# Patient Record
Sex: FEMALE | Race: WHITE | NOT HISPANIC OR LATINO | Employment: OTHER | URBAN - METROPOLITAN AREA
[De-identification: names, ages, dates, MRNs, and addresses within clinical notes are randomized per-mention and may not be internally consistent; named-entity substitution may affect disease eponyms.]

---

## 2023-06-06 ENCOUNTER — NURSING HOME VISIT (OUTPATIENT)
Dept: POST ACUTE CARE | Facility: EXTERNAL LOCATION | Age: 68
End: 2023-06-06
Payer: COMMERCIAL

## 2023-06-06 DIAGNOSIS — I10 HYPERTENSION, UNSPECIFIED TYPE: ICD-10-CM

## 2023-06-06 DIAGNOSIS — E78.5 HYPERLIPIDEMIA, UNSPECIFIED HYPERLIPIDEMIA TYPE: ICD-10-CM

## 2023-06-06 DIAGNOSIS — F10.29 ALCOHOL DEPENDENCE WITH UNSPECIFIED ALCOHOL-INDUCED DISORDER (MULTI): ICD-10-CM

## 2023-06-06 DIAGNOSIS — F32.A DEPRESSION, UNSPECIFIED DEPRESSION TYPE: ICD-10-CM

## 2023-06-06 DIAGNOSIS — J18.9 PNEUMONIA DUE TO INFECTIOUS ORGANISM, UNSPECIFIED LATERALITY, UNSPECIFIED PART OF LUNG: ICD-10-CM

## 2023-06-06 DIAGNOSIS — J44.9 CHRONIC OBSTRUCTIVE PULMONARY DISEASE, UNSPECIFIED COPD TYPE (MULTI): ICD-10-CM

## 2023-06-06 DIAGNOSIS — R53.81 PHYSICAL DECONDITIONING: Primary | ICD-10-CM

## 2023-06-06 PROCEDURE — 99309 SBSQ NF CARE MODERATE MDM 30: CPT | Performed by: NURSE PRACTITIONER

## 2023-06-06 NOTE — LETTER
Patient: Rose Marie Toussaint  : 1955    Encounter Date: 2023    Patient ID: Rose Marie Toussaint is a 67 y.o. female who is acute skilled care being seen and evaluated for multiple medical problems.  [id number]   1955    /84   Pulse 82   Temp 36.7 °C (98.1 °F)   Resp 18   Wt 57.6 kg (127 lb)   SpO2 97%     Assessment/Plan  Problem List Items Addressed This Visit          Respiratory    Chronic obstructive pulmonary disease (CMS/HCC)     Smoking cessation  Nicotine 21 mg patch transdermal change daily          Pneumonia due to infectious organism     Afebrile   Received IV Vancomycin and azithromycin while hospitalized  Robitussin DM 10 mg by mouth every 6 hrs as needed             Circulatory    Hypertension     Atenolol 25 mg by mouth daily   Chlorthalidone 25 mg by mouth daily             Other    Hyperlipidemia     Pravastatin 20 mg by mouth every HS          Alcohol dependence with unspecified alcohol-induced disorder (CMS/HCC)     MVI one tablet by mouth daily   Thiamine 100 mg by mouth daily   Folic acid 1 mg by mouth daily          Depression     Bupropion 100 mg by mouth BID    Sertraline 200 mg by mouth daily   Doxepin 50 mg by mouth daily          Physical deconditioning - Primary     PT              HPI: Client admitted at [facility] from 2023- 2023 with the final diagnosis of COPD, PN, and delirium tremor/ETOH. Client received IV Azithromycin and Vancomycin for PN, CT scan showed ground glass appearance. Client received Solumedrol and BIPAP, COPD. Precedex gtt, MVI, Thiamine, and Folate for ETOH withdrawal/ delirium tremor. Client denies HA, dizziness, or lightheadedness. Denies CP, SOB, or increased edema. C/O persistent cough. RA. Denies abdominal pain, N/V, diarrhea, or constipation. Denies dysuria. Denies change in appetite. Denies insomnia. C/O left ankle pain, chronic- declines left ankle x-ray.     Review of Systems ROS as described in in HPI     Physical  Exam  Constitutional:       Appearance: Normal appearance.      Comments: Sitting at bedside    HENT:      Head: Normocephalic.      Mouth/Throat:      Mouth: Mucous membranes are moist.   Cardiovascular:      Rate and Rhythm: Normal rate and regular rhythm.   Pulmonary:      Effort: Pulmonary effort is normal.      Breath sounds: Normal breath sounds.      Comments: RA   C/O persistent cough   Abdominal:      General: Bowel sounds are normal.   Genitourinary:     Comments: Denies dysuria   Musculoskeletal:      Cervical back: Neck supple.      Comments: PT  Left lateral and medial ankle edema- client states chronic issue since H/O fracture, declines Left ankle xray    Skin:     General: Skin is warm and dry.   Neurological:      Mental Status: She is alert. Mental status is at baseline.   Psychiatric:         Mood and Affect: Mood normal.      Comments: Conversational                    Electronically Signed By: SARAHI Nelson   6/8/23  8:12 AM

## 2023-06-08 VITALS
OXYGEN SATURATION: 97 % | RESPIRATION RATE: 18 BRPM | WEIGHT: 127 LBS | SYSTOLIC BLOOD PRESSURE: 116 MMHG | DIASTOLIC BLOOD PRESSURE: 84 MMHG | TEMPERATURE: 98.1 F | HEART RATE: 82 BPM

## 2023-06-08 PROBLEM — F32.A DEPRESSION: Status: ACTIVE | Noted: 2023-06-08

## 2023-06-08 PROBLEM — F10.29 ALCOHOL DEPENDENCE WITH UNSPECIFIED ALCOHOL-INDUCED DISORDER (MULTI): Status: ACTIVE | Noted: 2023-06-08

## 2023-06-08 PROBLEM — J44.9 CHRONIC OBSTRUCTIVE PULMONARY DISEASE (MULTI): Status: ACTIVE | Noted: 2023-06-08

## 2023-06-08 PROBLEM — R53.81 PHYSICAL DECONDITIONING: Status: ACTIVE | Noted: 2023-06-08

## 2023-06-08 PROBLEM — J18.9 PNEUMONIA DUE TO INFECTIOUS ORGANISM: Status: ACTIVE | Noted: 2023-06-08

## 2023-06-08 PROBLEM — E78.5 HYPERLIPIDEMIA: Status: ACTIVE | Noted: 2023-06-08

## 2023-06-08 PROBLEM — I10 HYPERTENSION: Status: ACTIVE | Noted: 2023-06-08

## 2023-06-08 NOTE — PROGRESS NOTES
Patient ID: Rose Marie Toussaint is a 67 y.o. female who is acute skilled care being seen and evaluated for multiple medical problems.  [id number]   1955    /84   Pulse 82   Temp 36.7 °C (98.1 °F)   Resp 18   Wt 57.6 kg (127 lb)   SpO2 97%     Assessment/Plan  Problem List Items Addressed This Visit          Respiratory    Chronic obstructive pulmonary disease (CMS/HCC)     Smoking cessation  Nicotine 21 mg patch transdermal change daily          Pneumonia due to infectious organism     Afebrile   Received IV Vancomycin and azithromycin while hospitalized  Robitussin DM 10 mg by mouth every 6 hrs as needed             Circulatory    Hypertension     Atenolol 25 mg by mouth daily   Chlorthalidone 25 mg by mouth daily             Other    Hyperlipidemia     Pravastatin 20 mg by mouth every HS          Alcohol dependence with unspecified alcohol-induced disorder (CMS/HCC)     MVI one tablet by mouth daily   Thiamine 100 mg by mouth daily   Folic acid 1 mg by mouth daily          Depression     Bupropion 100 mg by mouth BID    Sertraline 200 mg by mouth daily   Doxepin 50 mg by mouth daily          Physical deconditioning - Primary     PT              HPI: Client admitted at [facility] from 5/23/2023- 5/31/2023 with the final diagnosis of COPD, PN, and delirium tremor/ETOH. Client received IV Azithromycin and Vancomycin for PN, CT scan showed ground glass appearance. Client received Solumedrol and BIPAP, COPD. Precedex gtt, MVI, Thiamine, and Folate for ETOH withdrawal/ delirium tremor. Client denies HA, dizziness, or lightheadedness. Denies CP, SOB, or increased edema. C/O persistent cough. RA. Denies abdominal pain, N/V, diarrhea, or constipation. Denies dysuria. Denies change in appetite. Denies insomnia. C/O left ankle pain, chronic- declines left ankle x-ray.     Review of Systems ROS as described in in HPI     Physical Exam  Constitutional:       Appearance: Normal appearance.      Comments: Sitting at  bedside    HENT:      Head: Normocephalic.      Mouth/Throat:      Mouth: Mucous membranes are moist.   Cardiovascular:      Rate and Rhythm: Normal rate and regular rhythm.   Pulmonary:      Effort: Pulmonary effort is normal.      Breath sounds: Normal breath sounds.      Comments: RA   C/O persistent cough   Abdominal:      General: Bowel sounds are normal.   Genitourinary:     Comments: Denies dysuria   Musculoskeletal:      Cervical back: Neck supple.      Comments: PT  Left lateral and medial ankle edema- client states chronic issue since H/O fracture, declines Left ankle xray    Skin:     General: Skin is warm and dry.   Neurological:      Mental Status: She is alert. Mental status is at baseline.   Psychiatric:         Mood and Affect: Mood normal.      Comments: Conversational

## 2023-06-08 NOTE — ASSESSMENT & PLAN NOTE
Afebrile   Received IV Vancomycin and azithromycin while hospitalized  Robitussin DM 10 mg by mouth every 6 hrs as needed

## 2023-10-17 ENCOUNTER — HOSPITAL ENCOUNTER (OUTPATIENT)
Dept: RESPIRATORY THERAPY | Facility: HOSPITAL | Age: 68
End: 2023-10-17
Payer: COMMERCIAL

## 2023-10-18 ENCOUNTER — APPOINTMENT (OUTPATIENT)
Dept: RESPIRATORY THERAPY | Facility: HOSPITAL | Age: 68
End: 2023-10-18
Payer: COMMERCIAL

## 2023-10-18 ENCOUNTER — APPOINTMENT (OUTPATIENT)
Dept: PULMONOLOGY | Facility: CLINIC | Age: 68
End: 2023-10-18
Payer: COMMERCIAL

## 2023-10-18 PROBLEM — R06.09 DOE (DYSPNEA ON EXERTION): Status: ACTIVE | Noted: 2023-10-18

## 2023-10-18 RX ORDER — BUSPIRONE HYDROCHLORIDE 30 MG/1
30 TABLET ORAL 2 TIMES DAILY
COMMUNITY

## 2023-10-18 RX ORDER — PRAVASTATIN SODIUM 20 MG/1
1 TABLET ORAL DAILY
COMMUNITY

## 2023-10-18 RX ORDER — POTASSIUM CHLORIDE 750 MG/1
10 TABLET, FILM COATED, EXTENDED RELEASE ORAL DAILY
Status: ON HOLD | COMMUNITY
End: 2024-04-28

## 2023-10-18 RX ORDER — ATENOLOL 25 MG/1
1 TABLET ORAL DAILY
COMMUNITY

## 2023-10-18 RX ORDER — SERTRALINE HYDROCHLORIDE 100 MG/1
200 TABLET, FILM COATED ORAL DAILY
COMMUNITY

## 2023-10-18 RX ORDER — DILTIAZEM HYDROCHLORIDE 240 MG/1
240 CAPSULE, COATED, EXTENDED RELEASE ORAL
COMMUNITY

## 2023-10-18 RX ORDER — CHLORTHALIDONE 25 MG/1
25 TABLET ORAL
COMMUNITY

## 2023-10-18 RX ORDER — BUPROPION HYDROCHLORIDE 100 MG/1
100 TABLET ORAL 2 TIMES DAILY
COMMUNITY

## 2023-10-18 RX ORDER — DOXEPIN HYDROCHLORIDE 50 MG/1
50 CAPSULE ORAL NIGHTLY
COMMUNITY
End: 2024-04-29 | Stop reason: HOSPADM

## 2023-10-18 RX ORDER — ALBUTEROL SULFATE 90 UG/1
2 AEROSOL, METERED RESPIRATORY (INHALATION) EVERY 6 HOURS PRN
COMMUNITY

## 2023-10-19 ENCOUNTER — HOSPITAL ENCOUNTER (OUTPATIENT)
Dept: RESPIRATORY THERAPY | Facility: HOSPITAL | Age: 68
End: 2023-10-19
Payer: COMMERCIAL

## 2023-10-19 ENCOUNTER — APPOINTMENT (OUTPATIENT)
Dept: PULMONOLOGY | Facility: CLINIC | Age: 68
End: 2023-10-19
Payer: COMMERCIAL

## 2023-10-25 ENCOUNTER — APPOINTMENT (OUTPATIENT)
Dept: RESPIRATORY THERAPY | Facility: HOSPITAL | Age: 68
End: 2023-10-25
Payer: COMMERCIAL

## 2023-11-06 ENCOUNTER — APPOINTMENT (OUTPATIENT)
Dept: RESPIRATORY THERAPY | Facility: HOSPITAL | Age: 68
End: 2023-11-06
Payer: COMMERCIAL

## 2023-11-08 ENCOUNTER — APPOINTMENT (OUTPATIENT)
Dept: PULMONOLOGY | Facility: CLINIC | Age: 68
End: 2023-11-08
Payer: COMMERCIAL

## 2024-04-14 ENCOUNTER — APPOINTMENT (OUTPATIENT)
Dept: RADIOLOGY | Facility: HOSPITAL | Age: 69
DRG: 560 | End: 2024-04-14
Payer: COMMERCIAL

## 2024-04-14 ENCOUNTER — HOSPITAL ENCOUNTER (INPATIENT)
Facility: HOSPITAL | Age: 69
LOS: 2 days | Discharge: ANOTHER HOSPITAL | DRG: 560 | End: 2024-04-16
Attending: STUDENT IN AN ORGANIZED HEALTH CARE EDUCATION/TRAINING PROGRAM | Admitting: STUDENT IN AN ORGANIZED HEALTH CARE EDUCATION/TRAINING PROGRAM
Payer: COMMERCIAL

## 2024-04-14 DIAGNOSIS — E78.5 HYPERLIPIDEMIA, UNSPECIFIED HYPERLIPIDEMIA TYPE: ICD-10-CM

## 2024-04-14 DIAGNOSIS — F10.10 ALCOHOL ABUSE: ICD-10-CM

## 2024-04-14 DIAGNOSIS — K59.00 CONSTIPATION, UNSPECIFIED CONSTIPATION TYPE: ICD-10-CM

## 2024-04-14 DIAGNOSIS — I73.9 PERIPHERAL VASCULAR DISEASE, UNSPECIFIED (CMS-HCC): ICD-10-CM

## 2024-04-14 DIAGNOSIS — Z72.0 NICOTINE ABUSE: ICD-10-CM

## 2024-04-14 DIAGNOSIS — M00.9: Primary | ICD-10-CM

## 2024-04-14 DIAGNOSIS — I10 HYPERTENSION, UNSPECIFIED TYPE: ICD-10-CM

## 2024-04-14 DIAGNOSIS — G47.00 INSOMNIA, UNSPECIFIED TYPE: ICD-10-CM

## 2024-04-14 DIAGNOSIS — R11.0 NAUSEA: ICD-10-CM

## 2024-04-14 DIAGNOSIS — J44.9 CHRONIC OBSTRUCTIVE PULMONARY DISEASE, UNSPECIFIED COPD TYPE (MULTI): ICD-10-CM

## 2024-04-14 DIAGNOSIS — Z29.9 ENCOUNTER FOR DEEP VEIN THROMBOSIS (DVT) PROPHYLAXIS: ICD-10-CM

## 2024-04-14 DIAGNOSIS — F32.A DEPRESSION, UNSPECIFIED DEPRESSION TYPE: ICD-10-CM

## 2024-04-14 LAB
ALBUMIN SERPL BCP-MCNC: 3.2 G/DL (ref 3.4–5)
ANION GAP SERPL CALC-SCNC: 12 MMOL/L (ref 10–20)
BUN SERPL-MCNC: 22 MG/DL (ref 6–23)
CALCIUM SERPL-MCNC: 9.9 MG/DL (ref 8.6–10.3)
CHLORIDE SERPL-SCNC: 93 MMOL/L (ref 98–107)
CO2 SERPL-SCNC: 31 MMOL/L (ref 21–32)
CREAT SERPL-MCNC: 1.02 MG/DL (ref 0.5–1.05)
EGFRCR SERPLBLD CKD-EPI 2021: 61 ML/MIN/1.73M*2
ERYTHROCYTE [DISTWIDTH] IN BLOOD BY AUTOMATED COUNT: 12.8 % (ref 11.5–14.5)
GLUCOSE SERPL-MCNC: 88 MG/DL (ref 74–99)
HCT VFR BLD AUTO: 35.5 % (ref 36–46)
HGB BLD-MCNC: 11.9 G/DL (ref 12–16)
LACTATE SERPL-SCNC: 0.5 MMOL/L (ref 0.4–2)
MAGNESIUM SERPL-MCNC: 1.38 MG/DL (ref 1.6–2.4)
MCH RBC QN AUTO: 32.2 PG (ref 26–34)
MCHC RBC AUTO-ENTMCNC: 33.5 G/DL (ref 32–36)
MCV RBC AUTO: 96 FL (ref 80–100)
NRBC BLD-RTO: 0 /100 WBCS (ref 0–0)
PHOSPHATE SERPL-MCNC: 3.4 MG/DL (ref 2.5–4.9)
PLATELET # BLD AUTO: 410 X10*3/UL (ref 150–450)
POTASSIUM SERPL-SCNC: 4.4 MMOL/L (ref 3.5–5.3)
RBC # BLD AUTO: 3.69 X10*6/UL (ref 4–5.2)
SODIUM SERPL-SCNC: 132 MMOL/L (ref 136–145)
WBC # BLD AUTO: 14.6 X10*3/UL (ref 4.4–11.3)

## 2024-04-14 PROCEDURE — 80069 RENAL FUNCTION PANEL: CPT | Performed by: INTERNAL MEDICINE

## 2024-04-14 PROCEDURE — 1100000001 HC PRIVATE ROOM DAILY

## 2024-04-14 PROCEDURE — 96368 THER/DIAG CONCURRENT INF: CPT

## 2024-04-14 PROCEDURE — 96376 TX/PRO/DX INJ SAME DRUG ADON: CPT

## 2024-04-14 PROCEDURE — 2500000004 HC RX 250 GENERAL PHARMACY W/ HCPCS (ALT 636 FOR OP/ED)

## 2024-04-14 PROCEDURE — 36415 COLL VENOUS BLD VENIPUNCTURE: CPT | Performed by: INTERNAL MEDICINE

## 2024-04-14 PROCEDURE — 96367 TX/PROPH/DG ADDL SEQ IV INF: CPT

## 2024-04-14 PROCEDURE — 2500000002 HC RX 250 W HCPCS SELF ADMINISTERED DRUGS (ALT 637 FOR MEDICARE OP, ALT 636 FOR OP/ED): Mod: MUE | Performed by: INTERNAL MEDICINE

## 2024-04-14 PROCEDURE — 96365 THER/PROPH/DIAG IV INF INIT: CPT

## 2024-04-14 PROCEDURE — 2500000004 HC RX 250 GENERAL PHARMACY W/ HCPCS (ALT 636 FOR OP/ED): Performed by: STUDENT IN AN ORGANIZED HEALTH CARE EDUCATION/TRAINING PROGRAM

## 2024-04-14 PROCEDURE — 73610 X-RAY EXAM OF ANKLE: CPT | Mod: LEFT SIDE | Performed by: RADIOLOGY

## 2024-04-14 PROCEDURE — S4991 NICOTINE PATCH NONLEGEND: HCPCS

## 2024-04-14 PROCEDURE — 2500000004 HC RX 250 GENERAL PHARMACY W/ HCPCS (ALT 636 FOR OP/ED): Performed by: INTERNAL MEDICINE

## 2024-04-14 PROCEDURE — 83735 ASSAY OF MAGNESIUM: CPT | Performed by: INTERNAL MEDICINE

## 2024-04-14 PROCEDURE — G0378 HOSPITAL OBSERVATION PER HR: HCPCS

## 2024-04-14 PROCEDURE — 99223 1ST HOSP IP/OBS HIGH 75: CPT | Performed by: STUDENT IN AN ORGANIZED HEALTH CARE EDUCATION/TRAINING PROGRAM

## 2024-04-14 PROCEDURE — 2500000002 HC RX 250 W HCPCS SELF ADMINISTERED DRUGS (ALT 637 FOR MEDICARE OP, ALT 636 FOR OP/ED)

## 2024-04-14 PROCEDURE — 83605 ASSAY OF LACTIC ACID: CPT | Performed by: INTERNAL MEDICINE

## 2024-04-14 PROCEDURE — 73610 X-RAY EXAM OF ANKLE: CPT | Mod: LT

## 2024-04-14 PROCEDURE — 96375 TX/PRO/DX INJ NEW DRUG ADDON: CPT

## 2024-04-14 PROCEDURE — 2500000001 HC RX 250 WO HCPCS SELF ADMINISTERED DRUGS (ALT 637 FOR MEDICARE OP): Performed by: INTERNAL MEDICINE

## 2024-04-14 PROCEDURE — 84100 ASSAY OF PHOSPHORUS: CPT | Performed by: INTERNAL MEDICINE

## 2024-04-14 PROCEDURE — 2500000001 HC RX 250 WO HCPCS SELF ADMINISTERED DRUGS (ALT 637 FOR MEDICARE OP)

## 2024-04-14 PROCEDURE — 96366 THER/PROPH/DIAG IV INF ADDON: CPT

## 2024-04-14 PROCEDURE — 85027 COMPLETE CBC AUTOMATED: CPT | Performed by: INTERNAL MEDICINE

## 2024-04-14 RX ORDER — LORAZEPAM 2 MG/ML
1 INJECTION INTRAMUSCULAR EVERY 2 HOUR PRN
Status: DISCONTINUED | OUTPATIENT
Start: 2024-04-14 | End: 2024-04-16

## 2024-04-14 RX ORDER — ONDANSETRON HYDROCHLORIDE 2 MG/ML
4 INJECTION, SOLUTION INTRAVENOUS EVERY 8 HOURS PRN
Status: DISCONTINUED | OUTPATIENT
Start: 2024-04-14 | End: 2024-04-16 | Stop reason: HOSPADM

## 2024-04-14 RX ORDER — CHLORTHALIDONE 25 MG/1
25 TABLET ORAL DAILY
Status: DISCONTINUED | OUTPATIENT
Start: 2024-04-14 | End: 2024-04-16 | Stop reason: HOSPADM

## 2024-04-14 RX ORDER — ACETAMINOPHEN 500 MG
5 TABLET ORAL NIGHTLY PRN
Status: DISCONTINUED | OUTPATIENT
Start: 2024-04-14 | End: 2024-04-16 | Stop reason: HOSPADM

## 2024-04-14 RX ORDER — PRAVASTATIN SODIUM 40 MG/1
20 TABLET ORAL DAILY
Status: DISCONTINUED | OUTPATIENT
Start: 2024-04-14 | End: 2024-04-16 | Stop reason: HOSPADM

## 2024-04-14 RX ORDER — OXYCODONE HYDROCHLORIDE 10 MG/1
10 TABLET ORAL EVERY 6 HOURS PRN
Status: DISCONTINUED | OUTPATIENT
Start: 2024-04-14 | End: 2024-04-16 | Stop reason: HOSPADM

## 2024-04-14 RX ORDER — BUPROPION HYDROCHLORIDE 100 MG/1
100 TABLET ORAL 2 TIMES DAILY
Status: DISCONTINUED | OUTPATIENT
Start: 2024-04-14 | End: 2024-04-16 | Stop reason: HOSPADM

## 2024-04-14 RX ORDER — SERTRALINE HYDROCHLORIDE 50 MG/1
200 TABLET, FILM COATED ORAL DAILY
Status: DISCONTINUED | OUTPATIENT
Start: 2024-04-14 | End: 2024-04-16 | Stop reason: HOSPADM

## 2024-04-14 RX ORDER — OXYCODONE HYDROCHLORIDE 5 MG/1
5 TABLET ORAL EVERY 6 HOURS PRN
Status: DISCONTINUED | OUTPATIENT
Start: 2024-04-14 | End: 2024-04-16 | Stop reason: HOSPADM

## 2024-04-14 RX ORDER — LANOLIN ALCOHOL/MO/W.PET/CERES
100 CREAM (GRAM) TOPICAL DAILY
Status: DISCONTINUED | OUTPATIENT
Start: 2024-04-17 | End: 2024-04-16 | Stop reason: HOSPADM

## 2024-04-14 RX ORDER — ALBUTEROL SULFATE 90 UG/1
2 AEROSOL, METERED RESPIRATORY (INHALATION) EVERY 6 HOURS PRN
Status: DISCONTINUED | OUTPATIENT
Start: 2024-04-14 | End: 2024-04-16 | Stop reason: HOSPADM

## 2024-04-14 RX ORDER — FOLIC ACID 1 MG/1
1 TABLET ORAL DAILY
Status: DISCONTINUED | OUTPATIENT
Start: 2024-04-14 | End: 2024-04-16 | Stop reason: HOSPADM

## 2024-04-14 RX ORDER — POLYETHYLENE GLYCOL 3350 17 G/17G
17 POWDER, FOR SOLUTION ORAL DAILY PRN
Status: DISCONTINUED | OUTPATIENT
Start: 2024-04-14 | End: 2024-04-16 | Stop reason: HOSPADM

## 2024-04-14 RX ORDER — MULTIVIT-MIN/IRON FUM/FOLIC AC 7.5 MG-4
1 TABLET ORAL DAILY
Status: DISCONTINUED | OUTPATIENT
Start: 2024-04-14 | End: 2024-04-16 | Stop reason: HOSPADM

## 2024-04-14 RX ORDER — DILTIAZEM HYDROCHLORIDE 120 MG/1
240 CAPSULE, COATED, EXTENDED RELEASE ORAL DAILY
Status: DISCONTINUED | OUTPATIENT
Start: 2024-04-14 | End: 2024-04-16 | Stop reason: HOSPADM

## 2024-04-14 RX ORDER — IBUPROFEN 200 MG
1 TABLET ORAL DAILY
Status: DISCONTINUED | OUTPATIENT
Start: 2024-04-14 | End: 2024-04-16 | Stop reason: HOSPADM

## 2024-04-14 RX ORDER — BUSPIRONE HYDROCHLORIDE 15 MG/1
30 TABLET ORAL 2 TIMES DAILY
Status: DISCONTINUED | OUTPATIENT
Start: 2024-04-14 | End: 2024-04-16 | Stop reason: HOSPADM

## 2024-04-14 RX ORDER — ACETAMINOPHEN 325 MG/1
650 TABLET ORAL EVERY 4 HOURS PRN
Status: DISCONTINUED | OUTPATIENT
Start: 2024-04-14 | End: 2024-04-16 | Stop reason: HOSPADM

## 2024-04-14 RX ORDER — VANCOMYCIN HYDROCHLORIDE 1 G/20ML
INJECTION, POWDER, LYOPHILIZED, FOR SOLUTION INTRAVENOUS DAILY PRN
Status: DISCONTINUED | OUTPATIENT
Start: 2024-04-14 | End: 2024-04-16 | Stop reason: HOSPADM

## 2024-04-14 RX ORDER — MAGNESIUM SULFATE HEPTAHYDRATE 40 MG/ML
2 INJECTION, SOLUTION INTRAVENOUS ONCE
Status: COMPLETED | OUTPATIENT
Start: 2024-04-14 | End: 2024-04-14

## 2024-04-14 RX ORDER — ATENOLOL 25 MG/1
25 TABLET ORAL DAILY
Status: DISCONTINUED | OUTPATIENT
Start: 2024-04-14 | End: 2024-04-16 | Stop reason: HOSPADM

## 2024-04-14 RX ORDER — ENOXAPARIN SODIUM 100 MG/ML
40 INJECTION SUBCUTANEOUS EVERY 24 HOURS
Status: DISCONTINUED | OUTPATIENT
Start: 2024-04-14 | End: 2024-04-16 | Stop reason: HOSPADM

## 2024-04-14 RX ORDER — VANCOMYCIN HYDROCHLORIDE 1 G/200ML
1000 INJECTION, SOLUTION INTRAVENOUS EVERY 24 HOURS
Status: DISCONTINUED | OUTPATIENT
Start: 2024-04-14 | End: 2024-04-16

## 2024-04-14 RX ORDER — POLYETHYLENE GLYCOL 3350 17 G/17G
17 POWDER, FOR SOLUTION ORAL DAILY
Status: DISCONTINUED | OUTPATIENT
Start: 2024-04-14 | End: 2024-04-14

## 2024-04-14 RX ADMIN — BUSPIRONE HYDROCHLORIDE 30 MG: 15 TABLET ORAL at 08:16

## 2024-04-14 RX ADMIN — DILTIAZEM HYDROCHLORIDE 240 MG: 120 CAPSULE, COATED, EXTENDED RELEASE ORAL at 08:16

## 2024-04-14 RX ADMIN — CHLORTHALIDONE 25 MG: 25 TABLET ORAL at 09:24

## 2024-04-14 RX ADMIN — THIAMINE HYDROCHLORIDE 500 MG: 100 INJECTION, SOLUTION INTRAMUSCULAR; INTRAVENOUS at 08:19

## 2024-04-14 RX ADMIN — PRAVASTATIN SODIUM 20 MG: 40 TABLET ORAL at 08:15

## 2024-04-14 RX ADMIN — OXYCODONE HYDROCHLORIDE 5 MG: 5 TABLET ORAL at 15:51

## 2024-04-14 RX ADMIN — ENOXAPARIN SODIUM 40 MG: 40 INJECTION SUBCUTANEOUS at 09:23

## 2024-04-14 RX ADMIN — FOLIC ACID 1 MG: 1 TABLET ORAL at 08:15

## 2024-04-14 RX ADMIN — OXYCODONE HYDROCHLORIDE 10 MG: 10 TABLET ORAL at 09:23

## 2024-04-14 RX ADMIN — HYDROMORPHONE HYDROCHLORIDE 0.2 MG: 1 INJECTION, SOLUTION INTRAMUSCULAR; INTRAVENOUS; SUBCUTANEOUS at 11:42

## 2024-04-14 RX ADMIN — NICOTINE 1 PATCH: 14 PATCH, EXTENDED RELEASE TRANSDERMAL at 15:51

## 2024-04-14 RX ADMIN — VANCOMYCIN HYDROCHLORIDE 1000 MG: 1 INJECTION, SOLUTION INTRAVENOUS at 11:42

## 2024-04-14 RX ADMIN — Medication 1 TABLET: at 08:15

## 2024-04-14 RX ADMIN — HYDROMORPHONE HYDROCHLORIDE 0.2 MG: 1 INJECTION, SOLUTION INTRAMUSCULAR; INTRAVENOUS; SUBCUTANEOUS at 15:02

## 2024-04-14 RX ADMIN — BUPROPION HYDROCHLORIDE 100 MG: 100 TABLET, FILM COATED ORAL at 08:15

## 2024-04-14 RX ADMIN — LORAZEPAM 1 MG: 2 INJECTION INTRAMUSCULAR; INTRAVENOUS at 15:51

## 2024-04-14 RX ADMIN — THIAMINE HYDROCHLORIDE 500 MG: 100 INJECTION, SOLUTION INTRAMUSCULAR; INTRAVENOUS at 14:03

## 2024-04-14 RX ADMIN — BUSPIRONE HYDROCHLORIDE 30 MG: 15 TABLET ORAL at 20:08

## 2024-04-14 RX ADMIN — ATENOLOL 25 MG: 25 TABLET ORAL at 08:16

## 2024-04-14 RX ADMIN — THIAMINE HYDROCHLORIDE 500 MG: 100 INJECTION, SOLUTION INTRAMUSCULAR; INTRAVENOUS at 22:02

## 2024-04-14 RX ADMIN — SERTRALINE HYDROCHLORIDE 200 MG: 50 TABLET ORAL at 08:15

## 2024-04-14 RX ADMIN — MAGNESIUM SULFATE HEPTAHYDRATE 2 G: 2 INJECTION, SOLUTION INTRAVENOUS at 09:24

## 2024-04-14 RX ADMIN — BUPROPION HYDROCHLORIDE 100 MG: 100 TABLET, FILM COATED ORAL at 20:08

## 2024-04-14 SDOH — SOCIAL STABILITY: SOCIAL INSECURITY: ABUSE: ADULT

## 2024-04-14 SDOH — SOCIAL STABILITY: SOCIAL INSECURITY: WERE YOU ABLE TO COMPLETE ALL THE BEHAVIORAL HEALTH SCREENINGS?: YES

## 2024-04-14 SDOH — SOCIAL STABILITY: SOCIAL INSECURITY: DO YOU FEEL UNSAFE GOING BACK TO THE PLACE WHERE YOU ARE LIVING?: NO

## 2024-04-14 SDOH — SOCIAL STABILITY: SOCIAL INSECURITY: DOES ANYONE TRY TO KEEP YOU FROM HAVING/CONTACTING OTHER FRIENDS OR DOING THINGS OUTSIDE YOUR HOME?: NO

## 2024-04-14 SDOH — SOCIAL STABILITY: SOCIAL INSECURITY: DO YOU FEEL ANYONE HAS EXPLOITED OR TAKEN ADVANTAGE OF YOU FINANCIALLY OR OF YOUR PERSONAL PROPERTY?: NO

## 2024-04-14 SDOH — SOCIAL STABILITY: SOCIAL INSECURITY: HAS ANYONE EVER THREATENED TO HURT YOUR FAMILY OR YOUR PETS?: NO

## 2024-04-14 SDOH — SOCIAL STABILITY: SOCIAL INSECURITY: ARE THERE ANY APPARENT SIGNS OF INJURIES/BEHAVIORS THAT COULD BE RELATED TO ABUSE/NEGLECT?: NO

## 2024-04-14 SDOH — SOCIAL STABILITY: SOCIAL INSECURITY: ARE YOU OR HAVE YOU BEEN THREATENED OR ABUSED PHYSICALLY, EMOTIONALLY, OR SEXUALLY BY ANYONE?: NO

## 2024-04-14 SDOH — SOCIAL STABILITY: SOCIAL INSECURITY: HAVE YOU HAD THOUGHTS OF HARMING ANYONE ELSE?: NO

## 2024-04-14 ASSESSMENT — ACTIVITIES OF DAILY LIVING (ADL)
DRESSING YOURSELF: DEPENDENT
HEARING - LEFT EAR: DIFFICULTY WITH NOISE
ADEQUATE_TO_COMPLETE_ADL: NO
LACK_OF_TRANSPORTATION: NO
HEARING - RIGHT EAR: DIFFICULTY WITH NOISE
TOILETING: NEEDS ASSISTANCE
BATHING: DEPENDENT
PATIENT'S MEMORY ADEQUATE TO SAFELY COMPLETE DAILY ACTIVITIES?: YES
WALKS IN HOME: NEEDS ASSISTANCE
FEEDING YOURSELF: DEPENDENT
LACK_OF_TRANSPORTATION: NO
GROOMING: DEPENDENT
JUDGMENT_ADEQUATE_SAFELY_COMPLETE_DAILY_ACTIVITIES: YES
ASSISTIVE_DEVICE: WALKER

## 2024-04-14 ASSESSMENT — LIFESTYLE VARIABLES
TOTAL SCORE: 0
NAUSEA AND VOMITING: NO NAUSEA AND NO VOMITING
NAUSEA AND VOMITING: NO NAUSEA AND NO VOMITING
PULSE: 94
TOTAL SCORE: 0
VISUAL DISTURBANCES: NOT PRESENT
PAROXYSMAL SWEATS: NO SWEAT VISIBLE
AUDITORY DISTURBANCES: NOT PRESENT
VISUAL DISTURBANCES: NOT PRESENT
ORIENTATION AND CLOUDING OF SENSORIUM: ORIENTED AND CAN DO SERIAL ADDITIONS
ANXIETY: MODERATELY ANXIOUS, OR GUARDED, SO ANXIETY IS INFERRED
AGITATION: NORMAL ACTIVITY
VISUAL DISTURBANCES: NOT PRESENT
AUDITORY DISTURBANCES: NOT PRESENT
TREMOR: NO TREMOR
BLOOD PRESSURE: 105/60
TOTAL SCORE: 1
AGITATION: NORMAL ACTIVITY
TREMOR: NO TREMOR
BLOOD PRESSURE: 120/83
TOTAL SCORE: 0
SKIP TO QUESTIONS 9-10: 0
PULSE: 75
ORIENTATION AND CLOUDING OF SENSORIUM: ORIENTED AND CAN DO SERIAL ADDITIONS
TREMOR: NO TREMOR
HEADACHE, FULLNESS IN HEAD: NOT PRESENT
ANXIETY: MILDLY ANXIOUS
TOTAL SCORE: 0
VISUAL DISTURBANCES: NOT PRESENT
AGITATION: NORMAL ACTIVITY
AGITATION: NORMAL ACTIVITY
AGITATION: 3
PULSE: 75
ORIENTATION AND CLOUDING OF SENSORIUM: ORIENTED AND CAN DO SERIAL ADDITIONS
AGITATION: NORMAL ACTIVITY
TOTAL SCORE: 0
ANXIETY: NO ANXIETY, AT EASE
HEADACHE, FULLNESS IN HEAD: NOT PRESENT
TREMOR: NO TREMOR
HEADACHE, FULLNESS IN HEAD: NOT PRESENT
PULSE: 74
AUDITORY DISTURBANCES: NOT PRESENT
TREMOR: NO TREMOR
BLOOD PRESSURE: 117/71
HEADACHE, FULLNESS IN HEAD: NOT PRESENT
PAROXYSMAL SWEATS: NO SWEAT VISIBLE
AUDITORY DISTURBANCES: NOT PRESENT
PAROXYSMAL SWEATS: NO SWEAT VISIBLE
NAUSEA AND VOMITING: NO NAUSEA AND NO VOMITING
VISUAL DISTURBANCES: NOT PRESENT
PAROXYSMAL SWEATS: NO SWEAT VISIBLE
TOTAL SCORE: 0
AUDITORY DISTURBANCES: NOT PRESENT
HEADACHE, FULLNESS IN HEAD: NOT PRESENT
ANXIETY: NO ANXIETY, AT EASE
NAUSEA AND VOMITING: NO NAUSEA AND NO VOMITING
ANXIETY: NO ANXIETY, AT EASE
HEADACHE, FULLNESS IN HEAD: NOT PRESENT
NAUSEA AND VOMITING: NO NAUSEA AND NO VOMITING
VISUAL DISTURBANCES: NOT PRESENT
TOTAL SCORE: 0
HOW MANY STANDARD DRINKS CONTAINING ALCOHOL DO YOU HAVE ON A TYPICAL DAY: 1 OR 2
ORIENTATION AND CLOUDING OF SENSORIUM: ORIENTED AND CAN DO SERIAL ADDITIONS
ORIENTATION AND CLOUDING OF SENSORIUM: ORIENTED AND CAN DO SERIAL ADDITIONS
AGITATION: NORMAL ACTIVITY
ANXIETY: NO ANXIETY, AT EASE
BLOOD PRESSURE: 119/68
ORIENTATION AND CLOUDING OF SENSORIUM: ORIENTED AND CAN DO SERIAL ADDITIONS
ANXIETY: NO ANXIETY, AT EASE
AGITATION: NORMAL ACTIVITY
PAROXYSMAL SWEATS: NO SWEAT VISIBLE
ANXIETY: MILDLY ANXIOUS
TREMOR: NO TREMOR
TREMOR: NO TREMOR
HEADACHE, FULLNESS IN HEAD: NOT PRESENT
AUDITORY DISTURBANCES: NOT PRESENT
TREMOR: NO TREMOR
HEADACHE, FULLNESS IN HEAD: NOT PRESENT
AUDITORY DISTURBANCES: NOT PRESENT
PAROXYSMAL SWEATS: NO SWEAT VISIBLE
AUDITORY DISTURBANCES: NOT PRESENT
HEADACHE, FULLNESS IN HEAD: NOT PRESENT
AGITATION: NORMAL ACTIVITY
TREMOR: NO TREMOR
BLOOD PRESSURE: 165/80
TOTAL SCORE: 7
NAUSEA AND VOMITING: NO NAUSEA AND NO VOMITING
AUDITORY DISTURBANCES: NOT PRESENT
TREMOR: NO TREMOR
NAUSEA AND VOMITING: NO NAUSEA AND NO VOMITING
PAROXYSMAL SWEATS: NO SWEAT VISIBLE
HEADACHE, FULLNESS IN HEAD: NOT PRESENT
VISUAL DISTURBANCES: NOT PRESENT
AUDITORY DISTURBANCES: NOT PRESENT
NAUSEA AND VOMITING: NO NAUSEA AND NO VOMITING
PULSE: 75
VISUAL DISTURBANCES: NOT PRESENT
AGITATION: NORMAL ACTIVITY
ORIENTATION AND CLOUDING OF SENSORIUM: ORIENTED AND CAN DO SERIAL ADDITIONS
NAUSEA AND VOMITING: NO NAUSEA AND NO VOMITING
PAROXYSMAL SWEATS: NO SWEAT VISIBLE
ANXIETY: NO ANXIETY, AT EASE
VISUAL DISTURBANCES: NOT PRESENT
AUDIT-C TOTAL SCORE: 8
ORIENTATION AND CLOUDING OF SENSORIUM: ORIENTED AND CAN DO SERIAL ADDITIONS
ORIENTATION AND CLOUDING OF SENSORIUM: ORIENTED AND CAN DO SERIAL ADDITIONS
NAUSEA AND VOMITING: NO NAUSEA AND NO VOMITING
ORIENTATION AND CLOUDING OF SENSORIUM: ORIENTED AND CAN DO SERIAL ADDITIONS
AUDIT-C TOTAL SCORE: 8
HOW OFTEN DO YOU HAVE A DRINK CONTAINING ALCOHOL: 4 OR MORE TIMES A WEEK
HOW OFTEN DO YOU HAVE 6 OR MORE DRINKS ON ONE OCCASION: DAILY OR ALMOST DAILY
ANXIETY: NO ANXIETY, AT EASE
TOTAL SCORE: 1
VISUAL DISTURBANCES: NOT PRESENT

## 2024-04-14 ASSESSMENT — COGNITIVE AND FUNCTIONAL STATUS - GENERAL
MOVING TO AND FROM BED TO CHAIR: A LITTLE
WALKING IN HOSPITAL ROOM: A LITTLE
DAILY ACTIVITIY SCORE: 23
TOILETING: A LITTLE
WALKING IN HOSPITAL ROOM: A LITTLE
MOVING TO AND FROM BED TO CHAIR: A LITTLE
STANDING UP FROM CHAIR USING ARMS: A LITTLE
PATIENT BASELINE BEDBOUND: NO
CLIMB 3 TO 5 STEPS WITH RAILING: A LOT
DAILY ACTIVITIY SCORE: 23
STANDING UP FROM CHAIR USING ARMS: A LITTLE
MOBILITY SCORE: 19
CLIMB 3 TO 5 STEPS WITH RAILING: A LOT
MOBILITY SCORE: 19
TOILETING: A LITTLE

## 2024-04-14 ASSESSMENT — PATIENT HEALTH QUESTIONNAIRE - PHQ9
SUM OF ALL RESPONSES TO PHQ9 QUESTIONS 1 & 2: 0
1. LITTLE INTEREST OR PLEASURE IN DOING THINGS: NOT AT ALL
2. FEELING DOWN, DEPRESSED OR HOPELESS: NOT AT ALL

## 2024-04-14 ASSESSMENT — ENCOUNTER SYMPTOMS
ALLERGIC/IMMUNOLOGIC NEGATIVE: 1
JOINT SWELLING: 1
FEVER: 1
ENDOCRINE NEGATIVE: 1
RESPIRATORY NEGATIVE: 1
TREMORS: 1
HEMATOLOGIC/LYMPHATIC NEGATIVE: 1
EYES NEGATIVE: 1
GASTROINTESTINAL NEGATIVE: 1
NERVOUS/ANXIOUS: 1

## 2024-04-14 ASSESSMENT — PAIN SCALES - GENERAL
PAINLEVEL_OUTOF10: 4
PAINLEVEL_OUTOF10: 9
PAINLEVEL_OUTOF10: 6
PAINLEVEL_OUTOF10: 9
PAINLEVEL_OUTOF10: 0 - NO PAIN
PAINLEVEL_OUTOF10: 8

## 2024-04-14 ASSESSMENT — PAIN - FUNCTIONAL ASSESSMENT
PAIN_FUNCTIONAL_ASSESSMENT: 0-10
PAIN_FUNCTIONAL_ASSESSMENT: 0-10

## 2024-04-14 ASSESSMENT — COLUMBIA-SUICIDE SEVERITY RATING SCALE - C-SSRS
2. HAVE YOU ACTUALLY HAD ANY THOUGHTS OF KILLING YOURSELF?: NO
1. IN THE PAST MONTH, HAVE YOU WISHED YOU WERE DEAD OR WISHED YOU COULD GO TO SLEEP AND NOT WAKE UP?: NO
6. HAVE YOU EVER DONE ANYTHING, STARTED TO DO ANYTHING, OR PREPARED TO DO ANYTHING TO END YOUR LIFE?: NO

## 2024-04-14 ASSESSMENT — PAIN DESCRIPTION - ORIENTATION: ORIENTATION: RIGHT

## 2024-04-14 NOTE — CONSULTS
Consults  Referred by MARIANA Leal    Primary MD: Iain Márquez DO    Reason For Consult  Lt ankle infection    History Of Present Illness  Rose Marie Toussaint is a 68 y.o. female, hx of HTN, hx of COPD, hx of alcoholism, hx of Lt ankle fracture sp ORIF March 2022, hx of Lt ankle pain for 2 years, over the last week she has developed increased Lt ankle pain, swelling, redness, fever, chills, her WBC were up, the xra showed fractures / dislocated ankle     Past Medical History  She has no past medical history on file.    Surgical History  She has no past surgical history on file.     Social History     Occupational History    Not on file   Tobacco Use    Smoking status: Every Day     Current packs/day: 0.50     Average packs/day: 0.5 packs/day     Types: Cigarettes     Start date: 4/13/2024    Smokeless tobacco: Current   Substance and Sexual Activity    Alcohol use: Not on file    Drug use: Not on file    Sexual activity: Not on file     Travel History   Travel since 03/14/24    No documented travel since 03/14/24            Family History  Family History   Problem Relation Name Age of Onset    Hypertension Mother      Heart attack Father       Allergies  Penicillin     Immunization History   Administered Date(s) Administered    Flu vaccine (IIV4), preservative free *Check age/dose* 12/01/2021    [employer] SARS-CoV-2 Vaccination 03/12/2021    Pfizer Purple Cap SARS-CoV-2 12/01/2021   Pneumonia vaccine is planned  Hernandez fall risk 100, preventive protocol was implemented  Depression screen is negative    Medications  Home medications:  Medications Prior to Admission   Medication Sig Dispense Refill Last Dose    albuterol 90 mcg/actuation inhaler Inhale 2 puffs every 6 hours if needed for wheezing.   4/13/2024    atenolol (Tenormin) 25 mg tablet Take 1 tablet (25 mg) by mouth once daily.   4/13/2024    buPROPion (Wellbutrin) 100 mg tablet Take 1 tablet (100 mg) by mouth 2 times a day. Morning and 12pm   4/13/2024     busPIRone (Buspar) 30 mg tablet Take 1 tablet (30 mg) by mouth 2 times a day.   4/13/2024    chlorthalidone (Hygroton) 25 mg tablet Take 1 tablet (25 mg) by mouth.   4/13/2024    dilTIAZem CD (Cardizem CD) 240 mg 24 hr capsule Take 1 capsule (240 mg) by mouth.   4/13/2024    doxepin (SINEquan) 50 mg capsule Take 1 capsule (50 mg) by mouth once daily at bedtime.   4/13/2024    potassium chloride CR 10 mEq ER tablet Take 1 tablet (10 mEq) by mouth once daily. Do not crush, chew, or split.   4/13/2024    pravastatin (Pravachol) 20 mg tablet Take 1 tablet (20 mg) by mouth once daily.   4/13/2024    sertraline (Zoloft) 100 mg tablet Take 2 tablets (200 mg) by mouth once daily.   4/13/2024     Current medications:  Scheduled medications  atenolol, 25 mg, oral, Daily  buPROPion, 100 mg, oral, BID  busPIRone, 30 mg, oral, BID  chlorthalidone, 25 mg, oral, Daily  dilTIAZem CD, 240 mg, oral, Daily  enoxaparin, 40 mg, subcutaneous, q24h  folic acid, 1 mg, oral, Daily  magnesium sulfate, 2 g, intravenous, Once  multivitamin with minerals, 1 tablet, oral, Daily  polyethylene glycol, 17 g, oral, Daily  pravastatin, 20 mg, oral, Daily  sertraline, 200 mg, oral, Daily  [START ON 4/17/2024] thiamine, 100 mg, oral, Daily  thiamine, 500 mg, intravenous, q8h MANUEL  vancomycin, 1,000 mg, intravenous, q24h      Continuous medications     PRN medications  PRN medications: acetaminophen, albuterol, HYDROmorphone, LORazepam **OR** LORazepam, oxyCODONE, oxyCODONE, vancomycin    Review of Systems   All other systems reviewed and are negative.       Objective  Range of Vitals (last 24 hours)  Heart Rate:  [92-94]   Temp:  [36.7 °C (98.1 °F)]   Resp:  [22]   BP: (146-165)/(80-85)   Height:  [152.4 cm (5')]   Weight:  [56.8 kg (125 lb 4.8 oz)]   SpO2:  [95 %]   Daily Weight  04/14/24 : 56.8 kg (125 lb 4.8 oz)    Body mass index is 24.47 kg/m².     Physical Exam  Musculoskeletal:      Comments: Lt ankle swelling, redness, mostly over the lateral  "malleolus, the anlke and the foot are inverted, no necrosis, no crepitus          Relevant Results  Outside Hospital Results  reviewed  Labs  Results from last 72 hours   Lab Units 04/14/24  0644   WBC AUTO x10*3/uL 14.6*   HEMOGLOBIN g/dL 11.9*   HEMATOCRIT % 35.5*   PLATELETS AUTO x10*3/uL 410     Results from last 72 hours   Lab Units 04/14/24  0644   SODIUM mmol/L 132*   POTASSIUM mmol/L 4.4   CHLORIDE mmol/L 93*   CO2 mmol/L 31   BUN mg/dL 22   CREATININE mg/dL 1.02   GLUCOSE mg/dL 88   CALCIUM mg/dL 9.9   ANION GAP mmol/L 12   EGFR mL/min/1.73m*2 61   PHOSPHORUS mg/dL 3.4     Results from last 72 hours   Lab Units 04/14/24  0644   ALBUMIN g/dL 3.2*     Estimated Creatinine Clearance: 42.8 mL/min (by C-G formula based on SCr of 1.02 mg/dL).  CRP   Date Value Ref Range Status   05/24/2023 1.50 (A) mg/dL Final     Comment:     REF VALUE  < 1.00       Sedimentation Rate   Date Value Ref Range Status   05/24/2023 26 0 - 30 mm/h Final     Comment:     Please note new reference ranges as of 5/9/2022.     No results found for: \"HIV1X2\", \"HIVCONF\", \"TBMRLQ2FY\"  No results found for: \"HEPCABINIT\", \"HEPCAB\", \"HCVPCRQUANT\"  Microbiology  Reviewed  Imaging  Reviewed       Assessment/Plan     Left ankle cellulitis / likely hardware infection / failure    Recommendations :  Continue Vancomycin  Cultures  Keep the leg elevated  Will need surgery    I spent minutes in the professional and overall care of this patient.      Raina Sepulveda MD  "

## 2024-04-14 NOTE — CONSULTS
"Vancomycin Dosing by Pharmacy- INITIAL    Rsoe Marie Toussaint is a 68 y.o. year old female who Pharmacy has been consulted for vancomycin dosing for osteomyelitis/septic arthritis. Based on the patient's indication and renal status this patient will be dosed based on a goal AUC of 400-600.     Renal function is currently stable.    Visit Vitals  /80   Pulse 94   Temp 36.7 °C (98.1 °F) (Temporal)   Resp 22        Lab Results   Component Value Date    CREATININE 1.02 04/14/2024    CREATININE 1.36 (H) 05/31/2023    CREATININE 1.52 (H) 05/30/2023    CREATININE 1.36 (H) 05/29/2023    CREATININE 1.21 (H) 05/28/2023        Patient weight is No results found for: \"PTWEIGHT\"    No results found for: \"CULTURE\"     No intake/output data recorded.  [unfilled]    Lab Results   Component Value Date    PATIENTTEMP 37.0 05/26/2023    PATIENTTEMP 37.0 05/25/2023    PATIENTTEMP 37.0 05/23/2023          Assessment/Plan     Patient will not be given a loading dose.  Will initiate vancomycin maintenance,  1000 mg every 24 hours.    This dosing regimen is predicted by [employer] to result in the following pharmacokinetic parameters:  Loading dose: N/A  Regimen: 1000 mg IV every 24 hours.  Start time: 08:20 on 04/14/2024  Exposure target: AUC24 (range)400-600 mg/L.hr   AUC24,ss: 488 mg/L.hr  Probability of AUC24 > 400: 73 %  Ctrough,ss: 14.8 mg/L  Probability of Ctrough,ss > 20: 21 %  Probability of nephrotoxicity (Lodise TALIB 2009): 10 %      Follow-up level will be ordered on 4/16 at am labs unless clinically indicated sooner.  Will continue to monitor renal function daily while on vancomycin and order serum creatinine at least every 48 hours if not already ordered.  Follow for continued vancomycin needs, clinical response, and signs/symptoms of toxicity.       Rufino Horton, PharmD       "

## 2024-04-14 NOTE — PROGRESS NOTES
Patient: Rose Marie Toussaint  Room/bed: 133/133-A  Admitted on: 4/14/2024    Age: 68 y.o.   Gender: female  Code Status:  Full Code   Admitting Dx: Septic joint (Multi) [M00.9]    MRN: [id number]  PCP: Iain Márquez DO  Preferred Pharm: [unfilled]    Attending: [unfilled]  Resident: Ernie Rizo DO  TCC: No care team member to display      Overnight Events     No acute overnight events    Subjective   Patient was seen at bedside.  She is complaining of pain in her left ankle. Ankle is currently red and swollen.  Aspiration was taken at OSH in [place].  She states that the pain is currently well managed with oxycodone.  She denies fevers and chills chest pain and shortness of breath.  States that she was feeling feverish yesterday. No new complaints today.    Objective    Physical Exam   Constitutional: Appears stated age. Slightly disheveled In NAD.   HEENT: NC/AT, EOMI, clear sclera, moist mucous membranes  CV: RRR, No M/R/G  PULM: CTAB, no coughing or wheezing  ABDOMEN: Soft, NT/ND. No TTP. + BSx4.  SKIN: Normal Color, Warm, Dry, No Rashes   EXTREMITIES: Non-Tender, Full ROM, No Pedal Edema, Left ankle red and swollen with purulent drainage from both medial and lateral malleolus.  NEURO: A&O x 4, nonfocal neurological exam.  PSYCH: Normal Mood & Behavior      Temp:  [36.7 °C (98.1 °F)] 36.7 °C (98.1 °F)  Heart Rate:  [92-94] 94  Resp:  [22] 22  BP: (146-165)/(80-85) 165/80    No intake or output data in the 24 hours ending 04/14/24 1151    Vitals:    04/14/24 0415   Weight: 56.8 kg (125 lb 4.8 oz)             I/Os  No intake or output data in the 24 hours ending 04/14/24 1151    Labs:   Results from last 72 hours   Lab Units 04/14/24  0644   SODIUM mmol/L 132*   POTASSIUM mmol/L 4.4   CHLORIDE mmol/L 93*   CO2 mmol/L 31   BUN mg/dL 22   CREATININE mg/dL 1.02   GLUCOSE mg/dL 88   CALCIUM mg/dL 9.9   ANION GAP mmol/L 12   EGFR mL/min/1.73m*2 61   PHOSPHORUS mg/dL 3.4      Results from last 72 hours   Lab Units  "04/14/24  0644   WBC AUTO x10*3/uL 14.6*   HEMOGLOBIN g/dL 11.9*   HEMATOCRIT % 35.5*   PLATELETS AUTO x10*3/uL 410      Lab Results   Component Value Date    CALCIUM 9.9 04/14/2024    PHOS 3.4 04/14/2024      Lab Results   Component Value Date    CRP 1.50 (A) 05/24/2023      [unfilled]     Micro/ID:   No results found for the last 90 days.                   No lab exists for component: \"AGALPCRNB\"   .ID  Lab Results   Component Value Date    URINECULTURE NO GROWTH 05/24/2023    BLOODCULT  05/23/2023     No Growth at 1 days~No Growth at 2 days~No Growth at 3 days~NO GROWTH at 4 days - FINAL REPORT       Images:  XR ankle left 3+ views  Narrative: Interpreted By:  Collette Saldana,   STUDY:  XR ANKLE LEFT 3+ VIEWS; ;  4/14/2024 5:46 am      INDICATION:  Signs/Symptoms:Left ankle worsening infection and pain.      COMPARISON:  11/23/2022      ACCESSION NUMBER(S):  [id number]      ORDERING CLINICIAN:  ALEXIS VENEGAS      FINDINGS:  Three views of the left ankle show a plate and screw fixation of the  fibula with fracture of the hardware and fracture of the distal  fibula having apex lateral angulation. There is disruption of the  ankle mortise. The medial malleolus is fractured and displaced. There  is soft tissue gas adjacent to the medial malleolus.      Impression: Fracture and dislocation of the ankle with fractures through the  fixation hardware of the fibula. Displaced fractures of the distal  fibula and medial malleolus. Soft tissue gas in the medial ankle may  be due to infection with gas producing organism.          MACRO:  None      Signed by: Collette Saldana 4/14/2024 7:25 AM  Dictation workstation:   [id number]       Meds    Scheduled medications  atenolol, 25 mg, oral, Daily  buPROPion, 100 mg, oral, BID  busPIRone, 30 mg, oral, BID  chlorthalidone, 25 mg, oral, Daily  dilTIAZem CD, 240 mg, oral, Daily  enoxaparin, 40 mg, subcutaneous, q24h  folic acid, 1 mg, oral, Daily  multivitamin with minerals, 1 " tablet, oral, Daily  polyethylene glycol, 17 g, oral, Daily  pravastatin, 20 mg, oral, Daily  sertraline, 200 mg, oral, Daily  [START ON 4/17/2024] thiamine, 100 mg, oral, Daily  thiamine, 500 mg, intravenous, q8h MANUEL  vancomycin, 1,000 mg, intravenous, q24h      Continuous medications     PRN medications  PRN medications: acetaminophen, albuterol, HYDROmorphone, LORazepam **OR** LORazepam, oxyCODONE, oxyCODONE, vancomycin     Assessment and Plan    Rose Marie Toussaint is a 68 y.o. female with a PMH of COPD, HTN, Depression, hypokalemia  who presents to [place] ED presenting with worsening pain and swelling of her left ankle sp surgery in 2022.     Acute medical issues:     #Fracture dislocation of the distal tibia and fibula along with fibular plate fracture and medial malleolus screw fracture:  #Concern for septic arthritis:  - X-ray showing: Fracture dislocation of the distal tibia and fibula along with fibular plate fracture and medial malleolus screw fracture as described. Medial displacement of the medial malleolus screws.  - Wound culture from OSH pending  -Continue Vanocmycin per ID recs.  - Podiatry consulted; appreciate recs.        #Hyponatremia (resolved)  - Likely 2/2 decrease intake, patient was drinking water and alcohol   - Patient is asymptomatic  - Received 1 L NS in the ED, Na improved to  132.  - Continue to monitor.         #Hypokalemia(Resolved)   - Patient had hypokalemia at OSH, corrected after 80 meq of K; repeat 4.4        #ARELI on CKD (Resolved):  - Patient S.creat at OSH 1.41  - Likely pre-renal 2/2 intake.  - sp 1 L NS, S.Creat 1.02     #Alcohol use disorder:  - Patient using alcohol for pain managment.  -Last drink was 2 days before admission.  -Started on CIWA protocol(Ativan IV)  -High-dose thiamine 500 mg 3 times daily for 3 days then switch to 100 mg.     Chronic medical issues  #Tobacco Dependence -  - patient education for smoking/alcohol abstinence; nicotine patch       #COPD:  -Continue albuterol inhaler as needed.     #HLD  -Continue pravastatin 20 mg daily     #HTN:  -Continue atenolol 25 mg daily, Cardizem 240 mg daily, chlorthalidone 25 mg daily.     #Depression/anxiety:  -Continue Zoloft 200 mg, Wellbutrin 100 mg twice daily, buspirone 30 mg twice daily.        Fluids: PO intake   Electrolytes: Will replace as needed   Nutrition: Regular  GI Ppx: Not indicated   DVT Ppx: SCDs  Code Status: FULL  Abx: Vancomycin  Access: PIV   Emergency Contact: Cecil Toussaint (Spouse) [phone]      Disposition: Patient admitted for fracture ankle with fracture of metal and screw, possible septic joint. Treating with Vancomycin. Anticipate >2 midnight stay.      Ernie Rizo DO

## 2024-04-14 NOTE — PROGRESS NOTES
04/14/24 1202   Discharge Planning   Living Arrangements Spouse/significant other   Support Systems Spouse/significant other   Assistance Needed A&OX3; independent with ADLs with walker; drives; room air baseline and currently room air   Type of Residence Private residence   Number of Stairs to Enter Residence 0   Number of Stairs Within Residence 14   Do you have animals or pets at home? Yes   Type of Animals or Pets 1 cat   Who is requesting discharge planning? Provider   Patient expects to be discharged to: TBD   Does the patient need discharge transport arranged? Yes   RoundTrip coordination needed? Yes   Has discharge transport been arranged? No   Financial Resource Strain   How hard is it for you to pay for the very basics like food, housing, medical care, and heating? Not hard   Housing Stability   In the last 12 months, was there a time when you were not able to pay the mortgage or rent on time? N   In the last 12 months, how many places have you lived? 1   In the last 12 months, was there a time when you did not have a steady place to sleep or slept in a shelter (including now)? N   Transportation Needs   In the past 12 months, has lack of transportation kept you from medical appointments or from getting medications? no   In the past 12 months, has lack of transportation kept you from meetings, work, or from getting things needed for daily living? No     04/14/2024 1203pm  Spoke with patient bedside on [address]. At this time discharge plan is to be determined. Patient stated she is at home with her spouse and he recently came out of hospital for CHF. Patient stated she has been working on transitioning spouse to [facility]. Patient stated she may be able to stay with spouse at [facility] upon dc (but this hasn't been confirmed). SW should be requested by TCC on Monday to follow up with patient re the [facility].

## 2024-04-14 NOTE — H&P
History Of Present Illness  Rose Marie Toussaint is a 68 y.o. female  with a PMH of COPD, HTN, Depression, hypokalemia, ETOH use, NIcotine dependence, HLD, CKD  who presents to [place] ED presenting with worsening pain and swelling of her left ankle sp surgery in 2022.  Patient stated that she has been dealing with her ankle problem for 1 and half years now, after she had a surgery for a fall. She has infection and abscess, her pain has been present for a long time and hard to control. She was a poor historian as she was in distress and tired, wasn't able to provide a full story. She had her surgery with Dr. Francois in 2022. On the day she went to the ED, she was seen by her  nurse for [facility], and she told her she believes she is septic. She was scared and decide to go to the ED. She stated that her pain is 10/10, with worsening swelling, and pain. She is not able to bear weight on it. The patient drinks alcohol to alleviate the pain as she didn't have any pain meds at home except for Ibuprofen which wasn't very helpful. Her last drink was 2 days ago. She was having on and off fever. Denies any, n/v, new onset headaches, vision changes, chest pain, dyspnea, abdominal pain, changes in BM, or urinary changes.  12 Point ROS negative unless otherwise specified above.     ED Course:  -Vitals:   -Labs:  CBC:  WBCs 21,000, Hb/HCT 12.9/36.3, platelet 497  CMP:  , K2.9, CL 85, bicarb 35, , BUN/creat 29/1.41, alk phos 173, T. bili 0.9 AST/ALT 85/63  -Imaging:  X-RAY ankle:  Fracture dislocation of the distal tibia and fibula along with fibular  plate fracture and medial malleolus screw fracture as described.   -Meds &Fluids: Cefepime 1 gm x 2, vancomycin one , morphine 2 carl, I L NS, kcl 80 mg       Past Medical History: COPD, HTN, Depression, hypokalemia, ETOH use, NIcotine dependence, HLD, CKD    Allergies: Penicillin   Surgical History: Left ankle fracture with fixation with plate and screw   Family History:  Irrelevant   Medications: See med recs    Social History: Current Tobacco smoker 10-12/day , Current Alcohol drinker,No Illicit Drug Use, lives with her             Social History  She reports that she has been smoking cigarettes. She started smoking yesterday. She has been smoking an average of 0.5 packs per day. She uses smokeless tobacco. No history on file for alcohol use and drug use.    Family History  Family History   Problem Relation Name Age of Onset    Hypertension Mother      Heart attack Father          Allergies  Penicillin    Review of Systems   Constitutional:  Positive for fever (intermittent).   HENT: Negative.     Eyes: Negative.    Respiratory: Negative.     Cardiovascular:  Positive for leg swelling (At the site of the ankle).   Gastrointestinal: Negative.    Endocrine: Negative.    Genitourinary: Negative.    Musculoskeletal:  Positive for joint swelling (left ankle).   Skin: Negative.    Allergic/Immunologic: Negative.    Neurological:  Positive for tremors.   Hematological: Negative.    Psychiatric/Behavioral:  The patient is nervous/anxious.         Physical Exam  Constitutional:       General: She is in acute distress.      Appearance: She is normal weight. She is ill-appearing.   HENT:      Head: Normocephalic and atraumatic.      Nose: Nose normal.      Mouth/Throat:      Mouth: Mucous membranes are moist.      Pharynx: Oropharynx is clear.   Eyes:      Extraocular Movements: Extraocular movements intact.      Conjunctiva/sclera: Conjunctivae normal.      Pupils: Pupils are equal, round, and reactive to light.   Cardiovascular:      Rate and Rhythm: Normal rate and regular rhythm.      Pulses: Normal pulses.      Heart sounds: Normal heart sounds.   Pulmonary:      Effort: Pulmonary effort is normal.      Breath sounds: Normal breath sounds.   Abdominal:      General: Abdomen is flat. Bowel sounds are normal.      Palpations: Abdomen is soft.   Musculoskeletal:         General:  Swelling (left ankle), tenderness (left ankle) and deformity present.      Cervical back: Normal range of motion and neck supple.      Left lower leg: Edema present.   Skin:     General: Skin is warm and dry.   Neurological:      General: No focal deficit present.      Mental Status: She is alert and oriented to person, place, and time. Mental status is at baseline.   Psychiatric:      Comments: Anxious           Last Recorded Vitals  /85 (BP Location: Right arm, Patient Position: Lying)   Pulse 92   Temp 36.7 °C (98.1 °F) (Temporal)   Resp 22   Wt 56.8 kg (125 lb 4.8 oz)   SpO2 95%     Relevant Results  Scheduled medications  atenolol, 25 mg, oral, Daily  buPROPion, 100 mg, oral, BID  busPIRone, 30 mg, oral, BID  chlorthalidone, 25 mg, oral, Daily  dilTIAZem CD, 240 mg, oral, Daily  enoxaparin, 40 mg, subcutaneous, q24h  folic acid, 1 mg, oral, Daily  magnesium sulfate, 2 g, intravenous, Once  multivitamin with minerals, 1 tablet, oral, Daily  polyethylene glycol, 17 g, oral, Daily  pravastatin, 20 mg, oral, Daily  sertraline, 200 mg, oral, Daily  [START ON 4/17/2024] thiamine, 100 mg, oral, Daily  thiamine, 500 mg, intravenous, q8h MANUEL      Continuous medications     PRN medications  PRN medications: albuterol, LORazepam **OR** LORazepam, vancomycin    XR ankle left 3+ views    Result Date: 4/13/2024  * * *Final Report* * * DATE OF EXAM: Apr 13 2024  3:27PM   ASX   [id number]  -  XR ANKLE 3V AP/LAT/OBL LT  / ACCESSION #  [id number] PROCEDURE REASON: Osteomyelitis, ankle      * * * * Physician Interpretation * * * *  EXAMINATION / TECHNIQUE:  XR ANKLE 3V AP/LAT/OBL LT HISTORY:   osteomyelitis left ankle pain  Osteomyelitis, ankle. COMPARISON: None RESULT: There is fracture of the distal tibial metaphysis with lateral displacement of proximal tibia and fibula with respect to the distal fragments.  There is angulated fracture of the distal fibula as well as fracture of its associated internal fixation plate. 2  screws of the medial malleolus, with one of the screws demonstrating a fracture.  The medial malleolus adjacent to the screws is also fractured.  The threaded portion of the fractured screw is embedded within the displaced proximal tibial fracture fragment. Both screws are medially displaced into the soft tissues.  Marked soft tissue swelling at the ankle.    IMPRESSION: Fracture dislocation of the distal tibia and fibula along with fibular plate fracture and medial malleolus screw fracture as described. Medial displacement of the medial malleolus screws. : PSCB   Transcribe Date/Time: Apr 13 2024  4:29P Dictated by : TOMASA BENAVIDEZ MD This examination was interpreted and the report reviewed and electronically signed by: TOMASA BENAVIDEZ MD on Apr 13 2024  4:35PM  EST            Assessment/Plan   Principal Problem:    Septic joint (Multi)    Rose Marie Toussaint is a 68 y.o. female  with a PMH of COPD, HTN, Depression, hypokalemia  who presents to [place] ED presenting with worsening pain and swelling of her left ankle sp surgery in 2022.    Acute medical issues:    #Fracture dislocation of the distal tibia and fibula along with fibular plate fracture and medial malleolus screw fracture:  #Concern for septic arthritis:  -Patient has swelling, pain of the left ankle, she is not able to bear weight on it.  -Pain has been 10 out of 10 and progressively worsening.  -She has intermittent fever.  -X-ray showing: Fracture dislocation of the distal tibia and fibula along with fibular plate fracture and medial malleolus screw fracture as described. Medial displacement of the medial malleolus screws.  - WBCs: 21k, culture from the wound collected in OSH.  - Received cefepime and Vancomycin in the outside hospital.  -Continue Vanocmycin.  - ID and podiatry consulted; appreciate recs.      #Hyponatremia:  - Likely 2/2 decrease intake, patient was drinking water and alcohol   - Patient is asymptomatic  - Received 1 L NS  in the ED, Na improved to  132.  - Continue to monitor.       #Hypokalemia(Resolved)   - Patient had hypokalemia at OSH, corrected after 80 meq of K; repeat 4.4      #ARELI on CKD (Resolved):  - Patient S.creat at OSH 1.41  - Likely pre-renal 2/2 intake.  - sp 1 L NS, S.Creat 1.02    #Alcohol use disorder:  - Patient was in the alcohol  to control her symptoms.  -Last drink was 2 days before admission.  -Started on CIWA protocol(Ativan IV)  -High-dose thiamine 500 mg 3 times daily for 3 days then switch to 100 mg.    Chronic medical issues  #Tobacco Dependence -  - patient education for smoking/alcohol abstinence; nicotine patch     #COPD:  -Continue albuterol inhaler as needed.    #HLD  -Continue pravastatin 20 mg daily    #HTN:  -Continue atenolol 25 mg daily, Cardizem 240 mg daily, chlorthalidone 25 mg daily.    #Depression/anxiety:  -Continue Zoloft 200 mg, Wellbutrin 100 mg twice daily, buspirone 30 mg twice daily.      Fluids: PO intake   Electrolytes: Will replace as needed   Nutrition: Regular  GI Ppx: Not indicated   DVT Ppx: SCDs  Code Status: FULL  Abx: Vancomycin  Access: PIV   Emergency Contact: Cecil Toussaint (Spouse) [phone]      Disposition: Patient admitted for fracture ankle with fracture of metal and screw, possible septic joint , Anticipate >2 midnight stay.     Paul Lewis DO   Internal Medicine PGY-3  [facility]     Disclaimer: Documentation completed with the information available at the time of input. The times in the chart may not be reflective of actual patient care times, interventions, or procedures. Documentation occurs after the physical care of the patient.         Paul Lewis DO

## 2024-04-14 NOTE — HOSPITAL COURSE
Rose Marie Toussaint is a 68 y.o. female  with a PMH of COPD, HTN, Depression, hypokalemia, ETOH use, NIcotine dependence, HLD, CKD  who presents to [place] ED presenting with worsening pain and swelling of her left ankle sp surgery in 2022.  Patient stated that she has been dealing with her ankle problem for 1 and half years now, after she had a surgery for a fall. She has infection and abscess, her pain has been present for a long time and hard to control. She was a poor historian as she was in distress and tired, wasn't able to provide a full story. She had her surgery with Dr. Francois in 2022. On the day she went to the ED, she was seen by her  nurse for [facility], and she told her she believes she is septic. She was scared and decide to go to the ED. She stated that her pain is 10/10, with worsening swelling, and pain. She is not able to bear weight on it. The patient drinks alcohol to alleviate the pain as she didn't have any pain meds at home except for Ibuprofen which wasn't very helpful. Her last drink was 2 days ago. She was having on and off fever. Denies any, n/v, new onset headaches, vision changes, chest pain, dyspnea, abdominal pain, changes in BM, or urinary changes. Found to have fracutre dislocation of distal tibia and fibula along with fibular plate fracture and medial malleolus screw fracture with concerns for septic arthirits. Patient is on CIWA protocols for etoh use disorder.      Patient to have surgery at [facility] if can be reconstructed or possible BKA.  Patient will continue on Vancomycin.

## 2024-04-15 ENCOUNTER — APPOINTMENT (OUTPATIENT)
Dept: VASCULAR MEDICINE | Facility: HOSPITAL | Age: 69
DRG: 560 | End: 2024-04-15
Payer: COMMERCIAL

## 2024-04-15 LAB
ALBUMIN SERPL BCP-MCNC: 2.9 G/DL (ref 3.4–5)
ALBUMIN SERPL BCP-MCNC: 2.9 G/DL (ref 3.4–5)
ALP SERPL-CCNC: 146 U/L (ref 33–136)
ALT SERPL W P-5'-P-CCNC: 56 U/L (ref 7–45)
ANION GAP SERPL CALC-SCNC: 11 MMOL/L (ref 10–20)
ANION GAP SERPL CALC-SCNC: 11 MMOL/L (ref 10–20)
AST SERPL W P-5'-P-CCNC: 82 U/L (ref 9–39)
BILIRUB SERPL-MCNC: 0.2 MG/DL (ref 0–1.2)
BUN SERPL-MCNC: 19 MG/DL (ref 6–23)
BUN SERPL-MCNC: 19 MG/DL (ref 6–23)
CALCIUM SERPL-MCNC: 9.5 MG/DL (ref 8.6–10.3)
CALCIUM SERPL-MCNC: 9.7 MG/DL (ref 8.6–10.3)
CHLORIDE SERPL-SCNC: 90 MMOL/L (ref 98–107)
CHLORIDE SERPL-SCNC: 91 MMOL/L (ref 98–107)
CO2 SERPL-SCNC: 30 MMOL/L (ref 21–32)
CO2 SERPL-SCNC: 31 MMOL/L (ref 21–32)
CREAT SERPL-MCNC: 1.04 MG/DL (ref 0.5–1.05)
CREAT SERPL-MCNC: 1.07 MG/DL (ref 0.5–1.05)
CRP SERPL-MCNC: 23.19 MG/DL
EGFRCR SERPLBLD CKD-EPI 2021: 57 ML/MIN/1.73M*2
EGFRCR SERPLBLD CKD-EPI 2021: 59 ML/MIN/1.73M*2
ERYTHROCYTE [DISTWIDTH] IN BLOOD BY AUTOMATED COUNT: 12.9 % (ref 11.5–14.5)
ERYTHROCYTE [SEDIMENTATION RATE] IN BLOOD BY WESTERGREN METHOD: 94 MM/H (ref 0–30)
GLUCOSE SERPL-MCNC: 105 MG/DL (ref 74–99)
GLUCOSE SERPL-MCNC: 109 MG/DL (ref 74–99)
HCT VFR BLD AUTO: 32.5 % (ref 36–46)
HGB BLD-MCNC: 10.8 G/DL (ref 12–16)
MAGNESIUM SERPL-MCNC: 1.58 MG/DL (ref 1.6–2.4)
MCH RBC QN AUTO: 32.1 PG (ref 26–34)
MCHC RBC AUTO-ENTMCNC: 33.2 G/DL (ref 32–36)
MCV RBC AUTO: 97 FL (ref 80–100)
NRBC BLD-RTO: 0 /100 WBCS (ref 0–0)
PHOSPHATE SERPL-MCNC: 3.5 MG/DL (ref 2.5–4.9)
PLATELET # BLD AUTO: 420 X10*3/UL (ref 150–450)
POTASSIUM SERPL-SCNC: 3.4 MMOL/L (ref 3.5–5.3)
POTASSIUM SERPL-SCNC: 3.4 MMOL/L (ref 3.5–5.3)
PROT SERPL-MCNC: 6.1 G/DL (ref 6.4–8.2)
RBC # BLD AUTO: 3.36 X10*6/UL (ref 4–5.2)
SODIUM SERPL-SCNC: 128 MMOL/L (ref 136–145)
SODIUM SERPL-SCNC: 130 MMOL/L (ref 136–145)
WBC # BLD AUTO: 13.2 X10*3/UL (ref 4.4–11.3)

## 2024-04-15 PROCEDURE — S4991 NICOTINE PATCH NONLEGEND: HCPCS

## 2024-04-15 PROCEDURE — 85027 COMPLETE CBC AUTOMATED: CPT

## 2024-04-15 PROCEDURE — 85652 RBC SED RATE AUTOMATED: CPT

## 2024-04-15 PROCEDURE — 96367 TX/PROPH/DG ADDL SEQ IV INF: CPT

## 2024-04-15 PROCEDURE — 36415 COLL VENOUS BLD VENIPUNCTURE: CPT

## 2024-04-15 PROCEDURE — 93922 UPR/L XTREMITY ART 2 LEVELS: CPT

## 2024-04-15 PROCEDURE — 99232 SBSQ HOSP IP/OBS MODERATE 35: CPT | Performed by: STUDENT IN AN ORGANIZED HEALTH CARE EDUCATION/TRAINING PROGRAM

## 2024-04-15 PROCEDURE — 96365 THER/PROPH/DIAG IV INF INIT: CPT | Mod: 59

## 2024-04-15 PROCEDURE — 80053 COMPREHEN METABOLIC PANEL: CPT

## 2024-04-15 PROCEDURE — 80069 RENAL FUNCTION PANEL: CPT | Mod: CCI,91

## 2024-04-15 PROCEDURE — 2500000004 HC RX 250 GENERAL PHARMACY W/ HCPCS (ALT 636 FOR OP/ED)

## 2024-04-15 PROCEDURE — 96366 THER/PROPH/DIAG IV INF ADDON: CPT

## 2024-04-15 PROCEDURE — 2500000002 HC RX 250 W HCPCS SELF ADMINISTERED DRUGS (ALT 637 FOR MEDICARE OP, ALT 636 FOR OP/ED): Mod: MUE | Performed by: INTERNAL MEDICINE

## 2024-04-15 PROCEDURE — G0378 HOSPITAL OBSERVATION PER HR: HCPCS

## 2024-04-15 PROCEDURE — 1100000001 HC PRIVATE ROOM DAILY

## 2024-04-15 PROCEDURE — 2500000001 HC RX 250 WO HCPCS SELF ADMINISTERED DRUGS (ALT 637 FOR MEDICARE OP)

## 2024-04-15 PROCEDURE — 86140 C-REACTIVE PROTEIN: CPT

## 2024-04-15 PROCEDURE — 2500000001 HC RX 250 WO HCPCS SELF ADMINISTERED DRUGS (ALT 637 FOR MEDICARE OP): Performed by: INTERNAL MEDICINE

## 2024-04-15 PROCEDURE — 83735 ASSAY OF MAGNESIUM: CPT

## 2024-04-15 PROCEDURE — 2500000002 HC RX 250 W HCPCS SELF ADMINISTERED DRUGS (ALT 637 FOR MEDICARE OP, ALT 636 FOR OP/ED)

## 2024-04-15 PROCEDURE — 2500000004 HC RX 250 GENERAL PHARMACY W/ HCPCS (ALT 636 FOR OP/ED): Performed by: INTERNAL MEDICINE

## 2024-04-15 PROCEDURE — 2500000004 HC RX 250 GENERAL PHARMACY W/ HCPCS (ALT 636 FOR OP/ED): Performed by: STUDENT IN AN ORGANIZED HEALTH CARE EDUCATION/TRAINING PROGRAM

## 2024-04-15 PROCEDURE — 93922 UPR/L XTREMITY ART 2 LEVELS: CPT | Performed by: STUDENT IN AN ORGANIZED HEALTH CARE EDUCATION/TRAINING PROGRAM

## 2024-04-15 RX ORDER — POTASSIUM CHLORIDE 14.9 MG/ML
20 INJECTION INTRAVENOUS
Status: ACTIVE | OUTPATIENT
Start: 2024-04-15 | End: 2024-04-15

## 2024-04-15 RX ORDER — MAGNESIUM SULFATE HEPTAHYDRATE 40 MG/ML
2 INJECTION, SOLUTION INTRAVENOUS ONCE
Status: COMPLETED | OUTPATIENT
Start: 2024-04-15 | End: 2024-04-15

## 2024-04-15 RX ORDER — POTASSIUM CHLORIDE 14.9 MG/ML
20 INJECTION INTRAVENOUS
Status: COMPLETED | OUTPATIENT
Start: 2024-04-15 | End: 2024-04-15

## 2024-04-15 RX ADMIN — PRAVASTATIN SODIUM 20 MG: 40 TABLET ORAL at 09:14

## 2024-04-15 RX ADMIN — FOLIC ACID 1 MG: 1 TABLET ORAL at 09:14

## 2024-04-15 RX ADMIN — BUSPIRONE HYDROCHLORIDE 30 MG: 15 TABLET ORAL at 21:24

## 2024-04-15 RX ADMIN — THIAMINE HYDROCHLORIDE 500 MG: 100 INJECTION, SOLUTION INTRAMUSCULAR; INTRAVENOUS at 14:46

## 2024-04-15 RX ADMIN — ATENOLOL 25 MG: 25 TABLET ORAL at 09:14

## 2024-04-15 RX ADMIN — OXYCODONE HYDROCHLORIDE 10 MG: 10 TABLET ORAL at 04:02

## 2024-04-15 RX ADMIN — NICOTINE 1 PATCH: 14 PATCH, EXTENDED RELEASE TRANSDERMAL at 09:14

## 2024-04-15 RX ADMIN — MAGNESIUM SULFATE HEPTAHYDRATE 2 G: 2 INJECTION, SOLUTION INTRAVENOUS at 09:05

## 2024-04-15 RX ADMIN — VANCOMYCIN HYDROCHLORIDE 1000 MG: 1 INJECTION, SOLUTION INTRAVENOUS at 11:00

## 2024-04-15 RX ADMIN — BUPROPION HYDROCHLORIDE 100 MG: 100 TABLET, FILM COATED ORAL at 21:24

## 2024-04-15 RX ADMIN — DILTIAZEM HYDROCHLORIDE 240 MG: 120 CAPSULE, COATED, EXTENDED RELEASE ORAL at 09:15

## 2024-04-15 RX ADMIN — OXYCODONE HYDROCHLORIDE 10 MG: 10 TABLET ORAL at 09:18

## 2024-04-15 RX ADMIN — ENOXAPARIN SODIUM 40 MG: 40 INJECTION SUBCUTANEOUS at 06:08

## 2024-04-15 RX ADMIN — THIAMINE HYDROCHLORIDE 500 MG: 100 INJECTION, SOLUTION INTRAMUSCULAR; INTRAVENOUS at 21:24

## 2024-04-15 RX ADMIN — BUSPIRONE HYDROCHLORIDE 30 MG: 15 TABLET ORAL at 09:15

## 2024-04-15 RX ADMIN — POTASSIUM CHLORIDE 20 MEQ: 14.9 INJECTION, SOLUTION INTRAVENOUS at 14:47

## 2024-04-15 RX ADMIN — SERTRALINE HYDROCHLORIDE 200 MG: 50 TABLET ORAL at 09:14

## 2024-04-15 RX ADMIN — Medication 1 TABLET: at 09:14

## 2024-04-15 RX ADMIN — CHLORTHALIDONE 25 MG: 25 TABLET ORAL at 09:15

## 2024-04-15 RX ADMIN — OXYCODONE HYDROCHLORIDE 10 MG: 10 TABLET ORAL at 21:30

## 2024-04-15 RX ADMIN — BUPROPION HYDROCHLORIDE 100 MG: 100 TABLET, FILM COATED ORAL at 09:15

## 2024-04-15 RX ADMIN — POTASSIUM CHLORIDE 20 MEQ: 14.9 INJECTION, SOLUTION INTRAVENOUS at 11:53

## 2024-04-15 SDOH — HEALTH STABILITY: MENTAL HEALTH: SUBSTANCE USE: PT SLEEPING; UNABLE TO COMPLETE ASSESSMENT.

## 2024-04-15 ASSESSMENT — LIFESTYLE VARIABLES
NAUSEA AND VOMITING: NO NAUSEA AND NO VOMITING
ORIENTATION AND CLOUDING OF SENSORIUM: ORIENTED AND CAN DO SERIAL ADDITIONS
ORIENTATION AND CLOUDING OF SENSORIUM: ORIENTED AND CAN DO SERIAL ADDITIONS
TOTAL SCORE: 0
NAUSEA AND VOMITING: NO NAUSEA AND NO VOMITING
ANXIETY: NO ANXIETY, AT EASE
AGITATION: NORMAL ACTIVITY
HEADACHE, FULLNESS IN HEAD: NOT PRESENT
TOTAL SCORE: 0
BLOOD PRESSURE: 107/62
PULSE: 81
AGITATION: NORMAL ACTIVITY
NAUSEA AND VOMITING: NO NAUSEA AND NO VOMITING
VISUAL DISTURBANCES: NOT PRESENT
AUDITORY DISTURBANCES: NOT PRESENT
HEADACHE, FULLNESS IN HEAD: NOT PRESENT
ORIENTATION AND CLOUDING OF SENSORIUM: ORIENTED AND CAN DO SERIAL ADDITIONS
HEADACHE, FULLNESS IN HEAD: NOT PRESENT
HEADACHE, FULLNESS IN HEAD: NOT PRESENT
VISUAL DISTURBANCES: NOT PRESENT
TREMOR: NO TREMOR
ORIENTATION AND CLOUDING OF SENSORIUM: ORIENTED AND CAN DO SERIAL ADDITIONS
AGITATION: NORMAL ACTIVITY
ANXIETY: NO ANXIETY, AT EASE
TOTAL SCORE: 0
AGITATION: NORMAL ACTIVITY
PAROXYSMAL SWEATS: NO SWEAT VISIBLE
TOTAL SCORE: 0
ANXIETY: NO ANXIETY, AT EASE
VISUAL DISTURBANCES: NOT PRESENT
TREMOR: NO TREMOR
PAROXYSMAL SWEATS: NO SWEAT VISIBLE
ANXIETY: NO ANXIETY, AT EASE
TREMOR: NO TREMOR
VISUAL DISTURBANCES: NOT PRESENT
TOTAL SCORE: 0
TREMOR: NO TREMOR
PAROXYSMAL SWEATS: NO SWEAT VISIBLE
ANXIETY: NO ANXIETY, AT EASE
PAROXYSMAL SWEATS: NO SWEAT VISIBLE
AUDITORY DISTURBANCES: NOT PRESENT
TREMOR: NO TREMOR
AUDITORY DISTURBANCES: NOT PRESENT
AGITATION: NORMAL ACTIVITY
VISUAL DISTURBANCES: NOT PRESENT
NAUSEA AND VOMITING: NO NAUSEA AND NO VOMITING
HEADACHE, FULLNESS IN HEAD: NOT PRESENT
AUDITORY DISTURBANCES: NOT PRESENT
NAUSEA AND VOMITING: NO NAUSEA AND NO VOMITING
ORIENTATION AND CLOUDING OF SENSORIUM: ORIENTED AND CAN DO SERIAL ADDITIONS
AUDITORY DISTURBANCES: NOT PRESENT
PAROXYSMAL SWEATS: NO SWEAT VISIBLE

## 2024-04-15 ASSESSMENT — COGNITIVE AND FUNCTIONAL STATUS - GENERAL
STANDING UP FROM CHAIR USING ARMS: TOTAL
PERSONAL GROOMING: A LITTLE
WALKING IN HOSPITAL ROOM: TOTAL
CLIMB 3 TO 5 STEPS WITH RAILING: TOTAL
MOVING TO AND FROM BED TO CHAIR: TOTAL
WALKING IN HOSPITAL ROOM: TOTAL
CLIMB 3 TO 5 STEPS WITH RAILING: TOTAL
MOBILITY SCORE: 12
DAILY ACTIVITIY SCORE: 22
MOBILITY SCORE: 12
MOVING TO AND FROM BED TO CHAIR: TOTAL
TOILETING: A LITTLE
TOILETING: A LITTLE
STANDING UP FROM CHAIR USING ARMS: TOTAL
DAILY ACTIVITIY SCORE: 22
PERSONAL GROOMING: A LITTLE

## 2024-04-15 ASSESSMENT — PAIN DESCRIPTION - LOCATION
LOCATION: FOOT
LOCATION: FOOT

## 2024-04-15 ASSESSMENT — PAIN - FUNCTIONAL ASSESSMENT
PAIN_FUNCTIONAL_ASSESSMENT: 0-10
PAIN_FUNCTIONAL_ASSESSMENT: 0-10

## 2024-04-15 ASSESSMENT — PAIN SCALES - GENERAL
PAINLEVEL_OUTOF10: 8
PAINLEVEL_OUTOF10: 8
PAINLEVEL_OUTOF10: 0 - NO PAIN
PAINLEVEL_OUTOF10: 0 - NO PAIN
PAINLEVEL_OUTOF10: 8

## 2024-04-15 ASSESSMENT — PAIN DESCRIPTION - ORIENTATION
ORIENTATION: RIGHT
ORIENTATION: LEFT

## 2024-04-15 NOTE — CONSULTS
Consults    Reason For Consult  Left ankle non-union/hardware failure/osteomyelitis    History Of Present Illness  Rose Marie Toussaint is a 68 y.o. female presenting with a recurrent left ankle fracture/dislocation/infection.  Initial injury was 2 years ago and was fixated by Dr. Frederick.  Patient is unable to give a clear history on recovery and complications.  Currently, left ankle is grossly unstable with wounds to ankle.  Patient is a poor historian and unable to give much history..     Past Medical History  She has no past medical history on file.    Surgical History  She has no past surgical history on file.     Social History  She reports that she has been smoking cigarettes. She started smoking yesterday. She has been smoking an average of 0.5 packs per day. She uses smokeless tobacco. No history on file for alcohol use and drug use.    Family History  Family History   Problem Relation Name Age of Onset    Hypertension Mother      Heart attack Father          Allergies  Penicillin    Review of Systems  Unable to perform        Physical Exam    Constitutional:       General: She is in acute distress.      Appearance: She is normal weight. She is ill-appearing.   HENT:      Head: Normocephalic and atraumatic.      Nose: Nose normal.      Mouth/Throat:      Mouth: Mucous membranes are moist.      Pharynx: Oropharynx is clear.   Eyes:      Extraocular Movements: Extraocular movements intact.      Conjunctiva/sclera: Conjunctivae normal.      Pupils: Pupils are equal, round, and reactive to light.   Cardiovascular:      Rate and Rhythm: Normal rate and regular rhythm.      Pulses: Normal pulses.      Heart sounds: Normal heart sounds.   Pulmonary:      Effort: Pulmonary effort is normal.      Breath sounds: Normal breath sounds.   Abdominal:      General: Abdomen is flat. Bowel sounds are normal.      Palpations: Abdomen is soft.   Musculoskeletal:         General: Swelling (left ankle), tenderness (left ankle)  and deformity present. Left ankle is grossly unstable with deformity;  drainage from medial and lateral ankle,     Cervical back: Normal range of motion and neck supple.      Left lower leg: Edema present.   Skin:     General: Skin is warm and dry.   Neurological:      General: No focal deficit present.      Mental Status: She is alert and oriented to person, place, and time. Mental status is at baseline.   Psychiatric:      Comments: Anxious      Results:  XR ankle left 3+ views    Result Date: 4/14/2024  Interpreted By:  Collette Saldana, STUDY: XR ANKLE LEFT 3+ VIEWS; ;  4/14/2024 5:46 am   INDICATION: Signs/Symptoms:Left ankle worsening infection and pain.   COMPARISON: 11/23/2022   ACCESSION NUMBER(S): [id number]   ORDERING CLINICIAN: ALEXIS VENEGAS   FINDINGS: Three views of the left ankle show a plate and screw fixation of the fibula with fracture of the hardware and fracture of the distal fibula having apex lateral angulation. There is disruption of the ankle mortise. The medial malleolus is fractured and displaced. There is soft tissue gas adjacent to the medial malleolus.       Fracture and dislocation of the ankle with fractures through the fixation hardware of the fibula. Displaced fractures of the distal fibula and medial malleolus. Soft tissue gas in the medial ankle may be due to infection with gas producing organism.     MACRO: None   Signed by: Collette Saldana 4/14/2024 7:25 AM Dictation workstation:   [id number]    XR ankle left 3+ views    Result Date: 4/13/2024  * * *Final Report* * * DATE OF EXAM: Apr 13 2024  3:27PM   ASX   [id number]  -  XR ANKLE 3V AP/LAT/OBL LT  / ACCESSION #  [id number] PROCEDURE REASON: Osteomyelitis, ankle      * * * * Physician Interpretation * * * *  EXAMINATION / TECHNIQUE:  XR ANKLE 3V AP/LAT/OBL LT HISTORY:   osteomyelitis left ankle pain  Osteomyelitis, ankle. COMPARISON: None RESULT: There is fracture of the distal tibial metaphysis with lateral displacement of proximal  tibia and fibula with respect to the distal fragments.  There is angulated fracture of the distal fibula as well as fracture of its associated internal fixation plate. 2 screws of the medial malleolus, with one of the screws demonstrating a fracture.  The medial malleolus adjacent to the screws is also fractured.  The threaded portion of the fractured screw is embedded within the displaced proximal tibial fracture fragment. Both screws are medially displaced into the soft tissues.  Marked soft tissue swelling at the ankle.    IMPRESSION: Fracture dislocation of the distal tibia and fibula along with fibular plate fracture and medial malleolus screw fracture as described. Medial displacement of the medial malleolus screws. : PSCB   Transcribe Date/Time: Apr 13 2024  4:29P Dictated by : TOMASA BENAVIDEZ MD This examination was interpreted and the report reviewed and electronically signed by: TOMASA BENAVIDEZ MD on Apr 13 2024  4:35PM  EST   Results for orders placed or performed during the hospital encounter of 04/14/24 (from the past 24 hour(s))   CBC   Result Value Ref Range    WBC 14.6 (H) 4.4 - 11.3 x10*3/uL    nRBC 0.0 0.0 - 0.0 /100 WBCs    RBC 3.69 (L) 4.00 - 5.20 x10*6/uL    Hemoglobin 11.9 (L) 12.0 - 16.0 g/dL    Hematocrit 35.5 (L) 36.0 - 46.0 %    MCV 96 80 - 100 fL    MCH 32.2 26.0 - 34.0 pg    MCHC 33.5 32.0 - 36.0 g/dL    RDW 12.8 11.5 - 14.5 %    Platelets 410 150 - 450 x10*3/uL   Renal Function Panel   Result Value Ref Range    Glucose 88 74 - 99 mg/dL    Sodium 132 (L) 136 - 145 mmol/L    Potassium 4.4 3.5 - 5.3 mmol/L    Chloride 93 (L) 98 - 107 mmol/L    Bicarbonate 31 21 - 32 mmol/L    Anion Gap 12 10 - 20 mmol/L    Urea Nitrogen 22 6 - 23 mg/dL    Creatinine 1.02 0.50 - 1.05 mg/dL    eGFR 61 >60 mL/min/1.73m*2    Calcium 9.9 8.6 - 10.3 mg/dL    Phosphorus 3.4 2.5 - 4.9 mg/dL    Albumin 3.2 (L) 3.4 - 5.0 g/dL   Magnesium   Result Value Ref Range    Magnesium 1.38 (L) 1.60 - 2.40 mg/dL    Lactate   Result Value Ref Range    Lactate 0.5 0.4 - 2.0 mmol/L       Assessment/Plan      Left ankle fracture/dislocation - recurrent with failed hardware/infection/osteomyelitis - initially hardware at the very least needs to be removed; however, due to bone loss, patient may need a primary ankle arthrodesis, temporary external fixation, and/or amputation.  Concerned that patient does not seem to recall a new injury or recovery in general from first surgery.    - IV antibiotics per ID.   - will talk with patient again in AM to gain a clearer history.      I spent 40 minutes in the professional and overall care of this patient.

## 2024-04-15 NOTE — PROGRESS NOTES
Patient: Rose Marie Toussaint  Room/bed: 133/133-A  Admitted on: 4/14/2024    Age: 68 y.o.   Gender: female  Code Status:  Full Code   Admitting Dx: Septic joint (Multi) [M00.9]    MRN: [id number]  PCP: Iain Márquez DO  Preferred Pharm: [unfilled]    Attending: [unfilled]  Resident: Ernie Rizo DO  TCC: No care team member to display      Overnight Events     No acute overnight events    Subjective   Patient was seen at bedside.  She is complaining of pain in her left ankle. Ankle is currently red and swollen.  She states that the pain is currently well managed with oxycodone.  She denies fevers and chills chest pain and shortness of breath.  States that she was feeling feverish yesterday. No new complaints today.    Objective    Physical Exam   Constitutional: Appears stated age. Slightly disheveled In NAD.   HEENT: NC/AT, EOMI, clear sclera, moist mucous membranes  CV: RRR, No M/R/G  PULM: CTAB, no coughing or wheezing  ABDOMEN: Soft, NT/ND. No TTP. + BSx4.  SKIN: Normal Color, Warm, Dry, No Rashes   EXTREMITIES: Non-Tender, Full ROM, No Pedal Edema, Left ankle red and swollen with purulent drainage from both medial and lateral malleolus.  NEURO: A&O x 4, nonfocal neurological exam.  PSYCH: Normal Mood & Behavior      Temp:  [35.9 °C (96.6 °F)-36.7 °C (98.1 °F)] 36 °C (96.8 °F)  Heart Rate:  [74-85] 80  Resp:  [14-18] 18  BP: ()/(60-83) 98/70      Intake/Output Summary (Last 24 hours) at 4/15/2024 1215  Last data filed at 4/14/2024 1242  Gross per 24 hour   Intake 200 ml   Output --   Net 200 ml       Vitals:    04/14/24 0415   Weight: 56.8 kg (125 lb 4.8 oz)             I/Os    Intake/Output Summary (Last 24 hours) at 4/15/2024 1215  Last data filed at 4/14/2024 1242  Gross per 24 hour   Intake 200 ml   Output --   Net 200 ml       Labs:   Results from last 72 hours   Lab Units 04/15/24  0549 04/14/24  0644   SODIUM mmol/L 128*  130* 132*   POTASSIUM mmol/L 3.4*  3.4* 4.4   CHLORIDE mmol/L 90*  91*  "93*   CO2 mmol/L 30  31 31   BUN mg/dL 19  19 22   CREATININE mg/dL 1.04  1.07* 1.02   GLUCOSE mg/dL 105*  109* 88   CALCIUM mg/dL 9.7  9.5 9.9   ANION GAP mmol/L 11  11 12   EGFR mL/min/1.73m*2 59*  57* 61   PHOSPHORUS mg/dL 3.5 3.4      Results from last 72 hours   Lab Units 04/15/24  0549 04/14/24  0644   WBC AUTO x10*3/uL 13.2* 14.6*   HEMOGLOBIN g/dL 10.8* 11.9*   HEMATOCRIT % 32.5* 35.5*   PLATELETS AUTO x10*3/uL 420 410      Lab Results   Component Value Date    CALCIUM 9.5 04/15/2024    CALCIUM 9.7 04/15/2024    PHOS 3.5 04/15/2024      Lab Results   Component Value Date    CRP 23.19 (H) 04/15/2024      [unfilled]     Micro/ID:   No results found for the last 90 days.                   No lab exists for component: \"AGALPCRNB\"   .ID  Lab Results   Component Value Date    URINECULTURE NO GROWTH 05/24/2023    BLOODCULT  05/23/2023     No Growth at 1 days~No Growth at 2 days~No Growth at 3 days~NO GROWTH at 4 days - FINAL REPORT       Images:  XR ankle left 3+ views  Narrative: Interpreted By:  Collette Saldana,   STUDY:  XR ANKLE LEFT 3+ VIEWS; ;  4/14/2024 5:46 am      INDICATION:  Signs/Symptoms:Left ankle worsening infection and pain.      COMPARISON:  11/23/2022      ACCESSION NUMBER(S):  [id number]      ORDERING CLINICIAN:  ALEXIS VENEGAS      FINDINGS:  Three views of the left ankle show a plate and screw fixation of the  fibula with fracture of the hardware and fracture of the distal  fibula having apex lateral angulation. There is disruption of the  ankle mortise. The medial malleolus is fractured and displaced. There  is soft tissue gas adjacent to the medial malleolus.      Impression: Fracture and dislocation of the ankle with fractures through the  fixation hardware of the fibula. Displaced fractures of the distal  fibula and medial malleolus. Soft tissue gas in the medial ankle may  be due to infection with gas producing organism.          MACRO:  None      Signed by: Collette Saldana " 4/14/2024 7:25 AM  Dictation workstation:   [id number]       Meds    Scheduled medications  atenolol, 25 mg, oral, Daily  buPROPion, 100 mg, oral, BID  busPIRone, 30 mg, oral, BID  chlorthalidone, 25 mg, oral, Daily  dilTIAZem CD, 240 mg, oral, Daily  enoxaparin, 40 mg, subcutaneous, q24h  folic acid, 1 mg, oral, Daily  multivitamin with minerals, 1 tablet, oral, Daily  nicotine, 1 patch, transdermal, Daily  potassium chloride, 20 mEq, intravenous, q2h  pravastatin, 20 mg, oral, Daily  sertraline, 200 mg, oral, Daily  [START ON 4/17/2024] thiamine, 100 mg, oral, Daily  thiamine, 500 mg, intravenous, q8h MANUEL  vancomycin, 1,000 mg, intravenous, q24h      Continuous medications     PRN medications  PRN medications: acetaminophen, albuterol, HYDROmorphone, LORazepam **OR** LORazepam, melatonin, ondansetron, oxyCODONE, oxyCODONE, polyethylene glycol, vancomycin     Assessment and Plan    Rose Marie Toussaint is a 68 y.o. female with a PMH of COPD, HTN, Depression, hypokalemia  who presents to [place] ED presenting with worsening pain and swelling of her left ankle sp surgery in 2022. Anticipating surgery or transfer.     Acute medical issues:     #Fracture dislocation of the distal tibia and fibula along with fibular plate fracture and medial malleolus screw fracture:  #Concern for septic arthritis:  - X-ray showing: Fracture dislocation of the distal tibia and fibula along with fibular plate fracture and medial malleolus screw fracture as described. Medial displacement of the medial malleolus screws.  - Wound culture from OSH pending  -Continue Vanocmycin per ID recs.  - Podiatry consulted; appreciate recs.  - Will likely transfer to [facility] for repair or have patient undergo BKA here at [facility] if irreparable.         #Hyponatremia (resolved)  - Likely 2/2 decrease intake, patient was drinking water and alcohol   - Patient is asymptomatic  - Received 1 L NS in the ED, Na improved to  132.  - Continue to monitor.          #Hypokalemia(Resolved)   #Hypomagnesemia  - Patient had hypokalemia at OSH, corrected after 80 meq of K; repeat 4.4  -repleted        #ARELI on CKD (Resolved):  - Patient S.creat at OSH 1.41  - Likely pre-renal 2/2 intake.  - sp 1 L NS, S.Creat 1.02     #Alcohol use disorder:  - Patient using alcohol for pain managment.  -Last drink was 2 days before admission.  -Started on CIWA protocol(Ativan IV)  -High-dose thiamine 500 mg 3 times daily for 3 days then switch to 100 mg.     Chronic medical issues  #Tobacco Dependence -  - patient education for smoking/alcohol abstinence; nicotine patch      #COPD:  -Continue albuterol inhaler as needed.     #HLD  -Continue pravastatin 20 mg daily     #HTN:  -Continue atenolol 25 mg daily, Cardizem 240 mg daily, chlorthalidone 25 mg daily.     #Depression/anxiety:  -Continue Zoloft 200 mg, Wellbutrin 100 mg twice daily, buspirone 30 mg twice daily.        Fluids: PO intake   Electrolytes: Will replace as needed   Nutrition: Regular  GI Ppx: Not indicated   DVT Ppx: SCDs  Code Status: FULL  Abx: Vancomycin  Access: PIV   Emergency Contact: Cecil Toussaint (Spouse) [phone]      Disposition: Patient admitted for fracture ankle with fracture of metal and screw, possible septic joint. Treating with Vancomycin. Surgery at [facility] or here dependent on procedure. Anticipate >2 midnight stay.      Ernie Rizo DO

## 2024-04-15 NOTE — CARE PLAN
The patient's goals for the shift include      The clinical goals for the shift include pt will remain safe in bed    Over the shift, the patient did not make progress toward the following goals. Barriers to progression include . Recommendations to address these barriers include .  Problem: Pain  Goal: My pain/discomfort is manageable  Outcome: Progressing     Problem: Safety  Goal: I will remain free of falls  Outcome: Progressing

## 2024-04-15 NOTE — PROGRESS NOTES
04/15/24 1500   Referral Data   Referral Source Other (Comment)  (TCC)   Referral Reason Substance Abuse   County Information   County of Residence [place]   Patient Information   Primary Caregiver Self   Provides Primary Care For Significant Other   Support System Immediate family   Lives With spouse   Family Member Substance Use   Substance Use pt sleeping; unable to complete assessment.

## 2024-04-15 NOTE — PROGRESS NOTES
Rose Marie Toussaint is a 68 y.o. female on day 1 of admission presenting with Septic joint (Multi).    Subjective   Pain to left ankle controlled.  Friend at bedside.     Meds:  Scheduled medications  atenolol, 25 mg, oral, Daily  buPROPion, 100 mg, oral, BID  busPIRone, 30 mg, oral, BID  [Held by provider] chlorthalidone, 25 mg, oral, Daily  dilTIAZem CD, 240 mg, oral, Daily  enoxaparin, 40 mg, subcutaneous, q24h  folic acid, 1 mg, oral, Daily  multivitamin with minerals, 1 tablet, oral, Daily  nicotine, 1 patch, transdermal, Daily  pravastatin, 20 mg, oral, Daily  sertraline, 200 mg, oral, Daily  [START ON 4/17/2024] thiamine, 100 mg, oral, Daily  thiamine, 500 mg, intravenous, q8h MANUEL  vancomycin, 1,000 mg, intravenous, q24h      Continuous medications     PRN medications  PRN medications: acetaminophen, albuterol, HYDROmorphone, LORazepam **OR** LORazepam, melatonin, ondansetron, oxyCODONE, oxyCODONE, polyethylene glycol, vancomycin       Physical Exam   Constitutional:       General: She is in NAD     Appearance: She is normal weight.    HENT:      Head: Normocephalic and atraumatic.      Nose: Nose normal.      Mouth/Throat:      Mouth: Mucous membranes are moist.      Pharynx: Oropharynx is clear.   Eyes:      Extraocular Movements: Extraocular movements intact.      Conjunctiva/sclera: Conjunctivae normal.      Pupils: Pupils are equal, round, and reactive to light.   Cardiovascular:      Rate and Rhythm: Normal rate and regular rhythm.      Pulses: Normal pulses.      Heart sounds: Normal heart sounds.   Pulmonary:      Effort: Pulmonary effort is normal.      Breath sounds: Normal breath sounds.   Abdominal:      General: Abdomen is flat. Bowel sounds are normal.      Palpations: Abdomen is soft.   Musculoskeletal:         General: Swelling (left ankle), tenderness (left ankle) and deformity present. Left ankle is grossly unstable with deformity;  drainage from medial and lateral ankle,     Cervical back:  Normal range of motion and neck supple.      Left lower leg: Edema present.   Skin:     General: Skin is warm and dry.   Neurological:      General: No focal deficit present.      Mental Status: She is alert and oriented to person, place, and time. Mental status is at baseline.   Psychiatric:      Comments: Anxious      Last Recorded Vitals  Blood pressure 124/64, pulse 68, temperature 35.8 °C (96.4 °F), temperature source Temporal, resp. rate 16, height 1.524 m (5'), weight 56.8 kg (125 lb 4.8 oz), SpO2 95%.    Intake/Output last 3 Shifts:  I/O last 3 completed shifts:  In: 250 (4.4 mL/kg) [I.V.:50 (0.9 mL/kg); IV Piggyback:200]  Out: - (0 mL/kg)   Weight: 56.8 kg     Relevant Results   Vascular US ankle brachial index (LUIS) without exercise    Result Date: 4/15/2024  Preliminary Cardiology Report         [place] [address]  Tel [phone] and Fax [phone]            Preliminary Vascular Lab Report  [facility] US ANKLE BRACHIAL INDEX (LUIS) WITHOUT EXERCISE  Patient Name:      MANN Pond Physician:  [id number] Alicia Hernandez MD Study Date:        4/15/2024       Ordering Physician: [id number] LINDA GALINDO MRN/PID:           [id number]        Technologist:       Irasema Leung RVT Accession#:        [id number]    Technologist 2: Date of Birth/Age: 1955       Encounter#:         [id number] Gender:            F Admission Status:  Inpatient       Location Performed: [facility]  Diagnosis/ICD: Peripheral vascular disease, unspecified-I73.9 Procedure/CPT: 52255 Peripheral artery LUIS Only  PRELIMINARY CONCLUSIONS: Right Lower PVR: No evidence of arterial occlusive disease in the right lower extremity at rest. Decreased digital perfusion noted. Triphasic flow is noted in the right common femoral artery, right posterior tibial artery and  right dorsalis pedis artery. Left Lower PVR: Evidence of mild arterial occlusive disease in the left lower extremity at rest. Decreased digital perfusion noted. Biphasic flow is noted in the left common femoral artery, left posterior tibial artery and left dorsalis pedis artery.  Imaging & Doppler Findings:  RIGHT Lower PVR                Pressures Ratios Right Posterior Tibial (Ankle) 111 mmHg  0.93 Right Dorsalis Pedis (Ankle)   121 mmHg  1.02 Right Digit (Great Toe)        0 mmHg    0.00   LEFT Lower PVR                Pressures Ratios Left Posterior Tibial (Ankle) 93 mmHg   0.78 Left Dorsalis Pedis (Ankle)   97 mmHg   0.82 Left Digit (Great Toe)        0 mmHg    0.00                      Right     Left Brachial Pressure 118 mmHg 119 mmHg   VASCULAR PRELIMINARY REPORT completed by Irasema Leung RVT on 4/15/2024 at 2:53:33 PM  ** Final **     XR ankle left 3+ views    Result Date: 4/14/2024  Interpreted By:  Collette Saldana, STUDY: XR ANKLE LEFT 3+ VIEWS; ;  4/14/2024 5:46 am   INDICATION: Signs/Symptoms:Left ankle worsening infection and pain.   COMPARISON: 11/23/2022   ACCESSION NUMBER(S): [id number]   ORDERING CLINICIAN: ALEXIS VENEGAS   FINDINGS: Three views of the left ankle show a plate and screw fixation of the fibula with fracture of the hardware and fracture of the distal fibula having apex lateral angulation. There is disruption of the ankle mortise. The medial malleolus is fractured and displaced. There is soft tissue gas adjacent to the medial malleolus.       Fracture and dislocation of the ankle with fractures through the fixation hardware of the fibula. Displaced fractures of the distal fibula and medial malleolus. Soft tissue gas in the medial ankle may be due to infection with gas producing organism.     MACRO: None   Signed by: Collette Saldana 4/14/2024 7:25 AM Dictation workstation:   [id number]    XR ankle left 3+ views    Result Date: 4/13/2024  * * *Final Report* * * DATE OF EXAM: Apr 13 2024   3:27PM   ASX   5298  -  XR ANKLE 3V AP/LAT/OBL LT  / ACCESSION #  [id number] PROCEDURE REASON: Osteomyelitis, ankle      * * * * Physician Interpretation * * * *  EXAMINATION / TECHNIQUE:  XR ANKLE 3V AP/LAT/OBL LT HISTORY:   osteomyelitis left ankle pain  Osteomyelitis, ankle. COMPARISON: None RESULT: There is fracture of the distal tibial metaphysis with lateral displacement of proximal tibia and fibula with respect to the distal fragments.  There is angulated fracture of the distal fibula as well as fracture of its associated internal fixation plate. 2 screws of the medial malleolus, with one of the screws demonstrating a fracture.  The medial malleolus adjacent to the screws is also fractured.  The threaded portion of the fractured screw is embedded within the displaced proximal tibial fracture fragment. Both screws are medially displaced into the soft tissues.  Marked soft tissue swelling at the ankle.    IMPRESSION: Fracture dislocation of the distal tibia and fibula along with fibular plate fracture and medial malleolus screw fracture as described. Medial displacement of the medial malleolus screws. : [facility]   Transcribe Date/Time: Apr 13 2024  4:29P Dictated by : TOMASA BENAVIDEZ MD This examination was interpreted and the report reviewed and electronically signed by: TOMASA BENAVIDEZ MD on Apr 13 2024  4:35PM  EST   Results for orders placed or performed during the hospital encounter of 04/14/24 (from the past 24 hour(s))   CBC   Result Value Ref Range    WBC 13.2 (H) 4.4 - 11.3 x10*3/uL    nRBC 0.0 0.0 - 0.0 /100 WBCs    RBC 3.36 (L) 4.00 - 5.20 x10*6/uL    Hemoglobin 10.8 (L) 12.0 - 16.0 g/dL    Hematocrit 32.5 (L) 36.0 - 46.0 %    MCV 97 80 - 100 fL    MCH 32.1 26.0 - 34.0 pg    MCHC 33.2 32.0 - 36.0 g/dL    RDW 12.9 11.5 - 14.5 %    Platelets 420 150 - 450 x10*3/uL   Renal Function Panel   Result Value Ref Range    Glucose 109 (H) 74 - 99 mg/dL    Sodium 130 (L) 136 - 145 mmol/L    Potassium 3.4  (L) 3.5 - 5.3 mmol/L    Chloride 91 (L) 98 - 107 mmol/L    Bicarbonate 31 21 - 32 mmol/L    Anion Gap 11 10 - 20 mmol/L    Urea Nitrogen 19 6 - 23 mg/dL    Creatinine 1.07 (H) 0.50 - 1.05 mg/dL    eGFR 57 (L) >60 mL/min/1.73m*2    Calcium 9.5 8.6 - 10.3 mg/dL    Phosphorus 3.5 2.5 - 4.9 mg/dL    Albumin 2.9 (L) 3.4 - 5.0 g/dL   Magnesium   Result Value Ref Range    Magnesium 1.58 (L) 1.60 - 2.40 mg/dL   Sedimentation rate, automated   Result Value Ref Range    Sedimentation Rate 94 (H) 0 - 30 mm/h   C-reactive protein   Result Value Ref Range    C-Reactive Protein 23.19 (H) <1.00 mg/dL   Comprehensive metabolic panel   Result Value Ref Range    Glucose 105 (H) 74 - 99 mg/dL    Sodium 128 (L) 136 - 145 mmol/L    Potassium 3.4 (L) 3.5 - 5.3 mmol/L    Chloride 90 (L) 98 - 107 mmol/L    Bicarbonate 30 21 - 32 mmol/L    Anion Gap 11 10 - 20 mmol/L    Urea Nitrogen 19 6 - 23 mg/dL    Creatinine 1.04 0.50 - 1.05 mg/dL    eGFR 59 (L) >60 mL/min/1.73m*2    Calcium 9.7 8.6 - 10.3 mg/dL    Albumin 2.9 (L) 3.4 - 5.0 g/dL    Alkaline Phosphatase 146 (H) 33 - 136 U/L    Total Protein 6.1 (L) 6.4 - 8.2 g/dL    AST 82 (H) 9 - 39 U/L    Bilirubin, Total 0.2 0.0 - 1.2 mg/dL    ALT 56 (H) 7 - 45 U/L            Assessment/Plan   Principal Problem:    Septic joint (Multi)    Left ankle fracture/dislocation - recurrent with failed hardware/infection/osteomyelitis        Discussed case with ID (Dr. Menjivar) and foot and ankle orthopedic surgeons Dr. Jauregui and Dr. Guerra - recommended vascular studies, but agreed that patient will most likely either need a staged debridement and then arthrodesis or a BKA.  - IV antibiotics per ID.   Once vascular studies complete, will plan for next steps. NWB to left LE.       Carleen Obrien DPM

## 2024-04-15 NOTE — PROGRESS NOTES
04/15/24 1427   Discharge Planning   Living Arrangements Spouse/significant other   Support Systems Spouse/significant other   Assistance Needed A&Ox3, independent with ADLs with walker, drives, room air at baseline   Type of Residence Private residence   Number of Stairs to Enter Residence 0   Number of Stairs Within Residence 14   Do you have animals or pets at home? Yes   Type of Animals or Pets 1 cat   Home or Post Acute Services None   Patient expects to be discharged to: TBD pending medical plan

## 2024-04-15 NOTE — PROGRESS NOTES
Rose Marie Toussaint is a 68 y.o. female on day 1 of admission presenting with Septic joint (Multi).      Subjective   Patient is seen and examined, she denied any fever, no new complaints. Her ankle hurts at night with sudden movement or if she hits it.       Objective     Last Recorded Vitals  BP 98/70 (BP Location: Right arm, Patient Position: Lying)   Pulse 80   Temp 36 °C (96.8 °F) (Temporal)   Resp 18   Wt 56.8 kg (125 lb 4.8 oz)   SpO2 95%   Intake/Output last 3 Shifts:    Intake/Output Summary (Last 24 hours) at 4/15/2024 1102  Last data filed at 4/14/2024 1242  Gross per 24 hour   Intake 250 ml   Output --   Net 250 ml       Admission Weight  Weight: 56.8 kg (125 lb 4.8 oz) (04/14/24 0415)    Daily Weight  04/14/24 : 56.8 kg (125 lb 4.8 oz)    Image Results  XR ankle left 3+ views  Narrative: Interpreted By:  Collette Saldana,   STUDY:  XR ANKLE LEFT 3+ VIEWS; ;  4/14/2024 5:46 am      INDICATION:  Signs/Symptoms:Left ankle worsening infection and pain.      COMPARISON:  11/23/2022      ACCESSION NUMBER(S):  [id number]      ORDERING CLINICIAN:  ALEXIS VENEGAS      FINDINGS:  Three views of the left ankle show a plate and screw fixation of the  fibula with fracture of the hardware and fracture of the distal  fibula having apex lateral angulation. There is disruption of the  ankle mortise. The medial malleolus is fractured and displaced. There  is soft tissue gas adjacent to the medial malleolus.      Impression: Fracture and dislocation of the ankle with fractures through the  fixation hardware of the fibula. Displaced fractures of the distal  fibula and medial malleolus. Soft tissue gas in the medial ankle may  be due to infection with gas producing organism.          MACRO:  None      Signed by: Collette Saldana 4/14/2024 7:25 AM  Dictation workstation:   [id number]      Physical Exam  Constitutional:       Appearance: Normal appearance. She is normal weight.   HENT:      Head: Normocephalic and atraumatic.       Nose: Nose normal.   Eyes:      Extraocular Movements: Extraocular movements intact.      Conjunctiva/sclera: Conjunctivae normal.      Pupils: Pupils are equal, round, and reactive to light.   Cardiovascular:      Rate and Rhythm: Normal rate and regular rhythm.   Pulmonary:      Effort: Pulmonary effort is normal.      Breath sounds: Wheezing present.      Comments: Diminished air entry  Abdominal:      General: Abdomen is flat. Bowel sounds are normal.      Palpations: Abdomen is soft.   Musculoskeletal:         General: Swelling and deformity (Left ankle with dressing, swelling and deformity) present.      Cervical back: Normal range of motion and neck supple.      Left lower leg: Edema present.   Skin:     General: Skin is warm and dry.   Neurological:      General: No focal deficit present.      Mental Status: She is alert. Mental status is at baseline.   Psychiatric:         Mood and Affect: Mood normal.         Behavior: Behavior normal.         Relevant Results    Scheduled medications  atenolol, 25 mg, oral, Daily  buPROPion, 100 mg, oral, BID  busPIRone, 30 mg, oral, BID  chlorthalidone, 25 mg, oral, Daily  dilTIAZem CD, 240 mg, oral, Daily  enoxaparin, 40 mg, subcutaneous, q24h  folic acid, 1 mg, oral, Daily  multivitamin with minerals, 1 tablet, oral, Daily  nicotine, 1 patch, transdermal, Daily  potassium chloride, 20 mEq, intravenous, q2h  potassium chloride, 20 mEq, intravenous, q2h  pravastatin, 20 mg, oral, Daily  sertraline, 200 mg, oral, Daily  [START ON 4/17/2024] thiamine, 100 mg, oral, Daily  thiamine, 500 mg, intravenous, q8h MANUEL  vancomycin, 1,000 mg, intravenous, q24h      Continuous medications     PRN medications  PRN medications: acetaminophen, albuterol, HYDROmorphone, LORazepam **OR** LORazepam, melatonin, ondansetron, oxyCODONE, oxyCODONE, polyethylene glycol, vancomycin    Results for orders placed or performed during the hospital encounter of 04/14/24 (from the past 24 hour(s))    CBC   Result Value Ref Range    WBC 13.2 (H) 4.4 - 11.3 x10*3/uL    nRBC 0.0 0.0 - 0.0 /100 WBCs    RBC 3.36 (L) 4.00 - 5.20 x10*6/uL    Hemoglobin 10.8 (L) 12.0 - 16.0 g/dL    Hematocrit 32.5 (L) 36.0 - 46.0 %    MCV 97 80 - 100 fL    MCH 32.1 26.0 - 34.0 pg    MCHC 33.2 32.0 - 36.0 g/dL    RDW 12.9 11.5 - 14.5 %    Platelets 420 150 - 450 x10*3/uL   Renal Function Panel   Result Value Ref Range    Glucose 109 (H) 74 - 99 mg/dL    Sodium 130 (L) 136 - 145 mmol/L    Potassium 3.4 (L) 3.5 - 5.3 mmol/L    Chloride 91 (L) 98 - 107 mmol/L    Bicarbonate 31 21 - 32 mmol/L    Anion Gap 11 10 - 20 mmol/L    Urea Nitrogen 19 6 - 23 mg/dL    Creatinine 1.07 (H) 0.50 - 1.05 mg/dL    eGFR 57 (L) >60 mL/min/1.73m*2    Calcium 9.5 8.6 - 10.3 mg/dL    Phosphorus 3.5 2.5 - 4.9 mg/dL    Albumin 2.9 (L) 3.4 - 5.0 g/dL   Magnesium   Result Value Ref Range    Magnesium 1.58 (L) 1.60 - 2.40 mg/dL   Sedimentation rate, automated   Result Value Ref Range    Sedimentation Rate 94 (H) 0 - 30 mm/h   C-reactive protein   Result Value Ref Range    C-Reactive Protein 23.19 (H) <1.00 mg/dL   Comprehensive metabolic panel   Result Value Ref Range    Glucose 105 (H) 74 - 99 mg/dL    Sodium 128 (L) 136 - 145 mmol/L    Potassium 3.4 (L) 3.5 - 5.3 mmol/L    Chloride 90 (L) 98 - 107 mmol/L    Bicarbonate 30 21 - 32 mmol/L    Anion Gap 11 10 - 20 mmol/L    Urea Nitrogen 19 6 - 23 mg/dL    Creatinine 1.04 0.50 - 1.05 mg/dL    eGFR 59 (L) >60 mL/min/1.73m*2    Calcium 9.7 8.6 - 10.3 mg/dL    Albumin 2.9 (L) 3.4 - 5.0 g/dL    Alkaline Phosphatase 146 (H) 33 - 136 U/L    Total Protein 6.1 (L) 6.4 - 8.2 g/dL    AST 82 (H) 9 - 39 U/L    Bilirubin, Total 0.2 0.0 - 1.2 mg/dL    ALT 56 (H) 7 - 45 U/L     XR ankle left 3+ views    Result Date: 4/14/2024  Interpreted By:  Collette Saldana, STUDY: XR ANKLE LEFT 3+ VIEWS; ;  4/14/2024 5:46 am   INDICATION: Signs/Symptoms:Left ankle worsening infection and pain.   COMPARISON: 11/23/2022   ACCESSION NUMBER(S):  [id number]   ORDERING CLINICIAN: ALEXIS VENEGAS   FINDINGS: Three views of the left ankle show a plate and screw fixation of the fibula with fracture of the hardware and fracture of the distal fibula having apex lateral angulation. There is disruption of the ankle mortise. The medial malleolus is fractured and displaced. There is soft tissue gas adjacent to the medial malleolus.       Fracture and dislocation of the ankle with fractures through the fixation hardware of the fibula. Displaced fractures of the distal fibula and medial malleolus. Soft tissue gas in the medial ankle may be due to infection with gas producing organism.     MACRO: None   Signed by: Collette Saldana 4/14/2024 7:25 AM Dictation workstation:   [id number]    XR ankle left 3+ views    Result Date: 4/13/2024  * * *Final Report* * * DATE OF EXAM: Apr 13 2024  3:27PM   ASX   [id number]  -  XR ANKLE 3V AP/LAT/OBL LT  / ACCESSION #  [id number] PROCEDURE REASON: Osteomyelitis, ankle      * * * * Physician Interpretation * * * *  EXAMINATION / TECHNIQUE:  XR ANKLE 3V AP/LAT/OBL LT HISTORY:   osteomyelitis left ankle pain  Osteomyelitis, ankle. COMPARISON: None RESULT: There is fracture of the distal tibial metaphysis with lateral displacement of proximal tibia and fibula with respect to the distal fragments.  There is angulated fracture of the distal fibula as well as fracture of its associated internal fixation plate. 2 screws of the medial malleolus, with one of the screws demonstrating a fracture.  The medial malleolus adjacent to the screws is also fractured.  The threaded portion of the fractured screw is embedded within the displaced proximal tibial fracture fragment. Both screws are medially displaced into the soft tissues.  Marked soft tissue swelling at the ankle.    IMPRESSION: Fracture dislocation of the distal tibia and fibula along with fibular plate fracture and medial malleolus screw fracture as described. Medial displacement of the medial  malleolus screws. : ALFA   Transcribe Date/Time: Apr 13 2024  4:29P Dictated by : TOMASA BENAVIDEZ MD This examination was interpreted and the report reviewed and electronically signed by: TOMASA BENAVIDEZ MD on Apr 13 2024  4:35PM  EST         Assessment/Plan        Principal Problem:    Septic joint (Multi)    Left ankle cellulitis / likely hardware infection / failure sp I&D at [place]     Recommendations :  Continue Vancomycin  Cultures; pending.  Keep the leg elevated  Will need surgery                 Paul Lewis DO          Attending note : the patient was evaluated, the note was reviewed and up dated  Left ankle cellulitis / likely hardware infection / failure, forsurgery  Recommendations :  Continue Vancomycin  Discussed with the surgery team     I spent minutes in the professional and overall care of this patient.        Raina Sepulveda MD

## 2024-04-16 ENCOUNTER — APPOINTMENT (OUTPATIENT)
Dept: RADIOLOGY | Facility: HOSPITAL | Age: 69
DRG: 475 | End: 2024-04-16
Payer: COMMERCIAL

## 2024-04-16 ENCOUNTER — APPOINTMENT (OUTPATIENT)
Dept: CARDIOLOGY | Facility: HOSPITAL | Age: 69
DRG: 475 | End: 2024-04-16
Payer: COMMERCIAL

## 2024-04-16 ENCOUNTER — HOSPITAL ENCOUNTER (INPATIENT)
Facility: HOSPITAL | Age: 69
LOS: 13 days | Discharge: NURSING, REHAB OR LONG-TERM CARE | DRG: 475 | End: 2024-04-29
Attending: INTERNAL MEDICINE | Admitting: INTERNAL MEDICINE
Payer: COMMERCIAL

## 2024-04-16 VITALS
RESPIRATION RATE: 18 BRPM | SYSTOLIC BLOOD PRESSURE: 130 MMHG | TEMPERATURE: 97.5 F | HEART RATE: 70 BPM | BODY MASS INDEX: 24.6 KG/M2 | WEIGHT: 125.3 LBS | DIASTOLIC BLOOD PRESSURE: 73 MMHG | HEIGHT: 60 IN | OXYGEN SATURATION: 93 %

## 2024-04-16 DIAGNOSIS — I73.9 PERIPHERAL VASCULAR DISEASE, UNSPECIFIED (CMS-HCC): ICD-10-CM

## 2024-04-16 DIAGNOSIS — G47.00 INSOMNIA, UNSPECIFIED TYPE: ICD-10-CM

## 2024-04-16 DIAGNOSIS — I10 PRIMARY HYPERTENSION: ICD-10-CM

## 2024-04-16 DIAGNOSIS — I70.222 CRITICAL LIMB ISCHEMIA OF LEFT LOWER EXTREMITY (MULTI): ICD-10-CM

## 2024-04-16 DIAGNOSIS — Z72.0 NICOTINE ABUSE: ICD-10-CM

## 2024-04-16 DIAGNOSIS — F10.10 ALCOHOL ABUSE: ICD-10-CM

## 2024-04-16 DIAGNOSIS — G89.18 ACUTE POST-OPERATIVE PAIN: ICD-10-CM

## 2024-04-16 DIAGNOSIS — R09.89 OTHER SPECIFIED SYMPTOMS AND SIGNS INVOLVING THE CIRCULATORY AND RESPIRATORY SYSTEMS: ICD-10-CM

## 2024-04-16 DIAGNOSIS — E78.5 HYPERLIPIDEMIA, UNSPECIFIED HYPERLIPIDEMIA TYPE: ICD-10-CM

## 2024-04-16 DIAGNOSIS — I10 HYPERTENSION, UNSPECIFIED TYPE: ICD-10-CM

## 2024-04-16 DIAGNOSIS — K59.00 CONSTIPATION, UNSPECIFIED CONSTIPATION TYPE: ICD-10-CM

## 2024-04-16 DIAGNOSIS — J44.9 CHRONIC OBSTRUCTIVE PULMONARY DISEASE, UNSPECIFIED COPD TYPE (MULTI): ICD-10-CM

## 2024-04-16 DIAGNOSIS — Z29.9 ENCOUNTER FOR DEEP VEIN THROMBOSIS (DVT) PROPHYLAXIS: ICD-10-CM

## 2024-04-16 DIAGNOSIS — M86.672 CHRONIC OSTEOMYELITIS INVOLVING ANKLE AND FOOT, LEFT (MULTI): ICD-10-CM

## 2024-04-16 DIAGNOSIS — I70.262 ATHEROSCLEROSIS OF NATIVE ARTERIES OF EXTREMITIES WITH GANGRENE, LEFT LEG (MULTI): ICD-10-CM

## 2024-04-16 DIAGNOSIS — F32.A DEPRESSION, UNSPECIFIED DEPRESSION TYPE: ICD-10-CM

## 2024-04-16 DIAGNOSIS — I70.221 CRITICAL LIMB ISCHEMIA OF RIGHT LOWER EXTREMITY (MULTI): ICD-10-CM

## 2024-04-16 DIAGNOSIS — F10.931 ALCOHOL WITHDRAWAL DELIRIUM (MULTI): ICD-10-CM

## 2024-04-16 DIAGNOSIS — M00.9: Primary | ICD-10-CM

## 2024-04-16 LAB
ALBUMIN SERPL BCP-MCNC: 3 G/DL (ref 3.4–5)
ALP SERPL-CCNC: 137 U/L (ref 33–136)
ALT SERPL W P-5'-P-CCNC: 45 U/L (ref 7–45)
ANION GAP SERPL CALC-SCNC: 12 MMOL/L (ref 10–20)
AST SERPL W P-5'-P-CCNC: 38 U/L (ref 9–39)
BILIRUB SERPL-MCNC: 0.4 MG/DL (ref 0–1.2)
BUN SERPL-MCNC: 16 MG/DL (ref 6–23)
CALCIUM SERPL-MCNC: 9.7 MG/DL (ref 8.6–10.3)
CHLORIDE SERPL-SCNC: 91 MMOL/L (ref 98–107)
CO2 SERPL-SCNC: 32 MMOL/L (ref 21–32)
CREAT SERPL-MCNC: 1.08 MG/DL (ref 0.5–1.05)
EGFRCR SERPLBLD CKD-EPI 2021: 57 ML/MIN/1.73M*2
ERYTHROCYTE [DISTWIDTH] IN BLOOD BY AUTOMATED COUNT: 12.9 % (ref 11.5–14.5)
GLUCOSE SERPL-MCNC: 102 MG/DL (ref 74–99)
HCT VFR BLD AUTO: 34.6 % (ref 36–46)
HGB BLD-MCNC: 11.3 G/DL (ref 12–16)
MAGNESIUM SERPL-MCNC: 1.71 MG/DL (ref 1.6–2.4)
MCH RBC QN AUTO: 31.9 PG (ref 26–34)
MCHC RBC AUTO-ENTMCNC: 32.7 G/DL (ref 32–36)
MCV RBC AUTO: 98 FL (ref 80–100)
NRBC BLD-RTO: 0 /100 WBCS (ref 0–0)
PLATELET # BLD AUTO: 418 X10*3/UL (ref 150–450)
POTASSIUM SERPL-SCNC: 3.4 MMOL/L (ref 3.5–5.3)
PROT SERPL-MCNC: 6.1 G/DL (ref 6.4–8.2)
RBC # BLD AUTO: 3.54 X10*6/UL (ref 4–5.2)
SODIUM SERPL-SCNC: 132 MMOL/L (ref 136–145)
VANCOMYCIN SERPL-MCNC: 16.1 UG/ML (ref 5–20)
WBC # BLD AUTO: 9 X10*3/UL (ref 4.4–11.3)

## 2024-04-16 PROCEDURE — 93010 ELECTROCARDIOGRAM REPORT: CPT | Performed by: INTERNAL MEDICINE

## 2024-04-16 PROCEDURE — 80202 ASSAY OF VANCOMYCIN: CPT | Performed by: STUDENT IN AN ORGANIZED HEALTH CARE EDUCATION/TRAINING PROGRAM

## 2024-04-16 PROCEDURE — 1100000001 HC PRIVATE ROOM DAILY

## 2024-04-16 PROCEDURE — 2500000004 HC RX 250 GENERAL PHARMACY W/ HCPCS (ALT 636 FOR OP/ED): Mod: MUE | Performed by: INTERNAL MEDICINE

## 2024-04-16 PROCEDURE — 73700 CT LOWER EXTREMITY W/O DYE: CPT | Mod: LEFT SIDE | Performed by: STUDENT IN AN ORGANIZED HEALTH CARE EDUCATION/TRAINING PROGRAM

## 2024-04-16 PROCEDURE — 80053 COMPREHEN METABOLIC PANEL: CPT

## 2024-04-16 PROCEDURE — 2500000002 HC RX 250 W HCPCS SELF ADMINISTERED DRUGS (ALT 637 FOR MEDICARE OP, ALT 636 FOR OP/ED): Mod: MUE | Performed by: INTERNAL MEDICINE

## 2024-04-16 PROCEDURE — 83735 ASSAY OF MAGNESIUM: CPT

## 2024-04-16 PROCEDURE — 2500000004 HC RX 250 GENERAL PHARMACY W/ HCPCS (ALT 636 FOR OP/ED): Performed by: INTERNAL MEDICINE

## 2024-04-16 PROCEDURE — 71045 X-RAY EXAM CHEST 1 VIEW: CPT

## 2024-04-16 PROCEDURE — 2500000001 HC RX 250 WO HCPCS SELF ADMINISTERED DRUGS (ALT 637 FOR MEDICARE OP): Performed by: INTERNAL MEDICINE

## 2024-04-16 PROCEDURE — 93005 ELECTROCARDIOGRAM TRACING: CPT

## 2024-04-16 PROCEDURE — 85027 COMPLETE CBC AUTOMATED: CPT

## 2024-04-16 PROCEDURE — 73700 CT LOWER EXTREMITY W/O DYE: CPT | Mod: LT

## 2024-04-16 PROCEDURE — 2500000001 HC RX 250 WO HCPCS SELF ADMINISTERED DRUGS (ALT 637 FOR MEDICARE OP)

## 2024-04-16 PROCEDURE — 99222 1ST HOSP IP/OBS MODERATE 55: CPT | Performed by: STUDENT IN AN ORGANIZED HEALTH CARE EDUCATION/TRAINING PROGRAM

## 2024-04-16 PROCEDURE — 36415 COLL VENOUS BLD VENIPUNCTURE: CPT

## 2024-04-16 PROCEDURE — 2500000002 HC RX 250 W HCPCS SELF ADMINISTERED DRUGS (ALT 637 FOR MEDICARE OP, ALT 636 FOR OP/ED)

## 2024-04-16 PROCEDURE — 99223 1ST HOSP IP/OBS HIGH 75: CPT | Performed by: STUDENT IN AN ORGANIZED HEALTH CARE EDUCATION/TRAINING PROGRAM

## 2024-04-16 PROCEDURE — S4991 NICOTINE PATCH NONLEGEND: HCPCS

## 2024-04-16 PROCEDURE — 96375 TX/PRO/DX INJ NEW DRUG ADDON: CPT

## 2024-04-16 PROCEDURE — G0378 HOSPITAL OBSERVATION PER HR: HCPCS

## 2024-04-16 PROCEDURE — 96365 THER/PROPH/DIAG IV INF INIT: CPT | Mod: 59

## 2024-04-16 PROCEDURE — 99239 HOSP IP/OBS DSCHRG MGMT >30: CPT

## 2024-04-16 PROCEDURE — 2500000006 HC RX 250 W HCPCS SELF ADMINISTERED DRUGS (ALT 637 FOR ALL PAYERS)

## 2024-04-16 RX ORDER — VANCOMYCIN HYDROCHLORIDE 1 G/20ML
INJECTION, POWDER, LYOPHILIZED, FOR SOLUTION INTRAVENOUS DAILY PRN
Status: DISCONTINUED | OUTPATIENT
Start: 2024-04-16 | End: 2024-04-17

## 2024-04-16 RX ORDER — VANCOMYCIN HYDROCHLORIDE 750 MG/150ML
750 INJECTION, SOLUTION INTRAVENOUS EVERY 24 HOURS
Status: DISCONTINUED | OUTPATIENT
Start: 2024-04-16 | End: 2024-04-16 | Stop reason: HOSPADM

## 2024-04-16 RX ORDER — IBUPROFEN 200 MG
1 TABLET ORAL DAILY
Status: CANCELLED | OUTPATIENT
Start: 2024-04-17

## 2024-04-16 RX ORDER — IBUPROFEN 200 MG
1 TABLET ORAL DAILY
Status: DISCONTINUED | OUTPATIENT
Start: 2024-04-17 | End: 2024-04-29 | Stop reason: HOSPADM

## 2024-04-16 RX ORDER — ATENOLOL 25 MG/1
25 TABLET ORAL DAILY
Status: CANCELLED | OUTPATIENT
Start: 2024-04-17

## 2024-04-16 RX ORDER — BUPROPION HYDROCHLORIDE 100 MG/1
100 TABLET ORAL 2 TIMES DAILY
Status: DISCONTINUED | OUTPATIENT
Start: 2024-04-16 | End: 2024-04-21

## 2024-04-16 RX ORDER — BUSPIRONE HYDROCHLORIDE 10 MG/1
30 TABLET ORAL 2 TIMES DAILY
Status: DISCONTINUED | OUTPATIENT
Start: 2024-04-16 | End: 2024-04-17

## 2024-04-16 RX ORDER — ACETAMINOPHEN 500 MG
5 TABLET ORAL NIGHTLY PRN
Status: CANCELLED | OUTPATIENT
Start: 2024-04-16

## 2024-04-16 RX ORDER — OXYCODONE HYDROCHLORIDE 5 MG/1
5 TABLET ORAL EVERY 6 HOURS PRN
Status: CANCELLED | OUTPATIENT
Start: 2024-04-16

## 2024-04-16 RX ORDER — OXYCODONE HYDROCHLORIDE 5 MG/1
5 TABLET ORAL EVERY 6 HOURS PRN
Status: DISCONTINUED | OUTPATIENT
Start: 2024-04-16 | End: 2024-04-29 | Stop reason: HOSPADM

## 2024-04-16 RX ORDER — LANOLIN ALCOHOL/MO/W.PET/CERES
100 CREAM (GRAM) TOPICAL DAILY
Status: DISCONTINUED | OUTPATIENT
Start: 2024-04-17 | End: 2024-04-16

## 2024-04-16 RX ORDER — ALBUTEROL SULFATE 90 UG/1
2 AEROSOL, METERED RESPIRATORY (INHALATION) EVERY 6 HOURS PRN
Status: DISCONTINUED | OUTPATIENT
Start: 2024-04-16 | End: 2024-04-16

## 2024-04-16 RX ORDER — FOLIC ACID 1 MG/1
1 TABLET ORAL DAILY
Status: DISCONTINUED | OUTPATIENT
Start: 2024-04-16 | End: 2024-04-29 | Stop reason: HOSPADM

## 2024-04-16 RX ORDER — MULTIVIT-MIN/IRON FUM/FOLIC AC 7.5 MG-4
1 TABLET ORAL DAILY
Status: DISCONTINUED | OUTPATIENT
Start: 2024-04-17 | End: 2024-04-16

## 2024-04-16 RX ORDER — LORAZEPAM 1 MG/1
2 TABLET ORAL EVERY 2 HOUR PRN
Status: DISCONTINUED | OUTPATIENT
Start: 2024-04-16 | End: 2024-04-24

## 2024-04-16 RX ORDER — OXYCODONE HYDROCHLORIDE 10 MG/1
10 TABLET ORAL EVERY 6 HOURS PRN
Status: CANCELLED | OUTPATIENT
Start: 2024-04-16

## 2024-04-16 RX ORDER — ONDANSETRON HYDROCHLORIDE 2 MG/ML
4 INJECTION, SOLUTION INTRAVENOUS EVERY 8 HOURS PRN
Status: CANCELLED | OUTPATIENT
Start: 2024-04-16

## 2024-04-16 RX ORDER — SERTRALINE HYDROCHLORIDE 50 MG/1
200 TABLET, FILM COATED ORAL DAILY
Status: CANCELLED | OUTPATIENT
Start: 2024-04-17

## 2024-04-16 RX ORDER — PRAVASTATIN SODIUM 20 MG/1
20 TABLET ORAL DAILY
Status: DISCONTINUED | OUTPATIENT
Start: 2024-04-17 | End: 2024-04-29 | Stop reason: HOSPADM

## 2024-04-16 RX ORDER — CHLORTHALIDONE 25 MG/1
25 TABLET ORAL DAILY
Status: DISCONTINUED | OUTPATIENT
Start: 2024-04-17 | End: 2024-04-29 | Stop reason: HOSPADM

## 2024-04-16 RX ORDER — TALC
5 POWDER (GRAM) TOPICAL NIGHTLY PRN
Status: DISCONTINUED | OUTPATIENT
Start: 2024-04-16 | End: 2024-04-29 | Stop reason: HOSPADM

## 2024-04-16 RX ORDER — ACETAMINOPHEN 325 MG/1
650 TABLET ORAL EVERY 4 HOURS PRN
Status: DISCONTINUED | OUTPATIENT
Start: 2024-04-16 | End: 2024-04-29 | Stop reason: HOSPADM

## 2024-04-16 RX ORDER — LANOLIN ALCOHOL/MO/W.PET/CERES
100 CREAM (GRAM) TOPICAL DAILY
Status: CANCELLED | OUTPATIENT
Start: 2024-04-17

## 2024-04-16 RX ORDER — POLYETHYLENE GLYCOL 3350 17 G/17G
17 POWDER, FOR SOLUTION ORAL DAILY PRN
Status: CANCELLED | OUTPATIENT
Start: 2024-04-16

## 2024-04-16 RX ORDER — CHLORTHALIDONE 25 MG/1
25 TABLET ORAL DAILY
Status: CANCELLED | OUTPATIENT
Start: 2024-04-17

## 2024-04-16 RX ORDER — LORAZEPAM 0.5 MG/1
0.5 TABLET ORAL EVERY 2 HOUR PRN
Status: DISCONTINUED | OUTPATIENT
Start: 2024-04-16 | End: 2024-04-24

## 2024-04-16 RX ORDER — FOLIC ACID 1 MG/1
1 TABLET ORAL DAILY
Status: CANCELLED | OUTPATIENT
Start: 2024-04-17

## 2024-04-16 RX ORDER — ENOXAPARIN SODIUM 100 MG/ML
40 INJECTION SUBCUTANEOUS EVERY 24 HOURS
Status: DISCONTINUED | OUTPATIENT
Start: 2024-04-17 | End: 2024-04-29 | Stop reason: HOSPADM

## 2024-04-16 RX ORDER — MULTIVIT-MIN/IRON FUM/FOLIC AC 7.5 MG-4
1 TABLET ORAL DAILY
Status: DISCONTINUED | OUTPATIENT
Start: 2024-04-16 | End: 2024-04-29 | Stop reason: HOSPADM

## 2024-04-16 RX ORDER — ALBUTEROL SULFATE 0.83 MG/ML
2.5 SOLUTION RESPIRATORY (INHALATION) EVERY 6 HOURS PRN
Status: DISCONTINUED | OUTPATIENT
Start: 2024-04-16 | End: 2024-04-16

## 2024-04-16 RX ORDER — ALBUTEROL SULFATE 90 UG/1
2 AEROSOL, METERED RESPIRATORY (INHALATION) EVERY 6 HOURS PRN
Status: CANCELLED | OUTPATIENT
Start: 2024-04-16

## 2024-04-16 RX ORDER — LANOLIN ALCOHOL/MO/W.PET/CERES
100 CREAM (GRAM) TOPICAL DAILY
Status: DISCONTINUED | OUTPATIENT
Start: 2024-04-16 | End: 2024-04-16

## 2024-04-16 RX ORDER — ACETAMINOPHEN 325 MG/1
650 TABLET ORAL EVERY 4 HOURS PRN
Status: CANCELLED | OUTPATIENT
Start: 2024-04-16

## 2024-04-16 RX ORDER — POLYETHYLENE GLYCOL 3350 17 G/17G
17 POWDER, FOR SOLUTION ORAL DAILY PRN
Status: DISCONTINUED | OUTPATIENT
Start: 2024-04-16 | End: 2024-04-23

## 2024-04-16 RX ORDER — MULTIVIT-MIN/IRON FUM/FOLIC AC 7.5 MG-4
1 TABLET ORAL DAILY
Status: CANCELLED | OUTPATIENT
Start: 2024-04-17

## 2024-04-16 RX ORDER — POTASSIUM CHLORIDE 20 MEQ/1
40 TABLET, EXTENDED RELEASE ORAL ONCE
Status: COMPLETED | OUTPATIENT
Start: 2024-04-16 | End: 2024-04-16

## 2024-04-16 RX ORDER — ONDANSETRON HYDROCHLORIDE 2 MG/ML
4 INJECTION, SOLUTION INTRAVENOUS EVERY 8 HOURS PRN
Status: DISCONTINUED | OUTPATIENT
Start: 2024-04-16 | End: 2024-04-29 | Stop reason: HOSPADM

## 2024-04-16 RX ORDER — ENOXAPARIN SODIUM 100 MG/ML
40 INJECTION SUBCUTANEOUS EVERY 24 HOURS
Status: CANCELLED | OUTPATIENT
Start: 2024-04-17

## 2024-04-16 RX ORDER — VANCOMYCIN HYDROCHLORIDE 750 MG/150ML
750 INJECTION, SOLUTION INTRAVENOUS EVERY 24 HOURS
Status: DISCONTINUED | OUTPATIENT
Start: 2024-04-17 | End: 2024-04-17

## 2024-04-16 RX ORDER — VANCOMYCIN HYDROCHLORIDE 750 MG/150ML
750 INJECTION, SOLUTION INTRAVENOUS EVERY 24 HOURS
Status: CANCELLED | OUTPATIENT
Start: 2024-04-16

## 2024-04-16 RX ORDER — SERTRALINE HYDROCHLORIDE 50 MG/1
200 TABLET, FILM COATED ORAL DAILY
Status: DISCONTINUED | OUTPATIENT
Start: 2024-04-17 | End: 2024-04-17

## 2024-04-16 RX ORDER — VANCOMYCIN HYDROCHLORIDE 1 G/20ML
INJECTION, POWDER, LYOPHILIZED, FOR SOLUTION INTRAVENOUS DAILY PRN
Status: CANCELLED | OUTPATIENT
Start: 2024-04-16

## 2024-04-16 RX ORDER — OXYCODONE HYDROCHLORIDE 5 MG/1
10 TABLET ORAL EVERY 6 HOURS PRN
Status: DISCONTINUED | OUTPATIENT
Start: 2024-04-16 | End: 2024-04-29 | Stop reason: HOSPADM

## 2024-04-16 RX ORDER — ATENOLOL 25 MG/1
25 TABLET ORAL DAILY
Status: DISCONTINUED | OUTPATIENT
Start: 2024-04-17 | End: 2024-04-29 | Stop reason: HOSPADM

## 2024-04-16 RX ORDER — DILTIAZEM HYDROCHLORIDE 120 MG/1
240 CAPSULE, COATED, EXTENDED RELEASE ORAL DAILY
Status: DISCONTINUED | OUTPATIENT
Start: 2024-04-17 | End: 2024-04-29 | Stop reason: HOSPADM

## 2024-04-16 RX ORDER — BUPROPION HYDROCHLORIDE 100 MG/1
100 TABLET ORAL 2 TIMES DAILY
Status: CANCELLED | OUTPATIENT
Start: 2024-04-16

## 2024-04-16 RX ORDER — FOLIC ACID 1 MG/1
1 TABLET ORAL DAILY
Status: DISCONTINUED | OUTPATIENT
Start: 2024-04-17 | End: 2024-04-16

## 2024-04-16 RX ORDER — LORAZEPAM 1 MG/1
1 TABLET ORAL EVERY 2 HOUR PRN
Status: DISCONTINUED | OUTPATIENT
Start: 2024-04-16 | End: 2024-04-24

## 2024-04-16 RX ORDER — ALBUTEROL SULFATE 0.83 MG/ML
2.5 SOLUTION RESPIRATORY (INHALATION) EVERY 2 HOUR PRN
Status: DISCONTINUED | OUTPATIENT
Start: 2024-04-16 | End: 2024-04-29 | Stop reason: HOSPADM

## 2024-04-16 RX ORDER — DILTIAZEM HYDROCHLORIDE 120 MG/1
240 CAPSULE, COATED, EXTENDED RELEASE ORAL DAILY
Status: CANCELLED | OUTPATIENT
Start: 2024-04-17

## 2024-04-16 RX ORDER — BUSPIRONE HYDROCHLORIDE 15 MG/1
30 TABLET ORAL 2 TIMES DAILY
Status: CANCELLED | OUTPATIENT
Start: 2024-04-16

## 2024-04-16 RX ORDER — BUSPIRONE HYDROCHLORIDE 15 MG/1
7.5 TABLET ORAL ONCE
Status: COMPLETED | OUTPATIENT
Start: 2024-04-16 | End: 2024-04-16

## 2024-04-16 RX ORDER — PRAVASTATIN SODIUM 40 MG/1
20 TABLET ORAL DAILY
Status: CANCELLED | OUTPATIENT
Start: 2024-04-17

## 2024-04-16 RX ADMIN — OXYCODONE HYDROCHLORIDE 10 MG: 5 TABLET ORAL at 15:17

## 2024-04-16 RX ADMIN — ATENOLOL 25 MG: 25 TABLET ORAL at 08:17

## 2024-04-16 RX ADMIN — OXYCODONE HYDROCHLORIDE 10 MG: 10 TABLET ORAL at 11:27

## 2024-04-16 RX ADMIN — NICOTINE 1 PATCH: 14 PATCH, EXTENDED RELEASE TRANSDERMAL at 08:20

## 2024-04-16 RX ADMIN — THIAMINE HYDROCHLORIDE 500 MG: 100 INJECTION, SOLUTION INTRAMUSCULAR; INTRAVENOUS at 05:58

## 2024-04-16 RX ADMIN — BUPROPION HYDROCHLORIDE 100 MG: 100 TABLET, FILM COATED ORAL at 20:39

## 2024-04-16 RX ADMIN — THIAMINE HYDROCHLORIDE 500 MG: 100 INJECTION, SOLUTION INTRAMUSCULAR; INTRAVENOUS at 22:43

## 2024-04-16 RX ADMIN — Medication 1 TABLET: at 17:59

## 2024-04-16 RX ADMIN — BUPROPION HYDROCHLORIDE 100 MG: 100 TABLET, FILM COATED ORAL at 08:17

## 2024-04-16 RX ADMIN — ENOXAPARIN SODIUM 40 MG: 40 INJECTION SUBCUTANEOUS at 05:58

## 2024-04-16 RX ADMIN — FOLIC ACID 1 MG: 1 TABLET ORAL at 17:59

## 2024-04-16 RX ADMIN — Medication 1 TABLET: at 08:17

## 2024-04-16 RX ADMIN — BUSPIRONE HYDROCHLORIDE 30 MG: 10 TABLET ORAL at 20:39

## 2024-04-16 RX ADMIN — POTASSIUM CHLORIDE 40 MEQ: 1500 TABLET, EXTENDED RELEASE ORAL at 11:08

## 2024-04-16 RX ADMIN — SERTRALINE HYDROCHLORIDE 200 MG: 50 TABLET ORAL at 08:17

## 2024-04-16 RX ADMIN — FOLIC ACID 1 MG: 1 TABLET ORAL at 08:17

## 2024-04-16 RX ADMIN — BUSPIRONE HYDROCHLORIDE 30 MG: 15 TABLET ORAL at 08:17

## 2024-04-16 RX ADMIN — VANCOMYCIN HYDROCHLORIDE 750 MG: 750 INJECTION, SOLUTION INTRAVENOUS at 11:08

## 2024-04-16 RX ADMIN — PRAVASTATIN SODIUM 20 MG: 40 TABLET ORAL at 08:17

## 2024-04-16 RX ADMIN — LORAZEPAM 0.5 MG: 0.5 TABLET ORAL at 17:59

## 2024-04-16 RX ADMIN — LORAZEPAM 1 MG: 1 TABLET ORAL at 23:34

## 2024-04-16 RX ADMIN — THIAMINE HYDROCHLORIDE 500 MG: 100 INJECTION, SOLUTION INTRAMUSCULAR; INTRAVENOUS at 16:57

## 2024-04-16 RX ADMIN — DILTIAZEM HYDROCHLORIDE 240 MG: 120 CAPSULE, COATED, EXTENDED RELEASE ORAL at 08:17

## 2024-04-16 RX ADMIN — BUSPIRONE HYDROCHLORIDE 7.5 MG: 15 TABLET ORAL at 11:27

## 2024-04-16 SDOH — SOCIAL STABILITY: SOCIAL INSECURITY: DO YOU FEEL UNSAFE GOING BACK TO THE PLACE WHERE YOU ARE LIVING?: NO

## 2024-04-16 SDOH — SOCIAL STABILITY: SOCIAL INSECURITY: HAVE YOU HAD THOUGHTS OF HARMING ANYONE ELSE?: NO

## 2024-04-16 SDOH — SOCIAL STABILITY: SOCIAL INSECURITY: DOES ANYONE TRY TO KEEP YOU FROM HAVING/CONTACTING OTHER FRIENDS OR DOING THINGS OUTSIDE YOUR HOME?: NO

## 2024-04-16 SDOH — SOCIAL STABILITY: SOCIAL INSECURITY: ABUSE: ADULT

## 2024-04-16 SDOH — SOCIAL STABILITY: SOCIAL INSECURITY: HAS ANYONE EVER THREATENED TO HURT YOUR FAMILY OR YOUR PETS?: NO

## 2024-04-16 SDOH — SOCIAL STABILITY: SOCIAL INSECURITY: ARE YOU OR HAVE YOU BEEN THREATENED OR ABUSED PHYSICALLY, EMOTIONALLY, OR SEXUALLY BY ANYONE?: NO

## 2024-04-16 SDOH — SOCIAL STABILITY: SOCIAL INSECURITY: ARE THERE ANY APPARENT SIGNS OF INJURIES/BEHAVIORS THAT COULD BE RELATED TO ABUSE/NEGLECT?: NO

## 2024-04-16 SDOH — SOCIAL STABILITY: SOCIAL INSECURITY: HAVE YOU HAD ANY THOUGHTS OF HARMING ANYONE ELSE?: NO

## 2024-04-16 SDOH — SOCIAL STABILITY: SOCIAL INSECURITY: DO YOU FEEL ANYONE HAS EXPLOITED OR TAKEN ADVANTAGE OF YOU FINANCIALLY OR OF YOUR PERSONAL PROPERTY?: NO

## 2024-04-16 SDOH — SOCIAL STABILITY: SOCIAL INSECURITY: WERE YOU ABLE TO COMPLETE ALL THE BEHAVIORAL HEALTH SCREENINGS?: YES

## 2024-04-16 ASSESSMENT — LIFESTYLE VARIABLES
ANXIETY: NO ANXIETY, AT EASE
HOW OFTEN DURING THE LAST YEAR HAVE YOU HAD A FEELING OF GUILT OR REMORSE AFTER DRINKING: NEVER
TREMOR: NO TREMOR
AGITATION: NORMAL ACTIVITY
AUDITORY DISTURBANCES: NOT PRESENT
VISUAL DISTURBANCES: NOT PRESENT
TOTAL SCORE: 0
BLOOD PRESSURE: 108/62
PULSE: 74
AUDIT TOTAL SCORE: 8
PULSE: 83
AUDITORY DISTURBANCES: NOT PRESENT
PAROXYSMAL SWEATS: NO SWEAT VISIBLE
BLOOD PRESSURE: 109/62
ORIENTATION AND CLOUDING OF SENSORIUM: ORIENTED AND CAN DO SERIAL ADDITIONS
NAUSEA AND VOMITING: NO NAUSEA AND NO VOMITING
VISUAL DISTURBANCES: NOT PRESENT
HEADACHE, FULLNESS IN HEAD: NOT PRESENT
HOW OFTEN DO YOU HAVE 6 OR MORE DRINKS ON ONE OCCASION: DAILY OR ALMOST DAILY
NAUSEA AND VOMITING: NO NAUSEA AND NO VOMITING
HEADACHE, FULLNESS IN HEAD: NOT PRESENT
HOW MANY STANDARD DRINKS CONTAINING ALCOHOL DO YOU HAVE ON A TYPICAL DAY: 1 OR 2
TOTAL SCORE: 0
ANXIETY: NO ANXIETY, AT EASE
HEADACHE, FULLNESS IN HEAD: NOT PRESENT
AUDITORY DISTURBANCES: NOT PRESENT
PAROXYSMAL SWEATS: BEADS OF SWEAT OBVIOUS ON FOREHEAD
TREMOR: NO TREMOR
AUDIT-C TOTAL SCORE: 8
AUDIT-C TOTAL SCORE: 8
BLOOD PRESSURE: 128/64
ORIENTATION AND CLOUDING OF SENSORIUM: ORIENTED AND CAN DO SERIAL ADDITIONS
ANXIETY: 3
HAVE YOU OR SOMEONE ELSE BEEN INJURED AS A RESULT OF YOUR DRINKING: NO
HEADACHE, FULLNESS IN HEAD: NOT PRESENT
SKIP TO QUESTIONS 9-10: 0
HOW OFTEN DURING THE LAST YEAR HAVE YOU NEEDED AN ALCOHOLIC DRINK FIRST THING IN THE MORNING TO GET YOURSELF GOING AFTER A NIGHT OF HEAVY DRINKING: NEVER
NAUSEA AND VOMITING: NO NAUSEA AND NO VOMITING
AGITATION: NORMAL ACTIVITY
ORIENTATION AND CLOUDING OF SENSORIUM: ORIENTED AND CAN DO SERIAL ADDITIONS
HAS A RELATIVE, FRIEND, DOCTOR, OR ANOTHER HEALTH PROFESSIONAL EXPRESSED CONCERN ABOUT YOUR DRINKING OR SUGGESTED YOU CUT DOWN: NO
HOW OFTEN DO YOU HAVE A DRINK CONTAINING ALCOHOL: 4 OR MORE TIMES A WEEK
AGITATION: NORMAL ACTIVITY
TOTAL SCORE: 8
TOTAL SCORE: 0
VISUAL DISTURBANCES: NOT PRESENT
NAUSEA AND VOMITING: NO NAUSEA AND NO VOMITING
TREMOR: NO TREMOR
PAROXYSMAL SWEATS: NO SWEAT VISIBLE
TREMOR: NO TREMOR
HOW OFTEN DURING THE LAST YEAR HAVE YOU FOUND THAT YOU WERE NOT ABLE TO STOP DRINKING ONCE YOU HAD STARTED: NEVER
AGITATION: SOMEWHAT MORE THAN NORMAL ACTIVITY
AUDIT TOTAL SCORE: 0
VISUAL DISTURBANCES: NOT PRESENT
PULSE: 82
ANXIETY: NO ANXIETY, AT EASE
HOW OFTEN DURING THE LAST YEAR HAVE YOU FAILED TO DO WHAT WAS NORMALLY EXPECTED FROM YOU BECAUSE OF DRINKING: NEVER
AUDITORY DISTURBANCES: NOT PRESENT
ORIENTATION AND CLOUDING OF SENSORIUM: ORIENTED AND CAN DO SERIAL ADDITIONS
HOW OFTEN DURING THE LAST YEAR HAVE YOU BEEN UNABLE TO REMEMBER WHAT HAPPENED THE NIGHT BEFORE BECAUSE YOU HAD BEEN DRINKING: NEVER
PAROXYSMAL SWEATS: NO SWEAT VISIBLE

## 2024-04-16 ASSESSMENT — COGNITIVE AND FUNCTIONAL STATUS - GENERAL
CLIMB 3 TO 5 STEPS WITH RAILING: TOTAL
STANDING UP FROM CHAIR USING ARMS: TOTAL
TOILETING: A LITTLE
MOBILITY SCORE: 15
TOILETING: A LOT
MOVING TO AND FROM BED TO CHAIR: TOTAL
CLIMB 3 TO 5 STEPS WITH RAILING: TOTAL
MOBILITY SCORE: 12
MOVING TO AND FROM BED TO CHAIR: A LOT
DAILY ACTIVITIY SCORE: 22
WALKING IN HOSPITAL ROOM: TOTAL
PATIENT BASELINE BEDBOUND: NO
WALKING IN HOSPITAL ROOM: A LOT
DAILY ACTIVITIY SCORE: 21
PERSONAL GROOMING: A LITTLE
STANDING UP FROM CHAIR USING ARMS: A LOT
PERSONAL GROOMING: A LITTLE

## 2024-04-16 ASSESSMENT — ACTIVITIES OF DAILY LIVING (ADL)
JUDGMENT_ADEQUATE_SAFELY_COMPLETE_DAILY_ACTIVITIES: YES
TOILETING: NEEDS ASSISTANCE
PATIENT'S MEMORY ADEQUATE TO SAFELY COMPLETE DAILY ACTIVITIES?: YES
BATHING: NEEDS ASSISTANCE
HEARING - LEFT EAR: DIFFICULTY WITH NOISE
LACK_OF_TRANSPORTATION: NO
DRESSING YOURSELF: NEEDS ASSISTANCE
ASSISTIVE_DEVICE: WALKER
ADEQUATE_TO_COMPLETE_ADL: YES
FEEDING YOURSELF: NEEDS ASSISTANCE
WALKS IN HOME: NEEDS ASSISTANCE
LACK_OF_TRANSPORTATION: NO
GROOMING: NEEDS ASSISTANCE
HEARING - RIGHT EAR: DIFFICULTY WITH NOISE

## 2024-04-16 ASSESSMENT — PAIN DESCRIPTION - LOCATION: LOCATION: FOOT

## 2024-04-16 ASSESSMENT — PAIN SCALES - GENERAL
PAINLEVEL_OUTOF10: 7
PAINLEVEL_OUTOF10: 8

## 2024-04-16 ASSESSMENT — COLUMBIA-SUICIDE SEVERITY RATING SCALE - C-SSRS
1. IN THE PAST MONTH, HAVE YOU WISHED YOU WERE DEAD OR WISHED YOU COULD GO TO SLEEP AND NOT WAKE UP?: NO
2. HAVE YOU ACTUALLY HAD ANY THOUGHTS OF KILLING YOURSELF?: NO
6. HAVE YOU EVER DONE ANYTHING, STARTED TO DO ANYTHING, OR PREPARED TO DO ANYTHING TO END YOUR LIFE?: NO

## 2024-04-16 NOTE — PROGRESS NOTES
"Vancomycin Dosing by Pharmacy- FOLLOW UP    Rose Marie Toussaint is a 68 y.o. year old female who Pharmacy has been consulted for vancomycin dosing for osteomyelitis/septic arthritis. Based on the patient's indication and renal status this patient is being dosed based on a goal AUC of 400-600.     Renal function is currently stable. Continue to monitor for ARELI.     Current vancomycin dose: 1000 mg given every 24 hours    Estimated vancomycin AUC on current dose: 632 mg/L.hr     Visit Vitals  /73 (BP Location: Left arm, Patient Position: Lying)   Pulse 70   Temp 36.4 °C (97.5 °F) (Temporal)   Resp 18        Lab Results   Component Value Date    CREATININE 1.08 (H) 04/16/2024    CREATININE 1.07 (H) 04/15/2024    CREATININE 1.04 04/15/2024    CREATININE 1.02 04/14/2024        Patient weight is No results found for: \"PTWEIGHT\"    No results found for: \"CULTURE\"     I/O last 3 completed shifts:  In: 350 (6.2 mL/kg) [I.V.:50 (0.9 mL/kg); IV Piggyback:300]  Out: - (0 mL/kg)   Weight: 56.8 kg   [unfilled]    Lab Results   Component Value Date    PATIENTTEMP 37.0 05/26/2023    PATIENTTEMP 37.0 05/25/2023    PATIENTTEMP 37.0 05/23/2023        Assessment/Plan    Above goal AUC. Orders placed for new vancomcyin regimen of 750 mg every 24 hours to begin at 4/16 at 1100.    This dosing regimen is predicted by [employer] to result in the following pharmacokinetic parameters:    Regimen: 750 mg IV every 24 hours.  Start time: 11:00 on 04/16/2024  Exposure target: AUC24 (range)400-600 mg/L.hr   AUC24,ss: 482 mg/L.hr  Probability of AUC24 > 400: 90 %  Ctrough,ss: 14.6 mg/L  Probability of Ctrough,ss > 20: 7 %  Probability of nephrotoxicity (Lodise TALIB 2009): 10 %    The next level will be obtained on 0500 at 4/17. May be obtained sooner if clinically indicated.   Will continue to monitor renal function daily while on vancomycin and order serum creatinine at least every 48 hours if not already ordered.  Follow for continued " vancomycin needs, clinical response, and signs/symptoms of toxicity.       Carol Horner, PharmD

## 2024-04-16 NOTE — PROGRESS NOTES
04/16/24 0859   Discharge Planning   Living Arrangements Spouse/significant other   Support Systems Spouse/significant other   Assistance Needed A&OX3; independent with ADLs with walker; drives; room air baseline and currently room air   Type of Residence Private residence   Number of Stairs to Enter Residence 0   Number of Stairs Within Residence 14   Do you have animals or pets at home? Yes   Type of Animals or Pets 1 cat   Who is requesting discharge planning? Provider   Patient expects to be discharged to: transfer to [facility]   Does the patient need discharge transport arranged? Yes   RoundTrip coordination needed? Yes   Has discharge transport been arranged? No   Financial Resource Strain   How hard is it for you to pay for the very basics like food, housing, medical care, and heating? Not hard   Housing Stability   In the last 12 months, was there a time when you were not able to pay the mortgage or rent on time? N   In the last 12 months, how many places have you lived? 1   In the last 12 months, was there a time when you did not have a steady place to sleep or slept in a shelter (including now)? N   Transportation Needs   In the past 12 months, has lack of transportation kept you from medical appointments or from getting medications? no   In the past 12 months, has lack of transportation kept you from meetings, work, or from getting things needed for daily living? No     04/16/2024 0859am  Made aware patient will transfer to [facility] once accepted. Met with patient and she is aware of transfer and will make her family aware. When asked if she would like me to notify family she said she would call them. Transport confirmed for 11am this morning.

## 2024-04-16 NOTE — CARE PLAN
The patient's goals for the shift include pt will remain  comfortable throughout the shift.    The clinical goals for the shift include pt will remain safe and stable throughout the shift

## 2024-04-16 NOTE — PROGRESS NOTES
ORTHOPAEDIC SURGERY INPATIENT PROGRESS NOTE    Subjective   NAEON.  Patient patient seen on regular nursing floor for evaluation.  She appeared agitated and in constant motion.  She reports typically consuming 5 beers and 1 alcoholic drink daily and smokes typically 1/2 pack/day.    REVIEW OF SYSTEMS  Constitutional: no unplanned weight loss  Psychiatric: no suicidal ideation  ENT: no vision changes, no sinus problems  Pulmonary: no shortness of breath  Lymphatic: no enlarged lymph nodes  Cardiovascular: no chest pain or shortness of breath  Gastrointestinal: no stomach problems  Genitourinary: no dysuria   Skin: no rashes  Endocrine: no thyroid problems  Neurological: no headache, no numbness  Hematological: no easy bruising  Musculoskeletal: Left ankle pain    Objective   PHYSICAL EXAMINATION  Constitutional Exam: well developed, frail appearing, does not appear stated age  Psychiatric Exam: alert and oriented, inappropriate mood and behavior  Eye Exam: EOMI  Pulmonary Exam: breathing non-labored, no apparent distress  Lymphatic exam: no appreciable lymphadenopathy in the lower extremities  Cardiovascular exam: RRR to peripheral palpation, DP pulses 2+, PT 2+, toes are pink with good capillary refill, no pitting edema  Skin exam: no open lesions, rashes, abrasions or ulcerations  Neurological exam: sensation to light touch intact in both lower extremities in peripheral and dermatomal distributions (except for any abnormalities noted in musculoskeletal exam)    Musculoskeletal exam: Left lower extremity examination.  Patient has obvious relative varus deformity of the ankle with medial and lateral open wounds with drainage.  There is surrounding erythema and obvious ankle effusion.  Patient has sensation grossly diminished to light touch in the plantar surface of the foot and has intact but pain limited PF/DF.  She has palpable 2+ DP/PT pulses.    Last Recorded Vitals  Blood pressure 125/76, pulse 75, temperature  36.8 °C (98.2 °F), resp. rate 18, SpO2 96%.    Relevant Results  Results for orders placed or performed during the hospital encounter of 04/14/24 (from the past 24 hour(s))   Vancomycin   Result Value Ref Range    Vancomycin 16.1 5.0 - 20.0 ug/mL   CBC   Result Value Ref Range    WBC 9.0 4.4 - 11.3 x10*3/uL    nRBC 0.0 0.0 - 0.0 /100 WBCs    RBC 3.54 (L) 4.00 - 5.20 x10*6/uL    Hemoglobin 11.3 (L) 12.0 - 16.0 g/dL    Hematocrit 34.6 (L) 36.0 - 46.0 %    MCV 98 80 - 100 fL    MCH 31.9 26.0 - 34.0 pg    MCHC 32.7 32.0 - 36.0 g/dL    RDW 12.9 11.5 - 14.5 %    Platelets 418 150 - 450 x10*3/uL   Magnesium   Result Value Ref Range    Magnesium 1.71 1.60 - 2.40 mg/dL   Comprehensive metabolic panel   Result Value Ref Range    Glucose 102 (H) 74 - 99 mg/dL    Sodium 132 (L) 136 - 145 mmol/L    Potassium 3.4 (L) 3.5 - 5.3 mmol/L    Chloride 91 (L) 98 - 107 mmol/L    Bicarbonate 32 21 - 32 mmol/L    Anion Gap 12 10 - 20 mmol/L    Urea Nitrogen 16 6 - 23 mg/dL    Creatinine 1.08 (H) 0.50 - 1.05 mg/dL    eGFR 57 (L) >60 mL/min/1.73m*2    Calcium 9.7 8.6 - 10.3 mg/dL    Albumin 3.0 (L) 3.4 - 5.0 g/dL    Alkaline Phosphatase 137 (H) 33 - 136 U/L    Total Protein 6.1 (L) 6.4 - 8.2 g/dL    AST 38 9 - 39 U/L    Bilirubin, Total 0.4 0.0 - 1.2 mg/dL    ALT 45 7 - 45 U/L       Relevant Imaging  X-ray imaging left ankle reviewed from 04/14/2024 and independently evaluated by me on 04/16/2024 demonstrates hardware failure by comparison to intraoperative fluoroscopy from 11/20/2022 with relative varus malalignment and complete loss of the ankle mortise.    Assessment/Plan:  69 y/o F s/p L Ankle Bi malleolar ankle fracture ORIF with syndesmotic stabilization with Dr. Frederick from 11/20/2022 with hardware failure, draining wounds concerning for OM in the setting of presumed alcohol abuse with peripheral neuropathy and ongoing tobacco abuse.  Patient remains afebrile with stable vitals.  ESR/CRP from 04/15/2024 of 94/23, CBC from 9 from  "04/16/2024.      - NWB LLE  - NPO at midnight  - Admit to medicine, clearance pending for OR tomorrow  - STAT Left Ankle CT w/o contrast  - ID Consult  - Vascular Surgery Consult  - PO Pain control, IV for breakthrough  - Encourage IS, supplemental O2 as needed  - DVT PPX: SCDs, AMERICO hose, hold chemoppx in setting of upcoming surgery    Flako Guerra MD, ALEJA  Department of Orthopaedic Surgery  [facility]    Additional Updates:  I had a long conversation with the patient regarding her current clinical circumstances.  The patient was a relatively poor historian and had great difficulty with recall. I attempted to contact her treating surgeon as OSH records are not available in our system. I contacted the patient's emergency contact Cecil Toussaint at the contact number listed in the chart, the number was not in service.  I then contacted Janice Whitten at the number listed in the chart, the patient's sister.  Janice detailed a complex social circumstances for the patient and her .  Cecil is an alcoholic and likely will be moving to a assisted living environment shortly, by report he is \"incapacitated.\"  Rose Marie has struggled with her drinking and previously been treated for alcohol withdrawal.  Also by report, the patient has had difficulty with this ankle since approximately 6 weeks following index surgery.  She was reportedly relatively noncompliant and was scheduled to undergo a surgery in July 2023 but this was canceled due to problems with the preoperative workup and the patient subsequently canceled multiple follow-up appointments.      I reviewed with Janice as I did with the patient that the most definitive treatment in the setting would be left below-knee amputation.  At minimum patient would require a return to the operating room for debridement and hardware removal to begin to clear the infection and obtain sample to guide antibiotic management. This may also require " an antibiotic cement spacer and ankle spanning external fixator as an attempt for limb salvage with potential for staged conversion to arthrodesis down the road.  I reviewed that the patient would remain at increased risk of infection, wound healing complications and significant perioperative risk as she continues to consume alcohol and tobacco products.  Janice voiced understanding and was appreciative of the discussion.

## 2024-04-16 NOTE — PROGRESS NOTES
Mann Graham is a 68 y.o. female on day 2 of admission presenting with Septic joint (Multi).      Subjective   Patient is doing well today, no fever, no new complains, reported having occasional pain in the affected joint.       Objective     Last Recorded Vitals  /73 (BP Location: Left arm, Patient Position: Lying)   Pulse 70   Temp 36.4 °C (97.5 °F) (Temporal)   Resp 18   Wt 56.8 kg (125 lb 4.8 oz)   SpO2 93%   Intake/Output last 3 Shifts:    Intake/Output Summary (Last 24 hours) at 4/16/2024 1115  Last data filed at 4/16/2024 0205  Gross per 24 hour   Intake 350 ml   Output --   Net 350 ml       Admission Weight  Weight: 56.8 kg (125 lb 4.8 oz) (04/14/24 0415)    Daily Weight  04/14/24 : 56.8 kg (125 lb 4.8 oz)    Image Results  Vascular US ankle brachial index (LUIS) without exercise  Preliminary Cardiology Report            [place] [address]   Tel [phone] and Fax [phone]                 Preliminary Vascular Lab Report     [facility] US ANKLE BRACHIAL INDEX (LUIS) WITHOUT EXERCISE       Patient Name:      MANN GRAHAM Reading Physician:  [id number] Alicia Hernandez MD  Study Date:        4/15/2024       Ordering Physician: [id number] LINDA GALINDO  MRN/PID:           [id number]        Technologist:       Irasema Leung T  Accession#:        [id number]    Technologist 2:  Date of Birth/Age: 1955       Encounter#:         [id number]  Gender:            F  Admission Status:  Inpatient       Location Performed: [facility]       Diagnosis/ICD: Peripheral vascular disease, unspecified-I73.9  Procedure/CPT: 34543 Peripheral artery LUIS Only       PRELIMINARY CONCLUSIONS:  Right Lower PVR: No evidence of arterial occlusive disease in the right lower extremity at rest. Decreased digital perfusion noted. Triphasic flow is  noted in the right common femoral artery, right posterior tibial artery and right dorsalis pedis artery.  Left Lower PVR: Evidence of mild arterial occlusive disease in the left lower extremity at rest. Decreased digital perfusion noted. Biphasic flow is noted in the left common femoral artery, left posterior tibial artery and left dorsalis pedis artery.     Imaging & Doppler Findings:     RIGHT Lower PVR                Pressures Ratios  Right Posterior Tibial (Ankle) 111 mmHg  0.93  Right Dorsalis Pedis (Ankle)   121 mmHg  1.02  Right Digit (Great Toe)        0 mmHg    0.00          LEFT Lower PVR                Pressures Ratios  Left Posterior Tibial (Ankle) 93 mmHg   0.78  Left Dorsalis Pedis (Ankle)   97 mmHg   0.82  Left Digit (Great Toe)        0 mmHg    0.00                             Right     Left  Brachial Pressure 118 mmHg 119 mmHg            VASCULAR PRELIMINARY REPORT  completed by Irasema Leung RVT on 4/15/2024 at 2:53:33 PM       ** Final **      Physical Exam  Constitutional:       Appearance: Normal appearance.   HENT:      Head: Normocephalic and atraumatic.      Nose: Nose normal.      Mouth/Throat:      Mouth: Mucous membranes are moist.      Pharynx: Oropharynx is clear.   Eyes:      Extraocular Movements: Extraocular movements intact.      Conjunctiva/sclera: Conjunctivae normal.      Pupils: Pupils are equal, round, and reactive to light.   Cardiovascular:      Rate and Rhythm: Normal rate and regular rhythm.      Pulses: Normal pulses.      Heart sounds: Normal heart sounds.   Pulmonary:      Comments: Diminished air entry bilateral    Abdominal:      General: Abdomen is flat. Bowel sounds are normal.      Palpations: Abdomen is soft.   Musculoskeletal:         General: Swelling (Left ankle) and deformity present. Normal range of motion.      Cervical back: Normal range of motion and neck supple.   Skin:     General: Skin is warm and dry.   Neurological:      General: No focal deficit  present.      Mental Status: She is alert and oriented to person, place, and time. Mental status is at baseline.   Psychiatric:         Mood and Affect: Mood normal.         Behavior: Behavior normal.         Relevant Results    Scheduled medications  atenolol, 25 mg, oral, Daily  buPROPion, 100 mg, oral, BID  busPIRone, 30 mg, oral, BID  busPIRone, 7.5 mg, oral, Once  [Held by provider] chlorthalidone, 25 mg, oral, Daily  dilTIAZem CD, 240 mg, oral, Daily  enoxaparin, 40 mg, subcutaneous, q24h  folic acid, 1 mg, oral, Daily  multivitamin with minerals, 1 tablet, oral, Daily  nicotine, 1 patch, transdermal, Daily  pravastatin, 20 mg, oral, Daily  sertraline, 200 mg, oral, Daily  [START ON 4/17/2024] thiamine, 100 mg, oral, Daily  thiamine, 500 mg, intravenous, q8h MANUEL  vancomycin, 750 mg, intravenous, q24h      Continuous medications     PRN medications  PRN medications: acetaminophen, albuterol, HYDROmorphone, melatonin, ondansetron, oxyCODONE, oxyCODONE, polyethylene glycol, vancomycin    Results for orders placed or performed during the hospital encounter of 04/14/24 (from the past 24 hour(s))   Vancomycin   Result Value Ref Range    Vancomycin 16.1 5.0 - 20.0 ug/mL   CBC   Result Value Ref Range    WBC 9.0 4.4 - 11.3 x10*3/uL    nRBC 0.0 0.0 - 0.0 /100 WBCs    RBC 3.54 (L) 4.00 - 5.20 x10*6/uL    Hemoglobin 11.3 (L) 12.0 - 16.0 g/dL    Hematocrit 34.6 (L) 36.0 - 46.0 %    MCV 98 80 - 100 fL    MCH 31.9 26.0 - 34.0 pg    MCHC 32.7 32.0 - 36.0 g/dL    RDW 12.9 11.5 - 14.5 %    Platelets 418 150 - 450 x10*3/uL   Magnesium   Result Value Ref Range    Magnesium 1.71 1.60 - 2.40 mg/dL   Comprehensive metabolic panel   Result Value Ref Range    Glucose 102 (H) 74 - 99 mg/dL    Sodium 132 (L) 136 - 145 mmol/L    Potassium 3.4 (L) 3.5 - 5.3 mmol/L    Chloride 91 (L) 98 - 107 mmol/L    Bicarbonate 32 21 - 32 mmol/L    Anion Gap 12 10 - 20 mmol/L    Urea Nitrogen 16 6 - 23 mg/dL    Creatinine 1.08 (H) 0.50 - 1.05 mg/dL     eGFR 57 (L) >60 mL/min/1.73m*2    Calcium 9.7 8.6 - 10.3 mg/dL    Albumin 3.0 (L) 3.4 - 5.0 g/dL    Alkaline Phosphatase 137 (H) 33 - 136 U/L    Total Protein 6.1 (L) 6.4 - 8.2 g/dL    AST 38 9 - 39 U/L    Bilirubin, Total 0.4 0.0 - 1.2 mg/dL    ALT 45 7 - 45 U/L     Vascular US ankle brachial index (LUIS) without exercise    Result Date: 4/15/2024  Preliminary Cardiology Report         [facility] [id number] [place] [address]  Tel [phone] and Fax [phone]            Preliminary Vascular Lab Report  [facility] US ANKLE BRACHIAL INDEX (LUIS) WITHOUT EXERCISE  Patient Name:      MANN Pond Physician:  [id number] Alicia Hernandez MD Study Date:        4/15/2024       Ordering Physician: [id number] LINDA GALINDO MRN/PID:           [id number]        Technologist:       Irasema Leung RVT Accession#:        [id number]    Technologist 2: Date of Birth/Age: 1955       Encounter#:         [id number] Gender:            F Admission Status:  Inpatient       Location Performed: [facility]  Diagnosis/ICD: Peripheral vascular disease, unspecified-I73.9 Procedure/CPT: 26200 Peripheral artery LUIS Only  PRELIMINARY CONCLUSIONS: Right Lower PVR: No evidence of arterial occlusive disease in the right lower extremity at rest. Decreased digital perfusion noted. Triphasic flow is noted in the right common femoral artery, right posterior tibial artery and right dorsalis pedis artery. Left Lower PVR: Evidence of mild arterial occlusive disease in the left lower extremity at rest. Decreased digital perfusion noted. Biphasic flow is noted in the left common femoral artery, left posterior tibial artery and left dorsalis pedis artery.  Imaging & Doppler Findings:  RIGHT Lower PVR                Pressures Ratios Right Posterior Tibial (Ankle) 111 mmHg  0.93 Right Dorsalis Pedis  (Ankle)   121 mmHg  1.02 Right Digit (Great Toe)        0 mmHg    0.00   LEFT Lower PVR                Pressures Ratios Left Posterior Tibial (Ankle) 93 mmHg   0.78 Left Dorsalis Pedis (Ankle)   97 mmHg   0.82 Left Digit (Great Toe)        0 mmHg    0.00                      Right     Left Brachial Pressure 118 mmHg 119 mmHg   VASCULAR PRELIMINARY REPORT completed by Irasema Leung RVT on 4/15/2024 at 2:53:33 PM  ** Final **     XR ankle left 3+ views    Result Date: 4/14/2024  Interpreted By:  Collette Saldana, STUDY: XR ANKLE LEFT 3+ VIEWS; ;  4/14/2024 5:46 am   INDICATION: Signs/Symptoms:Left ankle worsening infection and pain.   COMPARISON: 11/23/2022   ACCESSION NUMBER(S): [id number]   ORDERING CLINICIAN: ALEXIS VENEGAS   FINDINGS: Three views of the left ankle show a plate and screw fixation of the fibula with fracture of the hardware and fracture of the distal fibula having apex lateral angulation. There is disruption of the ankle mortise. The medial malleolus is fractured and displaced. There is soft tissue gas adjacent to the medial malleolus.       Fracture and dislocation of the ankle with fractures through the fixation hardware of the fibula. Displaced fractures of the distal fibula and medial malleolus. Soft tissue gas in the medial ankle may be due to infection with gas producing organism.     MACRO: None   Signed by: Collette Saldana 4/14/2024 7:25 AM Dictation workstation:   [id number]    XR ankle left 3+ views    Result Date: 4/13/2024  * * *Final Report* * * DATE OF EXAM: Apr 13 2024  3:27PM   ASX   5298  -  XR ANKLE 3V AP/LAT/OBL LT  / ACCESSION #  [id number] PROCEDURE REASON: Osteomyelitis, ankle      * * * * Physician Interpretation * * * *  EXAMINATION / TECHNIQUE:  XR ANKLE 3V AP/LAT/OBL LT HISTORY:   osteomyelitis left ankle pain  Osteomyelitis, ankle. COMPARISON: None RESULT: There is fracture of the distal tibial metaphysis with lateral displacement of proximal tibia and fibula with respect to  the distal fragments.  There is angulated fracture of the distal fibula as well as fracture of its associated internal fixation plate. 2 screws of the medial malleolus, with one of the screws demonstrating a fracture.  The medial malleolus adjacent to the screws is also fractured.  The threaded portion of the fractured screw is embedded within the displaced proximal tibial fracture fragment. Both screws are medially displaced into the soft tissues.  Marked soft tissue swelling at the ankle.    IMPRESSION: Fracture dislocation of the distal tibia and fibula along with fibular plate fracture and medial malleolus screw fracture as described. Medial displacement of the medial malleolus screws. : PSCB   Transcribe Date/Time: Apr 13 2024  4:29P Dictated by : TOMASA BENAVIDEZ MD This examination was interpreted and the report reviewed and electronically signed by: TOMASA BENAVIDEZ MD on Apr 13 2024  4:35PM  EST     Assessment/Plan          Principal Problem:    Septic joint (Multi)    Left ankle cellulitis / likely hardware infection / failure sp I&D at [facility]     Recommendations :  Continue Vancomycin  Cultures; pending.  Keep the leg elevated  Will need surgery         Paul Lewis DO        Attending note : the patient was evaluated, the note was reviewed and up dated  Left ankle cellulitis / likely hardware infection / failure, forsurgery  Recommendations :  Continue Vancomycin  We need the results of the cultures from [facility], not in the EMR  Discussed with the surgery team     I spent minutes in the professional and overall care of this patient.        Raina Sepulveda MD

## 2024-04-16 NOTE — CARE PLAN
The clinical goals for the shift include pt will remain safe and stable throughout the shift

## 2024-04-16 NOTE — DISCHARGE SUMMARY
Discharge Diagnosis  Septic arthritis of left ankle  Left ankle hardware infection  Left ankle cellulitis  Electrolyte derangements (Hypokalemia, Hypomagnesemia, Hyponatremia)  Alcohol abuse history  Nicotine dependence    Issues Requiring Follow-Up  Septic Left ankle joint  Alcohol abuse  Nicotine dependence    Discharge Meds     Your medication list        ASK your doctor about these medications        Instructions Last Dose Given Next Dose Due   albuterol 90 mcg/actuation inhaler           atenolol 25 mg tablet  Commonly known as: Tenormin           buPROPion 100 mg tablet  Commonly known as: Wellbutrin           busPIRone 30 mg tablet  Commonly known as: Buspar           chlorthalidone 25 mg tablet  Commonly known as: [place]gro[place]           dilTIAZem  mg 24 hr capsule  Commonly known as: Cardizem CD           doxepin 50 mg capsule  Commonly known as: SINEquan           potassium chloride CR 10 mEq ER tablet  Commonly known as: Klor-Con           pravastatin 20 mg tablet  Commonly known as: Pravachol           sertraline 100 mg tablet  Commonly known as: Zoloft                    Test Results Pending At Discharge  Pending Labs       No current pending labs.            Hospital Course  Rose Marie Toussaint is a 68 y.o. female  with a PMH of COPD, HTN, Depression, hypokalemia, ETOH use, NIcotine dependence, HLD, CKD  who presents to [place] ED presenting with worsening pain and swelling of her left ankle sp surgery in 2022.  Patient stated that she has been dealing with her ankle problem for 1 and half years now, after she had a surgery for a fall. She has infection and abscess, her pain has been present for a long time and hard to control. She was a poor historian as she was in distress and tired, wasn't able to provide a full story. She had her surgery with Dr. Francois in 2022. On the day she went to the ED, she was seen by her  nurse for [facility], and she told her she believes she is septic. She was scared and  decide to go to the ED. She stated that her pain is 10/10, with worsening swelling, and pain. She is not able to bear weight on it. The patient drinks alcohol to alleviate the pain as she didn't have any pain meds at home except for Ibuprofen which wasn't very helpful. Her last drink was 2 days ago. She was having on and off fever. Denies any, n/v, new onset headaches, vision changes, chest pain, dyspnea, abdominal pain, changes in BM, or urinary changes. Found to have fracutre dislocation of distal tibia and fibula along with fibular plate fracture and medial malleolus screw fracture with concerns for septic arthirits. Patient is on CIWA protocols for etoh use disorder  however CIWAs have been predominantly 0 so can be taken off and monitored. No signs of withdrawal at time of discharge.     Podiatry saw patient and had discussion with patient as well as orthopaedic foot surgeon at [facility] (Dr. Guerra- apologize if spelling incorrect) who agreed with transfer to [facility] but admit to medicine with consult to them. Transfer center called and patient accepted to [facility] by Dr. SCARLET Jeong.     Notified patient bed available on 4/16/24. Patient made aware.   Patient discharged/transferred to [facility] for definitive management.    Pertinent Physical Exam At Time of Discharge  Physical Exam  Constitutional: Appears stated age. Slightly disheveled In NAD.   HEENT: NC/AT, EOMI, clear sclera, moist mucous membranes  CV: RRR, No M/R/G  PULM: CTAB, no coughing or wheezing  ABDOMEN: Soft, NT/ND. No TTP. + BSx4.  SKIN: Normal Color, Warm, Dry, No Rashes   EXTREMITIES: Non-Tender, Full ROM, No Pedal Edema, Left ankle red and swollen with purulent drainage from both medial and lateral malleolus.  NEURO: A&O x 4, nonfocal neurological exam.  PSYCH: Normal Mood & Behavior    Outpatient Follow-Up  No future appointments.      Ernie Rizo,

## 2024-04-16 NOTE — H&P
PRIMARY CARE PHYSICIAN:  Iain Márquez DO ADMITTING PHYSICIAN Octavio Jeong MD   MRN# [id number]   Admission Date: 4/16/2024     Subjective   CHIEF COMPLAINT: Infected hardwear    HISTORY OF PRESENT ILLNESS Rose Marie Toussaint is a 68 y.o. female with a history of left ankle fracture s/p ORIF in March 2022 presented to [facility] ED with worsening ankle pain for 2 weeks. She also had swelling redness fever with chills. She was started on IV vancomycin    Past Medical history     COPD  HTN  Depression  CKD  HLD     Past Surgical history  Ankle surgery.    Family History   Problem Relation Name Age of Onset    Hypertension Mother      Heart attack Father         Social History     Socioeconomic History    Marital status:      Spouse name: Not on file    Number of children: Not on file    Years of education: Not on file    Highest education level: Not on file   Occupational History    Not on file   Tobacco Use    Smoking status: Every Day     Current packs/day: 0.50     Average packs/day: 0.5 packs/day     Types: Cigarettes     Start date: 4/13/2024    Smokeless tobacco: Current   Substance and Sexual Activity    Alcohol use: Drinks daily.    Drug use: Not on file    Sexual activity: Not on file   Other Topics Concern    Not on file   Social History Narrative    Not on file     Social Determinants of Health     Financial Resource Strain: Low Risk  (4/16/2024)    Overall Financial Resource Strain (CARDIA)     Difficulty of Paying Living Expenses: Not very hard   Food Insecurity: Not on file   Transportation Needs: No Transportation Needs (4/16/2024)    PRAPARE - Transportation     Lack of Transportation (Medical): No     Lack of Transportation (Non-Medical): No   Physical Activity: Not on file   Stress: Not on file   Social Connections: Not on file   Intimate Partner Violence: Not on file   Housing Stability: Low Risk  (4/16/2024)    Housing Stability Vital Sign     Unable to Pay for Housing in the  Last Year: No     Number of Places Lived in the Last Year: 1     Unstable Housing in the Last Year: No   Recent Concern: Housing Stability - High Risk (4/14/2024)    Housing Stability Vital Sign     Unable to Pay for Housing in the Last Year: Yes     Number of Places Lived in the Last Year: 1     Unstable Housing in the Last Year: No       Medications  Medications Prior to Admission   Medication Sig Dispense Refill Last Dose    albuterol 90 mcg/actuation inhaler Inhale 2 puffs every 6 hours if needed for wheezing.       atenolol (Tenormin) 25 mg tablet Take 1 tablet (25 mg) by mouth once daily.       buPROPion (Wellbutrin) 100 mg tablet Take 1 tablet (100 mg) by mouth 2 times a day. Morning and 12pm       busPIRone (Buspar) 30 mg tablet Take 1 tablet (30 mg) by mouth 2 times a day.       chlorthalidone (Hygroton) 25 mg tablet Take 1 tablet (25 mg) by mouth.       dilTIAZem CD (Cardizem CD) 240 mg 24 hr capsule Take 1 capsule (240 mg) by mouth.       doxepin (SINEquan) 50 mg capsule Take 1 capsule (50 mg) by mouth once daily at bedtime.       potassium chloride CR 10 mEq ER tablet Take 1 tablet (10 mEq) by mouth once daily. Do not crush, chew, or split.       pravastatin (Pravachol) 20 mg tablet Take 1 tablet (20 mg) by mouth once daily.       sertraline (Zoloft) 100 mg tablet Take 2 tablets (200 mg) by mouth once daily.           Allergies   Allergen Reactions    Penicillin Unknown       Complete Review of symptoms  GENERAL: has fever & chills  HEENT: No, blurred vision, or hearing loss  CARDIAC: No chest pain, shortness of breath, palpitations or leg swelling  RESPIRATORY: No, cough, or hemoptysis  GI: No nausea, vomiting, diarrhea, or constipation  : No, dysuria, or frequency  SKIN: no rash, itching discoloration, jaundice or rash  ENDOCRINE: no or polydipsia  PSYCH: anxiety  NEURO: No, blurred vision, slurred speech, or facial droop    Objective     PHYSICAL EXAM:  Patient Vitals for the past 24 hrs:   BP Temp  Pulse Resp SpO2   04/16/24 1559 125/76 -- 75 18 96 %   04/16/24 1232 154/76 36.8 °C (98.2 °F) 64 18 96 %     Blood pressure 125/76, pulse 75, temperature 36.8 °C (98.2 °F), resp. rate 18, SpO2 96%.  Patient Vitals for the past 24 hrs:   BP Temp Pulse Resp SpO2   04/16/24 1559 125/76 -- 75 18 96 %   04/16/24 1232 154/76 36.8 °C (98.2 °F) 64 18 96 %     There is no height or weight on file to calculate BMI.  GENERAL: alert, cooperative, or no distress  SKIN: no rashes  OROPHARYNX:   NECK: supple, no thyromegaly, JVP within normal limits  LUNGS:  not in respiratory distress, respiratory rate normal, clear to auscultation  CARDIAC: rate regular and regular rhythm, normal S1 and S2, no murmur, rub, or gallop heard.  ABDOMEN: Soft, non-tender, normal bowel sounds; no bruits, organomegaly or masses.  EXTREMETIES: No edema.  Has left lower leg swollen.  NEURO: Alert and oriented x 3, strength and  sensation grossly normal,     DATA:   Diagnostic tests reviewed for today's visit:    Most recent  labs and imaging results  Results for orders placed or performed during the hospital encounter of 04/14/24 (from the past 96 hour(s))   CBC   Result Value Ref Range    WBC 14.6 (H) 4.4 - 11.3 x10*3/uL    nRBC 0.0 0.0 - 0.0 /100 WBCs    RBC 3.69 (L) 4.00 - 5.20 x10*6/uL    Hemoglobin 11.9 (L) 12.0 - 16.0 g/dL    Hematocrit 35.5 (L) 36.0 - 46.0 %    MCV 96 80 - 100 fL    MCH 32.2 26.0 - 34.0 pg    MCHC 33.5 32.0 - 36.0 g/dL    RDW 12.8 11.5 - 14.5 %    Platelets 410 150 - 450 x10*3/uL   Renal Function Panel   Result Value Ref Range    Glucose 88 74 - 99 mg/dL    Sodium 132 (L) 136 - 145 mmol/L    Potassium 4.4 3.5 - 5.3 mmol/L    Chloride 93 (L) 98 - 107 mmol/L    Bicarbonate 31 21 - 32 mmol/L    Anion Gap 12 10 - 20 mmol/L    Urea Nitrogen 22 6 - 23 mg/dL    Creatinine 1.02 0.50 - 1.05 mg/dL    eGFR 61 >60 mL/min/1.73m*2    Calcium 9.9 8.6 - 10.3 mg/dL    Phosphorus 3.4 2.5 - 4.9 mg/dL    Albumin 3.2 (L) 3.4 - 5.0 g/dL   Magnesium    Result Value Ref Range    Magnesium 1.38 (L) 1.60 - 2.40 mg/dL   Lactate   Result Value Ref Range    Lactate 0.5 0.4 - 2.0 mmol/L   CBC   Result Value Ref Range    WBC 13.2 (H) 4.4 - 11.3 x10*3/uL    nRBC 0.0 0.0 - 0.0 /100 WBCs    RBC 3.36 (L) 4.00 - 5.20 x10*6/uL    Hemoglobin 10.8 (L) 12.0 - 16.0 g/dL    Hematocrit 32.5 (L) 36.0 - 46.0 %    MCV 97 80 - 100 fL    MCH 32.1 26.0 - 34.0 pg    MCHC 33.2 32.0 - 36.0 g/dL    RDW 12.9 11.5 - 14.5 %    Platelets 420 150 - 450 x10*3/uL   Renal Function Panel   Result Value Ref Range    Glucose 109 (H) 74 - 99 mg/dL    Sodium 130 (L) 136 - 145 mmol/L    Potassium 3.4 (L) 3.5 - 5.3 mmol/L    Chloride 91 (L) 98 - 107 mmol/L    Bicarbonate 31 21 - 32 mmol/L    Anion Gap 11 10 - 20 mmol/L    Urea Nitrogen 19 6 - 23 mg/dL    Creatinine 1.07 (H) 0.50 - 1.05 mg/dL    eGFR 57 (L) >60 mL/min/1.73m*2    Calcium 9.5 8.6 - 10.3 mg/dL    Phosphorus 3.5 2.5 - 4.9 mg/dL    Albumin 2.9 (L) 3.4 - 5.0 g/dL   Magnesium   Result Value Ref Range    Magnesium 1.58 (L) 1.60 - 2.40 mg/dL   Sedimentation rate, automated   Result Value Ref Range    Sedimentation Rate 94 (H) 0 - 30 mm/h   C-reactive protein   Result Value Ref Range    C-Reactive Protein 23.19 (H) <1.00 mg/dL   Comprehensive metabolic panel   Result Value Ref Range    Glucose 105 (H) 74 - 99 mg/dL    Sodium 128 (L) 136 - 145 mmol/L    Potassium 3.4 (L) 3.5 - 5.3 mmol/L    Chloride 90 (L) 98 - 107 mmol/L    Bicarbonate 30 21 - 32 mmol/L    Anion Gap 11 10 - 20 mmol/L    Urea Nitrogen 19 6 - 23 mg/dL    Creatinine 1.04 0.50 - 1.05 mg/dL    eGFR 59 (L) >60 mL/min/1.73m*2    Calcium 9.7 8.6 - 10.3 mg/dL    Albumin 2.9 (L) 3.4 - 5.0 g/dL    Alkaline Phosphatase 146 (H) 33 - 136 U/L    Total Protein 6.1 (L) 6.4 - 8.2 g/dL    AST 82 (H) 9 - 39 U/L    Bilirubin, Total 0.2 0.0 - 1.2 mg/dL    ALT 56 (H) 7 - 45 U/L   Vancomycin   Result Value Ref Range    Vancomycin 16.1 5.0 - 20.0 ug/mL   CBC   Result Value Ref Range    WBC 9.0 4.4 - 11.3  x10*3/uL    nRBC 0.0 0.0 - 0.0 /100 WBCs    RBC 3.54 (L) 4.00 - 5.20 x10*6/uL    Hemoglobin 11.3 (L) 12.0 - 16.0 g/dL    Hematocrit 34.6 (L) 36.0 - 46.0 %    MCV 98 80 - 100 fL    MCH 31.9 26.0 - 34.0 pg    MCHC 32.7 32.0 - 36.0 g/dL    RDW 12.9 11.5 - 14.5 %    Platelets 418 150 - 450 x10*3/uL   Magnesium   Result Value Ref Range    Magnesium 1.71 1.60 - 2.40 mg/dL   Comprehensive metabolic panel   Result Value Ref Range    Glucose 102 (H) 74 - 99 mg/dL    Sodium 132 (L) 136 - 145 mmol/L    Potassium 3.4 (L) 3.5 - 5.3 mmol/L    Chloride 91 (L) 98 - 107 mmol/L    Bicarbonate 32 21 - 32 mmol/L    Anion Gap 12 10 - 20 mmol/L    Urea Nitrogen 16 6 - 23 mg/dL    Creatinine 1.08 (H) 0.50 - 1.05 mg/dL    eGFR 57 (L) >60 mL/min/1.73m*2    Calcium 9.7 8.6 - 10.3 mg/dL    Albumin 3.0 (L) 3.4 - 5.0 g/dL    Alkaline Phosphatase 137 (H) 33 - 136 U/L    Total Protein 6.1 (L) 6.4 - 8.2 g/dL    AST 38 9 - 39 U/L    Bilirubin, Total 0.4 0.0 - 1.2 mg/dL    ALT 45 7 - 45 U/L        Vascular US ankle brachial index (LUIS) without exercise    Result Date: 4/15/2024  Preliminary Cardiology Report         [place] [address]  Tel [phone] and Fax [phone]            Preliminary Vascular Lab Report  [facility] US ANKLE BRACHIAL INDEX (LUIS) WITHOUT EXERCISE  Patient Name:      MANN Pond Physician:  [id number] Alicia Hernandez MD Study Date:        4/15/2024       Ordering Physician: [id number] LINDA GALINDO MRN/PID:           [id number]        Technologist:       Irasema Leung RVT Accession#:        [id number]    Technologist 2: Date of Birth/Age: 1955       Encounter#:         [id number] Gender:            F Admission Status:  Inpatient       Location Performed: [facility]  Diagnosis/ICD: Peripheral vascular disease, unspecified-I73.9 Procedure/CPT:  82221 Peripheral artery LUIS Only  PRELIMINARY CONCLUSIONS: Right Lower PVR: No evidence of arterial occlusive disease in the right lower extremity at rest. Decreased digital perfusion noted. Triphasic flow is noted in the right common femoral artery, right posterior tibial artery and right dorsalis pedis artery. Left Lower PVR: Evidence of mild arterial occlusive disease in the left lower extremity at rest. Decreased digital perfusion noted. Biphasic flow is noted in the left common femoral artery, left posterior tibial artery and left dorsalis pedis artery.  Imaging & Doppler Findings:  RIGHT Lower PVR                Pressures Ratios Right Posterior Tibial (Ankle) 111 mmHg  0.93 Right Dorsalis Pedis (Ankle)   121 mmHg  1.02 Right Digit (Great Toe)        0 mmHg    0.00   LEFT Lower PVR                Pressures Ratios Left Posterior Tibial (Ankle) 93 mmHg   0.78 Left Dorsalis Pedis (Ankle)   97 mmHg   0.82 Left Digit (Great Toe)        0 mmHg    0.00                      Right     Left Brachial Pressure 118 mmHg 119 mmHg   VASCULAR PRELIMINARY REPORT completed by Irasema Leung RVT on 4/15/2024 at 2:53:33 PM  ** Final **      [unfilled]   Medication and Non-Pharmacologic VTE Prophylaxis/Anticoagulants   Last Anticoag Admin            enoxaparin (Lovenox) syringe 40 mg    Given 40 mg at 0558    Frequency: Every 24 hours         There are additional administrations since 04/13/24 1740 that are not shown.    Orders not given:    enoxaparin (Lovenox) syringe 40 mg            Assessment/Plan     Rose Marie Toussaint  has    Principal Problem:    Chronic osteomyelitis involving ankle and foot, left (Multi) ?Hard wear infection?   COPD   HTN   Alcohol abuse   Vancomycin. ID orthopedic consult        [facility]   Other Hospital problems        Abnormal findings not addressed during hospitalization, but require out patient follow up. None        I spent 75 minutes taking history and examining Rose Marie Toussaint, reviewing the labs & imaging  results, reviewing past hospital encounters,  speaking with & reviewing notes from emergency room physician.  SIGNATURE: Octavio Jeong MD PATIENT NAME: Rose Marie Toussaint   DATE: April 16, 2024 MRN: [id number]   TIME: 5:40 PM

## 2024-04-16 NOTE — CARE PLAN
The patient's goals for the shift include      The clinical goals for the shift include patient will remain safe throughout shift

## 2024-04-16 NOTE — CONSULTS
ORTHOPAEDIC CONSULT NOTE    Reason For Consult  Left ankle fracture and infection    History Of Present Illness  Rose Marie Toussaint is a 68 y.o. female with that suffered left bimalleolar fracture of left ankle and underwent ORIF with syndesmosis repair  in 2022 with Dr. Frederick from podiatry. I am unable to find any follow-up notes or ED visits relating to post-op recovery in the past year and a half.  She presented to OSH on 4/13 with left ankle pain and drainage. Per ED note patient has left lateral abscess on that ankle that began 2 months ago, and a wound on the medial aspect of her ankle that formed 2 weeks ago. XR was obtained and revealed a fracture dislocation of distal tibia and fibula along with fibular plate fracture and medial malleolus fracture. She was transferred to [facility] for Podiatry evaluation and possible intervention. Podiatry recommend an initial I&D and hardware removal along with IV abx. Given complexity of case patient was transferred to [facility] for orthopedic evaluation. Unable to see culture results of wound swab that was obtained at other hospital.     Patient is a poor historian and irritable, refusing to engage in assessment and showing signs of flights of ideas.     For my assessment patient reports infection has been present since original surgery but is unable to provide additional information of antibiotic course, possible PICC line, revision surgeries. She reports she ambulates with a walker at baseline, when asked if she's been able to put weight on it recently she answered that she's been taking care of her mother, cannot further elaborate. Per prior notes she's been unable to bear weight and has had 10/10 pain. Does not answer regarding recent injuries or when drainage started. Is not open to surgical discussion at this time.        Past Medical History  Patient has a PMH of COPD, HTN, depression, PAD, hypokalemia, ETOH abuse, Hx of delirium tremens,  nicotine  dependence, HLD, and CKD.    Surgical History  As above     Social History  Hx of nicotine dependence and ETOH abuse, last drink 4/12    Family History  Family History   Problem Relation Name Age of Onset    Hypertension Mother      Heart attack Father          Allergies  Penicillin    Review of Systems    No pertinent symptoms related to systems other than musculoskeletal were investigated      Physical Exam  Constitutional: irritable, sitting on edge of bed  Eyes: EOMI, clear sclera   Cardiovascular: Normal rate and regular rhythm  Respiratory/Thorax: NLWOB, on RA  Genitourinary: Voiding independently   Musculoskeletal: left ankle with open wound over lateral malleolus aprx 3-4 cm diameter with punctate wound in center, active blood drainage upon assessment, medial wound aprx 2.5 cm with more serosanguinous drainage and some skin abrasions. Patient refused participation in sensation/motor exam but upon assessment was firing TA/TP, toes well perfused.           Neurological: does not answer questions appropriately   Psychological: flight of ideas           Last Recorded Vitals  Vitals:    04/16/24 1232 04/16/24 1559   BP: 154/76 125/76   Pulse: 64 75   Resp: 18 18   Temp: 36.8 °C (98.2 °F)    SpO2: 96% 96%         Relevant Results    Imaging:     .=== 04/14/24 ===    XR ANKLE 3+ VIEWS LEFT    - Impression -  Fracture and dislocation of the ankle with fractures through the  fixation hardware of the fibula. Displaced fractures of the distal  fibula and medial malleolus. Soft tissue gas in the medial ankle may  be due to infection with gas producing organism.      MACRO:  None    Signed by: Collette Saldana 4/14/2024 7:25 AM  Dictation workstation:   [id number]   .=== 10/19/15 ===    CT SINUS W CONTRAST    - Impression -  1.  Partial opacification of right mastoid air cells.  Please  correlate with history of mastoiditis.  2.  Mild maxillary sinus mucosal thickening.  Please correlate with  chronic/allergic  sinusitis.      I personally reviewed the image(s)/study and resident physician's  interpretation. I agree with the findings as stated.         Lab Results:   Lab Results   Component Value Date    WBC 9.0 04/16/2024    HGB 11.3 (L) 04/16/2024    HCT 34.6 (L) 04/16/2024    MCV 98 04/16/2024     04/16/2024     Lab Results   Component Value Date    GLUCOSE 102 (H) 04/16/2024    CALCIUM 9.7 04/16/2024     (L) 04/16/2024    K 3.4 (L) 04/16/2024    CO2 32 04/16/2024    CL 91 (L) 04/16/2024    BUN 16 04/16/2024    CREATININE 1.08 (H) 04/16/2024     Results from last 72 hours   Lab Units 04/16/24  0658   ALK PHOS U/L 137*   BILIRUBIN TOTAL mg/dL 0.4   PROTEIN TOTAL g/dL 6.1*   ALT U/L 45   AST U/L 38   ALBUMIN g/dL 3.0*     Estimated Creatinine Clearance: 40.4 mL/min (A) (by C-G formula based on SCr of 1.08 mg/dL (H)).  C-Reactive Protein   Date/Time Value Ref Range Status   04/15/2024 05:49 AM 23.19 (H) <1.00 mg/dL Final             Assessment/Plan   Rose Marie Toussaint is a 68 y.o. female with left ankle fracture/dislocation with chronic osteomyelitis and hardware failure     - tentatively added on for I&D left ankle and hardware removal tomorrow with Dr. Guerra  - Further discussion about limb salvage procedure vs. amputation to be had between surgeon and patient once mentation improves  - recommend vascular consult for pre-op workup   - Please obtain preop labs (CBC/BMP/Coags/T+S/CXR/EKG)  -Please document any further need for medical optimization prior to OR  -NPO after midnight   -Rec DVT ppx with SCDs. DVT chemppx per trauma protocol. Currently on lovenox   -Pain control per primary  -WB status: NWB LLE  -Abx: currently on vancomycin  - repeat swab of wound       Dispo: Discussed with Dr. Guerra. Tentatively plan for OR tomorrow.     I spent 40 minutes in the professional and overall care of this patient.      Екатерина Purvis PA-C  Department of Surgical Services  [facility]  Center

## 2024-04-17 ENCOUNTER — APPOINTMENT (OUTPATIENT)
Dept: VASCULAR MEDICINE | Facility: HOSPITAL | Age: 69
DRG: 475 | End: 2024-04-17
Payer: COMMERCIAL

## 2024-04-17 ENCOUNTER — APPOINTMENT (OUTPATIENT)
Dept: RADIOLOGY | Facility: HOSPITAL | Age: 69
DRG: 475 | End: 2024-04-17
Payer: COMMERCIAL

## 2024-04-17 LAB
ABO GROUP (TYPE) IN BLOOD: NORMAL
ABO GROUP (TYPE) IN BLOOD: NORMAL
ALBUMIN SERPL BCP-MCNC: 3.1 G/DL (ref 3.4–5)
ALP SERPL-CCNC: 127 U/L (ref 33–136)
ALT SERPL W P-5'-P-CCNC: 39 U/L (ref 7–45)
ANION GAP SERPL CALC-SCNC: 13 MMOL/L (ref 10–20)
ANTIBODY SCREEN: NORMAL
AST SERPL W P-5'-P-CCNC: 30 U/L (ref 9–39)
ATRIAL RATE: 79 BPM
BILIRUB SERPL-MCNC: 0.3 MG/DL (ref 0–1.2)
BUN SERPL-MCNC: 15 MG/DL (ref 6–23)
CALCIUM SERPL-MCNC: 9.7 MG/DL (ref 8.6–10.3)
CHLORIDE SERPL-SCNC: 92 MMOL/L (ref 98–107)
CO2 SERPL-SCNC: 30 MMOL/L (ref 21–32)
CREAT SERPL-MCNC: 1.2 MG/DL (ref 0.5–1.05)
EGFRCR SERPLBLD CKD-EPI 2021: 50 ML/MIN/1.73M*2
ERYTHROCYTE [DISTWIDTH] IN BLOOD BY AUTOMATED COUNT: 12.7 % (ref 11.5–14.5)
GLUCOSE SERPL-MCNC: 104 MG/DL (ref 74–99)
HCT VFR BLD AUTO: 30 % (ref 36–46)
HGB BLD-MCNC: 10.5 G/DL (ref 12–16)
INR PPP: 1.3 (ref 0.9–1.1)
MAGNESIUM SERPL-MCNC: 1.5 MG/DL (ref 1.6–2.4)
MCH RBC QN AUTO: 31.7 PG (ref 26–34)
MCHC RBC AUTO-ENTMCNC: 35 G/DL (ref 32–36)
MCV RBC AUTO: 91 FL (ref 80–100)
NRBC BLD-RTO: 0 /100 WBCS (ref 0–0)
P AXIS: 85 DEGREES
P OFFSET: 179 MS
P ONSET: 145 MS
PHOSPHATE SERPL-MCNC: 3.8 MG/DL (ref 2.5–4.9)
PLATELET # BLD AUTO: 443 X10*3/UL (ref 150–450)
POTASSIUM SERPL-SCNC: 3.9 MMOL/L (ref 3.5–5.3)
PR INTERVAL: 148 MS
PROT SERPL-MCNC: 6.4 G/DL (ref 6.4–8.2)
PROTHROMBIN TIME: 14.3 SECONDS (ref 9.8–12.8)
Q ONSET: 219 MS
QRS COUNT: 13 BEATS
QRS DURATION: 64 MS
QT INTERVAL: 354 MS
QTC CALCULATION(BAZETT): 405 MS
QTC FREDERICIA: 388 MS
R AXIS: 68 DEGREES
RBC # BLD AUTO: 3.31 X10*6/UL (ref 4–5.2)
RH FACTOR (ANTIGEN D): NORMAL
RH FACTOR (ANTIGEN D): NORMAL
SODIUM SERPL-SCNC: 131 MMOL/L (ref 136–145)
T AXIS: 74 DEGREES
T OFFSET: 396 MS
VANCOMYCIN SERPL-MCNC: 10.7 UG/ML (ref 5–20)
VENTRICULAR RATE: 79 BPM
WBC # BLD AUTO: 12.3 X10*3/UL (ref 4.4–11.3)

## 2024-04-17 PROCEDURE — S4991 NICOTINE PATCH NONLEGEND: HCPCS

## 2024-04-17 PROCEDURE — 75635 CT ANGIO ABDOMINAL ARTERIES: CPT

## 2024-04-17 PROCEDURE — 2500000004 HC RX 250 GENERAL PHARMACY W/ HCPCS (ALT 636 FOR OP/ED): Mod: JZ | Performed by: INTERNAL MEDICINE

## 2024-04-17 PROCEDURE — 1100000001 HC PRIVATE ROOM DAILY

## 2024-04-17 PROCEDURE — 86901 BLOOD TYPING SEROLOGIC RH(D): CPT | Performed by: INTERNAL MEDICINE

## 2024-04-17 PROCEDURE — 99222 1ST HOSP IP/OBS MODERATE 55: CPT

## 2024-04-17 PROCEDURE — 80202 ASSAY OF VANCOMYCIN: CPT

## 2024-04-17 PROCEDURE — 36415 COLL VENOUS BLD VENIPUNCTURE: CPT | Performed by: INTERNAL MEDICINE

## 2024-04-17 PROCEDURE — 85027 COMPLETE CBC AUTOMATED: CPT

## 2024-04-17 PROCEDURE — 82607 VITAMIN B-12: CPT | Mod: AHULAB | Performed by: PSYCHIATRY & NEUROLOGY

## 2024-04-17 PROCEDURE — 70450 CT HEAD/BRAIN W/O DYE: CPT

## 2024-04-17 PROCEDURE — 2500000006 HC RX 250 W HCPCS SELF ADMINISTERED DRUGS (ALT 637 FOR ALL PAYERS): Performed by: INTERNAL MEDICINE

## 2024-04-17 PROCEDURE — 2500000002 HC RX 250 W HCPCS SELF ADMINISTERED DRUGS (ALT 637 FOR MEDICARE OP, ALT 636 FOR OP/ED)

## 2024-04-17 PROCEDURE — 2550000001 HC RX 255 CONTRASTS: Performed by: INTERNAL MEDICINE

## 2024-04-17 PROCEDURE — 2500000001 HC RX 250 WO HCPCS SELF ADMINISTERED DRUGS (ALT 637 FOR MEDICARE OP): Performed by: INTERNAL MEDICINE

## 2024-04-17 PROCEDURE — 36415 COLL VENOUS BLD VENIPUNCTURE: CPT

## 2024-04-17 PROCEDURE — 85610 PROTHROMBIN TIME: CPT

## 2024-04-17 PROCEDURE — 70450 CT HEAD/BRAIN W/O DYE: CPT | Performed by: RADIOLOGY

## 2024-04-17 PROCEDURE — 2500000001 HC RX 250 WO HCPCS SELF ADMINISTERED DRUGS (ALT 637 FOR MEDICARE OP)

## 2024-04-17 PROCEDURE — 83735 ASSAY OF MAGNESIUM: CPT

## 2024-04-17 PROCEDURE — 84100 ASSAY OF PHOSPHORUS: CPT | Performed by: INTERNAL MEDICINE

## 2024-04-17 PROCEDURE — 75635 CT ANGIO ABDOMINAL ARTERIES: CPT | Performed by: RADIOLOGY

## 2024-04-17 PROCEDURE — 2500000006 HC RX 250 W HCPCS SELF ADMINISTERED DRUGS (ALT 637 FOR ALL PAYERS): Mod: MUE | Performed by: INTERNAL MEDICINE

## 2024-04-17 PROCEDURE — 82746 ASSAY OF FOLIC ACID SERUM: CPT | Mod: AHULAB | Performed by: PSYCHIATRY & NEUROLOGY

## 2024-04-17 PROCEDURE — 86901 BLOOD TYPING SEROLOGIC RH(D): CPT

## 2024-04-17 PROCEDURE — 80053 COMPREHEN METABOLIC PANEL: CPT

## 2024-04-17 RX ORDER — CEFAZOLIN SODIUM 2 G/100ML
2 INJECTION, SOLUTION INTRAVENOUS EVERY 8 HOURS
Status: DISCONTINUED | OUTPATIENT
Start: 2024-04-17 | End: 2024-04-22

## 2024-04-17 RX ORDER — SERTRALINE HYDROCHLORIDE 50 MG/1
100 TABLET, FILM COATED ORAL DAILY
Status: DISCONTINUED | OUTPATIENT
Start: 2024-04-18 | End: 2024-04-21

## 2024-04-17 RX ADMIN — BUPROPION HYDROCHLORIDE 100 MG: 100 TABLET, FILM COATED ORAL at 08:40

## 2024-04-17 RX ADMIN — ATENOLOL 25 MG: 25 TABLET ORAL at 08:40

## 2024-04-17 RX ADMIN — LORAZEPAM 2 MG: 1 TABLET ORAL at 16:04

## 2024-04-17 RX ADMIN — BUSPIRONE HYDROCHLORIDE 30 MG: 10 TABLET ORAL at 08:40

## 2024-04-17 RX ADMIN — PRAVASTATIN SODIUM 20 MG: 20 TABLET ORAL at 08:40

## 2024-04-17 RX ADMIN — LORAZEPAM 2 MG: 1 TABLET ORAL at 12:55

## 2024-04-17 RX ADMIN — LORAZEPAM 2 MG: 1 TABLET ORAL at 20:18

## 2024-04-17 RX ADMIN — LORAZEPAM 2 MG: 1 TABLET ORAL at 01:55

## 2024-04-17 RX ADMIN — SERTRALINE HYDROCHLORIDE 200 MG: 50 TABLET ORAL at 08:40

## 2024-04-17 RX ADMIN — IOHEXOL 90 ML: 350 INJECTION, SOLUTION INTRAVENOUS at 14:52

## 2024-04-17 RX ADMIN — BUPROPION HYDROCHLORIDE 100 MG: 100 TABLET, FILM COATED ORAL at 20:18

## 2024-04-17 RX ADMIN — FOLIC ACID 1 MG: 1 TABLET ORAL at 08:39

## 2024-04-17 RX ADMIN — DILTIAZEM HYDROCHLORIDE 240 MG: 120 CAPSULE, EXTENDED RELEASE ORAL at 08:40

## 2024-04-17 RX ADMIN — NICOTINE 1 PATCH: 14 PATCH, EXTENDED RELEASE TRANSDERMAL at 08:39

## 2024-04-17 RX ADMIN — CEFAZOLIN SODIUM 2 G: 2 INJECTION, SOLUTION INTRAVENOUS at 16:04

## 2024-04-17 RX ADMIN — CEFAZOLIN SODIUM 2 G: 2 INJECTION, SOLUTION INTRAVENOUS at 23:56

## 2024-04-17 RX ADMIN — Medication 1 TABLET: at 08:40

## 2024-04-17 RX ADMIN — CEFAZOLIN SODIUM 2 G: 2 INJECTION, SOLUTION INTRAVENOUS at 08:53

## 2024-04-17 ASSESSMENT — LIFESTYLE VARIABLES
BLOOD PRESSURE: 140/75
TOTAL SCORE: 0
NAUSEA AND VOMITING: NO NAUSEA AND NO VOMITING
PULSE: 80
PAROXYSMAL SWEATS: NO SWEAT VISIBLE
AUDITORY DISTURBANCES: NOT PRESENT
TOTAL SCORE: 14
ANXIETY: NO ANXIETY, AT EASE
NAUSEA AND VOMITING: NO NAUSEA AND NO VOMITING
VISUAL DISTURBANCES: NOT PRESENT
TREMOR: 3
ANXIETY: NO ANXIETY, AT EASE
ANXIETY: 3
PULSE: 83
AUDITORY DISTURBANCES: NOT PRESENT
HEADACHE, FULLNESS IN HEAD: NOT PRESENT
ANXIETY: NO ANXIETY, AT EASE
PAROXYSMAL SWEATS: 2
HEADACHE, FULLNESS IN HEAD: NOT PRESENT
TREMOR: 5
ANXIETY: MODERATELY ANXIOUS, OR GUARDED, SO ANXIETY IS INFERRED
HEADACHE, FULLNESS IN HEAD: NOT PRESENT
ORIENTATION AND CLOUDING OF SENSORIUM: CANNOT DO SERIAL ADDITIONS OR IS UNCERTAIN ABOUT DATE
AUDITORY DISTURBANCES: NOT PRESENT
NAUSEA AND VOMITING: NO NAUSEA AND NO VOMITING
ANXIETY: NO ANXIETY, AT EASE
VISUAL DISTURBANCES: NOT PRESENT
HEADACHE, FULLNESS IN HEAD: NOT PRESENT
AGITATION: MODERATELY FIDGETY AND RESTLESS
VISUAL DISTURBANCES: NOT PRESENT
NAUSEA AND VOMITING: NO NAUSEA AND NO VOMITING
BLOOD PRESSURE: 138/70
TOTAL SCORE: 9
PAROXYSMAL SWEATS: NO SWEAT VISIBLE
AGITATION: 6
HEADACHE, FULLNESS IN HEAD: MILD
BLOOD PRESSURE: 123/71
AGITATION: 2
NAUSEA AND VOMITING: NO NAUSEA AND NO VOMITING
AUDITORY DISTURBANCES: NOT PRESENT
VISUAL DISTURBANCES: NOT PRESENT
VISUAL DISTURBANCES: NOT PRESENT
PAROXYSMAL SWEATS: BARELY PERCEPTIBLE SWEATING, PALMS MOIST
ORIENTATION AND CLOUDING OF SENSORIUM: ORIENTED AND CAN DO SERIAL ADDITIONS
AGITATION: NORMAL ACTIVITY
TREMOR: 2
AGITATION: NORMAL ACTIVITY
HEADACHE, FULLNESS IN HEAD: NOT PRESENT
TOTAL SCORE: 0
VISUAL DISTURBANCES: NOT PRESENT
AUDITORY DISTURBANCES: NOT PRESENT
AUDITORY DISTURBANCES: NOT PRESENT
ORIENTATION AND CLOUDING OF SENSORIUM: CANNOT DO SERIAL ADDITIONS OR IS UNCERTAIN ABOUT DATE
ANXIETY: MILDLY ANXIOUS
PAROXYSMAL SWEATS: NO SWEAT VISIBLE
ANXIETY: NO ANXIETY, AT EASE
TOTAL SCORE: 0
NAUSEA AND VOMITING: NO NAUSEA AND NO VOMITING
VISUAL DISTURBANCES: NOT PRESENT
TREMOR: 3
TOTAL SCORE: 0
TOTAL SCORE: 5
NAUSEA AND VOMITING: NO NAUSEA AND NO VOMITING
TOTAL SCORE: 5
AUDITORY DISTURBANCES: NOT PRESENT
AUDITORY DISTURBANCES: NOT PRESENT
HEADACHE, FULLNESS IN HEAD: NOT PRESENT
ANXIETY: MILDLY ANXIOUS
AGITATION: NORMAL ACTIVITY
VISUAL DISTURBANCES: NOT PRESENT
VISUAL DISTURBANCES: NOT PRESENT
HEADACHE, FULLNESS IN HEAD: NOT PRESENT
TOTAL SCORE: 10
PAROXYSMAL SWEATS: NO SWEAT VISIBLE
NAUSEA AND VOMITING: NO NAUSEA AND NO VOMITING
AGITATION: NORMAL ACTIVITY
PAROXYSMAL SWEATS: NO SWEAT VISIBLE
ANXIETY: MILDLY ANXIOUS
NAUSEA AND VOMITING: NO NAUSEA AND NO VOMITING
HEADACHE, FULLNESS IN HEAD: NOT PRESENT
AGITATION: MODERATELY FIDGETY AND RESTLESS
ORIENTATION AND CLOUDING OF SENSORIUM: ORIENTED AND CAN DO SERIAL ADDITIONS
VISUAL DISTURBANCES: NOT PRESENT
TREMOR: NO TREMOR
ORIENTATION AND CLOUDING OF SENSORIUM: ORIENTED AND CAN DO SERIAL ADDITIONS
PAROXYSMAL SWEATS: NO SWEAT VISIBLE
TREMOR: NO TREMOR
TREMOR: 2
VISUAL DISTURBANCES: NOT PRESENT
TREMOR: NO TREMOR
HEADACHE, FULLNESS IN HEAD: NOT PRESENT
TREMOR: MODERATE, WITH PATIENT'S ARMS EXTENDED
AGITATION: SOMEWHAT MORE THAN NORMAL ACTIVITY
AGITATION: SOMEWHAT MORE THAN NORMAL ACTIVITY
NAUSEA AND VOMITING: NO NAUSEA AND NO VOMITING
ORIENTATION AND CLOUDING OF SENSORIUM: ORIENTED AND CAN DO SERIAL ADDITIONS
ORIENTATION AND CLOUDING OF SENSORIUM: ORIENTED AND CAN DO SERIAL ADDITIONS
AUDITORY DISTURBANCES: NOT PRESENT
ORIENTATION AND CLOUDING OF SENSORIUM: ORIENTED AND CAN DO SERIAL ADDITIONS
TREMOR: NOT VISIBLE, BUT CAN BE FELT FINGERTIP TO FINGERTIP
HEADACHE, FULLNESS IN HEAD: NOT PRESENT
PAROXYSMAL SWEATS: NO SWEAT VISIBLE
HEADACHE, FULLNESS IN HEAD: NOT PRESENT
ANXIETY: MILDLY ANXIOUS
NAUSEA AND VOMITING: NO NAUSEA AND NO VOMITING
ORIENTATION AND CLOUDING OF SENSORIUM: ORIENTED AND CAN DO SERIAL ADDITIONS
ANXIETY: MILDLY ANXIOUS
TOTAL SCORE: 12
AUDITORY DISTURBANCES: NOT PRESENT
ORIENTATION AND CLOUDING OF SENSORIUM: ORIENTED AND CAN DO SERIAL ADDITIONS
PAROXYSMAL SWEATS: NO SWEAT VISIBLE
AGITATION: SOMEWHAT MORE THAN NORMAL ACTIVITY
VISUAL DISTURBANCES: NOT PRESENT
AUDITORY DISTURBANCES: NOT PRESENT
TOTAL SCORE: 5
AGITATION: NORMAL ACTIVITY
PAROXYSMAL SWEATS: NO SWEAT VISIBLE
TREMOR: NO TREMOR
TREMOR: 3
NAUSEA AND VOMITING: NO NAUSEA AND NO VOMITING
AUDITORY DISTURBANCES: NOT PRESENT
ORIENTATION AND CLOUDING OF SENSORIUM: ORIENTED AND CAN DO SERIAL ADDITIONS
PULSE: 65
TOTAL SCORE: 2
PAROXYSMAL SWEATS: NO SWEAT VISIBLE
ORIENTATION AND CLOUDING OF SENSORIUM: CANNOT DO SERIAL ADDITIONS OR IS UNCERTAIN ABOUT DATE

## 2024-04-17 ASSESSMENT — PAIN SCALES - GENERAL
PAINLEVEL_OUTOF10: 0 - NO PAIN

## 2024-04-17 ASSESSMENT — COGNITIVE AND FUNCTIONAL STATUS - GENERAL
MOVING TO AND FROM BED TO CHAIR: A LOT
STANDING UP FROM CHAIR USING ARMS: TOTAL
PERSONAL GROOMING: A LITTLE
MOBILITY SCORE: 11
MOVING FROM LYING ON BACK TO SITTING ON SIDE OF FLAT BED WITH BEDRAILS: A LITTLE
CLIMB 3 TO 5 STEPS WITH RAILING: TOTAL
STANDING UP FROM CHAIR USING ARMS: A LOT
CLIMB 3 TO 5 STEPS WITH RAILING: TOTAL
MOVING FROM LYING ON BACK TO SITTING ON SIDE OF FLAT BED WITH BEDRAILS: A LITTLE
WALKING IN HOSPITAL ROOM: TOTAL
STANDING UP FROM CHAIR USING ARMS: TOTAL
WALKING IN HOSPITAL ROOM: TOTAL
DRESSING REGULAR LOWER BODY CLOTHING: A LITTLE
TURNING FROM BACK TO SIDE WHILE IN FLAT BAD: A LITTLE
HELP NEEDED FOR BATHING: A LOT
DRESSING REGULAR UPPER BODY CLOTHING: A LITTLE
MOBILITY SCORE: 11
TOILETING: A LOT
EATING MEALS: A LITTLE
HELP NEEDED FOR BATHING: A LOT
PERSONAL GROOMING: A LITTLE
EATING MEALS: A LITTLE
MOBILITY SCORE: 15
DAILY ACTIVITIY SCORE: 17
DAILY ACTIVITIY SCORE: 21
DAILY ACTIVITIY SCORE: 17
PERSONAL GROOMING: A LITTLE
TURNING FROM BACK TO SIDE WHILE IN FLAT BAD: A LITTLE
WALKING IN HOSPITAL ROOM: A LOT
DRESSING REGULAR UPPER BODY CLOTHING: A LITTLE
TOILETING: A LITTLE
TOILETING: A LITTLE
CLIMB 3 TO 5 STEPS WITH RAILING: TOTAL
MOVING TO AND FROM BED TO CHAIR: A LOT
MOVING TO AND FROM BED TO CHAIR: A LOT
DRESSING REGULAR LOWER BODY CLOTHING: A LITTLE

## 2024-04-17 ASSESSMENT — PAIN SCALES - WONG BAKER
WONGBAKER_NUMERICALRESPONSE: NO HURT
WONGBAKER_NUMERICALRESPONSE: NO HURT

## 2024-04-17 ASSESSMENT — PAIN - FUNCTIONAL ASSESSMENT: PAIN_FUNCTIONAL_ASSESSMENT: 0-10

## 2024-04-17 NOTE — NURSING NOTE
Patient has been very restless and has made multiple attempts to get oob. Pt inc of large amount of urine and removed the external female catheter. Device currently off. Resting quietly at the time. Patient medicated for a CIWA score of 8.

## 2024-04-17 NOTE — PROGRESS NOTES
04/17/24 1500   Discharge Planning   Patient expects to be discharged to: SNF     Spoke to TCC from [facility], patient's  is currently at [place] with hx of dementia and can not make is own decisions, patients step daughter Daxa Montoya is currently lives in [place] her phone number is [phone] is trying to get him placed at [facility], Rose Marie is alert to herself and currently has a sitter at the bedside, she will most likely need placement to a SNF on discharge I am anticipating she may need to be discharged on IV abx, I will send a referral to [facility] as well, and will reach out to our SW to contact the patients Step daughter.

## 2024-04-17 NOTE — CARE PLAN
The patient's goals for the shift include      The clinical goals for the shift include pt will remain safe and stable throughout the shift      Problem: Pain  Goal: My pain/discomfort is manageable  Outcome: Progressing

## 2024-04-17 NOTE — PROGRESS NOTES
[facility]          Admitting Provider: Octavio Jeong MD Admission Date: 4/16/2024.   Attending Provider: Octavio Jeong MD MRN: [id number]       Subjective   Rose Marie Toussaint is a 68 y.o. female on day 1 of admission presenting with Chronic osteomyelitis involving ankle and foot, left (Multi).  Interval History no new complaints. CIWA score low this am.     Objective   Physical Exam  Last Recorded Vitals: Blood pressure 115/67, pulse 78, temperature 36.9 °C (98.4 °F), resp. rate 18, SpO2 93%.  Patient Vitals for the past 24 hrs:   BP Temp Pulse Resp SpO2   04/17/24 0755 115/67 36.9 °C (98.4 °F) 78 18 93 %   04/17/24 0151 140/75 -- 83 -- --   04/17/24 0000 138/70 -- 80 -- --   04/16/24 2331 128/64 -- 83 -- --   04/16/24 2200 109/62 -- 82 -- --   04/16/24 2000 108/62 36.9 °C (98.5 °F) 74 -- --   04/16/24 1900 109/70 36.8 °C (98.2 °F) 79 18 95 %   04/16/24 1559 125/76 -- 75 18 96 %   04/16/24 1232 154/76 36.8 °C (98.2 °F) 64 18 96 %     There is no height or weight on file to calculate BMI.  GENERAL: alert, cooperative, or no distress  SKIN: no rashes  NECK: supple, no thyromegaly, JVP within normal limits  LUNGS:  not in respiratory distress, respiratory rate normal, clear to auscultation  CARDIAC: regular rate and rhythm, normal S1 and S2, no murmur, rub, or gallop  ABDOMEN: Soft, non-tender, normal bowel sounds; no bruits, organomegaly or masses.  EXTREMITIES: No edema Left ankle wrapped  NEURO: Alert and oriented x 2, reflexes normal and symmetric, strength and  sensation grossly normal  Intake/Output last 3 Shifts:  I/O last 3 completed shifts:  In: 100 [P.O.:100]  Out: -   DATA:   Diagnostic tests reviewed for today's visit:    Most recent Labs  Results for orders placed or performed during the hospital encounter of 04/16/24 (from the past 24 hour(s))   ECG 12 Lead   Result Value Ref Range    Ventricular Rate 79 BPM    Atrial Rate 79 BPM    NH Interval 148 ms    QRS Duration 64  "ms    QT Interval 354 ms    QTC Calculation(Bazett) 405 ms    P Axis 85 degrees    R Axis 68 degrees    T Axis 74 degrees    QRS Count 13 beats    Q Onset 219 ms    P Onset 145 ms    P Offset 179 ms    T Offset 396 ms    QTC Fredericia 388 ms   CBC   Result Value Ref Range    WBC 12.3 (H) 4.4 - 11.3 x10*3/uL    nRBC 0.0 0.0 - 0.0 /100 WBCs    RBC 3.31 (L) 4.00 - 5.20 x10*6/uL    Hemoglobin 10.5 (L) 12.0 - 16.0 g/dL    Hematocrit 30.0 (L) 36.0 - 46.0 %    MCV 91 80 - 100 fL    MCH 31.7 26.0 - 34.0 pg    MCHC 35.0 32.0 - 36.0 g/dL    RDW 12.7 11.5 - 14.5 %    Platelets 443 150 - 450 x10*3/uL   Comprehensive metabolic panel   Result Value Ref Range    Glucose 104 (H) 74 - 99 mg/dL    Sodium 131 (L) 136 - 145 mmol/L    Potassium 3.9 3.5 - 5.3 mmol/L    Chloride 92 (L) 98 - 107 mmol/L    Bicarbonate 30 21 - 32 mmol/L    Anion Gap 13 10 - 20 mmol/L    Urea Nitrogen 15 6 - 23 mg/dL    Creatinine 1.20 (H) 0.50 - 1.05 mg/dL    eGFR 50 (L) >60 mL/min/1.73m*2    Calcium 9.7 8.6 - 10.3 mg/dL    Albumin 3.1 (L) 3.4 - 5.0 g/dL    Alkaline Phosphatase 127 33 - 136 U/L    Total Protein 6.4 6.4 - 8.2 g/dL    AST 30 9 - 39 U/L    Bilirubin, Total 0.3 0.0 - 1.2 mg/dL    ALT 39 7 - 45 U/L   Magnesium   Result Value Ref Range    Magnesium 1.50 (L) 1.60 - 2.40 mg/dL   Vancomycin   Result Value Ref Range    Vancomycin 10.7 5.0 - 20.0 ug/mL   Phosphorus   Result Value Ref Range    Phosphorus 3.8 2.5 - 4.9 mg/dL   Protime-INR   Result Value Ref Range    Protime 14.3 (H) 9.8 - 12.8 seconds    INR 1.3 (H) 0.9 - 1.1   Type and screen   Result Value Ref Range    ABO TYPE A     Rh TYPE POS     ANTIBODY SCREEN NEG      Urine Culture   Date Value Ref Range Status   05/24/2023 NO GROWTH  Final     Blood Culture   Date Value Ref Range Status   05/23/2023   Final    No Growth at 1 days~No Growth at 2 days~No Growth at 3 days~NO GROWTH at 4 days - FINAL REPORT    No results found for: \"RESPCULTSM\" No results found for: \"PERDIAFLDCUL\" No results found " "for: \"STERFLDCULSM\"   CT ankle left wo IV contrast    Result Date: 4/17/2024  Interpreted By:  Tony Nina, STUDY: CT ANKLE LEFT WO IV CONTRAST;  4/16/2024 11:38 pm   INDICATION: Signs/Symptoms:hardware failure.   COMPARISON: Radiographs 04/14/2024.   ACCESSION NUMBER(S): [id number]   ORDERING CLINICIAN: JOSE ROBERTO MCLAUGHLIN   TECHNIQUE: CT imaging of the left ankle was obtained without administration of intravenous contrast medium. Coronal and sagittal reformatted images were performed.   FINDINGS: OSSEOUS STRUCTURES: Patient is status post bimalleolar fracture ORIF with a lateral fibular plate and screw construct consisted of 3 proximal fibular screws, 4 distal syndesmotic screws and 4 distal fibular screws. Additional 2 medial to lateral transfixing screws through the medial malleolus. The lateral plate is fractured distally resulting in lateral apex angulation of the distal fibular fragment measuring approximately 54 degrees.   There is near complete retraction of the medial malleolus screws with displacement of the medial malleolus fragment superomedially from the distal tibia by approximately 4 cm. There is a dominant 3 cm fragment displaced posteriorly from the posterior distal tibia by approximately 1 cm (series 617, image 52).   There is disruption of the tibiotalar joint with near complete anterolateral subluxation of the tibia relative to the talus and associated marked hindfoot varus deformity. The posterior margin of the distal tibia is perched against the anterolateral margin of the talar dome (series 617, image 43). There is prominent subchondral cystic change/erosion of the lateral talar dome with loss of approximately 5% of the lateral articulating surface bone mass. There is a fractured screw in the anterior distal tibia. There is marked thickening of the tibiotalar joint capsule with above water attenuation content within the joint without simple fluid amenable for aspiration.   The subtalar, " mid and hindfoot joints are maintained. Diffuse osteopenia of the foot.   SOFT TISSUES: There is a subcutaneous fluid collection with internal gaseous foci in the medial ankle region surrounding the medial malleolus screws measuring approximately 2.9 x 2.2 cm in the greatest axial dimensions and 3.5 cm in the craniocaudal dimension.       Bimalleolar fracture ORIF with fracturing of lateral fibular plate, angulated distal fibular fracture, retracted medial malleolus screws, moderately displaced medial malleolus fragment, marked disruption of tibiotalar joint and hindfoot varus deformity.   Approximately 5% loss of the lateral talar dome articulating surface bone mass.   MACRO: None   Signed by: Tony Nina 4/17/2024 8:41 AM Dictation workstation:   [id number]    ECG 12 Lead    Result Date: 4/17/2024   Poor data quality, interpretation may be adversely affected Sinus rhythm with Premature atrial complexes Otherwise normal ECG When compared with ECG of 16-AUG-2023 10:32, Premature atrial complexes are now Present    XR chest 1 view    Result Date: 4/17/2024  Interpreted By:  Екатерина Mejia, STUDY: XR CHEST 1 VIEW;  4/16/2024 8:42 pm   INDICATION: Signs/Symptoms:Pre-Op.   COMPARISON: 05/26/2023   ACCESSION NUMBER(S): [id number]   ORDERING CLINICIAN: LEIA LEGGETT   FINDINGS: Faint linear opacity in the right apex similar to the prior exam. No new focal infiltrate, pleural effusion or pneumothorax identified. Lucency near the right heart border likely artifactual. Cardiac silhouette within normal limits for size. Step-off deformity at the left AC joint.       Streaky probable scarring or atelectasis in the right apex similar to 05/26/2023.   MACRO: None.   Signed by: Екатерина Mejia 4/17/2024 7:47 AM Dictation workstation:   [id number]    Vascular US ankle brachial index (LUSI) without exercise    Result Date: 4/15/2024  Preliminary Cardiology Report         [address]  [address]  Tel [phone] and Fax [phone]            Preliminary Vascular Lab Report  [facility] US ANKLE BRACHIAL INDEX (LUIS) WITHOUT EXERCISE  Patient Name:      MANN Pond Physician:  [id number] Alicia Hernandez MD Study Date:        4/15/2024       Ordering Physician: [id number] LINDA GALINDO MRN/PID:           [id number]        Technologist:       Irasema Leung RVT Accession#:        [id number]    Technologist 2: Date of Birth/Age: 1955       Encounter#:         [id number] Gender:            F Admission Status:  Inpatient       Location Performed: [facility]  Diagnosis/ICD: Peripheral vascular disease, unspecified-I73.9 Procedure/CPT: 43240 Peripheral artery LUIS Only  PRELIMINARY CONCLUSIONS: Right Lower PVR: No evidence of arterial occlusive disease in the right lower extremity at rest. Decreased digital perfusion noted. Triphasic flow is noted in the right common femoral artery, right posterior tibial artery and right dorsalis pedis artery. Left Lower PVR: Evidence of mild arterial occlusive disease in the left lower extremity at rest. Decreased digital perfusion noted. Biphasic flow is noted in the left common femoral artery, left posterior tibial artery and left dorsalis pedis artery.  Imaging & Doppler Findings:  RIGHT Lower PVR                Pressures Ratios Right Posterior Tibial (Ankle) 111 mmHg  0.93 Right Dorsalis Pedis (Ankle)   121 mmHg  1.02 Right Digit (Great Toe)        0 mmHg    0.00   LEFT Lower PVR                Pressures Ratios Left Posterior Tibial (Ankle) 93 mmHg   0.78 Left Dorsalis Pedis (Ankle)   97 mmHg   0.82 Left Digit (Great Toe)        0 mmHg    0.00                      Right     Left Brachial Pressure 118 mmHg 119 mmHg   VASCULAR PRELIMINARY REPORT completed by Irasema Leung RVT on 4/15/2024 at 2:53:33 PM  ** Final **     Current  Facility-Administered Medications   Medication Dose Route Frequency Provider Last Rate Last Admin    acetaminophen (Tylenol) tablet 650 mg  650 mg oral q4h PRN Octavio Jeong MD        albuterol 2.5 mg /3 mL (0.083 %) nebulizer solution 2.5 mg  2.5 mg nebulization q2h PRN Octavio Jeong MD        atenolol (Tenormin) tablet 25 mg  25 mg oral Daily Octavio Jeong MD   25 mg at 04/17/24 0840    buPROPion (Wellbutrin) tablet 100 mg  100 mg oral BID Octavio Jeong MD   100 mg at 04/17/24 0840    busPIRone (Buspar) tablet 30 mg  30 mg oral BID Ernie Rizo DO   30 mg at 04/17/24 0840    ceFAZolin in dextrose (iso-os) (Ancef) IVPB 2 g  2 g intravenous q8h Jaycob Krishna MD   2 g at 04/17/24 0853    [Held by provider] chlorthalidone (Hygroton) tablet 25 mg  25 mg oral Daily Ernie Rizo DO        dilTIAZem CD (Cardizem CD) 24 hr capsule 240 mg  240 mg oral Daily Octavio Jeong MD   240 mg at 04/17/24 0840    [Held by provider] enoxaparin (Lovenox) syringe 40 mg  40 mg subcutaneous q24h Octavio Jeong MD        folic acid (Folvite) tablet 1 mg  1 mg oral Daily Octavio Jeong MD   1 mg at 04/17/24 0839    HYDROmorphone PF (Dilaudid) injection 0.2 mg  0.2 mg intravenous q3h PRN Octavio Jeong MD        LORazepam (Ativan) tablet 0.5 mg  0.5 mg oral q2h PRN Octavio Jeong MD   0.5 mg at 04/16/24 1759    Or    LORazepam (Ativan) tablet 1 mg  1 mg oral q2h PRN Octavio Jeong MD   1 mg at 04/16/24 2334    Or    LORazepam (Ativan) tablet 2 mg  2 mg oral q2h PRN Octavio Jeong MD   2 mg at 04/17/24 0155    melatonin tablet 4.5 mg  4.5 mg oral Nightly PRN Octavio Jeong MD        multivitamin with minerals 1 tablet  1 tablet oral Daily Octavio Jeong MD   1 tablet at 04/17/24 0840    nicotine (Nicoderm CQ) 14 mg/24 hr patch 1 patch  1 patch transdermal Daily Ernie Rizo DO   1 patch at 04/17/24 0839    ondansetron (Zofran) injection 4 mg  4 mg intravenous q8h  PRN Octavio Jeong MD        oxyCODONE (Roxicodone) immediate release tablet 10 mg  10 mg oral q6h PRN Octavio Jeong MD   10 mg at 04/16/24 1517    oxyCODONE (Roxicodone) immediate release tablet 5 mg  5 mg oral q6h PRN Octavio Jeong MD        polyethylene glycol (Glycolax, Miralax) packet 17 g  17 g oral Daily PRN Octavio Jeong MD        pravastatin (Pravachol) tablet 20 mg  20 mg oral Daily Octavio Jeong MD   20 mg at 04/17/24 0840    sertraline (Zoloft) tablet 200 mg  200 mg oral Daily Octavio Jeong MD   200 mg at 04/17/24 0840     No current outpatient medications on file.   ..     Medication and Non-Pharmacologic VTE Prophylaxis/Anticoagulants      Last Anticoag Admin            enoxaparin (Lovenox) syringe 40 mg    Given 40 mg at 04/16/24 0558    Frequency: Every 24 hours         There are additional administrations since 04/14/24 0855 that are not shown.    Orders not given:    enoxaparin (Lovenox) syringe 40 mg          Assessment/Plan   Rose Marie Toussaint has  Principal Problem:    Chronic osteomyelitis involving ankle and foot, left (Multi)  COPD   HTN  CKD 3  Alcohol withdrawal On Vancomycin. Ortho consulted  Mild PAD LLE          On CIWA   Other Hospital problems        Abnormal findings not addressed during hospitalization, but require out patient follow up. None        I spent 35 minutes talking and examining Rose Marie Toussaint, reviewing the labs & medications, formulating plan of care & discussing with NP and nursing staff.  Octavio Jeong MD

## 2024-04-17 NOTE — CONSULTS
INFECTIOUS DISEASE INPATIENT INITIAL CONSULTATION    Referred By: Octavio Jeong    Reason For Consult: Septic joint     HPI:  This is a 68 y.o. female with PMH of alcohol dependence, left ankle fracture s/p ORIF 2022 who presented with left ankle pain and drainage.    Had developed an abscess on the medial left ankle 2 months ago. Transferred here from OSH for possible I/D and hardware removal given concern for infection. Was being seen by ID at [facility] and on IV Vancomycin. Ortho is seeing here.    I looked up records from [facility] and wound cultures x2 from 4/13 have grown MSSA.    Afebrile. Has leukocytosis, initially 14.6 and overall better at 12.3 yesterday. No culture data.     Allergies:  Penicillin     Vitals (Last 24 Hours):  Heart Rate:  [64-83]   Temp:  [36.4 °C (97.5 °F)-36.9 °C (98.5 °F)]   Resp:  [18]   BP: (108-162)/(62-98)   SpO2:  [93 %-96 %]      PHYSICAL EXAM:  Gen - lethargic, confused, not able to give any history  Heart - RRR  Lungs - clear bilaterally, no wheezing  Abd - soft, no ttp, BS present  LLE - in dressings  Skin - no rash    MEDS:    Current Facility-Administered Medications:     acetaminophen (Tylenol) tablet 650 mg, 650 mg, oral, q4h PRN, Octavio Jeong MD    albuterol 2.5 mg /3 mL (0.083 %) nebulizer solution 2.5 mg, 2.5 mg, nebulization, q2h PRN, Octavio Jeong MD    atenolol (Tenormin) tablet 25 mg, 25 mg, oral, Daily, Octavio Jeong MD    buPROPion (Wellbutrin) tablet 100 mg, 100 mg, oral, BID, Octavio Jeong MD, 100 mg at 04/16/24 2039    busPIRone (Buspar) tablet 30 mg, 30 mg, oral, BID, Ernie Rizo, DO, 30 mg at 04/16/24 2039    [Held by provider] chlorthalidone (Hygroton) tablet 25 mg, 25 mg, oral, Daily, Ernie Garical, DO    dilTIAZem CD (Cardizem CD) 24 hr capsule 240 mg, 240 mg, oral, Daily, Octavio Jeong MD    [Held by provider] enoxaparin (Lovenox) syringe 40 mg, 40 mg, subcutaneous, q24h, Octavio Jeong MD    folic acid (Folvite)  tablet 1 mg, 1 mg, oral, Daily, Octavio Jeong MD, 1 mg at 04/16/24 1759    HYDROmorphone PF (Dilaudid) injection 0.2 mg, 0.2 mg, intravenous, q3h PRN, Octavio Jeong MD    LORazepam (Ativan) tablet 0.5 mg, 0.5 mg, oral, q2h PRN, 0.5 mg at 04/16/24 1759 **OR** LORazepam (Ativan) tablet 1 mg, 1 mg, oral, q2h PRN, 1 mg at 04/16/24 2334 **OR** LORazepam (Ativan) tablet 2 mg, 2 mg, oral, q2h PRN, Octavio Jeong MD, 2 mg at 04/17/24 0155    melatonin tablet 4.5 mg, 4.5 mg, oral, Nightly PRN, Octavio Jeong MD    multivitamin with minerals 1 tablet, 1 tablet, oral, Daily, Octavio Jeong MD, 1 tablet at 04/16/24 1759    nicotine (Nicoderm CQ) 14 mg/24 hr patch 1 patch, 1 patch, transdermal, Daily, Ernie Rizo,     ondansetron (Zofran) injection 4 mg, 4 mg, intravenous, q8h PRN, Octavio Jeong MD    oxyCODONE (Roxicodone) immediate release tablet 10 mg, 10 mg, oral, q6h PRN, Octavio Jeong MD, 10 mg at 04/16/24 1517    oxyCODONE (Roxicodone) immediate release tablet 5 mg, 5 mg, oral, q6h PRN, Octavio Jeong MD    polyethylene glycol (Glycolax, Miralax) packet 17 g, 17 g, oral, Daily PRN, Octavio Jeong MD    pravastatin (Pravachol) tablet 20 mg, 20 mg, oral, Daily, Octavio Jeong MD    sertraline (Zoloft) tablet 200 mg, 200 mg, oral, Daily, Octavio Jeong MD    vancomycin (Vancocin) pharmacy to dose - pharmacy monitoring, , miscellaneous, Daily PRN, Octavio Jeong MD    vancomycin in dextrose 5 % (Vancocin) IVPB 750 mg, 750 mg, intravenous, q24h, Octavio Jeong MD     LABS:  Lab Results   Component Value Date    WBC 12.3 (H) 04/17/2024    HGB 10.5 (L) 04/17/2024    HCT 30.0 (L) 04/17/2024    MCV 91 04/17/2024     04/17/2024      Results from last 72 hours   Lab Units 04/17/24  0514   SODIUM mmol/L 131*   POTASSIUM mmol/L 3.9   CHLORIDE mmol/L 92*   CO2 mmol/L 30   BUN mg/dL 15   CREATININE mg/dL 1.20*   GLUCOSE mg/dL 104*   CALCIUM mg/dL 9.7    ANION GAP mmol/L 13   EGFR mL/min/1.73m*2 50*     Results from last 72 hours   Lab Units 04/17/24  0514   ALK PHOS U/L 127   BILIRUBIN TOTAL mg/dL 0.3   PROTEIN TOTAL g/dL 6.4   ALT U/L 39   AST U/L 30   ALBUMIN g/dL 3.1*     Estimated Creatinine Clearance: 36.4 mL/min (A) (by C-G formula based on SCr of 1.2 mg/dL (H)).    C-Reactive Protein   Date/Time Value Ref Range Status   04/15/2024 05:49 AM 23.19 (H) <1.00 mg/dL Final     CRP   Date/Time Value Ref Range Status   05/24/2023 06:45 AM 1.50 (A) mg/dL Final     Comment:     REF VALUE  < 1.00       Sedimentation Rate   Date/Time Value Ref Range Status   04/15/2024 05:49 AM 94 (H) 0 - 30 mm/h Final   05/24/2023 06:45 AM 26 0 - 30 mm/h Final     Comment:     Please note new reference ranges as of 5/9/2022.       IMAGING:  CXR 4/16  FINDINGS:  Faint linear opacity in the right apex similar to the prior exam. No  new focal infiltrate, pleural effusion or pneumothorax identified.  Lucency near the right heart border likely artifactual. Cardiac  silhouette within normal limits for size. Step-off deformity at the  left AC joint.    X-Ray Left Ankle 4/14  IMPRESSION:  Fracture and dislocation of the ankle with fractures through the  fixation hardware of the fibula. Displaced fractures of the distal  fibula and medial malleolus. Soft tissue gas in the medial ankle may  be due to infection with gas producing organism.    ASSESSMENT/PLAN:    Left Ankle Hardware Associated Infection with Suspected OM due to MSSA  CKD - CrCl 36, affects abx dosing  Penicillin Allergy - limits abx options    Surgical intervention pending with ortho. Stopped IV Vancomycin and placing on IV Cefazolin 2g Q8H. Has penicillin allergy but no reaction listed, suspect will be low risk for cross-reactivity. Monitoring for any rash/itching.    Will follow. Thanks!    Jaycob Krishna MD  [facility]  Office #[phone]

## 2024-04-17 NOTE — PROGRESS NOTES
04/17/24 1058   Discharge Planning   Patient expects to be discharged to: SNF     I met with this patient at her bedside to discuss discharge planning, she was alertx1, I called her  jorge at [phone] and left a message for him to call me back, I am anticipating that on discharge the patient may need a SNF for rehab and possibly IV abx I will reach out to the  as well to see if they can follow up with the  and choice the patient for facilities.

## 2024-04-17 NOTE — CARE PLAN
The patient's goals for the shift include      The clinical goals for the shift include pt will remain free of injury, agitation, falls this shift    Over the shift, the pt made progress towards the following goals      Problem: Pain  Goal: My pain/discomfort is manageable  Outcome: Progressing     Problem: Safety  Goal: Patient will be injury free during hospitalization  Outcome: Progressing  Goal: I will remain free of falls  Outcome: Progressing     Problem: Daily Care  Goal: Daily care needs are met  Outcome: Progressing     Problem: Psychosocial Needs  Goal: Demonstrates ability to cope with hospitalization/illness  Outcome: Progressing  Goal: Collaborate with me, my family, and caregiver to identify my specific goals  Outcome: Progressing     Problem: Discharge Barriers  Goal: My discharge needs are met  Outcome: Progressing     Problem: Skin  Goal: Participates in plan/prevention/treatment measures  Outcome: Progressing  Flowsheets (Taken 4/17/2024 0941)  Participates in plan/prevention/treatment measures: Elevate heels  Goal: Prevent/manage excess moisture  Outcome: Progressing  Flowsheets (Taken 4/17/2024 0941)  Prevent/manage excess moisture: Moisturize dry skin  Goal: Prevent/minimize sheer/friction injuries  Outcome: Progressing  Flowsheets (Taken 4/17/2024 0941)  Prevent/minimize sheer/friction injuries: Turn/reposition every 2 hours/use positioning/transfer devices  Goal: Promote/optimize nutrition  Outcome: Progressing  Flowsheets (Taken 4/17/2024 0941)  Promote/optimize nutrition:   Monitor/record intake including meals   Offer water/supplements/favorite foods

## 2024-04-17 NOTE — SIGNIFICANT EVENT
Service attempting to determine if pt has capacity to choose between limb salvage vs amputation - clear cognitive impairment noted.   Head CT without  (2015 mild diffuse volume loss noted)   MOCA  Reduce sertraline 100 mg daily  Dc buspirone  Continue Bupropion  mg twice daily  Full consult when results in.

## 2024-04-17 NOTE — CONSULTS
Reason For Consult  Foot infection, osteomyelitis    History Of Present Illness  Rose Marie Toussaint is a 68 y.o. female presenting with worsening left ankle pain. She has a history of left ankle fracture which has been fraught with complications. She has a history of heavy smoker and drinker. She reports some numbness in her left foot at times. Vascular surgery was consulted in order to help determine a plan for viability of the foot moving forward.     Past Medical History  She has no past medical history on file.    Surgical History  She has no past surgical history on file.     Social History  She reports that she has been smoking cigarettes. She started smoking 4 days ago. She has been smoking an average of 0.5 packs per day. She uses smokeless tobacco. No history on file for alcohol use and drug use.    Family History  Family History   Problem Relation Name Age of Onset    Hypertension Mother      Heart attack Father          Allergies  Penicillin    Review of Systems  A full 10 point ROS was completed. Nothing positive other than what was mentioned in the HPI.      Physical Exam  PE:  Constitutional: A&Ox3, calm and cooperative  Head/Neck: Neck supple, no JVD  Cardiovascular: RRR  Respiratory/Thorax: Non labored breathing on RA  Genitourinary: Voiding independently   Musculoskeletal: ROM intact, no joint swelling, normal strength. LLE: Reduced strength. In ace wrap. DP pulse palpable but faint. Able to wiggle toes. Sensation intact to light touch.  Extremities: [place], No peripheral edema  Neurological: No focal deficits   Psychological: Appropriate mood and behavior  Skin: Warm and dry.       Last Recorded Vitals  Blood pressure 115/67, pulse 78, temperature 36.9 °C (98.4 °F), temperature source Oral, resp. rate 18, height 1.524 m (5'), weight 56.7 kg (125 lb), SpO2 93%.    Relevant Results  Results for orders placed or performed during the hospital encounter of 04/16/24 (from the past 24 hour(s))   ECG 12 Lead    Result Value Ref Range    Ventricular Rate 79 BPM    Atrial Rate 79 BPM    SD Interval 148 ms    QRS Duration 64 ms    QT Interval 354 ms    QTC Calculation(Bazett) 405 ms    P Axis 85 degrees    R Axis 68 degrees    T Axis 74 degrees    QRS Count 13 beats    Q Onset 219 ms    P Onset 145 ms    P Offset 179 ms    T Offset 396 ms    QTC Fredericia 388 ms   CBC   Result Value Ref Range    WBC 12.3 (H) 4.4 - 11.3 x10*3/uL    nRBC 0.0 0.0 - 0.0 /100 WBCs    RBC 3.31 (L) 4.00 - 5.20 x10*6/uL    Hemoglobin 10.5 (L) 12.0 - 16.0 g/dL    Hematocrit 30.0 (L) 36.0 - 46.0 %    MCV 91 80 - 100 fL    MCH 31.7 26.0 - 34.0 pg    MCHC 35.0 32.0 - 36.0 g/dL    RDW 12.7 11.5 - 14.5 %    Platelets 443 150 - 450 x10*3/uL   Comprehensive metabolic panel   Result Value Ref Range    Glucose 104 (H) 74 - 99 mg/dL    Sodium 131 (L) 136 - 145 mmol/L    Potassium 3.9 3.5 - 5.3 mmol/L    Chloride 92 (L) 98 - 107 mmol/L    Bicarbonate 30 21 - 32 mmol/L    Anion Gap 13 10 - 20 mmol/L    Urea Nitrogen 15 6 - 23 mg/dL    Creatinine 1.20 (H) 0.50 - 1.05 mg/dL    eGFR 50 (L) >60 mL/min/1.73m*2    Calcium 9.7 8.6 - 10.3 mg/dL    Albumin 3.1 (L) 3.4 - 5.0 g/dL    Alkaline Phosphatase 127 33 - 136 U/L    Total Protein 6.4 6.4 - 8.2 g/dL    AST 30 9 - 39 U/L    Bilirubin, Total 0.3 0.0 - 1.2 mg/dL    ALT 39 7 - 45 U/L   Magnesium   Result Value Ref Range    Magnesium 1.50 (L) 1.60 - 2.40 mg/dL   Vancomycin   Result Value Ref Range    Vancomycin 10.7 5.0 - 20.0 ug/mL   Phosphorus   Result Value Ref Range    Phosphorus 3.8 2.5 - 4.9 mg/dL   Protime-INR   Result Value Ref Range    Protime 14.3 (H) 9.8 - 12.8 seconds    INR 1.3 (H) 0.9 - 1.1   Type and screen   Result Value Ref Range    ABO TYPE A     Rh TYPE POS     ANTIBODY SCREEN NEG      CT ankle left wo IV contrast   Final Result   Bimalleolar fracture ORIF with fracturing of lateral fibular plate,   angulated distal fibular fracture, retracted medial malleolus screws,   moderately displaced medial  "malleolus fragment, marked disruption of   tibiotalar joint and hindfoot varus deformity.        Approximately 5% loss of the lateral talar dome articulating surface   bone mass.        MACRO:   None        Signed by: Tony Maico 4/17/2024 8:41 AM   Dictation workstation:   [id number]      XR chest 1 view   Final Result   Streaky probable scarring or atelectasis in the right apex similar to   05/26/2023.        MACRO:   None.        Signed by: Екатерина Mejia 4/17/2024 7:47 AM   Dictation workstation:   [id number]      FL less than 1 hour    (Results Pending)   Vascular US PVR without exercise    (Results Pending)   CT angio aorta and bilateral iliofemoral runoff w and or wo IV contrast    (Results Pending)         Assessment/Plan     Plan:   Discussed with Dr. Barbosa and Dr. Christie.   Will obtain a PVR study and CT angio aorta with bilateral runoff.   Okay for surgery this afternoon with Dr. Guerra  If limb appears salvageable then will need and Angiogram of the LLE, which may be able to be done at [facility] either tomorrow or friday.   If limb is not salvageable then would recommend medicine to medicine transfer to [facility] for BKA.    I spent 60 minutes in the professional and overall care of this patient.      Phillip García PA-C    Discussed with Dr. Christie his read of Ct angio his read is  \"PVR with triphasic  PT and biphasic DP, and flat line at the toes level. CTA with possible moderate disease of the right ASHLI, the tibials appear to occlude distally with reconstitution via collaterals, plantar appears patent. Plan for angiogram + intervention if right foot/leg deemed salvageable per ortho and podiatry. ASA 81 mg and Statin. \"    Will attempt to get an Angio gram done tomorrow evening. If patient can tolerate procedure. Will need clearance for Psych to determine if she has capacity. Exam may require general anesthesia if she is unable to follow commands and stay still.  "

## 2024-04-17 NOTE — PROGRESS NOTES
Vancomycin Dosing by Pharmacy- Cessation of Therapy    Consult to pharmacy for vancomycin dosing has been discontinued by the prescriber, pharmacy will sign off at this time.    Please call pharmacy if there are further questions or re-enter a consult if vancomycin is resumed.     [place], PharmD

## 2024-04-18 ENCOUNTER — APPOINTMENT (OUTPATIENT)
Dept: CARDIOLOGY | Facility: HOSPITAL | Age: 69
DRG: 475 | End: 2024-04-18
Payer: COMMERCIAL

## 2024-04-18 PROBLEM — I70.221 CRITICAL LIMB ISCHEMIA OF RIGHT LOWER EXTREMITY (MULTI): Status: ACTIVE | Noted: 2024-04-16

## 2024-04-18 PROBLEM — F10.931 ALCOHOL WITHDRAWAL DELIRIUM (MULTI): Status: ACTIVE | Noted: 2024-04-18

## 2024-04-18 LAB
ALBUMIN SERPL BCP-MCNC: 3.2 G/DL (ref 3.4–5)
ALP SERPL-CCNC: 123 U/L (ref 33–136)
ALT SERPL W P-5'-P-CCNC: 20 U/L (ref 7–45)
ANION GAP SERPL CALC-SCNC: 16 MMOL/L (ref 10–20)
AST SERPL W P-5'-P-CCNC: 24 U/L (ref 9–39)
BILIRUB SERPL-MCNC: 0.2 MG/DL (ref 0–1.2)
BUN SERPL-MCNC: 13 MG/DL (ref 6–23)
CALCIUM SERPL-MCNC: 9.6 MG/DL (ref 8.6–10.3)
CHLORIDE SERPL-SCNC: 94 MMOL/L (ref 98–107)
CO2 SERPL-SCNC: 25 MMOL/L (ref 21–32)
CREAT SERPL-MCNC: 1.04 MG/DL (ref 0.5–1.05)
EGFRCR SERPLBLD CKD-EPI 2021: 59 ML/MIN/1.73M*2
ERYTHROCYTE [DISTWIDTH] IN BLOOD BY AUTOMATED COUNT: 13.2 % (ref 11.5–14.5)
FOLATE SERPL-MCNC: >24 NG/ML
GLUCOSE BLD MANUAL STRIP-MCNC: 128 MG/DL (ref 74–99)
GLUCOSE SERPL-MCNC: 84 MG/DL (ref 74–99)
HCT VFR BLD AUTO: 33.6 % (ref 36–46)
HGB BLD-MCNC: 11.1 G/DL (ref 12–16)
MAGNESIUM SERPL-MCNC: 1.5 MG/DL (ref 1.6–2.4)
MCH RBC QN AUTO: 31.5 PG (ref 26–34)
MCHC RBC AUTO-ENTMCNC: 33 G/DL (ref 32–36)
MCV RBC AUTO: 96 FL (ref 80–100)
NRBC BLD-RTO: 0 /100 WBCS (ref 0–0)
PLATELET # BLD AUTO: 440 X10*3/UL (ref 150–450)
POTASSIUM SERPL-SCNC: 3.8 MMOL/L (ref 3.5–5.3)
PROT SERPL-MCNC: 6.5 G/DL (ref 6.4–8.2)
RBC # BLD AUTO: 3.52 X10*6/UL (ref 4–5.2)
SODIUM SERPL-SCNC: 131 MMOL/L (ref 136–145)
VIT B12 SERPL-MCNC: 405 PG/ML (ref 211–911)
WBC # BLD AUTO: 10.8 X10*3/UL (ref 4.4–11.3)

## 2024-04-18 PROCEDURE — 2500000004 HC RX 250 GENERAL PHARMACY W/ HCPCS (ALT 636 FOR OP/ED): Performed by: INTERNAL MEDICINE

## 2024-04-18 PROCEDURE — 2500000004 HC RX 250 GENERAL PHARMACY W/ HCPCS (ALT 636 FOR OP/ED): Mod: JZ | Performed by: INTERNAL MEDICINE

## 2024-04-18 PROCEDURE — 85027 COMPLETE CBC AUTOMATED: CPT

## 2024-04-18 PROCEDURE — 93005 ELECTROCARDIOGRAM TRACING: CPT

## 2024-04-18 PROCEDURE — 2500000004 HC RX 250 GENERAL PHARMACY W/ HCPCS (ALT 636 FOR OP/ED): Performed by: PSYCHIATRY & NEUROLOGY

## 2024-04-18 PROCEDURE — B41G1ZZ FLUOROSCOPY OF LEFT LOWER EXTREMITY ARTERIES USING LOW OSMOLAR CONTRAST: ICD-10-PCS | Performed by: STUDENT IN AN ORGANIZED HEALTH CARE EDUCATION/TRAINING PROGRAM

## 2024-04-18 PROCEDURE — 2500000006 HC RX 250 W HCPCS SELF ADMINISTERED DRUGS (ALT 637 FOR ALL PAYERS): Mod: MUE | Performed by: PSYCHIATRY & NEUROLOGY

## 2024-04-18 PROCEDURE — 99223 1ST HOSP IP/OBS HIGH 75: CPT | Performed by: STUDENT IN AN ORGANIZED HEALTH CARE EDUCATION/TRAINING PROGRAM

## 2024-04-18 PROCEDURE — 83735 ASSAY OF MAGNESIUM: CPT

## 2024-04-18 PROCEDURE — 36415 COLL VENOUS BLD VENIPUNCTURE: CPT

## 2024-04-18 PROCEDURE — 2500000006 HC RX 250 W HCPCS SELF ADMINISTERED DRUGS (ALT 637 FOR ALL PAYERS): Performed by: PSYCHIATRY & NEUROLOGY

## 2024-04-18 PROCEDURE — 96125 COGNITIVE TEST BY HC PRO: CPT | Mod: GO

## 2024-04-18 PROCEDURE — 97166 OT EVAL MOD COMPLEX 45 MIN: CPT | Mod: GO

## 2024-04-18 PROCEDURE — 84075 ASSAY ALKALINE PHOSPHATASE: CPT

## 2024-04-18 PROCEDURE — 82947 ASSAY GLUCOSE BLOOD QUANT: CPT

## 2024-04-18 PROCEDURE — 2500000001 HC RX 250 WO HCPCS SELF ADMINISTERED DRUGS (ALT 637 FOR MEDICARE OP): Performed by: INTERNAL MEDICINE

## 2024-04-18 PROCEDURE — 1100000001 HC PRIVATE ROOM DAILY

## 2024-04-18 PROCEDURE — 99223 1ST HOSP IP/OBS HIGH 75: CPT | Performed by: PSYCHIATRY & NEUROLOGY

## 2024-04-18 RX ORDER — MAGNESIUM SULFATE HEPTAHYDRATE 40 MG/ML
2 INJECTION, SOLUTION INTRAVENOUS ONCE
Status: COMPLETED | OUTPATIENT
Start: 2024-04-18 | End: 2024-04-18

## 2024-04-18 RX ADMIN — THIAMINE HYDROCHLORIDE 500 MG: 100 INJECTION, SOLUTION INTRAMUSCULAR; INTRAVENOUS at 17:11

## 2024-04-18 RX ADMIN — LORAZEPAM 2 MG: 1 TABLET ORAL at 00:19

## 2024-04-18 RX ADMIN — MAGNESIUM SULFATE HEPTAHYDRATE 2 G: 40 INJECTION, SOLUTION INTRAVENOUS at 08:54

## 2024-04-18 RX ADMIN — LORAZEPAM 1 MG: 1 TABLET ORAL at 08:08

## 2024-04-18 RX ADMIN — CEFAZOLIN SODIUM 2 G: 2 INJECTION, SOLUTION INTRAVENOUS at 23:49

## 2024-04-18 RX ADMIN — Medication 1 TABLET: at 08:07

## 2024-04-18 RX ADMIN — CEFAZOLIN SODIUM 2 G: 2 INJECTION, SOLUTION INTRAVENOUS at 15:30

## 2024-04-18 RX ADMIN — PRAVASTATIN SODIUM 20 MG: 20 TABLET ORAL at 08:07

## 2024-04-18 RX ADMIN — FOLIC ACID 1 MG: 1 TABLET ORAL at 08:09

## 2024-04-18 RX ADMIN — SERTRALINE HYDROCHLORIDE 100 MG: 50 TABLET ORAL at 08:07

## 2024-04-18 RX ADMIN — CEFAZOLIN SODIUM 2 G: 2 INJECTION, SOLUTION INTRAVENOUS at 08:09

## 2024-04-18 RX ADMIN — LORAZEPAM 2 MG: 1 TABLET ORAL at 21:23

## 2024-04-18 RX ADMIN — ATENOLOL 25 MG: 25 TABLET ORAL at 08:08

## 2024-04-18 RX ADMIN — DILTIAZEM HYDROCHLORIDE 240 MG: 120 CAPSULE, EXTENDED RELEASE ORAL at 08:07

## 2024-04-18 RX ADMIN — BUPROPION HYDROCHLORIDE 100 MG: 100 TABLET, FILM COATED ORAL at 21:27

## 2024-04-18 RX ADMIN — LORAZEPAM 2 MG: 1 TABLET ORAL at 15:16

## 2024-04-18 RX ADMIN — BUPROPION HYDROCHLORIDE 100 MG: 100 TABLET, FILM COATED ORAL at 08:08

## 2024-04-18 RX ADMIN — THIAMINE HYDROCHLORIDE 500 MG: 100 INJECTION, SOLUTION INTRAMUSCULAR; INTRAVENOUS at 21:24

## 2024-04-18 ASSESSMENT — LIFESTYLE VARIABLES
AUDITORY DISTURBANCES: NOT PRESENT
PAROXYSMAL SWEATS: NO SWEAT VISIBLE
TREMOR: 2
ORIENTATION AND CLOUDING OF SENSORIUM: ORIENTED AND CAN DO SERIAL ADDITIONS
ORIENTATION AND CLOUDING OF SENSORIUM: ORIENTED AND CAN DO SERIAL ADDITIONS
PAROXYSMAL SWEATS: NO SWEAT VISIBLE
AGITATION: NORMAL ACTIVITY
AGITATION: NORMAL ACTIVITY
ANXIETY: MILDLY ANXIOUS
ANXIETY: NO ANXIETY, AT EASE
VISUAL DISTURBANCES: NOT PRESENT
PAROXYSMAL SWEATS: NO SWEAT VISIBLE
ORIENTATION AND CLOUDING OF SENSORIUM: ORIENTED AND CAN DO SERIAL ADDITIONS
HEADACHE, FULLNESS IN HEAD: NOT PRESENT
AUDITORY DISTURBANCES: NOT PRESENT
TOTAL SCORE: 9
ANXIETY: MILDLY ANXIOUS
TOTAL SCORE: 8
NAUSEA AND VOMITING: NO NAUSEA AND NO VOMITING
PAROXYSMAL SWEATS: NO SWEAT VISIBLE
VISUAL DISTURBANCES: NOT PRESENT
HEADACHE, FULLNESS IN HEAD: NOT PRESENT
HEADACHE, FULLNESS IN HEAD: NOT PRESENT
VISUAL DISTURBANCES: NOT PRESENT
PAROXYSMAL SWEATS: NO SWEAT VISIBLE
TREMOR: NO TREMOR
TREMOR: NOT VISIBLE, BUT CAN BE FELT FINGERTIP TO FINGERTIP
AUDITORY DISTURBANCES: NOT PRESENT
AUDITORY DISTURBANCES: NOT PRESENT
TACTILE DISTURBANCES: VERY MILD ITCHING, PINS AND NEEDLES, BURNING OR NUMBNESS
VISUAL DISTURBANCES: NOT PRESENT
TOTAL SCORE: 10
AGITATION: MODERATELY FIDGETY AND RESTLESS
TOTAL SCORE: 0
TREMOR: 3
ORIENTATION AND CLOUDING OF SENSORIUM: DISORIENTED FOR DATA BY NO MORE THAN 2 CALENDAR DAYS
ANXIETY: MILDLY ANXIOUS
NAUSEA AND VOMITING: NO NAUSEA AND NO VOMITING
AGITATION: SOMEWHAT MORE THAN NORMAL ACTIVITY
HEADACHE, FULLNESS IN HEAD: VERY MILD
ORIENTATION AND CLOUDING OF SENSORIUM: ORIENTED AND CAN DO SERIAL ADDITIONS
ORIENTATION AND CLOUDING OF SENSORIUM: ORIENTED AND CAN DO SERIAL ADDITIONS
AGITATION: 3
ANXIETY: MILDLY ANXIOUS
TREMOR: 2
TOTAL SCORE: 5
TREMOR: NO TREMOR
ANXIETY: MILDLY ANXIOUS
VISUAL DISTURBANCES: NOT PRESENT
NAUSEA AND VOMITING: NO NAUSEA AND NO VOMITING
ORIENTATION AND CLOUDING OF SENSORIUM: CANNOT DO SERIAL ADDITIONS OR IS UNCERTAIN ABOUT DATE
TREMOR: 2
AUDITORY DISTURBANCES: NOT PRESENT
PAROXYSMAL SWEATS: BARELY PERCEPTIBLE SWEATING, PALMS MOIST
TREMOR: 2
PAROXYSMAL SWEATS: NO SWEAT VISIBLE
TREMOR: 3
HEADACHE, FULLNESS IN HEAD: NOT PRESENT
AGITATION: NORMAL ACTIVITY
NAUSEA AND VOMITING: NO NAUSEA AND NO VOMITING
VISUAL DISTURBANCES: NOT PRESENT
ANXIETY: NO ANXIETY, AT EASE
TOTAL SCORE: 10
AGITATION: 3
AUDITORY DISTURBANCES: NOT PRESENT
AGITATION: 3
ANXIETY: NO ANXIETY, AT EASE
NAUSEA AND VOMITING: NO NAUSEA AND NO VOMITING
TREMOR: NO TREMOR
ORIENTATION AND CLOUDING OF SENSORIUM: DISORIENTED FOR DATA BY NO MORE THAN 2 CALENDAR DAYS
ANXIETY: NO ANXIETY, AT EASE
AUDITORY DISTURBANCES: NOT PRESENT
ANXIETY: NO ANXIETY, AT EASE
VISUAL DISTURBANCES: NOT PRESENT
VISUAL DISTURBANCES: NOT PRESENT
PAROXYSMAL SWEATS: NO SWEAT VISIBLE
ORIENTATION AND CLOUDING OF SENSORIUM: ORIENTED AND CAN DO SERIAL ADDITIONS
VISUAL DISTURBANCES: NOT PRESENT
TOTAL SCORE: 0
TOTAL SCORE: 9
NAUSEA AND VOMITING: NO NAUSEA AND NO VOMITING
VISUAL DISTURBANCES: NOT PRESENT
NAUSEA AND VOMITING: NO NAUSEA AND NO VOMITING
HEADACHE, FULLNESS IN HEAD: NOT PRESENT
NAUSEA AND VOMITING: NO NAUSEA AND NO VOMITING
AUDITORY DISTURBANCES: NOT PRESENT
ORIENTATION AND CLOUDING OF SENSORIUM: DISORIENTED FOR DATA BY NO MORE THAN 2 CALENDAR DAYS
TREMOR: MODERATE, WITH PATIENT'S ARMS EXTENDED
HEADACHE, FULLNESS IN HEAD: NOT PRESENT
AUDITORY DISTURBANCES: NOT PRESENT
PAROXYSMAL SWEATS: NO SWEAT VISIBLE
AUDITORY DISTURBANCES: NOT PRESENT
AGITATION: MODERATELY FIDGETY AND RESTLESS
NAUSEA AND VOMITING: NO NAUSEA AND NO VOMITING
TREMOR: 2
ANXIETY: NO ANXIETY, AT EASE
HEADACHE, FULLNESS IN HEAD: NOT PRESENT
HEADACHE, FULLNESS IN HEAD: NOT PRESENT
PAROXYSMAL SWEATS: NO SWEAT VISIBLE
VISUAL DISTURBANCES: NOT PRESENT
ORIENTATION AND CLOUDING OF SENSORIUM: ORIENTED AND CAN DO SERIAL ADDITIONS
ORIENTATION AND CLOUDING OF SENSORIUM: ORIENTED AND CAN DO SERIAL ADDITIONS
TOTAL SCORE: 8
HEADACHE, FULLNESS IN HEAD: NOT PRESENT
HEADACHE, FULLNESS IN HEAD: NOT PRESENT
AUDITORY DISTURBANCES: NOT PRESENT
NAUSEA AND VOMITING: NO NAUSEA AND NO VOMITING
PAROXYSMAL SWEATS: NO SWEAT VISIBLE
AGITATION: 2
NAUSEA AND VOMITING: NO NAUSEA AND NO VOMITING
TOTAL SCORE: 4
TREMOR: 2
PAROXYSMAL SWEATS: NO SWEAT VISIBLE
PAROXYSMAL SWEATS: NO SWEAT VISIBLE
ORIENTATION AND CLOUDING OF SENSORIUM: ORIENTED AND CAN DO SERIAL ADDITIONS
ANXIETY: NO ANXIETY, AT EASE
AGITATION: 3
AUDITORY DISTURBANCES: NOT PRESENT
HEADACHE, FULLNESS IN HEAD: NOT PRESENT
VISUAL DISTURBANCES: VERY MILD SENSITIVITY
NAUSEA AND VOMITING: NO NAUSEA AND NO VOMITING
TOTAL SCORE: 5
AGITATION: MODERATELY FIDGETY AND RESTLESS
HEADACHE, FULLNESS IN HEAD: NOT PRESENT
ORIENTATION AND CLOUDING OF SENSORIUM: CANNOT DO SERIAL ADDITIONS OR IS UNCERTAIN ABOUT DATE
ANXIETY: NO ANXIETY, AT EASE
TREMOR: MODERATE, WITH PATIENT'S ARMS EXTENDED
TOTAL SCORE: 0
TOTAL SCORE: 5
VISUAL DISTURBANCES: NOT PRESENT
VISUAL DISTURBANCES: MILD SENSITIVITY
NAUSEA AND VOMITING: NO NAUSEA AND NO VOMITING
ANXIETY: NO ANXIETY, AT EASE
AGITATION: 2
AUDITORY DISTURBANCES: NOT PRESENT
AUDITORY DISTURBANCES: NOT PRESENT
AGITATION: 2
HEADACHE, FULLNESS IN HEAD: NOT PRESENT
TOTAL SCORE: 4
PAROXYSMAL SWEATS: NO SWEAT VISIBLE
NAUSEA AND VOMITING: NO NAUSEA AND NO VOMITING

## 2024-04-18 ASSESSMENT — COGNITIVE AND FUNCTIONAL STATUS - GENERAL
DRESSING REGULAR UPPER BODY CLOTHING: A LITTLE
PERSONAL GROOMING: A LITTLE
MOVING FROM LYING ON BACK TO SITTING ON SIDE OF FLAT BED WITH BEDRAILS: A LITTLE
TOILETING: A LOT
WALKING IN HOSPITAL ROOM: TOTAL
TOILETING: TOTAL
DAILY ACTIVITIY SCORE: 15
DRESSING REGULAR UPPER BODY CLOTHING: A LOT
PERSONAL GROOMING: A LOT
DAILY ACTIVITIY SCORE: 9
CLIMB 3 TO 5 STEPS WITH RAILING: TOTAL
HELP NEEDED FOR BATHING: A LOT
EATING MEALS: A LITTLE
HELP NEEDED FOR BATHING: TOTAL
STANDING UP FROM CHAIR USING ARMS: TOTAL
MOVING TO AND FROM BED TO CHAIR: A LOT
EATING MEALS: A LOT
MOBILITY SCORE: 11
TURNING FROM BACK TO SIDE WHILE IN FLAT BAD: A LITTLE
DRESSING REGULAR LOWER BODY CLOTHING: TOTAL
DRESSING REGULAR LOWER BODY CLOTHING: A LOT

## 2024-04-18 ASSESSMENT — PAIN - FUNCTIONAL ASSESSMENT
PAIN_FUNCTIONAL_ASSESSMENT: 0-10

## 2024-04-18 ASSESSMENT — PAIN SCALES - GENERAL
PAINLEVEL_OUTOF10: 0 - NO PAIN

## 2024-04-18 ASSESSMENT — ACTIVITIES OF DAILY LIVING (ADL)
BATHING_ASSISTANCE: MAXIMAL
ADL_ASSISTANCE: INDEPENDENT

## 2024-04-18 NOTE — CARE PLAN
The patient's goals for the shift include      The clinical goals for the shift include pt will have angiogram and MOCA completed today    Over the shift, the patient made progress to the following goals     Barriers to progress include: mental status, cognitive impairments      Problem: Pain  Goal: My pain/discomfort is manageable  Outcome: Progressing     Problem: Safety  Goal: Patient will be injury free during hospitalization  Outcome: Progressing  Goal: I will remain free of falls  Outcome: Progressing     Problem: Daily Care  Goal: Daily care needs are met  Outcome: Progressing     Problem: Psychosocial Needs  Goal: Demonstrates ability to cope with hospitalization/illness  Outcome: Progressing  Goal: Collaborate with me, my family, and caregiver to identify my specific goals  Outcome: Progressing     Problem: Discharge Barriers  Goal: My discharge needs are met  Outcome: Progressing     Problem: Skin  Goal: Participates in plan/prevention/treatment measures  4/18/2024 1032 by Allison Bush RN  Flowsheets (Taken 4/18/2024 1032)  Participates in plan/prevention/treatment measures: Elevate heels  4/18/2024 1031 by Allison Bush RN  Outcome: Progressing  Goal: Prevent/manage excess moisture  4/18/2024 1032 by Allison Bush RN  Flowsheets (Taken 4/17/2024 0941)  Prevent/manage excess moisture: Moisturize dry skin  4/18/2024 1031 by Allison Bush RN  Outcome: Progressing  Goal: Prevent/minimize sheer/friction injuries  4/18/2024 1032 by Allison Bush RN  Flowsheets (Taken 4/17/2024 0941)  Prevent/minimize sheer/friction injuries: Turn/reposition every 2 hours/use positioning/transfer devices  4/18/2024 1031 by Allison Bush RN  Outcome: Progressing  Goal: Promote/optimize nutrition  4/18/2024 1032 by Allison Bush RN  Flowsheets (Taken 4/17/2024 0941)  Promote/optimize nutrition:   Monitor/record intake including meals    Offer water/supplements/favorite foods  4/18/2024 1031 by Allison Bush RN  Outcome: Progressing  Goal: Promote skin healing  4/18/2024 1032 by Allison Bush RN  Flowsheets (Taken 4/18/2024 1032)  Promote skin healing: Assess skin/pad under line(s)/device(s)  4/18/2024 1031 by Allison Bush RN  Outcome: Progressing

## 2024-04-18 NOTE — CARE PLAN
The patient's goals for the shift include      The clinical goals for the shift include pt will remain free of injury, agitation, falls this shift      Problem: Pain  Goal: My pain/discomfort is manageable  Outcome: Progressing     Problem: Safety  Goal: Patient will be injury free during hospitalization  Outcome: Progressing  Goal: I will remain free of falls  Outcome: Progressing     Problem: Daily Care  Goal: Daily care needs are met  Outcome: Progressing     Problem: Psychosocial Needs  Goal: Demonstrates ability to cope with hospitalization/illness  Outcome: Progressing  Goal: Collaborate with me, my family, and caregiver to identify my specific goals  Outcome: Progressing     Problem: Skin  Goal: Participates in plan/prevention/treatment measures  Outcome: Progressing  Goal: Prevent/manage excess moisture  Outcome: Progressing  Goal: Prevent/minimize sheer/friction injuries  Outcome: Progressing  Goal: Promote/optimize nutrition  Outcome: Progressing  Goal: Promote skin healing  Outcome: Progressing     Problem: Discharge Barriers  Goal: My discharge needs are met  Outcome: Progressing     Problem: Psychosocial Needs  Goal: Demonstrates ability to cope with hospitalization/illness  Outcome: Progressing  Goal: Collaborate with me, my family, and caregiver to identify my specific goals  Outcome: Progressing     Problem: Daily Care  Goal: Daily care needs are met  Outcome: Progressing     Problem: Safety  Goal: Patient will be injury free during hospitalization  Outcome: Progressing  Goal: I will remain free of falls  Outcome: Progressing

## 2024-04-18 NOTE — PROGRESS NOTES
Occupational Therapy    Evaluation    Patient Name: Rose Marie Toussaint  MRN: [id number]  Today's Date: 4/18/2024  Time Calculation  Start Time: 1005  Stop Time: 1055  Time Calculation (min): 50 min    Assessment  IP OT Assessment  OT Assessment: Pt seen this date for OT eval and MOCA. Pt demonstrates with decreased cognition, impulsive, assist with mobility and ADLS. Pt would benefit from MOd intensity therapy at d/c  Prognosis: Good  Barriers to Discharge: None  Evaluation/Treatment Tolerance: Other (Comment) (increased confusion)  Medical Staff Made Aware: Yes  End of Session Communication: Bedside nurse, Physician  End of Session Patient Position: Bed, 3 rail up, Alarm on  Plan:  Treatment Interventions: ADL retraining, Functional transfer training, Cognitive reorientation, Patient/family training, Continued evaluation  OT Frequency: 3 times per week  OT Discharge Recommendations: Moderate intensity level of continued care  Equipment Recommended upon Discharge: Other (comment) (TBD)  OT Recommended Transfer Status: Maximum assist, Assist of 2  OT - OK to Discharge: Yes    Subjective   Current Problem:  1. Septic joint (Multi)  acetaminophen (Tylenol) tablet 650 mg    HYDROmorphone PF (Dilaudid) injection 0.2 mg    ondansetron (Zofran) injection 4 mg    oxyCODONE (Roxicodone) immediate release tablet 10 mg    oxyCODONE (Roxicodone) immediate release tablet 5 mg    DISCONTINUED: vancomycin in dextrose 5 % (Vancocin) IVPB 750 mg    DISCONTINUED: vancomycin (Vancocin) pharmacy to dose - pharmacy monitoring      2. Hypertension, unspecified type  atenolol (Tenormin) tablet 25 mg    chlorthalidone (Hygroton) tablet 25 mg    dilTIAZem CD (Cardizem CD) 24 hr capsule 240 mg      3. Depression, unspecified depression type  buPROPion (Wellbutrin) tablet 100 mg    sertraline (Zoloft) tablet 100 mg    DISCONTINUED: busPIRone (Buspar) tablet 30 mg    DISCONTINUED: sertraline (Zoloft) tablet 200 mg      4. Encounter for deep vein  thrombosis (DVT) prophylaxis  enoxaparin (Lovenox) syringe 40 mg      5. Alcohol abuse  thiamine in dextrose 5% 100 mL  mg    DISCONTINUED: folic acid (Folvite) tablet 1 mg    DISCONTINUED: multivitamin with minerals 1 tablet    DISCONTINUED: thiamine (Vitamin B-1) tablet 100 mg      6. Nicotine abuse  nicotine (Nicoderm CQ) 14 mg/24 hr patch 1 patch      7. Hyperlipidemia, unspecified hyperlipidemia type  pravastatin (Pravachol) tablet 20 mg      8. Chronic obstructive pulmonary disease, unspecified COPD type (Multi)  DISCONTINUED: albuterol 90 mcg/actuation inhaler 2 puff      9. Insomnia, unspecified type  melatonin tablet 4.5 mg      10. Constipation, unspecified constipation type  polyethylene glycol (Glycolax, Miralax) packet 17 g      11. Chronic osteomyelitis involving ankle and foot, left (Multi)  Case Request Operating Room: Irrigation and Debridement Ankle, left ankle Hardware Removal    Case Request Operating Room: Irrigation and Debridement Ankle, left ankle Hardware Removal    FL less than 1 hour    FL less than 1 hour    Vascular US PVR without exercise    Vascular US PVR without exercise      12. Other specified symptoms and signs involving the circulatory and respiratory systems  Vascular US PVR without exercise    Vascular US PVR without exercise      13. Critical limb ischemia of right lower extremity (Multi)  Case Request Cath Lab: Lower Extremity Angiogram    Case Request Cath Lab: Lower Extremity Angiogram    Invasive vascular procedure    Invasive vascular procedure        General:  General  Reason for Referral: Chronic osetomylitis L ankle and foot  Referred By: JIMENEZ Kessler MD  Past Medical History Relevant to Rehab: No past medical history on file.    Family/Caregiver Present: Yes  Caregiver Feedback: Sisters present at start of session able to provide some baseline information  Prior to Session Communication: Bedside nurse  Patient Position Received: Bed, 3 rail up, Alarm on  Preferred  Learning Style: verbal, visual  General Comment: pt agreeable to eval. Pleasantly confused  Precautions:  LE Weight Bearing Status: Left Non-Weight Bearing  Medical Precautions: Fall precautions     Pain:  Pain Assessment  Pain Assessment: 0-10  Pain Score: 0 - No pain    Objective   Cognition:  Overall Cognitive Status: Impaired  Orientation Level: Disoriented to place, Disoriented to time, Disoriented to situation  Following Commands:  (follows one step commands 10% with redirection repetition, time and cuing)  Attention: Exceptions to WFL (easily distracted)  Memory: Exceptions to WFL  Long-Term Memory: Impaired  Short-Term Memory: Impaired  Insight: Severe  Impulsive: Severely           Home Living:  Type of Home: House  Lives With: Spouse  Home Layout: One level  Home Access: Stairs to enter without rails  Entrance Stairs-Number of Steps: 1  Bathroom Shower/Tub: Tub/shower unit  Bathroom Toilet: Standard   Prior Function:  Level of [place]: Independent with ADLs and functional transfers, Independent with homemaking with ambulation  Receives Help From: Family  ADL Assistance: Independent  Homemaking Assistance: Independent  Ambulatory Assistance: Independent  Hand Dominance: Right  IADL History:  Homemaking Responsibilities: Yes  Meal Prep Responsibility: Primary  Laundry Responsibility: Primary  Cleaning Responsibility: Primary  Bill Paying/Finance Responsibility: Primary  Shopping Responsibility: Primary  Homemaking Comments: Pt primary homemaker, primary caregiver for spoese. Per chart pt was in hospital and now going to SNF  Current License: Yes  Mode of Transportation: Car  ADL:  Eating Assistance: Moderate  Eating Deficit: Setup, Increased time to complete (pt currently NPO)  Grooming Assistance: Moderate  Grooming Deficit: Setup, Verbal cueing, Increased time to complete, Wash/dry hands, Wash/dry face, Brushing hair  Bathing Assistance: Maximal  Bathing Deficit: Setup, Verbal cueing, Increased time  to complete , Left arm, Abdomen, Chest, Right arm, Right upper leg, Left upper leg, Right lower leg including foot, Left lower leg including foot, Buttocks  UE Dressing Assistance: Moderate  UE Dressing Deficit: Thread RUE, Thread LUE, Pull over head, Pull around back, Pull down in back, Setup, Verbal cueing  LE Dressing Assistance: Total  LE Dressing Deficit: Don/doff R sock, Don/doff L sock, Thread RLE into underwear, Thread LLE into underwear  Toileting Assistance with Device: Total  Activity Tolerance:  Endurance: Tolerates 10 - 20 min exercise with multiple rests  Activity Tolerance Comments: fatigue with activity  Bed Mobility/Transfers: Bed Mobility  Bed Mobility: Yes  Bed Mobility 1  Bed Mobility 1: Supine to sitting, Sitting to supine  Level of Assistance 1: Moderate assistance, Maximum verbal cues, Maximum tactile cues  Bed Mobility 2  Bed Mobility  2: Scooting  Level of Assistance 2: Maximum verbal cues, Maximum tactile cues, Maximum assistance (x2)    Transfers  Transfer: Yes  Transfer 1  Technique 1: Sit to stand, Stand to sit  Transfer Device 1: Walker  Transfer Level of Assistance 1: Maximum assistance, Maximum verbal cues, Maximum tactile cues, +2 (increased cues to keep non weight bearing)    Sitting Balance:  Static Sitting Balance  Static Sitting-Balance Support: Feet supported  Static Sitting-Level of Assistance: Contact guard     IADL's:   Homemaking Responsibilities: Yes  Meal Prep Responsibility: Primary  Laundry Responsibility: Primary  Cleaning Responsibility: Primary  Bill Paying/Finance Responsibility: Primary  Shopping Responsibility: Primary  Homemaking Comments: Pt primary homemaker, primary caregiver for spoese. Per chart pt was in hospital and now going to SNF  Current License: Yes  Mode of Transportation: Car  Vision: Vision - Basic Assessment  Current Vision:  (per pt wears galsses could not tell therapist if  all the time ofr just for reading)  Sensation:  Light Touch: No apparent  deficits  Sharp/Dull: No apparent deficits  Sensation Comment: intact  Strength:  Strength Comments: B UE WFL     Coordination:  Movements are Fluid and Coordinated: Yes  Finger to Nose:  (able to complete with increased cues)  Coordination Comment: pt able to complete with increesed cues   Hand Function:  Hand Function  Gross Grasp: Functional  Coordination: Functional  Extremities: RUE   RUE : Within Functional Limits and LUE   LUE: Within Functional Limits    Outcome Measures: [facility] Daily Activity  Putting on and taking off regular lower body clothing: Total  Bathing (including washing, rinsing, drying): Total  Putting on and taking off regular upper body clothing: A lot  Toileting, which includes using toilet, bedpan or urinal: Total  Taking care of personal grooming such as brushing teeth: A lot  Eating Meals: A lot  Daily Activity - Total Score: 9      Education Documentation  Handouts, taught by Reyna Guy OT at 4/18/2024 11:24 AM.  Learner: Patient  Readiness: Acceptance  Method: Explanation, Demonstration  Response: Verbalizes Understanding, No Evidence of Learning, Needs Reinforcement    Precautions, taught by Reyna Guy OT at 4/18/2024 11:24 AM.  Learner: Patient  Readiness: Acceptance  Method: Explanation, Demonstration  Response: Verbalizes Understanding, No Evidence of Learning, Needs Reinforcement    ADL Training, taught by Reyna Guy OT at 4/18/2024 11:24 AM.  Learner: Patient  Readiness: Acceptance  Method: Explanation, Demonstration  Response: Verbalizes Understanding, No Evidence of Learning, Needs Reinforcement    Education Comments  No comments found.      Goals:   Encounter Problems       Encounter Problems (Active)       ADLs       Patient will perform UB bathing 50% with minimal assist  level of assistance and adaptive equipment prn . (Progressing)       Start:  04/18/24    Expected End:  05/02/24            Patient with complete upper body dressing with minimal  assist  level of assistance donning and doffing all UE clothes with PRN adaptive equipment while supported sitting (Progressing)       Start:  04/18/24    Expected End:  05/02/24            Patient will complete daily grooming tasks brushing teeth and washing face/hair with modified independent level of assistance and PRN adaptive equipment while supported sitting. (Progressing)       Start:  04/18/24    Expected End:  05/02/24               COGNITION/SAFETY       Pt will follow Simple 25% time during OT tx session with max cues . (Progressing)       Start:  04/18/24    Expected End:  05/02/24            Patient will demonstrated orientation x 3 with verbal cues, visual cues, and auditory cues. (Progressing)       Start:  04/18/24    Expected End:  05/02/24       ORIENTATION            TRANSFERS       Patient will perform bed mobility minimal assist  level of assistance and bed rails and draw sheet in order to improve safety and independence with mobility (Progressing)       Start:  04/18/24    Expected End:  05/02/24

## 2024-04-18 NOTE — PROGRESS NOTES
INFECTIOUS DISEASE DAILY PROGRESS NOTE    SUBJECTIVE:    High CIWA scores yesterday, on protocol for alcohol withdrawal. Sleepy this morning and did not wake to give any interval history. Possible surgery soon.    OBJECTIVE:  VITALS (Last 24 Hours)  /58 (BP Location: Right arm, Patient Position: Lying)   Pulse 79   Temp 36.5 °C (97.7 °F) (Axillary)   Resp 18   Ht 1.524 m (5')   Wt 56.7 kg (125 lb)   SpO2 94%   BMI 24.41 kg/m²     PHYSICAL EXAM:  Gen - lethargic, confused, not able to give any history  Heart - RRR  Lungs - clear bilaterally, no wheezing  Abd - soft, no ttp, BS present  LLE - in dressings  Skin - no rash    ABX: IV Cefazolin    LABS:  Lab Results   Component Value Date    WBC 10.8 04/18/2024    HGB 11.1 (L) 04/18/2024    HCT 33.6 (L) 04/18/2024    MCV 96 04/18/2024     04/18/2024     Lab Results   Component Value Date    GLUCOSE 84 04/18/2024    CALCIUM 9.6 04/18/2024     (L) 04/18/2024    K 3.8 04/18/2024    CO2 25 04/18/2024    CL 94 (L) 04/18/2024    BUN 13 04/18/2024    CREATININE 1.04 04/18/2024     Results from last 72 hours   Lab Units 04/18/24  0541   ALK PHOS U/L 123   BILIRUBIN TOTAL mg/dL 0.2   PROTEIN TOTAL g/dL 6.5   ALT U/L 20   AST U/L 24   ALBUMIN g/dL 3.2*     Estimated Creatinine Clearance: 42 mL/min (by C-G formula based on SCr of 1.04 mg/dL).    ASSESSMENT/PLAN:     Left Ankle Hardware Associated Infection with Suspected OM due to MSSA  CKD - CrCl 42, affects abx dosing  Penicillin Allergy - limits abx options  Alcohol Dependence - withdrawal, on CIWA protocol     Surgical intervention pending with ortho.    IV Cefazolin 2g Q8H. No apparent reaction thus far.    Monitoring for adverse effects of abx such as rash/itching/diarrhea.     Will follow. Thanks! D/w RN Allison Krishna MD  [facility]  Office #[phone]

## 2024-04-18 NOTE — CARE PLAN
Problem: Skin  Goal: Participates in plan/prevention/treatment measures  4/17/2024 2133 by Saida Rudd RN  Flowsheets (Taken 4/17/2024 2133)  Participates in plan/prevention/treatment measures: Elevate heels  4/17/2024 2129 by Saida Rudd RN  Outcome: Progressing   The patient's goals for the shift include      The clinical goals for the shift include pt will remain free of injury, agitation, falls this shift

## 2024-04-18 NOTE — SIGNIFICANT EVENT

## 2024-04-18 NOTE — CONSULTS
ETHICS CONSULTATION NOTE    CONSULT REQUESTER: Physician : Dr. Flako Guerra    ETHICS QUESTION: Who is the appropriate surrogate for a patient who lacks capacity?    BACKGROUND:    Process Steps: Information about Ms. Toussaint's case was gathered through discussion with Dr. Guerra, Екатерина ROCK, Allison Bush RN, Ms. Toussaint's sisters Ghada and Janice, and through chart review. After initial consultation with Dr. Guerra, a family meeting was held with all the aforementioned participants at Ms. Toussaint's bedside and with me by phone.    Ethically Relevant Medical Information: Ms. Toussaint is admitted for chronic osteomyelitis involving her left foot and ankle. Her sisters reported that Ms. Toussaint first broke a bone in her ankle in November 2022 for which she had surgery. Chart review shows that Ms. Toussaint was scheduled for surgery in July 2023, a procedure that would include removal and hardware. I was informed that the surgery was cancelled and she did not appear for subsequent follow-up appointments.    I was told that Ms. Toussaint's foot and ankle are chronically infected and that hardware is exposed. She is in need of either limb-sparing surgery or below-knee amputation. Limb-sparing surgery would require extensive follow-up and other subsequent procedures. Ghada and Janice expressed concerns that Ms. Toussaint would be unlikely to follow-up and complete the regimen. Dr. Guerra outlined the likely harms that would ensue if limb-sparing surgery were initiated and the needed follow-up did not occur.    Code Status: Full code    Capacity/Decision-Making Considerations: I was informed that Ms. Toussaint lacks capacity. She did not participate in the discussion at her bedside.    Advance Directives/Family/Support System: Ms. Toussaint is . Her  is currently hospitalized at a different facility. I was informed that he lacks the capacity to serve as Ms. Toussaint's surrogate. Mr. Toussaint has a daughter who, I am told, serves as his surrogate. She is Ms.  Breana's step-daughter. She lives out of state and is not available for surrogacy.     Ms. Toussaint's siblings, Ghada, Janice, and Donis, have been involved with the medical team. It was reported to me that Ghada and Janice have been uncertain about whether they can act as surrogates. Following the [place] hierarchy of surrogates, Ms. Villanueva siblings are priority surrogates if her  is unable to act in that role and if there are no adult children or parents of the patient available.    Other Consult-Specific Information: The family meeting was held at 13:15, 4/18 with all participants named above at patient's bedside and myself via phone. We discussed Ms. Toussaint's current condition. Ms. Toussaint's sisters expressed understanding of the treatment options available to her: limb-sparing surgery or below-knee amputation, and the risks and benefits of each. Ghada and Janice expressed sadness at the thought of amputation, but they acknowledged that Ms. Toussaint is unlikely to adhere to the regimen involved with limb-sparing surgery.    Ghada and Janice asked who could serve as surrogate. I confirmed that Ms. Toussaint's  does not have capacity and that Ms. Toussaint's step-daughter was not adopted and is unavailable. I explained that, by the [place] hierarchy, they, along with their brother Donis are the appropriate surrogates. Ghada and Janice confirmed that they are willing and available to serve in this role and that Donis is also willing and able to be a surrogate on Ms. Toussaint's behalf.    ETHICS DISCUSSION & ANALYSIS:    Rec 1: When a patient lacks capacity, we look to a surrogate who can represent the patient's values, perspectives, and goals. Often, it is family members who are best positioned to serve this function. The [place] Revised Code section 2133.08 provides a hierarchy of people involved in a patient's life who are priority surrogates. By this hierarchy, a majority of a patient's adult siblings would be identified as appropriate surrogates if  there is no available candidate among a guardian, spouse, adult children, or parents. This is the case in Ms. Toussaint's situation, making Ghada, Janice, and Donis appropriate surrogates to participate in decision-making about her care.      ETHICS RECOMMENDATIONS:    There is ethical support to engaging Ms. Toussaint's siblings as her surrogates. They are the appropriate people to approach, according to the [place] hierarchy. They are also willing and available to take on this role and represent Ms. Toussaint in complex medical planning.      FOLLOW UP:   The Ethics Consultation Service remains available.  Please call with any questions or additional concerns..  The Ethics Consultation Service (ECS) can be reached by paging [phone]..     Nic Hammer DBE    Fellow  Clinical Ethics

## 2024-04-18 NOTE — PROGRESS NOTES
[facility]          Admitting Provider: Octavio Jeong MD Admission Date: 4/16/2024.   Attending Provider: Octavio Jeong MD MRN: [id number]       Subjective   Rose Marie Toussaint is a 68 y.o. female on day 2 of admission presenting with Chronic osteomyelitis involving ankle and foot, left (Multi).  Interval History confused. Tremulous. Had high CIWA score.     Objective   Physical Exam  Last Recorded Vitals: Blood pressure 139/58, pulse 79, temperature 36.5 °C (97.7 °F), temperature source Axillary, resp. rate 18, height 1.524 m (5'), weight 56.7 kg (125 lb), SpO2 94%.  Patient Vitals for the past 24 hrs:   BP Temp Temp src Pulse Resp SpO2 Height Weight   04/18/24 0729 139/58 36.5 °C (97.7 °F) Axillary 79 18 94 % -- --   04/18/24 0259 120/69 36.4 °C (97.5 °F) -- 71 -- 96 % -- --   04/18/24 0000 121/69 36.6 °C (97.9 °F) Oral 76 20 94 % -- --   04/17/24 2300 -- 36.6 °C (97.9 °F) -- -- -- -- -- --   04/17/24 2225 122/72 36.6 °C (97.8 °F) -- 74 -- 95 % -- --   04/17/24 2000 123/71 -- -- 65 -- -- -- --   04/17/24 1556 98/60 -- -- 76 18 94 % -- --   04/17/24 1045 118/70 -- -- 71 18 93 % -- --   04/17/24 0859 -- 36.9 °C (98.4 °F) Oral -- -- -- -- --   04/17/24 0858 -- -- -- -- -- -- 1.524 m (5') 56.7 kg (125 lb)     Body mass index is 24.41 kg/m².  GENERAL: alert, cooperative, or no distress  SKIN: no rashes  NECK: supple, no thyromegaly, JVP within normal limits  LUNGS:  not in respiratory distress, respiratory rate normal, clear to auscultation  CARDIAC: regular rate and rhythm, normal S1 and S2, no murmur, rub, or gallop  ABDOMEN: Soft, non-tender, normal bowel sounds; no bruits, organomegaly or masses.  EXTREMITIES: No edema  NEURO: Alert and oriented x 1, reflexes normal and symmetric, strength and  sensation grossly normal  Intake/Output last 3 Shifts:  I/O last 3 completed shifts:  In: 200 (3.5 mL/kg) [P.O.:100; IV Piggyback:100]  Out: - (0 mL/kg)   Weight: 56.7 kg   DATA:   Diagnostic  "tests reviewed for today's visit:    Most recent Labs  Results for orders placed or performed during the hospital encounter of 04/16/24 (from the past 24 hour(s))   ABO/Rh   Result Value Ref Range    ABO TYPE A     Rh TYPE POS    CBC   Result Value Ref Range    WBC 10.8 4.4 - 11.3 x10*3/uL    nRBC 0.0 0.0 - 0.0 /100 WBCs    RBC 3.52 (L) 4.00 - 5.20 x10*6/uL    Hemoglobin 11.1 (L) 12.0 - 16.0 g/dL    Hematocrit 33.6 (L) 36.0 - 46.0 %    MCV 96 80 - 100 fL    MCH 31.5 26.0 - 34.0 pg    MCHC 33.0 32.0 - 36.0 g/dL    RDW 13.2 11.5 - 14.5 %    Platelets 440 150 - 450 x10*3/uL   Comprehensive metabolic panel   Result Value Ref Range    Glucose 84 74 - 99 mg/dL    Sodium 131 (L) 136 - 145 mmol/L    Potassium 3.8 3.5 - 5.3 mmol/L    Chloride 94 (L) 98 - 107 mmol/L    Bicarbonate 25 21 - 32 mmol/L    Anion Gap 16 10 - 20 mmol/L    Urea Nitrogen 13 6 - 23 mg/dL    Creatinine 1.04 0.50 - 1.05 mg/dL    eGFR 59 (L) >60 mL/min/1.73m*2    Calcium 9.6 8.6 - 10.3 mg/dL    Albumin 3.2 (L) 3.4 - 5.0 g/dL    Alkaline Phosphatase 123 33 - 136 U/L    Total Protein 6.5 6.4 - 8.2 g/dL    AST 24 9 - 39 U/L    Bilirubin, Total 0.2 0.0 - 1.2 mg/dL    ALT 20 7 - 45 U/L   Magnesium   Result Value Ref Range    Magnesium 1.50 (L) 1.60 - 2.40 mg/dL     Urine Culture   Date Value Ref Range Status   05/24/2023 NO GROWTH  Final     Blood Culture   Date Value Ref Range Status   05/23/2023   Final    No Growth at 1 days~No Growth at 2 days~No Growth at 3 days~NO GROWTH at 4 days - FINAL REPORT    No results found for: \"RESPCULTSM\" No results found for: \"PERDIAFLDCUL\" No results found for: \"STERFLDCULSM\"   CT angio aorta and bilateral iliofemoral runoff w and or wo IV contrast    Result Date: 4/17/2024  Interpreted By:  Mukesh Rivera, STUDY: CT ANGIO AORTA AND BILATERAL ILIOFEMORAL RUNOFF W AND OR WO IV CONTRAST;  4/17/2024 3:22 pm   INDICATION: Signs/Symptoms:Assess vasulature prior to surgery for left foot.   COMPARISON: None.   ACCESSION NUMBER(S): " [id number]   ORDERING CLINICIAN: JEROME FARNSWORTH   TECHNIQUE: CTA of the abdomen and pelvis, with runoff was performed.  Contiguous axial images were obtained at 3 mm slice thickness through the abdomen and pelvis, with runoff. 3D Coronal and sagittal reconstructions at 3 mm slice thickness were performed. 90 ml of contrast material  Omnipaque 350 were administered intravenously without immediate complication. FINDINGS:   Examination is limited by motion artifact.   CTA ABDOMEN VESSELS   Celiac axis: No significant stenosis.   SMA: No significant stenosis.   FAMILIA: No significant stenosis.   Right renal vessels: Calcific disease at its origin without significant stenosis.   Left renal vessels: Calcific disease at its origin without significant stenosis.   Infrarenal aorta: Diffuse partially calcified atherosclerotic disease without significant stenosis or aneurysmal dilation.   CTA  PELVIC VESSELS R. Common iliac artery: Contains partially calcified disease resulting in focal mild-to-moderate stenosis at its mid segment   R. External iliac artery: No significant stenosis.   R. Internal iliac artery: Contains partially calcified disease resulting in mild stenosis.   L. Common iliac artery: Contains partially calcified atherosclerotic disease without significant stenosis.   L. External iliac artery: Contains predominantly noncalcified disease resulting in high-grade stenosis.   L. Internal iliac artery: Contains partially calcified atherosclerotic disease resulting in mild stenosis.   CTA RIGHT LOWER EXTREMITY R. Common femoral artery: Contains partially calcified atherosclerotic disease without significant stenosis.   R. Profunda femoris artery: No significant stenosis.   R. SFA: Contains partially calcified disease of its distal segment resulting in mild stenosis across Shola's canal. The remainder of this vessel is patent.   R. Popliteal artery: Contains partially calcified atherosclerotic disease resulting in focal  mild stenosis of the above the knee segment. Remainder of this vessel is patent.   Evaluation of the tibial vessels is markedly limited due to motion artifact. The trifurcation appears to be patent and there appears to be a three-vessel runoff. The right plantar artery is patent, the dorsalis pedis is not visualized, this could be due to motion artifact.   CTA LEFT LOWER EXTREMITY L. Common femoral artery: Contains partially calcified atherosclerotic disease resulting in mild stenosis.   L. Profunda femoris artery: No significant stenosis.   L. SFA: Contains minor partially calcified atherosclerotic disease of the distal most segment across Shola's canal, resulting in moderate stenosis, the remainder of this vessel is patent.   L. Popliteal artery: No significant stenosis.   Evaluation of the tibial vessels is markedly limited due to both motion artifact and significant venous contamination. Despite this, the trifurcation appears to be patent, evaluation beyond the proximal most segments is limited.   NON-VASCULAR FINDINGS   Visualized portions of the heart: Unremarkable.   Lungs: Included lung bases are clear.   Hepatobiliary: Unremarkable liver without biliary dilation evident   Pancreas: Unremarkable   Spleen: Unremarkable   Adrenal Glands: Nodule in the right adrenal gland measuring up to 1.1 cm and nodule in the left adrenal gland measuring up to 1.7 cm. Both of which are indeterminate.   Kidneys, ureters, and bladder: No calculi or hydroureteronephrosis. The distended urinary bladder is within normal limits.   GI tract: Large fecal ball within the rectum measuring 8 cm. There is very mild rectal wall thickening and perirectal fat stranding. There is no evidence of obstruction. There is colonic diverticulosis without evidence of diverticulitis. The appendix is within normal limits.   Peritoneum and retroperitoneum: No free fluid or free air is noted.   Lymph Nodes: No abdominal or pelvic lymphadenopathy is  evident   Reproductive Organs: Unremarkable   Visualized musculoskeletal structures: Degenerative changes of the spine. Postsurgical changes related to prior fracture at the left ankle and foot with metallic hardware in place. Further evaluation is limited by motion artifact. There is fatty atrophy of the left leg.       1. Diffuse partially calcified atherosclerotic disease with high-grade stenosis across the left external iliac artery and moderate stenosis of the right common iliac artery. Additional sites of disease is seen in the right popliteal artery. Evaluation of the tibial vessels is limited due to significant motion artifact and venous contamination in the left leg. Consider catheter angiography for further evaluation.   2. Nodules in both adrenal glands which are indeterminate by this examination.   3. Large fecal ball within the rectum measuring up to 8 cm with very mild inflammatory changes suggestive of early stercoral colitis. There is also colonic diverticulosis without diverticulitis.   4. Additional chronic findings, as above.   Signed by: Mukesh Rivera 4/17/2024 5:12 PM Dictation workstation:   [id number]    CT head wo IV contrast    Result Date: 4/17/2024  Interpreted By:  Shahida Bhakta, STUDY: CT HEAD WO IV CONTRAST;  4/17/2024 3:22 pm   INDICATION: Signs/Symptoms:cognitive impairment.   COMPARISON: None.   ACCESSION NUMBER(S): [id number]   ORDERING CLINICIAN: LEONA JUAREZ   TECHNIQUE: Noncontrast axial CT scan of head was performed. Angled reformats in brain and bone windows were generated. The images were reviewed in bone, brain, blood and soft tissue windows.   FINDINGS: The ventricles, cisterns and sulci are prominent, consistent with mild diffuse volume loss. There are areas of nonspecific white matter hypodensity, which are probably age-related or microvascular in nature.   Gray-white differentiation is intact and there is no evidence of acute cortical infarct. No mass, mass effect or  midline shift is seen. There is no evidence of hemorrhage.   The visualized paranasal sinuses are clear. Nasal septal deviation to the right.         No evidence of acute cortical infarct or intracranial hemorrhage.   No evidence of intracranial hemorrhage or displaced skull fracture.   MACRO: None   Signed by: Shahida Bhakta 4/17/2024 3:31 PM Dictation workstation:   [id number]    ECG 12 Lead    Result Date: 4/17/2024  Sinus rhythm with Premature atrial complexes Otherwise normal ECG When compared with ECG of 16-AUG-2023 10:32, Premature atrial complexes are now Present Confirmed by Matias Caceres ([id number]) on 4/17/2024 11:11:56 AM    CT ankle left wo IV contrast    Result Date: 4/17/2024  Interpreted By:  Tony Nina, STUDY: CT ANKLE LEFT WO IV CONTRAST;  4/16/2024 11:38 pm   INDICATION: Signs/Symptoms:hardware failure.   COMPARISON: Radiographs 04/14/2024.   ACCESSION NUMBER(S): [id number]   ORDERING CLINICIAN: JOSE ROBERTO MCLAUGHLIN   TECHNIQUE: CT imaging of the left ankle was obtained without administration of intravenous contrast medium. Coronal and sagittal reformatted images were performed.   FINDINGS: OSSEOUS STRUCTURES: Patient is status post bimalleolar fracture ORIF with a lateral fibular plate and screw construct consisted of 3 proximal fibular screws, 4 distal syndesmotic screws and 4 distal fibular screws. Additional 2 medial to lateral transfixing screws through the medial malleolus. The lateral plate is fractured distally resulting in lateral apex angulation of the distal fibular fragment measuring approximately 54 degrees.   There is near complete retraction of the medial malleolus screws with displacement of the medial malleolus fragment superomedially from the distal tibia by approximately 4 cm. There is a dominant 3 cm fragment displaced posteriorly from the posterior distal tibia by approximately 1 cm (series 617, image 52).   There is disruption of the tibiotalar joint with near complete  anterolateral subluxation of the tibia relative to the talus and associated marked hindfoot varus deformity. The posterior margin of the distal tibia is perched against the anterolateral margin of the talar dome (series 617, image 43). There is prominent subchondral cystic change/erosion of the lateral talar dome with loss of approximately 5% of the lateral articulating surface bone mass. There is a fractured screw in the anterior distal tibia. There is marked thickening of the tibiotalar joint capsule with above water attenuation content within the joint without simple fluid amenable for aspiration.   The subtalar, mid and hindfoot joints are maintained. Diffuse osteopenia of the foot.   SOFT TISSUES: There is a subcutaneous fluid collection with internal gaseous foci in the medial ankle region surrounding the medial malleolus screws measuring approximately 2.9 x 2.2 cm in the greatest axial dimensions and 3.5 cm in the craniocaudal dimension.       Bimalleolar fracture ORIF with fracturing of lateral fibular plate, angulated distal fibular fracture, retracted medial malleolus screws, moderately displaced medial malleolus fragment, marked disruption of tibiotalar joint and hindfoot varus deformity.   Approximately 5% loss of the lateral talar dome articulating surface bone mass.   MACRO: None   Signed by: Tony Nina 4/17/2024 8:41 AM Dictation workstation:   [id number]    XR chest 1 view    Result Date: 4/17/2024  Interpreted By:  Екатерина Mejia, STUDY: XR CHEST 1 VIEW;  4/16/2024 8:42 pm   INDICATION: Signs/Symptoms:Pre-Op.   COMPARISON: 05/26/2023   ACCESSION NUMBER(S): [id number]   ORDERING CLINICIAN: LEIA LEGGETT   FINDINGS: Faint linear opacity in the right apex similar to the prior exam. No new focal infiltrate, pleural effusion or pneumothorax identified. Lucency near the right heart border likely artifactual. Cardiac silhouette within normal limits for size. Step-off deformity at the left AC joint.        Streaky probable scarring or atelectasis in the right apex similar to 05/26/2023.   MACRO: None.   Signed by: Екатерина Mejia 4/17/2024 7:47 AM Dictation workstation:   [id number]    Current Facility-Administered Medications   Medication Dose Route Frequency Provider Last Rate Last Admin    acetaminophen (Tylenol) tablet 650 mg  650 mg oral q4h PRN Octavio Jeong MD        albuterol 2.5 mg /3 mL (0.083 %) nebulizer solution 2.5 mg  2.5 mg nebulization q2h PRN Octavio Jeong MD        atenolol (Tenormin) tablet 25 mg  25 mg oral Daily Octavio Jeong MD   25 mg at 04/17/24 0840    buPROPion (Wellbutrin) tablet 100 mg  100 mg oral BID Octavio Jeong MD   100 mg at 04/17/24 2018    ceFAZolin in dextrose (iso-os) (Ancef) IVPB 2 g  2 g intravenous q8h Jaycob Krishna MD   Stopped at 04/18/24 0026    [Held by provider] chlorthalidone (Hygroton) tablet 25 mg  25 mg oral Daily Ernie Rizo DO        dilTIAZem CD (Cardizem CD) 24 hr capsule 240 mg  240 mg oral Daily Octavio Jeong MD   240 mg at 04/17/24 0840    [Held by provider] enoxaparin (Lovenox) syringe 40 mg  40 mg subcutaneous q24h Octavio Jeong MD        folic acid (Folvite) tablet 1 mg  1 mg oral Daily Octavio Jeong MD   1 mg at 04/17/24 0839    HYDROmorphone PF (Dilaudid) injection 0.2 mg  0.2 mg intravenous q3h PRN Octavio Jeong MD        LORazepam (Ativan) tablet 0.5 mg  0.5 mg oral q2h PRN Octavio Jeong MD   0.5 mg at 04/16/24 1759    Or    LORazepam (Ativan) tablet 1 mg  1 mg oral q2h PRN Octavio Jeong MD   1 mg at 04/16/24 2334    Or    LORazepam (Ativan) tablet 2 mg  2 mg oral q2h PRN Octavio Jeong MD   2 mg at 04/18/24 0019    melatonin tablet 4.5 mg  4.5 mg oral Nightly PRN Octavio Jeong MD        multivitamin with minerals 1 tablet  1 tablet oral Daily Octavio Jeong MD   1 tablet at 04/17/24 0840    nicotine (Nicoderm CQ) 14 mg/24 hr patch 1 patch  1 patch transdermal Daily Ernie  D Fuell, DO   1 patch at 04/17/24 0839    ondansetron (Zofran) injection 4 mg  4 mg intravenous q8h PRN Octavio Jeong MD        oxyCODONE (Roxicodone) immediate release tablet 10 mg  10 mg oral q6h PRN Octavio Jeong MD   10 mg at 04/16/24 1517    oxyCODONE (Roxicodone) immediate release tablet 5 mg  5 mg oral q6h PRN Octavio Jeong MD        polyethylene glycol (Glycolax, Miralax) packet 17 g  17 g oral Daily PRN Octavio Jeong MD        pravastatin (Pravachol) tablet 20 mg  20 mg oral Daily Octavio Jeong MD   20 mg at 04/17/24 0840    sertraline (Zoloft) tablet 100 mg  100 mg oral Daily Manda Kessler MD         No current outpatient medications on file.   ..     Medication and Non-Pharmacologic VTE Prophylaxis/Anticoagulants      Last Anticoag Admin            enoxaparin (Lovenox) syringe 40 mg    Given 40 mg at 04/16/24 0558    Frequency: Every 24 hours         Orders not given:    enoxaparin (Lovenox) syringe 40 mg          Assessment/Plan   Rose Marie Toussaint has  Principal Problem:    Chronic osteomyelitis involving ankle and foot, left (Multi)  Active Problems:    Septic joint (Multi)    Alcohol withdrawal delirium (Multi)    Hyponatremia   On Vancomycin  Angiogram planned for today.      On CIWA scale.   Other Hospital problems        Abnormal findings not addressed during hospitalization, but require out patient follow up. None        I spent 35 minutes talking and examining Rose Marie Toussaint, reviewing the labs & medications, formulating plan of care & discussing with NP and nursing staff.    Octavio Jeong MD

## 2024-04-18 NOTE — CARE PLAN
Pt does not  have capacity.      CASSIDY  called  sister  Janice Whitten [phone] (VM left); Ghada [phone] (no  answer or option  for  VM) and step-daughter  Daxa  [phone]  (  left)  to  ask them  to call this  writer and give  further  contact  info if  possible - [facility]  reports that there  are  biological daughters  but that there is not  contact info  on  them currently.     Of note tho, CIWA is  currently  high. Pt was reportedly A+Ox3  while  at  [facility].     CASSIDY will continue to follow for  contact info  for  proxy.   Currently, sisters  would  be  proxy  as they are  the  closest  available  relative. Majority  decision making  is  needed for  all decisions.     DANN Manning, KAYE        4-18-24 [id number]  CASSIDY spoke with  Daxa. She is a  NP in [place]. She says that there are no bio dtrs and that as far as  she knows there  is no HPOA. She was informed  that Janice is currently  healthcare proxy - Daxa  says she speaks with her  frequently. Daxa confirmed that   the phone no is correct.     Daxa  confirmed her  dad will be  be dc to home from  [facility] but the  caregiver   cannot  care  for  Rose Marie  too.   Daxa thought  [place]  plan was  appropriate.     TCC updated.     DANN Manning, KAYE            4-18-24    [id number]  Ethics  saw  pt  and family  Healthcare proxies  are  three siblings with majority  rule  CASSIDY spoke with Janice and Ghada who agreed to  [place]  CASSIDY updated  them  that  pt's   will not be there but plan is  to  get him  there  per  Daxa  They agreed  DANN Manning, KAYE

## 2024-04-18 NOTE — PROGRESS NOTES
04/18/24 1258   Discharge Planning   Patient expects to be discharged to: SNF     Patient has not been med clear, for discharge on IV abx, possible need for sx referrals have been sent to [facility], I will continue to monitor for discharge planning.

## 2024-04-18 NOTE — PROGRESS NOTES
ORTHOPAEDIC SURGERY INPATIENT PROGRESS NOTE    Subjective   NAEON.  Patient was seen by psychiatry and deemed not to have capacity.  She continues to be afebrile with stable vitals.  She continues on IV antibiotics per ID.    REVIEW OF SYSTEMS  Constitutional: no unplanned weight loss  Psychiatric: no suicidal ideation  ENT: no vision changes, no sinus problems  Pulmonary: no shortness of breath  Lymphatic: no enlarged lymph nodes  Cardiovascular: no chest pain or shortness of breath  Gastrointestinal: no stomach problems  Genitourinary: no dysuria   Skin: no rashes  Endocrine: no thyroid problems  Neurological: no headache, no numbness  Hematological: no easy bruising  Musculoskeletal: Left ankle pain    Objective   PHYSICAL EXAMINATION  Constitutional Exam: frail appearing, does not appear stated age  Psychiatric Exam: alert and not oriented, inappropriate mood and behavior  Eye Exam: EOMI  Pulmonary Exam: breathing non-labored, no apparent distress  Lymphatic exam: no appreciable lymphadenopathy in the lower extremities  Cardiovascular exam: RRR to peripheral palpation, DP pulses 2+, PT 2+, toes are pink with good capillary refill, no pitting edema  Skin exam: no open lesions, rashes, abrasions or ulcerations  Neurological exam: sensation to light touch intact in both lower extremities in peripheral and dermatomal distributions (except for any abnormalities noted in musculoskeletal exam)    Musculoskeletal exam: Left lower extremity examination.  Patient has obvious relative varus deformity of the ankle with soft dressing overlying ankle. Patient has sensation grossly diminished to light touch in the plantar surface of the foot and has intact but pain limited PF/DF.  She has palpable 2+ DP/PT pulses.    Last Recorded Vitals  Blood pressure 139/58, pulse 79, temperature 36.5 °C (97.7 °F), temperature source Axillary, resp. rate 18, height 1.524 m (5'), weight 56.7 kg (125 lb), SpO2 94%.    Relevant  Results  Results for orders placed or performed during the hospital encounter of 04/16/24 (from the past 24 hour(s))   CBC   Result Value Ref Range    WBC 10.8 4.4 - 11.3 x10*3/uL    nRBC 0.0 0.0 - 0.0 /100 WBCs    RBC 3.52 (L) 4.00 - 5.20 x10*6/uL    Hemoglobin 11.1 (L) 12.0 - 16.0 g/dL    Hematocrit 33.6 (L) 36.0 - 46.0 %    MCV 96 80 - 100 fL    MCH 31.5 26.0 - 34.0 pg    MCHC 33.0 32.0 - 36.0 g/dL    RDW 13.2 11.5 - 14.5 %    Platelets 440 150 - 450 x10*3/uL   Comprehensive metabolic panel   Result Value Ref Range    Glucose 84 74 - 99 mg/dL    Sodium 131 (L) 136 - 145 mmol/L    Potassium 3.8 3.5 - 5.3 mmol/L    Chloride 94 (L) 98 - 107 mmol/L    Bicarbonate 25 21 - 32 mmol/L    Anion Gap 16 10 - 20 mmol/L    Urea Nitrogen 13 6 - 23 mg/dL    Creatinine 1.04 0.50 - 1.05 mg/dL    eGFR 59 (L) >60 mL/min/1.73m*2    Calcium 9.6 8.6 - 10.3 mg/dL    Albumin 3.2 (L) 3.4 - 5.0 g/dL    Alkaline Phosphatase 123 33 - 136 U/L    Total Protein 6.5 6.4 - 8.2 g/dL    AST 24 9 - 39 U/L    Bilirubin, Total 0.2 0.0 - 1.2 mg/dL    ALT 20 7 - 45 U/L   Magnesium   Result Value Ref Range    Magnesium 1.50 (L) 1.60 - 2.40 mg/dL   POCT GLUCOSE   Result Value Ref Range    POCT Glucose 128 (H) 74 - 99 mg/dL       Relevant Imaging  No new imaging.    Assessment/Plan:  69 y/o F s/p L Ankle Bi-malleolar ankle fracture ORIF with syndesmotic stabilization with Dr. Frederick from 11/20/2022 with hardware failure, draining wounds concerning for OM in the setting of presumed alcohol abuse with peripheral neuropathy and ongoing tobacco abuse.  Patient remains afebrile with stable vitals.  ESR/CRP from 04/15/2024 of 94/23, CBC from 9 from 04/16/2024.      - NWB LLE  - Regular diet  - ID Consult  - Vascular Surgery Consulted, recommendations appreciated  - PO Pain control, IV for breakthrough  - Please see narrative note for further details  - Encourage IS, supplemental O2 as needed  - DVT PPX: SCDs, AMERICO hose, hold chemoppx in setting of upcoming  surgery    Flako Guerra MD, ALJEA  Department of Orthopaedic Surgery  [facility]    Additional Updates:  The patient was deemed to lack capacity to consent to surgery and an ethics consult was placed. The vascular surgery team had recommended a formal angiogram but this was delayed due to the results of the capacity evaluation and awaiting the ethics consult. The patient had been scheduled for formal I&D, YADIRA, likely abx spacer and ankle spanning external fixator for the first stage of limb salvage with me. As these events unfolded, I had a long discussion with the patient's sisters Janice Whitten and Dwain Ulloa at the bedside and revisited the patient's current condition and treatment options including limb salvage and definitive below knee amputation. I had attempted to also contact the patient's step-daughter Daxa at the request of the patient's sisters without success. I reviewed that she will remain at increased risk of infection and wound healing complications, particularly in light of her alcohol and tobacco use and would require close follow-up with atleast one additional surgical procedure to formally fuse the hindfoot. Based on the patient's complex social circumstances including providing care for her  who cannot care for himself and her prior history of non-compliance with treatment recommendations as well as poor follow-up, I did recommend the patient undergo a definitive L below knee amputation. This procedure would appear to offer a more durable result and would more likely minimize complications in an already complex circumstance.    I coordinated with Dr. Hammer of ethics, please see his separate documentation and a family meeting was held where it was determined that the patient's sisters could provide consent for surgery. There was agreement that the surgical procedure most likely to benefit the patient would be for a below knee amputation. The  patient's sisters were not yet ready to consent to this procedure and were going to take some time to discuss with their family members. I did advise that if the patient were to destabilize clinically that she would require left below knee amputation on an emergent basis.     The diagnosis and treatment plan were reviewed with the patient. All questions were answered. The patient verbalized understanding of the treatment plan. There were no barriers to understanding identified.    Note dictated with XYDO software.  Completed without full type editing and sent to avoid delay.

## 2024-04-18 NOTE — CONSULTS
"Reason For Consult  capacity    History Of Present Illness  Rose Marie Toussaint is a 68 y.o. female, known ETOH abuse, last drink 4/12, admitted [facility] on 4/14/24 for recurrent fracture and septic joint in previously surgerized ankle. S/p ORIF Left ankle 2022. Transferred [facility] for surgery on 4/16. Pt noted to be unable to give much history, noted to have marked cognitive deficits.     H.o. ETOH withdrawal delirium in 5/23 - reported ETOH intake previously one pint shruti and many beers daily  Psychiatry consulted for capacity to choose between limb salvage vs amputation.  apparently recently hospitalized and has memory impairment.    Head CT mild diffuse volume loss, chronic microvascular changes  Folate high (causes anxiety)  B12 marginal 400  MOCA 8/30    Currently pt sitting up in bed, sitter in room.   Pt oriented to situation \"I can't walk on my foot so I'm stuck in bed\".   Not sure where she is.   C.o. pain, taking ibuprofen  \"Trying to keep a sense of humor\"  Doctors came in and talked to her - \"I made my doom and gloom\". Pauses for a moment and cannot continue sentence even with prompt. \"I dont know what I was saying\".     Stimulus bound, unable to put IPAD down and stop playing with it. Requires multiple redirections.     Born in [facility], lived here all my life, currently in [place].   Nice day today - \"It was maximo\" (overcast and grey).   Unable to look outside and explain the weather.     Unable to describe what kind of treatment she's getting.     Overall pt is rambling, tangential, stimulus bound, confused, oriented only x 1 to self.   Has no capacity for any kind of decision making  Surgeon notified.     Past Medical History  COPD, HTN, Depression, hypokalemia, ETOH use, NIcotine dependence, HLD, CKD  who presents to [place] ED presenting with worsening pain and swelling of her left ankle sp surgery in 2022.   She has no past medical history on file.    Surgical History  She has no past surgical " history on file.     Social History  H.o. ETOH withdrawal delirium in 5/23 - reported ETOH intake previously one pint shruti and many beers daily  She reports that she has been smoking cigarettes. She started smoking 5 days ago. She has been smoking an average of 0.5 packs per day. She uses smokeless tobacco. No history on file for alcohol use and drug use.    Family History  Family History   Problem Relation Name Age of Onset    Hypertension Mother      Heart attack Father          Allergies  Penicillin    Physical Exam  Physical Exam  Psychiatric:         Attention and Perception: She is inattentive. She does not perceive auditory or visual hallucinations.         Mood and Affect: Mood is anxious. Affect is inappropriate.         Speech: Speech is tangential.         Behavior: Behavior is hyperactive.         Thought Content: Thought content is not paranoid. Thought content does not include homicidal or suicidal ideation.         Cognition and Memory: Cognition is impaired. Memory is impaired. She exhibits impaired recent memory.         Judgment: Judgment is impulsive.      Comments: Awake, alert female in NAD sitting up in bed, seen by zoom, distractible, poor concentration, stimulus bound to IPAD, rambling, sometimes nonsensical, at times confused. Oriented to self and something wrong with my foot. No hallucinations or paranoia. Not suicidal. Impulsive.        Last Recorded Vitals  Blood pressure 139/58, pulse 79, temperature 36.5 °C (97.7 °F), temperature source Axillary, resp. rate 18, height 1.524 m (5'), weight 56.7 kg (125 lb), SpO2 94%.    Relevant Results  MOCA 8/30  Cognition:  Overall Cognitive Status: Impaired  Orientation Level: Disoriented to place, Disoriented to time, Disoriented to situation  Following Commands:  (follows one step commands 10% with redirection repetition, time and cuing)  Attention: Exceptions to WFL (easily distracted)  Memory: Exceptions to WFL  Long-Term Memory:  Impaired  Short-Term Memory: Impaired  Insight: Severe  Impulsive: Severely    Scheduled medications  atenolol, 25 mg, oral, Daily  buPROPion, 100 mg, oral, BID  ceFAZolin, 2 g, intravenous, q8h  [Held by provider] chlorthalidone, 25 mg, oral, Daily  dilTIAZem CD, 240 mg, oral, Daily  [Held by provider] enoxaparin, 40 mg, subcutaneous, q24h  folic acid, 1 mg, oral, Daily  multivitamin with minerals, 1 tablet, oral, Daily  nicotine, 1 patch, transdermal, Daily  pravastatin, 20 mg, oral, Daily  sertraline, 100 mg, oral, Daily  thiamine, 500 mg, intravenous, TID        PRN medications: acetaminophen, albuterol, HYDROmorphone, LORazepam **OR** LORazepam **OR** LORazepam, melatonin, ondansetron, oxyCODONE, oxyCODONE, polyethylene glycol    Results for orders placed or performed during the hospital encounter of 04/16/24 (from the past 96 hour(s))   ECG 12 Lead   Result Value Ref Range    Ventricular Rate 79 BPM    Atrial Rate 79 BPM    DC Interval 148 ms    QRS Duration 64 ms    QT Interval 354 ms    QTC Calculation(Bazett) 405 ms    P Axis 85 degrees    R Axis 68 degrees    T Axis 74 degrees    QRS Count 13 beats    Q Onset 219 ms    P Onset 145 ms    P Offset 179 ms    T Offset 396 ms    QTC Fredericia 388 ms   CBC   Result Value Ref Range    WBC 12.3 (H) 4.4 - 11.3 x10*3/uL    nRBC 0.0 0.0 - 0.0 /100 WBCs    RBC 3.31 (L) 4.00 - 5.20 x10*6/uL    Hemoglobin 10.5 (L) 12.0 - 16.0 g/dL    Hematocrit 30.0 (L) 36.0 - 46.0 %    MCV 91 80 - 100 fL    MCH 31.7 26.0 - 34.0 pg    MCHC 35.0 32.0 - 36.0 g/dL    RDW 12.7 11.5 - 14.5 %    Platelets 443 150 - 450 x10*3/uL   Comprehensive metabolic panel   Result Value Ref Range    Glucose 104 (H) 74 - 99 mg/dL    Sodium 131 (L) 136 - 145 mmol/L    Potassium 3.9 3.5 - 5.3 mmol/L    Chloride 92 (L) 98 - 107 mmol/L    Bicarbonate 30 21 - 32 mmol/L    Anion Gap 13 10 - 20 mmol/L    Urea Nitrogen 15 6 - 23 mg/dL    Creatinine 1.20 (H) 0.50 - 1.05 mg/dL    eGFR 50 (L) >60 mL/min/1.73m*2     Calcium 9.7 8.6 - 10.3 mg/dL    Albumin 3.1 (L) 3.4 - 5.0 g/dL    Alkaline Phosphatase 127 33 - 136 U/L    Total Protein 6.4 6.4 - 8.2 g/dL    AST 30 9 - 39 U/L    Bilirubin, Total 0.3 0.0 - 1.2 mg/dL    ALT 39 7 - 45 U/L   Magnesium   Result Value Ref Range    Magnesium 1.50 (L) 1.60 - 2.40 mg/dL   Vancomycin   Result Value Ref Range    Vancomycin 10.7 5.0 - 20.0 ug/mL   Phosphorus   Result Value Ref Range    Phosphorus 3.8 2.5 - 4.9 mg/dL   Protime-INR   Result Value Ref Range    Protime 14.3 (H) 9.8 - 12.8 seconds    INR 1.3 (H) 0.9 - 1.1   Type and screen   Result Value Ref Range    ABO TYPE A     Rh TYPE POS     ANTIBODY SCREEN NEG    Vitamin B12   Result Value Ref Range    Vitamin B12 405 211 - 911 pg/mL   Folate   Result Value Ref Range    Folate, Serum >24.0 >5.0 ng/mL   ABO/Rh   Result Value Ref Range    ABO TYPE A     Rh TYPE POS    CBC   Result Value Ref Range    WBC 10.8 4.4 - 11.3 x10*3/uL    nRBC 0.0 0.0 - 0.0 /100 WBCs    RBC 3.52 (L) 4.00 - 5.20 x10*6/uL    Hemoglobin 11.1 (L) 12.0 - 16.0 g/dL    Hematocrit 33.6 (L) 36.0 - 46.0 %    MCV 96 80 - 100 fL    MCH 31.5 26.0 - 34.0 pg    MCHC 33.0 32.0 - 36.0 g/dL    RDW 13.2 11.5 - 14.5 %    Platelets 440 150 - 450 x10*3/uL   Comprehensive metabolic panel   Result Value Ref Range    Glucose 84 74 - 99 mg/dL    Sodium 131 (L) 136 - 145 mmol/L    Potassium 3.8 3.5 - 5.3 mmol/L    Chloride 94 (L) 98 - 107 mmol/L    Bicarbonate 25 21 - 32 mmol/L    Anion Gap 16 10 - 20 mmol/L    Urea Nitrogen 13 6 - 23 mg/dL    Creatinine 1.04 0.50 - 1.05 mg/dL    eGFR 59 (L) >60 mL/min/1.73m*2    Calcium 9.6 8.6 - 10.3 mg/dL    Albumin 3.2 (L) 3.4 - 5.0 g/dL    Alkaline Phosphatase 123 33 - 136 U/L    Total Protein 6.5 6.4 - 8.2 g/dL    AST 24 9 - 39 U/L    Bilirubin, Total 0.2 0.0 - 1.2 mg/dL    ALT 20 7 - 45 U/L   Magnesium   Result Value Ref Range    Magnesium 1.50 (L) 1.60 - 2.40 mg/dL   POCT GLUCOSE   Result Value Ref Range    POCT Glucose 128 (H) 74 - 99 mg/dL          Assessment/Plan     IMP:  Severe cognitive impairment - likely Dementia but could also have a component of Wernicke's from ETOH withdrawal  ETOH abuse/dependence  Pt lacks capacity for medical decision making  No apparent HCPOA - spouse has dementia apparently.    PLAN:  High dose thiamine for possible Wernicke's encephalopathy  If pt does not have POA, then next of kin can be proxy. In the event there is no one able or willing to be decision maker, please have two physicians (MD or DO only) document medical necessity for any surgical procedure, and then proceed.   A same day ethics consultation should be considered in the event of family disagreement, otherwise is optional.       I spent 60 minutes in the professional and overall care of this patient.      Manda Kessler MD

## 2024-04-19 ENCOUNTER — APPOINTMENT (OUTPATIENT)
Dept: VASCULAR MEDICINE | Facility: HOSPITAL | Age: 69
DRG: 475 | End: 2024-04-19
Payer: COMMERCIAL

## 2024-04-19 LAB
ALBUMIN SERPL BCP-MCNC: 3.1 G/DL (ref 3.4–5)
ALP SERPL-CCNC: 114 U/L (ref 33–136)
ALT SERPL W P-5'-P-CCNC: 10 U/L (ref 7–45)
ANION GAP SERPL CALC-SCNC: 16 MMOL/L (ref 10–20)
AST SERPL W P-5'-P-CCNC: 21 U/L (ref 9–39)
BASOPHILS # BLD AUTO: 0.1 X10*3/UL (ref 0–0.1)
BASOPHILS NFR BLD AUTO: 1.1 %
BILIRUB SERPL-MCNC: 0.2 MG/DL (ref 0–1.2)
BUN SERPL-MCNC: 14 MG/DL (ref 6–23)
CALCIUM SERPL-MCNC: 9 MG/DL (ref 8.6–10.3)
CHLORIDE SERPL-SCNC: 93 MMOL/L (ref 98–107)
CO2 SERPL-SCNC: 26 MMOL/L (ref 21–32)
CREAT SERPL-MCNC: 1.09 MG/DL (ref 0.5–1.05)
EGFRCR SERPLBLD CKD-EPI 2021: 56 ML/MIN/1.73M*2
EOSINOPHIL # BLD AUTO: 0.34 X10*3/UL (ref 0–0.7)
EOSINOPHIL NFR BLD AUTO: 3.6 %
ERYTHROCYTE [DISTWIDTH] IN BLOOD BY AUTOMATED COUNT: 12.9 % (ref 11.5–14.5)
GLUCOSE SERPL-MCNC: 82 MG/DL (ref 74–99)
HCT VFR BLD AUTO: 33.8 % (ref 36–46)
HGB BLD-MCNC: 11.5 G/DL (ref 12–16)
IMM GRANULOCYTES # BLD AUTO: 0.31 X10*3/UL (ref 0–0.7)
IMM GRANULOCYTES NFR BLD AUTO: 3.3 % (ref 0–0.9)
LYMPHOCYTES # BLD AUTO: 1.47 X10*3/UL (ref 1.2–4.8)
LYMPHOCYTES NFR BLD AUTO: 15.5 %
MAGNESIUM SERPL-MCNC: 1.7 MG/DL (ref 1.6–2.4)
MCH RBC QN AUTO: 31.7 PG (ref 26–34)
MCHC RBC AUTO-ENTMCNC: 34 G/DL (ref 32–36)
MCV RBC AUTO: 93 FL (ref 80–100)
MONOCYTES # BLD AUTO: 0.77 X10*3/UL (ref 0.1–1)
MONOCYTES NFR BLD AUTO: 8.1 %
NEUTROPHILS # BLD AUTO: 6.51 X10*3/UL (ref 1.2–7.7)
NEUTROPHILS NFR BLD AUTO: 68.4 %
NRBC BLD-RTO: 0 /100 WBCS (ref 0–0)
PLATELET # BLD AUTO: 493 X10*3/UL (ref 150–450)
POTASSIUM SERPL-SCNC: 3.3 MMOL/L (ref 3.5–5.3)
PROT SERPL-MCNC: 6.3 G/DL (ref 6.4–8.2)
RBC # BLD AUTO: 3.63 X10*6/UL (ref 4–5.2)
SODIUM SERPL-SCNC: 132 MMOL/L (ref 136–145)
WBC # BLD AUTO: 9.5 X10*3/UL (ref 4.4–11.3)

## 2024-04-19 PROCEDURE — 2500000001 HC RX 250 WO HCPCS SELF ADMINISTERED DRUGS (ALT 637 FOR MEDICARE OP): Performed by: INTERNAL MEDICINE

## 2024-04-19 PROCEDURE — 36415 COLL VENOUS BLD VENIPUNCTURE: CPT | Performed by: INTERNAL MEDICINE

## 2024-04-19 PROCEDURE — 2500000002 HC RX 250 W HCPCS SELF ADMINISTERED DRUGS (ALT 637 FOR MEDICARE OP, ALT 636 FOR OP/ED)

## 2024-04-19 PROCEDURE — 80053 COMPREHEN METABOLIC PANEL: CPT

## 2024-04-19 PROCEDURE — 2500000006 HC RX 250 W HCPCS SELF ADMINISTERED DRUGS (ALT 637 FOR ALL PAYERS): Performed by: PSYCHIATRY & NEUROLOGY

## 2024-04-19 PROCEDURE — 2500000004 HC RX 250 GENERAL PHARMACY W/ HCPCS (ALT 636 FOR OP/ED): Performed by: PSYCHIATRY & NEUROLOGY

## 2024-04-19 PROCEDURE — 1100000001 HC PRIVATE ROOM DAILY

## 2024-04-19 PROCEDURE — 85025 COMPLETE CBC W/AUTO DIFF WBC: CPT | Performed by: INTERNAL MEDICINE

## 2024-04-19 PROCEDURE — 2500000004 HC RX 250 GENERAL PHARMACY W/ HCPCS (ALT 636 FOR OP/ED): Mod: MUE | Performed by: PSYCHIATRY & NEUROLOGY

## 2024-04-19 PROCEDURE — S4991 NICOTINE PATCH NONLEGEND: HCPCS

## 2024-04-19 PROCEDURE — 2500000006 HC RX 250 W HCPCS SELF ADMINISTERED DRUGS (ALT 637 FOR ALL PAYERS): Mod: MUE | Performed by: PSYCHIATRY & NEUROLOGY

## 2024-04-19 PROCEDURE — 83735 ASSAY OF MAGNESIUM: CPT

## 2024-04-19 PROCEDURE — 2500000004 HC RX 250 GENERAL PHARMACY W/ HCPCS (ALT 636 FOR OP/ED): Mod: JZ | Performed by: INTERNAL MEDICINE

## 2024-04-19 RX ORDER — LACTULOSE 10 G/15ML
20 SOLUTION ORAL DAILY
Status: DISCONTINUED | OUTPATIENT
Start: 2024-04-19 | End: 2024-04-29 | Stop reason: HOSPADM

## 2024-04-19 RX ADMIN — ATENOLOL 25 MG: 25 TABLET ORAL at 08:52

## 2024-04-19 RX ADMIN — LORAZEPAM 0.5 MG: 0.5 TABLET ORAL at 18:56

## 2024-04-19 RX ADMIN — THIAMINE HYDROCHLORIDE 500 MG: 100 INJECTION, SOLUTION INTRAMUSCULAR; INTRAVENOUS at 18:27

## 2024-04-19 RX ADMIN — FOLIC ACID 1 MG: 1 TABLET ORAL at 08:52

## 2024-04-19 RX ADMIN — LORAZEPAM 0.5 MG: 0.5 TABLET ORAL at 22:21

## 2024-04-19 RX ADMIN — LORAZEPAM 1 MG: 1 TABLET ORAL at 03:57

## 2024-04-19 RX ADMIN — CEFAZOLIN SODIUM 2 G: 2 INJECTION, SOLUTION INTRAVENOUS at 17:03

## 2024-04-19 RX ADMIN — NICOTINE 1 PATCH: 14 PATCH, EXTENDED RELEASE TRANSDERMAL at 08:54

## 2024-04-19 RX ADMIN — BUPROPION HYDROCHLORIDE 100 MG: 100 TABLET, FILM COATED ORAL at 08:51

## 2024-04-19 RX ADMIN — LORAZEPAM 0.5 MG: 0.5 TABLET ORAL at 09:31

## 2024-04-19 RX ADMIN — PRAVASTATIN SODIUM 20 MG: 20 TABLET ORAL at 09:00

## 2024-04-19 RX ADMIN — THIAMINE HYDROCHLORIDE 500 MG: 100 INJECTION, SOLUTION INTRAMUSCULAR; INTRAVENOUS at 22:12

## 2024-04-19 RX ADMIN — OXYCODONE HYDROCHLORIDE 10 MG: 5 TABLET ORAL at 17:24

## 2024-04-19 RX ADMIN — LACTULOSE 20 G: 20 SOLUTION ORAL at 08:53

## 2024-04-19 RX ADMIN — SERTRALINE HYDROCHLORIDE 100 MG: 50 TABLET ORAL at 08:52

## 2024-04-19 RX ADMIN — CEFAZOLIN SODIUM 2 G: 2 INJECTION, SOLUTION INTRAVENOUS at 08:54

## 2024-04-19 RX ADMIN — THIAMINE HYDROCHLORIDE 500 MG: 100 INJECTION, SOLUTION INTRAMUSCULAR; INTRAVENOUS at 10:28

## 2024-04-19 RX ADMIN — Medication 1 TABLET: at 08:50

## 2024-04-19 RX ADMIN — BUPROPION HYDROCHLORIDE 100 MG: 100 TABLET, FILM COATED ORAL at 22:12

## 2024-04-19 RX ADMIN — DILTIAZEM HYDROCHLORIDE 240 MG: 120 CAPSULE, EXTENDED RELEASE ORAL at 08:51

## 2024-04-19 ASSESSMENT — PAIN SCALES - GENERAL
PAINLEVEL_OUTOF10: 9
PAINLEVEL_OUTOF10: 0 - NO PAIN

## 2024-04-19 ASSESSMENT — LIFESTYLE VARIABLES
ANXIETY: MILDLY ANXIOUS
VISUAL DISTURBANCES: NOT PRESENT
AUDITORY DISTURBANCES: NOT PRESENT
AGITATION: NORMAL ACTIVITY
AGITATION: NORMAL ACTIVITY
PAROXYSMAL SWEATS: NO SWEAT VISIBLE
AUDITORY DISTURBANCES: NOT PRESENT
ANXIETY: NO ANXIETY, AT EASE
ORIENTATION AND CLOUDING OF SENSORIUM: ORIENTED AND CAN DO SERIAL ADDITIONS
ANXIETY: MILDLY ANXIOUS
TOTAL SCORE: 8
AUDITORY DISTURBANCES: NOT PRESENT
TOTAL SCORE: 3
VISUAL DISTURBANCES: MILD SENSITIVITY
TREMOR: 2
NAUSEA AND VOMITING: NO NAUSEA AND NO VOMITING
VISUAL DISTURBANCES: MODERATE SENSITIVITY
TREMOR: 2
AGITATION: SOMEWHAT MORE THAN NORMAL ACTIVITY
TREMOR: 2
NAUSEA AND VOMITING: NO NAUSEA AND NO VOMITING
BLOOD PRESSURE: 117/65
HEADACHE, FULLNESS IN HEAD: NOT PRESENT
TOTAL SCORE: 5
ORIENTATION AND CLOUDING OF SENSORIUM: CANNOT DO SERIAL ADDITIONS OR IS UNCERTAIN ABOUT DATE
TOTAL SCORE: 5
ORIENTATION AND CLOUDING OF SENSORIUM: ORIENTED AND CAN DO SERIAL ADDITIONS
TOTAL SCORE: 2
ORIENTATION AND CLOUDING OF SENSORIUM: ORIENTED AND CAN DO SERIAL ADDITIONS
AUDITORY DISTURBANCES: NOT PRESENT
HEADACHE, FULLNESS IN HEAD: NOT PRESENT
NAUSEA AND VOMITING: NO NAUSEA AND NO VOMITING
HEADACHE, FULLNESS IN HEAD: NOT PRESENT
NAUSEA AND VOMITING: NO NAUSEA AND NO VOMITING
HEADACHE, FULLNESS IN HEAD: NOT PRESENT
TREMOR: 2
VISUAL DISTURBANCES: NOT PRESENT
VISUAL DISTURBANCES: NOT PRESENT
PULSE: 72
ANXIETY: NO ANXIETY, AT EASE
AUDITORY DISTURBANCES: NOT PRESENT
AUDITORY DISTURBANCES: NOT PRESENT
ANXIETY: MILDLY ANXIOUS
TOTAL SCORE: 2
AUDITORY DISTURBANCES: NOT PRESENT
AGITATION: NORMAL ACTIVITY
TACTILE DISTURBANCES: VERY MILD ITCHING, PINS AND NEEDLES, BURNING OR NUMBNESS
AUDITORY DISTURBANCES: NOT PRESENT
HEADACHE, FULLNESS IN HEAD: NOT PRESENT
ORIENTATION AND CLOUDING OF SENSORIUM: ORIENTED AND CAN DO SERIAL ADDITIONS
AGITATION: NORMAL ACTIVITY
ANXIETY: MILDLY ANXIOUS
TREMOR: 2
VISUAL DISTURBANCES: MILD SENSITIVITY
HEADACHE, FULLNESS IN HEAD: NOT PRESENT
TREMOR: 2
HEADACHE, FULLNESS IN HEAD: NOT PRESENT
AUDITORY DISTURBANCES: NOT PRESENT
TREMOR: 2
TREMOR: 2
AUDITORY DISTURBANCES: NOT PRESENT
ANXIETY: NO ANXIETY, AT EASE
ORIENTATION AND CLOUDING OF SENSORIUM: ORIENTED AND CAN DO SERIAL ADDITIONS
HEADACHE, FULLNESS IN HEAD: NOT PRESENT
TOTAL SCORE: 4
HEADACHE, FULLNESS IN HEAD: NOT PRESENT
TOTAL SCORE: 6
TREMOR: 2
NAUSEA AND VOMITING: NO NAUSEA AND NO VOMITING
AUDITORY DISTURBANCES: NOT PRESENT
AGITATION: NORMAL ACTIVITY
TOTAL SCORE: 5
ANXIETY: NO ANXIETY, AT EASE
AGITATION: 2
PULSE: 69
PAROXYSMAL SWEATS: NO SWEAT VISIBLE
AUDITORY DISTURBANCES: NOT PRESENT
AGITATION: NORMAL ACTIVITY
VISUAL DISTURBANCES: MILD SENSITIVITY
AGITATION: NORMAL ACTIVITY
TREMOR: 2
NAUSEA AND VOMITING: NO NAUSEA AND NO VOMITING
TREMOR: 2
VISUAL DISTURBANCES: NOT PRESENT
TREMOR: NOT VISIBLE, BUT CAN BE FELT FINGERTIP TO FINGERTIP
PAROXYSMAL SWEATS: NO SWEAT VISIBLE
NAUSEA AND VOMITING: NO NAUSEA AND NO VOMITING
ORIENTATION AND CLOUDING OF SENSORIUM: ORIENTED AND CAN DO SERIAL ADDITIONS
VISUAL DISTURBANCES: MILD SENSITIVITY
PAROXYSMAL SWEATS: NO SWEAT VISIBLE
ANXIETY: MILDLY ANXIOUS
VISUAL DISTURBANCES: NOT PRESENT
NAUSEA AND VOMITING: NO NAUSEA AND NO VOMITING
AUDITORY DISTURBANCES: NOT PRESENT
BLOOD PRESSURE: 122/69
ORIENTATION AND CLOUDING OF SENSORIUM: ORIENTED AND CAN DO SERIAL ADDITIONS
PAROXYSMAL SWEATS: NO SWEAT VISIBLE
TOTAL SCORE: 2
ANXIETY: 2
AGITATION: NORMAL ACTIVITY
AGITATION: NORMAL ACTIVITY
PAROXYSMAL SWEATS: NO SWEAT VISIBLE
TOTAL SCORE: 5
VISUAL DISTURBANCES: NOT PRESENT
TREMOR: 2
TOTAL SCORE: 6
ORIENTATION AND CLOUDING OF SENSORIUM: ORIENTED AND CAN DO SERIAL ADDITIONS
ORIENTATION AND CLOUDING OF SENSORIUM: ORIENTED AND CAN DO SERIAL ADDITIONS
PAROXYSMAL SWEATS: NO SWEAT VISIBLE
PAROXYSMAL SWEATS: NO SWEAT VISIBLE
NAUSEA AND VOMITING: NO NAUSEA AND NO VOMITING
ORIENTATION AND CLOUDING OF SENSORIUM: ORIENTED AND CAN DO SERIAL ADDITIONS
TOTAL SCORE: 2
PAROXYSMAL SWEATS: NO SWEAT VISIBLE
ANXIETY: MILDLY ANXIOUS
VISUAL DISTURBANCES: NOT PRESENT
AGITATION: NORMAL ACTIVITY
PAROXYSMAL SWEATS: NO SWEAT VISIBLE
NAUSEA AND VOMITING: NO NAUSEA AND NO VOMITING
PAROXYSMAL SWEATS: NO SWEAT VISIBLE
ANXIETY: 2
HEADACHE, FULLNESS IN HEAD: NOT PRESENT
ANXIETY: 2
HEADACHE, FULLNESS IN HEAD: NOT PRESENT
NAUSEA AND VOMITING: NO NAUSEA AND NO VOMITING
ORIENTATION AND CLOUDING OF SENSORIUM: ORIENTED AND CAN DO SERIAL ADDITIONS
BLOOD PRESSURE: 137/80
PULSE: 71
PAROXYSMAL SWEATS: NO SWEAT VISIBLE
PAROXYSMAL SWEATS: NO SWEAT VISIBLE
HEADACHE, FULLNESS IN HEAD: NOT PRESENT
NAUSEA AND VOMITING: NO NAUSEA AND NO VOMITING
VISUAL DISTURBANCES: NOT PRESENT
ORIENTATION AND CLOUDING OF SENSORIUM: ORIENTED AND CAN DO SERIAL ADDITIONS
AGITATION: 2
NAUSEA AND VOMITING: NO NAUSEA AND NO VOMITING
HEADACHE, FULLNESS IN HEAD: NOT PRESENT

## 2024-04-19 ASSESSMENT — COGNITIVE AND FUNCTIONAL STATUS - GENERAL
DRESSING REGULAR LOWER BODY CLOTHING: TOTAL
TURNING FROM BACK TO SIDE WHILE IN FLAT BAD: A LITTLE
HELP NEEDED FOR BATHING: TOTAL
DRESSING REGULAR LOWER BODY CLOTHING: TOTAL
DRESSING REGULAR UPPER BODY CLOTHING: A LOT
MOBILITY SCORE: 11
PERSONAL GROOMING: A LOT
WALKING IN HOSPITAL ROOM: TOTAL
MOVING FROM LYING ON BACK TO SITTING ON SIDE OF FLAT BED WITH BEDRAILS: A LITTLE
PERSONAL GROOMING: A LOT
MOVING TO AND FROM BED TO CHAIR: A LOT
WALKING IN HOSPITAL ROOM: TOTAL
DAILY ACTIVITIY SCORE: 9
MOVING TO AND FROM BED TO CHAIR: A LOT
TURNING FROM BACK TO SIDE WHILE IN FLAT BAD: A LITTLE
CLIMB 3 TO 5 STEPS WITH RAILING: TOTAL
TOILETING: TOTAL
MOBILITY SCORE: 11
STANDING UP FROM CHAIR USING ARMS: TOTAL
STANDING UP FROM CHAIR USING ARMS: TOTAL
HELP NEEDED FOR BATHING: TOTAL
DRESSING REGULAR UPPER BODY CLOTHING: A LOT
TOILETING: TOTAL
DAILY ACTIVITIY SCORE: 9
EATING MEALS: A LOT
EATING MEALS: A LOT
MOVING FROM LYING ON BACK TO SITTING ON SIDE OF FLAT BED WITH BEDRAILS: A LITTLE
CLIMB 3 TO 5 STEPS WITH RAILING: TOTAL

## 2024-04-19 ASSESSMENT — PAIN DESCRIPTION - LOCATION: LOCATION: FOOT

## 2024-04-19 ASSESSMENT — PAIN - FUNCTIONAL ASSESSMENT: PAIN_FUNCTIONAL_ASSESSMENT: 0-10

## 2024-04-19 ASSESSMENT — PAIN DESCRIPTION - ORIENTATION: ORIENTATION: LEFT

## 2024-04-19 NOTE — SIGNIFICANT EVENT
Patient without family at bedside, patient pleasant, cooperative, confused.     Left Lower Extremity:   -dressing to ankle intact  -Tenderness with painful ROM.  -Diminished sensation to to plantar surface  -limited DF/PF  -Foot warm, well perfused  -Palpable DP pulse, brisk cap refill  -Compartments soft and compressible    Plan:  We are awaiting family decision for BKA.

## 2024-04-19 NOTE — CARE PLAN
The patient's goals for the shift include      The clinical goals for the shift include pt to be free from injury this shift      Problem: Pain  Goal: My pain/discomfort is manageable  Outcome: Progressing     Problem: Safety  Goal: Patient will be injury free during hospitalization  Outcome: Progressing  Goal: I will remain free of falls  Outcome: Progressing     Problem: Daily Care  Goal: Daily care needs are met  Outcome: Progressing     Problem: Psychosocial Needs  Goal: Demonstrates ability to cope with hospitalization/illness  Outcome: Progressing  Goal: Collaborate with me, my family, and caregiver to identify my specific goals  Outcome: Progressing     Problem: Discharge Barriers  Goal: My discharge needs are met  Outcome: Progressing     Problem: Skin  Goal: Participates in plan/prevention/treatment measures  Outcome: Progressing  Goal: Prevent/manage excess moisture  Outcome: Progressing  Goal: Prevent/minimize sheer/friction injuries  Outcome: Progressing  Goal: Promote/optimize nutrition  Outcome: Progressing  Goal: Promote skin healing  Outcome: Progressing

## 2024-04-19 NOTE — PROGRESS NOTES
INFECTIOUS DISEASE DAILY PROGRESS NOTE    SUBJECTIVE:    No overnight events. No new complaints. Afebrile. No rash/itching/diarrhea. Seems that a BKA is needed but ongoing discussions for consent from family, etc are pending.    OBJECTIVE:  VITALS (Last 24 Hours)  /72 (BP Location: Left arm, Patient Position: Lying)   Pulse 69   Temp 36.5 °C (97.7 °F) (Oral)   Resp 18   Ht 1.524 m (5')   Wt 56.7 kg (125 lb)   SpO2 98%   BMI 24.41 kg/m²     PHYSICAL EXAM:  Gen - more awake today, seems a bit confused still  Abd - soft, no ttp, BS present  LLE - in dressings  Skin - no rash    ABX: IV Cefazolin    LABS:  Lab Results   Component Value Date    WBC 9.5 04/19/2024    HGB 11.5 (L) 04/19/2024    HCT 33.8 (L) 04/19/2024    MCV 93 04/19/2024     (H) 04/19/2024     Lab Results   Component Value Date    GLUCOSE 82 04/19/2024    CALCIUM 9.0 04/19/2024     (L) 04/19/2024    K 3.3 (L) 04/19/2024    CO2 26 04/19/2024    CL 93 (L) 04/19/2024    BUN 14 04/19/2024    CREATININE 1.09 (H) 04/19/2024     Results from last 72 hours   Lab Units 04/19/24  0521   ALK PHOS U/L 114   BILIRUBIN TOTAL mg/dL 0.2   PROTEIN TOTAL g/dL 6.3*   ALT U/L 10   AST U/L 21   ALBUMIN g/dL 3.1*     Estimated Creatinine Clearance: 40.1 mL/min (A) (by C-G formula based on SCr of 1.09 mg/dL (H)).    ASSESSMENT/PLAN:     Left Ankle Hardware Associated Infection with Suspected OM due to MSSA  CKD - CrCl 42, affects abx dosing  Penicillin Allergy - limits abx options  Alcohol Dependence - withdrawal, on CIWA protocol     Surgical intervention pending with ortho - timing TBD, ongoing talks with family.    IV Cefazolin 2g Q8H.    Monitoring for adverse effects of abx such as rash/itching/diarrhea - none.     Will follow peripherally over the weekend. Please call Dr. Gonzalez with questions. Thanks!    Jaycob Krishna MD  [facility]  Office #[phone]

## 2024-04-19 NOTE — PROGRESS NOTES
[facility]          Admitting Provider: Octavio Jeong MD Admission Date: 4/16/2024.   Attending Provider: Octavio Jeong MD MRN: [id number]       Subjective   Rose Marie Toussaint is a 68 y.o. female on day 3 of admission presenting with Chronic osteomyelitis involving ankle and foot, left (Multi).  Interval History remains confused with high CIWA scores.     Objective   Physical Exam  Last Recorded Vitals: Blood pressure 136/72, pulse 69, temperature 36.5 °C (97.7 °F), temperature source Oral, resp. rate 18, height 1.524 m (5'), weight 56.7 kg (125 lb), SpO2 98%.  Patient Vitals for the past 24 hrs:   BP Temp Temp src Pulse Resp SpO2   04/18/24 2344 136/72 36.5 °C (97.7 °F) Oral 69 18 98 %   04/18/24 2044 138/62 36.6 °C (97.9 °F) Oral 72 17 99 %   04/18/24 1519 106/62 36.6 °C (97.8 °F) Oral 72 16 94 %     Body mass index is 24.41 kg/m².  GENERAL: alert, cooperative, or no distress  SKIN: no rashes  NECK: supple, no thyromegaly, JVP within normal limits  LUNGS:  not in respiratory distress, respiratory rate normal, clear to auscultation  CARDIAC: regular rate and rhythm, normal S1 and S2, no murmur, rub, or gallop  ABDOMEN: Soft, non-tender, normal bowel sounds; no bruits, organomegaly or masses.  EXTREMITIES: No edema  NEURO: Alert and oriented x 1, reflexes normal and symmetric, strength and  sensation grossly normal  Intake/Output last 3 Shifts:  I/O last 3 completed shifts:  In: 690 (12.2 mL/kg) [P.O.:240; I.V.:50 (0.9 mL/kg); IV Piggyback:400]  Out: - (0 mL/kg)   Weight: 56.7 kg   DATA:   Diagnostic tests reviewed for today's visit:    Most recent Labs  Results for orders placed or performed during the hospital encounter of 04/16/24 (from the past 24 hour(s))   POCT GLUCOSE   Result Value Ref Range    POCT Glucose 128 (H) 74 - 99 mg/dL   Comprehensive metabolic panel   Result Value Ref Range    Glucose 82 74 - 99 mg/dL    Sodium 132 (L) 136 - 145 mmol/L    Potassium 3.3 (L) 3.5 -  "5.3 mmol/L    Chloride 93 (L) 98 - 107 mmol/L    Bicarbonate 26 21 - 32 mmol/L    Anion Gap 16 10 - 20 mmol/L    Urea Nitrogen 14 6 - 23 mg/dL    Creatinine 1.09 (H) 0.50 - 1.05 mg/dL    eGFR 56 (L) >60 mL/min/1.73m*2    Calcium 9.0 8.6 - 10.3 mg/dL    Albumin 3.1 (L) 3.4 - 5.0 g/dL    Alkaline Phosphatase 114 33 - 136 U/L    Total Protein 6.3 (L) 6.4 - 8.2 g/dL    AST 21 9 - 39 U/L    Bilirubin, Total 0.2 0.0 - 1.2 mg/dL    ALT 10 7 - 45 U/L   Magnesium   Result Value Ref Range    Magnesium 1.70 1.60 - 2.40 mg/dL   CBC and Auto Differential   Result Value Ref Range    WBC 9.5 4.4 - 11.3 x10*3/uL    nRBC 0.0 0.0 - 0.0 /100 WBCs    RBC 3.63 (L) 4.00 - 5.20 x10*6/uL    Hemoglobin 11.5 (L) 12.0 - 16.0 g/dL    Hematocrit 33.8 (L) 36.0 - 46.0 %    MCV 93 80 - 100 fL    MCH 31.7 26.0 - 34.0 pg    MCHC 34.0 32.0 - 36.0 g/dL    RDW 12.9 11.5 - 14.5 %    Platelets 493 (H) 150 - 450 x10*3/uL    Neutrophils % 68.4 40.0 - 80.0 %    Immature Granulocytes %, Automated 3.3 (H) 0.0 - 0.9 %    Lymphocytes % 15.5 13.0 - 44.0 %    Monocytes % 8.1 2.0 - 10.0 %    Eosinophils % 3.6 0.0 - 6.0 %    Basophils % 1.1 0.0 - 2.0 %    Neutrophils Absolute 6.51 1.20 - 7.70 x10*3/uL    Immature Granulocytes Absolute, Automated 0.31 0.00 - 0.70 x10*3/uL    Lymphocytes Absolute 1.47 1.20 - 4.80 x10*3/uL    Monocytes Absolute 0.77 0.10 - 1.00 x10*3/uL    Eosinophils Absolute 0.34 0.00 - 0.70 x10*3/uL    Basophils Absolute 0.10 0.00 - 0.10 x10*3/uL     Urine Culture   Date Value Ref Range Status   05/24/2023 NO GROWTH  Final     Blood Culture   Date Value Ref Range Status   05/23/2023   Final    No Growth at 1 days~No Growth at 2 days~No Growth at 3 days~NO GROWTH at 4 days - FINAL REPORT    No results found for: \"RESPCULTSM\" No results found for: \"PERDIAFLDCUL\" No results found for: \"STERFLDCULSM\"   CT angio aorta and bilateral iliofemoral runoff w and or wo IV contrast    Result Date: 4/17/2024  Interpreted By:  Mukesh Rivera, STUDY: CT ANGIO " AORTA AND BILATERAL ILIOFEMORAL RUNOFF W AND OR WO IV CONTRAST;  4/17/2024 3:22 pm   INDICATION: Signs/Symptoms:Assess vasulature prior to surgery for left foot.   COMPARISON: None.   ACCESSION NUMBER(S): [id number]   ORDERING CLINICIAN: JEROME FARNSWORTH   TECHNIQUE: CTA of the abdomen and pelvis, with runoff was performed.  Contiguous axial images were obtained at 3 mm slice thickness through the abdomen and pelvis, with runoff. 3D Coronal and sagittal reconstructions at 3 mm slice thickness were performed. 90 ml of contrast material  Omnipaque 350 were administered intravenously without immediate complication. FINDINGS:   Examination is limited by motion artifact.   CTA ABDOMEN VESSELS   Celiac axis: No significant stenosis.   SMA: No significant stenosis.   FAMILIA: No significant stenosis.   Right renal vessels: Calcific disease at its origin without significant stenosis.   Left renal vessels: Calcific disease at its origin without significant stenosis.   Infrarenal aorta: Diffuse partially calcified atherosclerotic disease without significant stenosis or aneurysmal dilation.   CTA  PELVIC VESSELS R. Common iliac artery: Contains partially calcified disease resulting in focal mild-to-moderate stenosis at its mid segment   R. External iliac artery: No significant stenosis.   R. Internal iliac artery: Contains partially calcified disease resulting in mild stenosis.   L. Common iliac artery: Contains partially calcified atherosclerotic disease without significant stenosis.   L. External iliac artery: Contains predominantly noncalcified disease resulting in high-grade stenosis.   L. Internal iliac artery: Contains partially calcified atherosclerotic disease resulting in mild stenosis.   CTA RIGHT LOWER EXTREMITY R. Common femoral artery: Contains partially calcified atherosclerotic disease without significant stenosis.   R. Profunda femoris artery: No significant stenosis.   R. SFA: Contains partially calcified disease  of its distal segment resulting in mild stenosis across Shola's canal. The remainder of this vessel is patent.   R. Popliteal artery: Contains partially calcified atherosclerotic disease resulting in focal mild stenosis of the above the knee segment. Remainder of this vessel is patent.   Evaluation of the tibial vessels is markedly limited due to motion artifact. The trifurcation appears to be patent and there appears to be a three-vessel runoff. The right plantar artery is patent, the dorsalis pedis is not visualized, this could be due to motion artifact.   CTA LEFT LOWER EXTREMITY L. Common femoral artery: Contains partially calcified atherosclerotic disease resulting in mild stenosis.   L. Profunda femoris artery: No significant stenosis.   L. SFA: Contains minor partially calcified atherosclerotic disease of the distal most segment across Shola's canal, resulting in moderate stenosis, the remainder of this vessel is patent.   L. Popliteal artery: No significant stenosis.   Evaluation of the tibial vessels is markedly limited due to both motion artifact and significant venous contamination. Despite this, the trifurcation appears to be patent, evaluation beyond the proximal most segments is limited.   NON-VASCULAR FINDINGS   Visualized portions of the heart: Unremarkable.   Lungs: Included lung bases are clear.   Hepatobiliary: Unremarkable liver without biliary dilation evident   Pancreas: Unremarkable   Spleen: Unremarkable   Adrenal Glands: Nodule in the right adrenal gland measuring up to 1.1 cm and nodule in the left adrenal gland measuring up to 1.7 cm. Both of which are indeterminate.   Kidneys, ureters, and bladder: No calculi or hydroureteronephrosis. The distended urinary bladder is within normal limits.   GI tract: Large fecal ball within the rectum measuring 8 cm. There is very mild rectal wall thickening and perirectal fat stranding. There is no evidence of obstruction. There is colonic  diverticulosis without evidence of diverticulitis. The appendix is within normal limits.   Peritoneum and retroperitoneum: No free fluid or free air is noted.   Lymph Nodes: No abdominal or pelvic lymphadenopathy is evident   Reproductive Organs: Unremarkable   Visualized musculoskeletal structures: Degenerative changes of the spine. Postsurgical changes related to prior fracture at the left ankle and foot with metallic hardware in place. Further evaluation is limited by motion artifact. There is fatty atrophy of the left leg.       1. Diffuse partially calcified atherosclerotic disease with high-grade stenosis across the left external iliac artery and moderate stenosis of the right common iliac artery. Additional sites of disease is seen in the right popliteal artery. Evaluation of the tibial vessels is limited due to significant motion artifact and venous contamination in the left leg. Consider catheter angiography for further evaluation.   2. Nodules in both adrenal glands which are indeterminate by this examination.   3. Large fecal ball within the rectum measuring up to 8 cm with very mild inflammatory changes suggestive of early stercoral colitis. There is also colonic diverticulosis without diverticulitis.   4. Additional chronic findings, as above.   Signed by: Mukesh Rivera 4/17/2024 5:12 PM Dictation workstation:   [id number]    CT head wo IV contrast    Result Date: 4/17/2024  Interpreted By:  Shahida Bhakta, STUDY: CT HEAD WO IV CONTRAST;  4/17/2024 3:22 pm   INDICATION: Signs/Symptoms:cognitive impairment.   COMPARISON: None.   ACCESSION NUMBER(S): [id number]   ORDERING CLINICIAN: LEONA JUAREZ   TECHNIQUE: Noncontrast axial CT scan of head was performed. Angled reformats in brain and bone windows were generated. The images were reviewed in bone, brain, blood and soft tissue windows.   FINDINGS: The ventricles, cisterns and sulci are prominent, consistent with mild diffuse volume loss. There are areas  of nonspecific white matter hypodensity, which are probably age-related or microvascular in nature.   Gray-white differentiation is intact and there is no evidence of acute cortical infarct. No mass, mass effect or midline shift is seen. There is no evidence of hemorrhage.   The visualized paranasal sinuses are clear. Nasal septal deviation to the right.         No evidence of acute cortical infarct or intracranial hemorrhage.   No evidence of intracranial hemorrhage or displaced skull fracture.   MACRO: None   Signed by: Shahida Bhakta 4/17/2024 3:31 PM Dictation workstation:   [id number]    Current Facility-Administered Medications   Medication Dose Route Frequency Provider Last Rate Last Admin    acetaminophen (Tylenol) tablet 650 mg  650 mg oral q4h PRN Octavio Jeong MD        albuterol 2.5 mg /3 mL (0.083 %) nebulizer solution 2.5 mg  2.5 mg nebulization q2h PRN Octavio Jeong MD        atenolol (Tenormin) tablet 25 mg  25 mg oral Daily Octavio Jeong MD   25 mg at 04/18/24 0808    buPROPion (Wellbutrin) tablet 100 mg  100 mg oral BID Octavio Jeong MD   100 mg at 04/18/24 2127    ceFAZolin in dextrose (iso-os) (Ancef) IVPB 2 g  2 g intravenous q8h Jaycob Krishna MD   Stopped at 04/19/24 0019    [Held by provider] chlorthalidone (Hygroton) tablet 25 mg  25 mg oral Daily Ernie Rizo DO        dilTIAZem CD (Cardizem CD) 24 hr capsule 240 mg  240 mg oral Daily Octavio Jeong MD   240 mg at 04/18/24 0807    [Held by provider] enoxaparin (Lovenox) syringe 40 mg  40 mg subcutaneous q24h Octavio Jeong MD        folic acid (Folvite) tablet 1 mg  1 mg oral Daily Octavio Jeong MD   1 mg at 04/18/24 0809    HYDROmorphone PF (Dilaudid) injection 0.2 mg  0.2 mg intravenous q3h PRN Octavio Jeong MD        LORazepam (Ativan) tablet 0.5 mg  0.5 mg oral q2h PRN Octavio Jeong MD   0.5 mg at 04/16/24 9640    Or    LORazepam (Ativan) tablet 1 mg  1 mg oral q2h PRN Octavio  MD Jean-Paul   1 mg at 04/19/24 0357    Or    LORazepam (Ativan) tablet 2 mg  2 mg oral q2h PRN Octavio Jeong MD   2 mg at 04/18/24 2123    melatonin tablet 4.5 mg  4.5 mg oral Nightly PRN Octavio Jeong MD        multivitamin with minerals 1 tablet  1 tablet oral Daily Octavio Jeong MD   1 tablet at 04/18/24 0807    nicotine (Nicoderm CQ) 14 mg/24 hr patch 1 patch  1 patch transdermal Daily Ernie Rizo DO   1 patch at 04/17/24 0839    ondansetron (Zofran) injection 4 mg  4 mg intravenous q8h PRN Octavio Jeong MD        oxyCODONE (Roxicodone) immediate release tablet 10 mg  10 mg oral q6h PRN Octavio Jeong MD   10 mg at 04/16/24 1517    oxyCODONE (Roxicodone) immediate release tablet 5 mg  5 mg oral q6h PRN Octavio Jeong MD        polyethylene glycol (Glycolax, Miralax) packet 17 g  17 g oral Daily PRN Octavio Jeong MD        pravastatin (Pravachol) tablet 20 mg  20 mg oral Daily Octavio Jeong MD   20 mg at 04/18/24 0807    sertraline (Zoloft) tablet 100 mg  100 mg oral Daily Manda Kessler MD   100 mg at 04/18/24 0807    thiamine in dextrose 5% 100 mL  mg  500 mg intravenous TID Manda Kessler MD   500 mg at 04/18/24 2124     No current outpatient medications on file.   ..     Medication and Non-Pharmacologic VTE Prophylaxis/Anticoagulants      Last Anticoag Admin            Orders not given:    enoxaparin (Lovenox) syringe 40 mg          Assessment/Plan   Rose Marie Toussaint has  Principal Problem:    Chronic osteomyelitis involving ankle and foot, left (Multi)  Active Problems:    Septic joint (Multi)    Alcohol withdrawal delirium (Multi)    Critical limb ischemia of right lower extremity (Multi)     On Vancomycin  Plan for BKA. Awaiting sister to make decision        On CIWA scale.   Other Hospital problems        Abnormal findings not addressed during hospitalization, but require out patient follow up. None        I spent 35   minutes talking and examining  Rose Marie Toussaint, reviewing the labs & medications, formulating plan of care & discussing with NP and nursing staff.      Octavio Jeong MD

## 2024-04-19 NOTE — PROGRESS NOTES
04/19/24 1013   Discharge Planning   Patient expects to be discharged to: [facility]     Patient has not been med clear for discharge, per notes ID on consult, Vascular on consult, receiving IB abx, plan for left BKA, on discharge [facility] is currently looking to see if they can accept, we do not have any other FOC at this time, patient will need auth for placements, ADOD possibly next week sometime, I will continue to monitor for discharge planning.

## 2024-04-20 LAB
ALBUMIN SERPL BCP-MCNC: 3.3 G/DL (ref 3.4–5)
ALP SERPL-CCNC: 109 U/L (ref 33–136)
ALT SERPL W P-5'-P-CCNC: 5 U/L (ref 7–45)
ANION GAP SERPL CALC-SCNC: 14 MMOL/L (ref 10–20)
AST SERPL W P-5'-P-CCNC: 19 U/L (ref 9–39)
BILIRUB SERPL-MCNC: 0.2 MG/DL (ref 0–1.2)
BUN SERPL-MCNC: 15 MG/DL (ref 6–23)
CALCIUM SERPL-MCNC: 9.1 MG/DL (ref 8.6–10.3)
CHLORIDE SERPL-SCNC: 91 MMOL/L (ref 98–107)
CO2 SERPL-SCNC: 31 MMOL/L (ref 21–32)
CREAT SERPL-MCNC: 1.37 MG/DL (ref 0.5–1.05)
EGFRCR SERPLBLD CKD-EPI 2021: 43 ML/MIN/1.73M*2
ERYTHROCYTE [DISTWIDTH] IN BLOOD BY AUTOMATED COUNT: 12.9 % (ref 11.5–14.5)
GLUCOSE SERPL-MCNC: 80 MG/DL (ref 74–99)
HCT VFR BLD AUTO: 36.1 % (ref 36–46)
HGB BLD-MCNC: 12.1 G/DL (ref 12–16)
MAGNESIUM SERPL-MCNC: 1.6 MG/DL (ref 1.6–2.4)
MCH RBC QN AUTO: 31.8 PG (ref 26–34)
MCHC RBC AUTO-ENTMCNC: 33.5 G/DL (ref 32–36)
MCV RBC AUTO: 95 FL (ref 80–100)
NRBC BLD-RTO: 0 /100 WBCS (ref 0–0)
PLATELET # BLD AUTO: 549 X10*3/UL (ref 150–450)
POTASSIUM SERPL-SCNC: 3.8 MMOL/L (ref 3.5–5.3)
PROT SERPL-MCNC: 5.8 G/DL (ref 6.4–8.2)
RBC # BLD AUTO: 3.81 X10*6/UL (ref 4–5.2)
SODIUM SERPL-SCNC: 132 MMOL/L (ref 136–145)
WBC # BLD AUTO: 10.5 X10*3/UL (ref 4.4–11.3)

## 2024-04-20 PROCEDURE — 2500000002 HC RX 250 W HCPCS SELF ADMINISTERED DRUGS (ALT 637 FOR MEDICARE OP, ALT 636 FOR OP/ED)

## 2024-04-20 PROCEDURE — 2500000004 HC RX 250 GENERAL PHARMACY W/ HCPCS (ALT 636 FOR OP/ED): Performed by: PSYCHIATRY & NEUROLOGY

## 2024-04-20 PROCEDURE — 2500000004 HC RX 250 GENERAL PHARMACY W/ HCPCS (ALT 636 FOR OP/ED): Mod: JZ | Performed by: INTERNAL MEDICINE

## 2024-04-20 PROCEDURE — 84075 ASSAY ALKALINE PHOSPHATASE: CPT

## 2024-04-20 PROCEDURE — 2500000001 HC RX 250 WO HCPCS SELF ADMINISTERED DRUGS (ALT 637 FOR MEDICARE OP): Performed by: INTERNAL MEDICINE

## 2024-04-20 PROCEDURE — 2500000004 HC RX 250 GENERAL PHARMACY W/ HCPCS (ALT 636 FOR OP/ED): Mod: MUE | Performed by: PSYCHIATRY & NEUROLOGY

## 2024-04-20 PROCEDURE — 85027 COMPLETE CBC AUTOMATED: CPT

## 2024-04-20 PROCEDURE — 2500000004 HC RX 250 GENERAL PHARMACY W/ HCPCS (ALT 636 FOR OP/ED): Performed by: INTERNAL MEDICINE

## 2024-04-20 PROCEDURE — 2500000006 HC RX 250 W HCPCS SELF ADMINISTERED DRUGS (ALT 637 FOR ALL PAYERS): Mod: MUE | Performed by: PSYCHIATRY & NEUROLOGY

## 2024-04-20 PROCEDURE — 2500000006 HC RX 250 W HCPCS SELF ADMINISTERED DRUGS (ALT 637 FOR ALL PAYERS): Performed by: PSYCHIATRY & NEUROLOGY

## 2024-04-20 PROCEDURE — 1100000001 HC PRIVATE ROOM DAILY

## 2024-04-20 PROCEDURE — 83735 ASSAY OF MAGNESIUM: CPT

## 2024-04-20 PROCEDURE — 36415 COLL VENOUS BLD VENIPUNCTURE: CPT

## 2024-04-20 PROCEDURE — S4991 NICOTINE PATCH NONLEGEND: HCPCS

## 2024-04-20 RX ADMIN — DILTIAZEM HYDROCHLORIDE 240 MG: 120 CAPSULE, EXTENDED RELEASE ORAL at 10:13

## 2024-04-20 RX ADMIN — SERTRALINE HYDROCHLORIDE 100 MG: 50 TABLET ORAL at 10:12

## 2024-04-20 RX ADMIN — THIAMINE HYDROCHLORIDE 500 MG: 100 INJECTION, SOLUTION INTRAMUSCULAR; INTRAVENOUS at 10:52

## 2024-04-20 RX ADMIN — THIAMINE HYDROCHLORIDE 500 MG: 100 INJECTION, SOLUTION INTRAMUSCULAR; INTRAVENOUS at 20:43

## 2024-04-20 RX ADMIN — CEFAZOLIN SODIUM 2 G: 2 INJECTION, SOLUTION INTRAVENOUS at 10:11

## 2024-04-20 RX ADMIN — BUPROPION HYDROCHLORIDE 100 MG: 100 TABLET, FILM COATED ORAL at 20:43

## 2024-04-20 RX ADMIN — CEFAZOLIN SODIUM 2 G: 2 INJECTION, SOLUTION INTRAVENOUS at 00:52

## 2024-04-20 RX ADMIN — LACTULOSE 20 G: 20 SOLUTION ORAL at 10:13

## 2024-04-20 RX ADMIN — CEFAZOLIN SODIUM 2 G: 2 INJECTION, SOLUTION INTRAVENOUS at 18:25

## 2024-04-20 RX ADMIN — PRAVASTATIN SODIUM 20 MG: 20 TABLET ORAL at 10:12

## 2024-04-20 RX ADMIN — FOLIC ACID 1 MG: 1 TABLET ORAL at 10:12

## 2024-04-20 RX ADMIN — ENOXAPARIN SODIUM 40 MG: 40 INJECTION SUBCUTANEOUS at 05:18

## 2024-04-20 RX ADMIN — ATENOLOL 25 MG: 25 TABLET ORAL at 10:12

## 2024-04-20 RX ADMIN — THIAMINE HYDROCHLORIDE 500 MG: 100 INJECTION, SOLUTION INTRAMUSCULAR; INTRAVENOUS at 17:30

## 2024-04-20 RX ADMIN — OXYCODONE HYDROCHLORIDE 10 MG: 5 TABLET ORAL at 14:39

## 2024-04-20 RX ADMIN — BUPROPION HYDROCHLORIDE 100 MG: 100 TABLET, FILM COATED ORAL at 10:12

## 2024-04-20 RX ADMIN — NICOTINE 1 PATCH: 14 PATCH, EXTENDED RELEASE TRANSDERMAL at 10:12

## 2024-04-20 RX ADMIN — LORAZEPAM 0.5 MG: 0.5 TABLET ORAL at 13:07

## 2024-04-20 RX ADMIN — Medication 1 TABLET: at 10:12

## 2024-04-20 ASSESSMENT — LIFESTYLE VARIABLES
AUDITORY DISTURBANCES: NOT PRESENT
ORIENTATION AND CLOUDING OF SENSORIUM: ORIENTED AND CAN DO SERIAL ADDITIONS
AGITATION: NORMAL ACTIVITY
VISUAL DISTURBANCES: MILD SENSITIVITY
AUDITORY DISTURBANCES: NOT PRESENT
PULSE: 74
NAUSEA AND VOMITING: MILD NAUSEA WITH NO VOMITING
BLOOD PRESSURE: 146/63
TREMOR: NOT VISIBLE, BUT CAN BE FELT FINGERTIP TO FINGERTIP
TOTAL SCORE: 2
AGITATION: NORMAL ACTIVITY
TOTAL SCORE: 4
AGITATION: SOMEWHAT MORE THAN NORMAL ACTIVITY
PAROXYSMAL SWEATS: NO SWEAT VISIBLE
AUDITORY DISTURBANCES: NOT PRESENT
NAUSEA AND VOMITING: NO NAUSEA AND NO VOMITING
TOTAL SCORE: 6
ORIENTATION AND CLOUDING OF SENSORIUM: ORIENTED AND CAN DO SERIAL ADDITIONS
ANXIETY: MILDLY ANXIOUS
HEADACHE, FULLNESS IN HEAD: NOT PRESENT
NAUSEA AND VOMITING: NO NAUSEA AND NO VOMITING
AUDITORY DISTURBANCES: NOT PRESENT
PAROXYSMAL SWEATS: NO SWEAT VISIBLE
TREMOR: NOT VISIBLE, BUT CAN BE FELT FINGERTIP TO FINGERTIP
VISUAL DISTURBANCES: NOT PRESENT
NAUSEA AND VOMITING: NO NAUSEA AND NO VOMITING
HEADACHE, FULLNESS IN HEAD: NOT PRESENT
BLOOD PRESSURE: 119/72
ANXIETY: MILDLY ANXIOUS
NAUSEA AND VOMITING: NO NAUSEA AND NO VOMITING
ORIENTATION AND CLOUDING OF SENSORIUM: ORIENTED AND CAN DO SERIAL ADDITIONS
ANXIETY: MILDLY ANXIOUS
ANXIETY: MILDLY ANXIOUS
HEADACHE, FULLNESS IN HEAD: NOT PRESENT
ANXIETY: NO ANXIETY, AT EASE
AUDITORY DISTURBANCES: NOT PRESENT
HEADACHE, FULLNESS IN HEAD: NOT PRESENT
TOTAL SCORE: 3
AGITATION: NORMAL ACTIVITY
VISUAL DISTURBANCES: MILD SENSITIVITY
VISUAL DISTURBANCES: NOT PRESENT
TOTAL SCORE: 4
HEADACHE, FULLNESS IN HEAD: NOT PRESENT
ORIENTATION AND CLOUDING OF SENSORIUM: ORIENTED AND CAN DO SERIAL ADDITIONS
PAROXYSMAL SWEATS: NO SWEAT VISIBLE
AGITATION: NORMAL ACTIVITY
TREMOR: 2
HEADACHE, FULLNESS IN HEAD: NOT PRESENT
TREMOR: NOT VISIBLE, BUT CAN BE FELT FINGERTIP TO FINGERTIP
VISUAL DISTURBANCES: NOT PRESENT
TREMOR: 2
ANXIETY: MILDLY ANXIOUS
PULSE: 76
AGITATION: NORMAL ACTIVITY
ORIENTATION AND CLOUDING OF SENSORIUM: ORIENTED AND CAN DO SERIAL ADDITIONS
AUDITORY DISTURBANCES: NOT PRESENT
ANXIETY: 2
AUDITORY DISTURBANCES: NOT PRESENT
VISUAL DISTURBANCES: MILD SENSITIVITY
PAROXYSMAL SWEATS: NO SWEAT VISIBLE
TOTAL SCORE: 4
PAROXYSMAL SWEATS: NO SWEAT VISIBLE
VISUAL DISTURBANCES: NOT PRESENT
HEADACHE, FULLNESS IN HEAD: NOT PRESENT
ORIENTATION AND CLOUDING OF SENSORIUM: ORIENTED AND CAN DO SERIAL ADDITIONS
AGITATION: 2
TOTAL SCORE: 4
NAUSEA AND VOMITING: NO NAUSEA AND NO VOMITING
NAUSEA AND VOMITING: NO NAUSEA AND NO VOMITING
TREMOR: 2
ORIENTATION AND CLOUDING OF SENSORIUM: ORIENTED AND CAN DO SERIAL ADDITIONS
TREMOR: NOT VISIBLE, BUT CAN BE FELT FINGERTIP TO FINGERTIP
PAROXYSMAL SWEATS: NO SWEAT VISIBLE
PAROXYSMAL SWEATS: NO SWEAT VISIBLE

## 2024-04-20 ASSESSMENT — COGNITIVE AND FUNCTIONAL STATUS - GENERAL
WALKING IN HOSPITAL ROOM: TOTAL
TOILETING: TOTAL
CLIMB 3 TO 5 STEPS WITH RAILING: TOTAL
DAILY ACTIVITIY SCORE: 9
EATING MEALS: A LOT
MOVING FROM LYING ON BACK TO SITTING ON SIDE OF FLAT BED WITH BEDRAILS: A LITTLE
MOVING TO AND FROM BED TO CHAIR: A LOT
DRESSING REGULAR UPPER BODY CLOTHING: A LOT
TURNING FROM BACK TO SIDE WHILE IN FLAT BAD: A LITTLE
STANDING UP FROM CHAIR USING ARMS: TOTAL
PERSONAL GROOMING: A LOT
DRESSING REGULAR LOWER BODY CLOTHING: TOTAL
HELP NEEDED FOR BATHING: TOTAL
MOBILITY SCORE: 11

## 2024-04-20 ASSESSMENT — PAIN DESCRIPTION - ORIENTATION: ORIENTATION: LEFT

## 2024-04-20 ASSESSMENT — PAIN DESCRIPTION - LOCATION: LOCATION: FOOT

## 2024-04-20 ASSESSMENT — PAIN SCALES - GENERAL
PAINLEVEL_OUTOF10: 0 - NO PAIN
PAINLEVEL_OUTOF10: 0 - NO PAIN

## 2024-04-20 ASSESSMENT — PAIN - FUNCTIONAL ASSESSMENT: PAIN_FUNCTIONAL_ASSESSMENT: 0-10

## 2024-04-20 NOTE — PROGRESS NOTES
[facility]          Admitting Provider: Octavio Jeong MD Admission Date: 4/16/2024.   Attending Provider: Octavio Jeong MD MRN: [id number]       Subjective   Rose Marie Toussaint is a 68 y.o. female on day 4 of admission presenting with Chronic osteomyelitis involving ankle and foot, left (Multi).  Interval History no complaints, less confused. Has some pain in the left ankle.     Objective   Physical Exam  Last Recorded Vitals: Blood pressure 105/68, pulse 74, temperature 36.4 °C (97.6 °F), temperature source Oral, resp. rate 18, height 1.524 m (5'), weight 56.7 kg (125 lb), SpO2 94%.  Patient Vitals for the past 24 hrs:   BP Temp Temp src Pulse Resp SpO2   04/20/24 0814 105/68 36.4 °C (97.6 °F) Oral 74 18 94 %   04/20/24 0638 139/77 36.3 °C (97.3 °F) -- 67 -- 98 %   04/19/24 2300 132/62 36.1 °C (97 °F) -- 73 18 97 %   04/19/24 1900 112/66 36.3 °C (97.4 °F) -- 68 18 96 %   04/19/24 1849 117/65 -- -- 69 -- --   04/19/24 1732 122/69 -- -- 71 -- --   04/19/24 1340 126/60 -- -- 75 -- --   04/19/24 1140 132/67 36.6 °C (97.9 °F) Axillary 70 18 96 %   04/19/24 0938 137/80 -- -- 72 -- --     Body mass index is 24.41 kg/m².  GENERAL: alert, cooperative, or no distress  SKIN: no rashes  NECK: supple, no thyromegaly, JVP within normal limits  LUNGS:  not in respiratory distress, respiratory rate normal, clear to auscultation  CARDIAC: regular rate and rhythm, normal S1 and S2, no murmur, rub, or gallop  ABDOMEN: Soft, non-tender, normal bowel sounds; no bruits, organomegaly or masses.  EXTREMITIES: No edema left leg wrapped.  NEURO: Alert and oriented x 3, reflexes normal and symmetric, strength and  sensation grossly normal  Intake/Output last 3 Shifts:  I/O last 3 completed shifts:  In: 300 (5.3 mL/kg) [P.O.:300]  Out: - (0 mL/kg)   Weight: 56.7 kg   DATA:   Diagnostic tests reviewed for today's visit:    Most recent Labs  Results for orders placed or performed during the hospital encounter of  "04/16/24 (from the past 24 hour(s))   CBC   Result Value Ref Range    WBC 10.5 4.4 - 11.3 x10*3/uL    nRBC 0.0 0.0 - 0.0 /100 WBCs    RBC 3.81 (L) 4.00 - 5.20 x10*6/uL    Hemoglobin 12.1 12.0 - 16.0 g/dL    Hematocrit 36.1 36.0 - 46.0 %    MCV 95 80 - 100 fL    MCH 31.8 26.0 - 34.0 pg    MCHC 33.5 32.0 - 36.0 g/dL    RDW 12.9 11.5 - 14.5 %    Platelets 549 (H) 150 - 450 x10*3/uL   Comprehensive metabolic panel   Result Value Ref Range    Glucose 80 74 - 99 mg/dL    Sodium 132 (L) 136 - 145 mmol/L    Potassium 3.8 3.5 - 5.3 mmol/L    Chloride 91 (L) 98 - 107 mmol/L    Bicarbonate 31 21 - 32 mmol/L    Anion Gap 14 10 - 20 mmol/L    Urea Nitrogen 15 6 - 23 mg/dL    Creatinine 1.37 (H) 0.50 - 1.05 mg/dL    eGFR 43 (L) >60 mL/min/1.73m*2    Calcium 9.1 8.6 - 10.3 mg/dL    Albumin 3.3 (L) 3.4 - 5.0 g/dL    Alkaline Phosphatase 109 33 - 136 U/L    Total Protein 5.8 (L) 6.4 - 8.2 g/dL    AST 19 9 - 39 U/L    Bilirubin, Total 0.2 0.0 - 1.2 mg/dL    ALT 5 (L) 7 - 45 U/L   Magnesium   Result Value Ref Range    Magnesium 1.60 1.60 - 2.40 mg/dL     Urine Culture   Date Value Ref Range Status   05/24/2023 NO GROWTH  Final     Blood Culture   Date Value Ref Range Status   05/23/2023   Final    No Growth at 1 days~No Growth at 2 days~No Growth at 3 days~NO GROWTH at 4 days - FINAL REPORT    No results found for: \"RESPCULTSM\" No results found for: \"PERDIAFLDCUL\" No results found for: \"STERFLDCULSM\"   No results found.  Current Facility-Administered Medications   Medication Dose Route Frequency Provider Last Rate Last Admin    acetaminophen (Tylenol) tablet 650 mg  650 mg oral q4h PRN Octavio Jeong MD        albuterol 2.5 mg /3 mL (0.083 %) nebulizer solution 2.5 mg  2.5 mg nebulization q2h PRN Octavio Jeong MD        atenolol (Tenormin) tablet 25 mg  25 mg oral Daily Octavio Jeong MD   25 mg at 04/19/24 8718    buPROPion (Wellbutrin) tablet 100 mg  100 mg oral BID Octavio Jeong MD   100 mg at 04/19/24 2079    " ceFAZolin in dextrose (iso-os) (Ancef) IVPB 2 g  2 g intravenous q8h Jaycob Krishna MD   Stopped at 04/20/24 0122    [Held by provider] chlorthalidone (Hygroton) tablet 25 mg  25 mg oral Daily Ernie Rizo DO        dilTIAZem CD (Cardizem CD) 24 hr capsule 240 mg  240 mg oral Daily Octavio Jeong MD   240 mg at 04/19/24 0851    enoxaparin (Lovenox) syringe 40 mg  40 mg subcutaneous q24h Octavio Jeong MD   40 mg at 04/20/24 0518    folic acid (Folvite) tablet 1 mg  1 mg oral Daily Octavio Jeong MD   1 mg at 04/19/24 0852    HYDROmorphone PF (Dilaudid) injection 0.2 mg  0.2 mg intravenous q3h PRN Octavio Jeong MD        lactulose 20 gram/30 mL oral solution 20 g  20 g oral Daily Octavio Jeong MD   20 g at 04/19/24 0853    LORazepam (Ativan) tablet 0.5 mg  0.5 mg oral q2h PRN Octavio Jeong MD   0.5 mg at 04/19/24 2221    Or    LORazepam (Ativan) tablet 1 mg  1 mg oral q2h PRN Octavio Jeong MD   1 mg at 04/19/24 0357    Or    LORazepam (Ativan) tablet 2 mg  2 mg oral q2h PRN Octavio Jeong MD   2 mg at 04/18/24 2123    melatonin tablet 4.5 mg  4.5 mg oral Nightly PRN Octavio Jeong MD        multivitamin with minerals 1 tablet  1 tablet oral Daily Octavio Jeong MD   1 tablet at 04/19/24 0850    nicotine (Nicoderm CQ) 14 mg/24 hr patch 1 patch  1 patch transdermal Daily Ernie Rizo DO   1 patch at 04/19/24 0854    ondansetron (Zofran) injection 4 mg  4 mg intravenous q8h PRN Octavio Jeong MD        oxyCODONE (Roxicodone) immediate release tablet 10 mg  10 mg oral q6h PRN Octavio Jeong MD   10 mg at 04/19/24 1724    oxyCODONE (Roxicodone) immediate release tablet 5 mg  5 mg oral q6h PRN Octavio Jeong MD        polyethylene glycol (Glycolax, Miralax) packet 17 g  17 g oral Daily PRN Octavio Jeong MD        pravastatin (Pravachol) tablet 20 mg  20 mg oral Daily Octavio Jeong MD   20 mg at 04/19/24 0900    sertraline (Zoloft) tablet  100 mg  100 mg oral Daily Manda Kessler MD   100 mg at 04/19/24 0852    thiamine in dextrose 5% 100 mL  mg  500 mg intravenous TID Manda Kessler MD   500 mg at 04/19/24 2212     No current outpatient medications on file.   ..     Medication and Non-Pharmacologic VTE Prophylaxis/Anticoagulants      Last Anticoag Admin            enoxaparin (Lovenox) syringe 40 mg    Given 40 mg at 0518    Frequency: Every 24 hours         No unadministered anticoagulant orders found.          Assessment/Plan   Rose Marie Toussaint has  Principal Problem:    Chronic osteomyelitis involving ankle and foot, left (Multi)  Active Problems:    Septic joint (Multi)    Alcohol withdrawal delirium (Multi)    Critical limb ischemia of right lower extremity (Multi)     On Cefazolin (MSSA)  Plan for BKA. Awaiting sister to make decision        On CIWA scale.   Other Hospital problems        Abnormal findings not addressed during hospitalization, but require out patient follow up. None      I spent 35 minutes talking and examining Rose Marie Toussaint, reviewing the labs & medications, formulating plan of care & discussing with NP and nursing staff.    Octavio Jeong MD

## 2024-04-21 LAB
ALBUMIN SERPL BCP-MCNC: 3.2 G/DL (ref 3.4–5)
ALP SERPL-CCNC: 111 U/L (ref 33–136)
ALT SERPL W P-5'-P-CCNC: <3 U/L (ref 7–45)
ANION GAP SERPL CALC-SCNC: 15 MMOL/L (ref 10–20)
AST SERPL W P-5'-P-CCNC: 16 U/L (ref 9–39)
BILIRUB SERPL-MCNC: 0.2 MG/DL (ref 0–1.2)
BUN SERPL-MCNC: 14 MG/DL (ref 6–23)
CALCIUM SERPL-MCNC: 9.1 MG/DL (ref 8.6–10.3)
CHLORIDE SERPL-SCNC: 93 MMOL/L (ref 98–107)
CO2 SERPL-SCNC: 30 MMOL/L (ref 21–32)
CREAT SERPL-MCNC: 1.34 MG/DL (ref 0.5–1.05)
EGFRCR SERPLBLD CKD-EPI 2021: 44 ML/MIN/1.73M*2
ERYTHROCYTE [DISTWIDTH] IN BLOOD BY AUTOMATED COUNT: 13.1 % (ref 11.5–14.5)
GLUCOSE SERPL-MCNC: 92 MG/DL (ref 74–99)
HCT VFR BLD AUTO: 35.5 % (ref 36–46)
HGB BLD-MCNC: 11.6 G/DL (ref 12–16)
MAGNESIUM SERPL-MCNC: 1.6 MG/DL (ref 1.6–2.4)
MCH RBC QN AUTO: 30.9 PG (ref 26–34)
MCHC RBC AUTO-ENTMCNC: 32.7 G/DL (ref 32–36)
MCV RBC AUTO: 95 FL (ref 80–100)
NRBC BLD-RTO: 0 /100 WBCS (ref 0–0)
PLATELET # BLD AUTO: 583 X10*3/UL (ref 150–450)
POTASSIUM SERPL-SCNC: 3.8 MMOL/L (ref 3.5–5.3)
PROT SERPL-MCNC: 5.9 G/DL (ref 6.4–8.2)
RBC # BLD AUTO: 3.75 X10*6/UL (ref 4–5.2)
SODIUM SERPL-SCNC: 134 MMOL/L (ref 136–145)
WBC # BLD AUTO: 11 X10*3/UL (ref 4.4–11.3)

## 2024-04-21 PROCEDURE — 99233 SBSQ HOSP IP/OBS HIGH 50: CPT | Performed by: STUDENT IN AN ORGANIZED HEALTH CARE EDUCATION/TRAINING PROGRAM

## 2024-04-21 PROCEDURE — 2500000006 HC RX 250 W HCPCS SELF ADMINISTERED DRUGS (ALT 637 FOR ALL PAYERS): Performed by: PSYCHIATRY & NEUROLOGY

## 2024-04-21 PROCEDURE — 84075 ASSAY ALKALINE PHOSPHATASE: CPT

## 2024-04-21 PROCEDURE — 2500000006 HC RX 250 W HCPCS SELF ADMINISTERED DRUGS (ALT 637 FOR ALL PAYERS): Mod: MUE | Performed by: PSYCHIATRY & NEUROLOGY

## 2024-04-21 PROCEDURE — 2500000004 HC RX 250 GENERAL PHARMACY W/ HCPCS (ALT 636 FOR OP/ED): Performed by: PSYCHIATRY & NEUROLOGY

## 2024-04-21 PROCEDURE — 85027 COMPLETE CBC AUTOMATED: CPT

## 2024-04-21 PROCEDURE — 2500000004 HC RX 250 GENERAL PHARMACY W/ HCPCS (ALT 636 FOR OP/ED): Performed by: INTERNAL MEDICINE

## 2024-04-21 PROCEDURE — 2500000001 HC RX 250 WO HCPCS SELF ADMINISTERED DRUGS (ALT 637 FOR MEDICARE OP): Performed by: INTERNAL MEDICINE

## 2024-04-21 PROCEDURE — 2500000004 HC RX 250 GENERAL PHARMACY W/ HCPCS (ALT 636 FOR OP/ED): Mod: JZ | Performed by: INTERNAL MEDICINE

## 2024-04-21 PROCEDURE — 2500000002 HC RX 250 W HCPCS SELF ADMINISTERED DRUGS (ALT 637 FOR MEDICARE OP, ALT 636 FOR OP/ED)

## 2024-04-21 PROCEDURE — 83735 ASSAY OF MAGNESIUM: CPT

## 2024-04-21 PROCEDURE — S4991 NICOTINE PATCH NONLEGEND: HCPCS

## 2024-04-21 PROCEDURE — 36415 COLL VENOUS BLD VENIPUNCTURE: CPT

## 2024-04-21 PROCEDURE — 1100000001 HC PRIVATE ROOM DAILY

## 2024-04-21 RX ORDER — SERTRALINE HYDROCHLORIDE 50 MG/1
50 TABLET, FILM COATED ORAL DAILY
Status: DISCONTINUED | OUTPATIENT
Start: 2024-04-22 | End: 2024-04-29 | Stop reason: HOSPADM

## 2024-04-21 RX ORDER — QUETIAPINE FUMARATE 25 MG/1
25 TABLET, FILM COATED ORAL NIGHTLY PRN
Status: DISCONTINUED | OUTPATIENT
Start: 2024-04-21 | End: 2024-04-29 | Stop reason: HOSPADM

## 2024-04-21 RX ORDER — BUPROPION HYDROCHLORIDE 100 MG/1
100 TABLET ORAL
Status: DISCONTINUED | OUTPATIENT
Start: 2024-04-22 | End: 2024-04-29 | Stop reason: HOSPADM

## 2024-04-21 RX ADMIN — DILTIAZEM HYDROCHLORIDE 240 MG: 120 CAPSULE, EXTENDED RELEASE ORAL at 09:39

## 2024-04-21 RX ADMIN — NICOTINE 1 PATCH: 14 PATCH, EXTENDED RELEASE TRANSDERMAL at 09:39

## 2024-04-21 RX ADMIN — CEFAZOLIN SODIUM 2 G: 2 INJECTION, SOLUTION INTRAVENOUS at 16:17

## 2024-04-21 RX ADMIN — FOLIC ACID 1 MG: 1 TABLET ORAL at 09:39

## 2024-04-21 RX ADMIN — LACTULOSE 20 G: 20 SOLUTION ORAL at 09:39

## 2024-04-21 RX ADMIN — CEFAZOLIN SODIUM 2 G: 2 INJECTION, SOLUTION INTRAVENOUS at 00:41

## 2024-04-21 RX ADMIN — PRAVASTATIN SODIUM 20 MG: 20 TABLET ORAL at 09:39

## 2024-04-21 RX ADMIN — Medication 1 TABLET: at 09:39

## 2024-04-21 RX ADMIN — ENOXAPARIN SODIUM 40 MG: 40 INJECTION SUBCUTANEOUS at 05:35

## 2024-04-21 RX ADMIN — BUPROPION HYDROCHLORIDE 100 MG: 100 TABLET, FILM COATED ORAL at 20:48

## 2024-04-21 RX ADMIN — SERTRALINE HYDROCHLORIDE 100 MG: 50 TABLET ORAL at 09:39

## 2024-04-21 RX ADMIN — THIAMINE HYDROCHLORIDE 500 MG: 100 INJECTION, SOLUTION INTRAMUSCULAR; INTRAVENOUS at 10:36

## 2024-04-21 RX ADMIN — ATENOLOL 25 MG: 25 TABLET ORAL at 09:39

## 2024-04-21 RX ADMIN — BUPROPION HYDROCHLORIDE 100 MG: 100 TABLET, FILM COATED ORAL at 09:49

## 2024-04-21 RX ADMIN — Medication 4.5 MG: at 20:48

## 2024-04-21 RX ADMIN — OXYCODONE HYDROCHLORIDE 10 MG: 5 TABLET ORAL at 20:48

## 2024-04-21 RX ADMIN — CEFAZOLIN SODIUM 2 G: 2 INJECTION, SOLUTION INTRAVENOUS at 09:39

## 2024-04-21 ASSESSMENT — COGNITIVE AND FUNCTIONAL STATUS - GENERAL
TURNING FROM BACK TO SIDE WHILE IN FLAT BAD: A LITTLE
DRESSING REGULAR LOWER BODY CLOTHING: A LOT
MOVING TO AND FROM BED TO CHAIR: A LOT
PERSONAL GROOMING: A LOT
DAILY ACTIVITIY SCORE: 11
STANDING UP FROM CHAIR USING ARMS: TOTAL
TOILETING: TOTAL
EATING MEALS: A LITTLE
WALKING IN HOSPITAL ROOM: TOTAL
DRESSING REGULAR UPPER BODY CLOTHING: A LOT
HELP NEEDED FOR BATHING: TOTAL
CLIMB 3 TO 5 STEPS WITH RAILING: TOTAL
MOBILITY SCORE: 12

## 2024-04-21 ASSESSMENT — LIFESTYLE VARIABLES
ORIENTATION AND CLOUDING OF SENSORIUM: ORIENTED AND CAN DO SERIAL ADDITIONS
TREMOR: NOT VISIBLE, BUT CAN BE FELT FINGERTIP TO FINGERTIP
AUDITORY DISTURBANCES: NOT PRESENT
NAUSEA AND VOMITING: NO NAUSEA AND NO VOMITING
AUDITORY DISTURBANCES: NOT PRESENT
VISUAL DISTURBANCES: NOT PRESENT
AUDITORY DISTURBANCES: NOT PRESENT
ORIENTATION AND CLOUDING OF SENSORIUM: ORIENTED AND CAN DO SERIAL ADDITIONS
NAUSEA AND VOMITING: NO NAUSEA AND NO VOMITING
AUDITORY DISTURBANCES: NOT PRESENT
ANXIETY: 2
PAROXYSMAL SWEATS: NO SWEAT VISIBLE
TOTAL SCORE: 2
AGITATION: NORMAL ACTIVITY
ANXIETY: 2
TOTAL SCORE: 2
HEADACHE, FULLNESS IN HEAD: NOT PRESENT
TOTAL SCORE: 4
ORIENTATION AND CLOUDING OF SENSORIUM: ORIENTED AND CAN DO SERIAL ADDITIONS
TOTAL SCORE: 3
AUDITORY DISTURBANCES: NOT PRESENT
VISUAL DISTURBANCES: NOT PRESENT
AGITATION: NORMAL ACTIVITY
TOTAL SCORE: 4
AGITATION: NORMAL ACTIVITY
VISUAL DISTURBANCES: NOT PRESENT
ANXIETY: 3
NAUSEA AND VOMITING: NO NAUSEA AND NO VOMITING
VISUAL DISTURBANCES: NOT PRESENT
PAROXYSMAL SWEATS: NO SWEAT VISIBLE
NAUSEA AND VOMITING: NO NAUSEA AND NO VOMITING
TREMOR: NOT VISIBLE, BUT CAN BE FELT FINGERTIP TO FINGERTIP
TREMOR: NOT VISIBLE, BUT CAN BE FELT FINGERTIP TO FINGERTIP
ORIENTATION AND CLOUDING OF SENSORIUM: ORIENTED AND CAN DO SERIAL ADDITIONS
PAROXYSMAL SWEATS: NO SWEAT VISIBLE
BLOOD PRESSURE: 112/97
AGITATION: NORMAL ACTIVITY
AGITATION: NORMAL ACTIVITY
PULSE: 81
ANXIETY: MILDLY ANXIOUS
VISUAL DISTURBANCES: NOT PRESENT
HEADACHE, FULLNESS IN HEAD: NOT PRESENT
ORIENTATION AND CLOUDING OF SENSORIUM: CANNOT DO SERIAL ADDITIONS OR IS UNCERTAIN ABOUT DATE
HEADACHE, FULLNESS IN HEAD: NOT PRESENT
ANXIETY: MILDLY ANXIOUS
TREMOR: NOT VISIBLE, BUT CAN BE FELT FINGERTIP TO FINGERTIP
PAROXYSMAL SWEATS: NO SWEAT VISIBLE
TREMOR: NOT VISIBLE, BUT CAN BE FELT FINGERTIP TO FINGERTIP
PAROXYSMAL SWEATS: NO SWEAT VISIBLE
HEADACHE, FULLNESS IN HEAD: NOT PRESENT
HEADACHE, FULLNESS IN HEAD: NOT PRESENT
NAUSEA AND VOMITING: NO NAUSEA AND NO VOMITING

## 2024-04-21 ASSESSMENT — PAIN SCALES - GENERAL: PAINLEVEL_OUTOF10: 7

## 2024-04-21 NOTE — PROGRESS NOTES
ORTHOPAEDIC SURGERY INPATIENT PROGRESS NOTE    Subjective   NAEON.  Patient with apparently improving cognition.  Alert and oriented to person and place.  Patient does not recall any previous conversation regarding surgical treatment of her LLE.  Patient reports that her ankle has been deformed since the day of surgery.  She does not recall when her ankle wounds developed.    REVIEW OF SYSTEMS  Constitutional: no unplanned weight loss  Psychiatric: no suicidal ideation  ENT: no vision changes, no sinus problems  Pulmonary: no shortness of breath  Lymphatic: no enlarged lymph nodes  Cardiovascular: no chest pain or shortness of breath  Gastrointestinal: no stomach problems  Genitourinary: no dysuria   Skin: no rashes  Endocrine: no thyroid problems  Neurological: no headache, no numbness  Hematological: no easy bruising  Musculoskeletal: Left ankle pain    Objective   PHYSICAL EXAMINATION  Constitutional Exam: frail appearing, does not appear stated age  Psychiatric Exam: alert and not oriented, appropriate mood and behavior  Eye Exam: EOMI  Pulmonary Exam: breathing non-labored, no apparent distress  Lymphatic exam: no appreciable lymphadenopathy in the lower extremities  Cardiovascular exam: RRR to peripheral palpation, DP pulses 2+, PT 2+, toes are pink with good capillary refill, no pitting edema  Skin exam: no open lesions, rashes, abrasions or ulcerations  Neurological exam: sensation to light touch intact in both lower extremities in peripheral and dermatomal distributions (except for any abnormalities noted in musculoskeletal exam)    Musculoskeletal exam: Left lower extremity examination.  Patient with continued obvious varus deformity of the ankle with soft dressing overlying ankle.  Patient has diminished sensation to light touch grossly the plantar surface of the foot and about the dorsum of the foot.  Intact but pain limited PF/DF.  She is 1+ DP pulse palpated.    Last Recorded Vitals  Blood pressure  133/77, pulse 83, temperature 36.2 °C (97.2 °F), temperature source Axillary, resp. rate 18, height 1.524 m (5'), weight 56.7 kg (125 lb), SpO2 97%.    Relevant Results  Results for orders placed or performed during the hospital encounter of 04/16/24 (from the past 24 hour(s))   CBC   Result Value Ref Range    WBC 11.0 4.4 - 11.3 x10*3/uL    nRBC 0.0 0.0 - 0.0 /100 WBCs    RBC 3.75 (L) 4.00 - 5.20 x10*6/uL    Hemoglobin 11.6 (L) 12.0 - 16.0 g/dL    Hematocrit 35.5 (L) 36.0 - 46.0 %    MCV 95 80 - 100 fL    MCH 30.9 26.0 - 34.0 pg    MCHC 32.7 32.0 - 36.0 g/dL    RDW 13.1 11.5 - 14.5 %    Platelets 583 (H) 150 - 450 x10*3/uL   Comprehensive metabolic panel   Result Value Ref Range    Glucose 92 74 - 99 mg/dL    Sodium 134 (L) 136 - 145 mmol/L    Potassium 3.8 3.5 - 5.3 mmol/L    Chloride 93 (L) 98 - 107 mmol/L    Bicarbonate 30 21 - 32 mmol/L    Anion Gap 15 10 - 20 mmol/L    Urea Nitrogen 14 6 - 23 mg/dL    Creatinine 1.34 (H) 0.50 - 1.05 mg/dL    eGFR 44 (L) >60 mL/min/1.73m*2    Calcium 9.1 8.6 - 10.3 mg/dL    Albumin 3.2 (L) 3.4 - 5.0 g/dL    Alkaline Phosphatase 111 33 - 136 U/L    Total Protein 5.9 (L) 6.4 - 8.2 g/dL    AST 16 9 - 39 U/L    Bilirubin, Total 0.2 0.0 - 1.2 mg/dL    ALT <3 (L) 7 - 45 U/L   Magnesium   Result Value Ref Range    Magnesium 1.60 1.60 - 2.40 mg/dL       Relevant Imaging  No new imaging.    Assessment/Plan:  69 y/o F s/p L Ankle Bi-malleolar ankle fracture ORIF with syndesmotic stabilization with Dr. Frederick from 11/20/2022 with hardware failure, draining wounds concerning for OM in the setting of presumed alcohol abuse with peripheral neuropathy and ongoing tobacco abuse.  Patient remains afebrile with stable vitals.  ESR/CRP from 04/15/2024 of 94/23, CBC from 9 from 04/16/2024.      - EDOUARD CHO @ [facility]  - ID Consulted  - Vascular Surgery Consulted, recommendations appreciated  - PO Pain control, IV for breakthrough  - Please see narrative note for further details  - Encourage IS,  supplemental O2 as needed  - DVT PPX: SCDs, AMERICO hose, hold chemoppx in setting of upcoming surgery    Flako Guerra MD, ALEJA  Department of Orthopaedic Surgery  [facility]    Additional Updates:  Patient previously deemed to lack capacity to consent for surgery, please see separate ethics consultation and documentation.  Patient niece was present at the bedside during today's evaluation.  I contacted the patient's sister Janice following the encounter and reviewed the patient does appear to have improved cognition overall but is not oriented to person, place and time.  I summarized events with the patient and again with her sister Janice but there are 2 primary treatment options in this clinical setting including an attempt for limb salvage versus definitive left below-knee amputation.  Based on the patient's comorbidities including including alcoholism, tobacco abuse and complex and evolving social circumstances with a history of noncompliance with treatment recommendations and poor follow-up, the patient would most likely benefit from definitive left below-knee amputation.  The patient's sister Janice and Josephine are in apparent agreement but not yet ready to formally consent at this time.  I will again revisit this decision making with them as I would recommend surgery at this hospital admission.  I also reviewed that if the patient were to destabilize clinically that she would require a left below-knee amputation on an emergent basis.  Treatment plan also discussed with bedside RN Adi.    The diagnosis and treatment plan were reviewed with the patient. All questions were answered. The patient verbalized understanding of the treatment plan. There were no barriers to understanding identified.    Note dictated with [employer] software.  Completed without full type editing and sent to avoid delay.

## 2024-04-21 NOTE — PROGRESS NOTES
[facility]          Admitting Provider: Octavio Jeong MD Admission Date: 4/16/2024.   Attending Provider: Octavio Jeong MD MRN: [id number]       Subjective   Rose Marie Toussaint is a 68 y.o. female on day 5 of admission presenting with Chronic osteomyelitis involving ankle and foot, left (Multi).  Interval History less confused. No complaints. Was up all night.     Objective   Physical Exam  Last Recorded Vitals: Blood pressure 133/77, pulse 83, temperature 36.2 °C (97.2 °F), temperature source Axillary, resp. rate 18, height 1.524 m (5'), weight 56.7 kg (125 lb), SpO2 97%.  Patient Vitals for the past 24 hrs:   BP Temp Temp src Pulse Resp SpO2   04/21/24 0803 133/77 36.2 °C (97.2 °F) Axillary 83 18 97 %   04/21/24 0300 126/70 -- -- 77 -- 95 %   04/21/24 0000 125/66 -- -- 72 -- 96 %   04/20/24 2000 118/68 37.1 °C (98.7 °F) Oral 68 -- 97 %   04/20/24 1700 119/72 36.8 °C (98.3 °F) Oral 74 18 --   04/20/24 1300 146/63 -- -- 76 -- --   04/20/24 1112 124/71 36.4 °C (97.5 °F) Oral 78 18 98 %   04/20/24 1011 110/62 -- -- 79 -- --     Body mass index is 24.41 kg/m².  GENERAL: alert, cooperative, or no distress  SKIN: no rashes  NECK: supple, no thyromegaly, JVP within normal limits  LUNGS:  not in respiratory distress, respiratory rate normal, clear to auscultation  CARDIAC: regular rate and rhythm, normal S1 and S2, no murmur, rub, or gallop  ABDOMEN: Soft, non-tender, normal bowel sounds; no bruits, organomegaly or masses.  EXTREMITIES: No edema left ankle wrapped  NEURO: Alert and oriented x 1 reflexes normal and symmetric, strength and  sensation grossly normal  Intake/Output last 3 Shifts:  I/O last 3 completed shifts:  In: 470 (8.3 mL/kg) [P.O.:370; IV Piggyback:100]  Out: - (0 mL/kg)   Weight: 56.7 kg   DATA:   Diagnostic tests reviewed for today's visit:    Most recent Labs  Results for orders placed or performed during the hospital encounter of 04/16/24 (from the past 24 hour(s))  "  CBC   Result Value Ref Range    WBC 11.0 4.4 - 11.3 x10*3/uL    nRBC 0.0 0.0 - 0.0 /100 WBCs    RBC 3.75 (L) 4.00 - 5.20 x10*6/uL    Hemoglobin 11.6 (L) 12.0 - 16.0 g/dL    Hematocrit 35.5 (L) 36.0 - 46.0 %    MCV 95 80 - 100 fL    MCH 30.9 26.0 - 34.0 pg    MCHC 32.7 32.0 - 36.0 g/dL    RDW 13.1 11.5 - 14.5 %    Platelets 583 (H) 150 - 450 x10*3/uL   Comprehensive metabolic panel   Result Value Ref Range    Glucose 92 74 - 99 mg/dL    Sodium 134 (L) 136 - 145 mmol/L    Potassium 3.8 3.5 - 5.3 mmol/L    Chloride 93 (L) 98 - 107 mmol/L    Bicarbonate 30 21 - 32 mmol/L    Anion Gap 15 10 - 20 mmol/L    Urea Nitrogen 14 6 - 23 mg/dL    Creatinine 1.34 (H) 0.50 - 1.05 mg/dL    eGFR 44 (L) >60 mL/min/1.73m*2    Calcium 9.1 8.6 - 10.3 mg/dL    Albumin 3.2 (L) 3.4 - 5.0 g/dL    Alkaline Phosphatase 111 33 - 136 U/L    Total Protein 5.9 (L) 6.4 - 8.2 g/dL    AST 16 9 - 39 U/L    Bilirubin, Total 0.2 0.0 - 1.2 mg/dL    ALT <3 (L) 7 - 45 U/L   Magnesium   Result Value Ref Range    Magnesium 1.60 1.60 - 2.40 mg/dL     Urine Culture   Date Value Ref Range Status   05/24/2023 NO GROWTH  Final     Blood Culture   Date Value Ref Range Status   05/23/2023   Final    No Growth at 1 days~No Growth at 2 days~No Growth at 3 days~NO GROWTH at 4 days - FINAL REPORT    No results found for: \"RESPCULTSM\" No results found for: \"PERDIAFLDCUL\" No results found for: \"STERFLDCULSM\"   No results found.  Current Facility-Administered Medications   Medication Dose Route Frequency Provider Last Rate Last Admin    acetaminophen (Tylenol) tablet 650 mg  650 mg oral q4h PRN Octavio Umapathy, MD        albuterol 2.5 mg /3 mL (0.083 %) nebulizer solution 2.5 mg  2.5 mg nebulization q2h PRN Octavio Jeong MD        atenolol (Tenormin) tablet 25 mg  25 mg oral Daily Octavio Jeong MD   25 mg at 04/20/24 1012    buPROPion (Wellbutrin) tablet 100 mg  100 mg oral BID Octavio Jeong MD   100 mg at 04/20/24 2043    ceFAZolin in dextrose " (iso-os) (Ancef) IVPB 2 g  2 g intravenous q8h Jaycob Krishna MD   Stopped at 04/21/24 0111    [Held by provider] chlorthalidone (Hygroton) tablet 25 mg  25 mg oral Daily Ernie Rizo DO        dilTIAZem CD (Cardizem CD) 24 hr capsule 240 mg  240 mg oral Daily Octavio Jeong MD   240 mg at 04/20/24 1013    enoxaparin (Lovenox) syringe 40 mg  40 mg subcutaneous q24h Octavio Jeong MD   40 mg at 04/21/24 0535    folic acid (Folvite) tablet 1 mg  1 mg oral Daily Octavio Jeong MD   1 mg at 04/20/24 1012    HYDROmorphone PF (Dilaudid) injection 0.2 mg  0.2 mg intravenous q3h PRN Octavio Jeong MD        lactulose 20 gram/30 mL oral solution 20 g  20 g oral Daily Octavio Jeong MD   20 g at 04/20/24 1013    LORazepam (Ativan) tablet 0.5 mg  0.5 mg oral q2h PRN Octavio Jeong MD   0.5 mg at 04/20/24 1307    Or    LORazepam (Ativan) tablet 1 mg  1 mg oral q2h PRN Octavio Jeong MD   1 mg at 04/19/24 0357    Or    LORazepam (Ativan) tablet 2 mg  2 mg oral q2h PRN Octavio Jeong MD   2 mg at 04/18/24 2123    melatonin tablet 4.5 mg  4.5 mg oral Nightly PRN Octavio Jeong MD        multivitamin with minerals 1 tablet  1 tablet oral Daily Octavio Jeong MD   1 tablet at 04/20/24 1012    nicotine (Nicoderm CQ) 14 mg/24 hr patch 1 patch  1 patch transdermal Daily Ernie Rizo DO   1 patch at 04/20/24 1012    ondansetron (Zofran) injection 4 mg  4 mg intravenous q8h PRN Octavio Jeong MD        oxyCODONE (Roxicodone) immediate release tablet 10 mg  10 mg oral q6h PRN Octavio Jeong MD   10 mg at 04/20/24 1439    oxyCODONE (Roxicodone) immediate release tablet 5 mg  5 mg oral q6h PRN Octavio Jeong MD        polyethylene glycol (Glycolax, Miralax) packet 17 g  17 g oral Daily PRN Octavio Jeong MD        pravastatin (Pravachol) tablet 20 mg  20 mg oral Daily Octavio Jeong MD   20 mg at 04/20/24 1012    sertraline (Zoloft) tablet 100 mg  100 mg oral  Daily Manda Kessler MD   100 mg at 04/20/24 1012    thiamine in dextrose 5% 100 mL  mg  500 mg intravenous TID Manda Kessler MD   500 mg at 04/20/24 2043     No current outpatient medications on file.   ..     Medication and Non-Pharmacologic VTE Prophylaxis/Anticoagulants      Last Anticoag Admin            enoxaparin (Lovenox) syringe 40 mg    Given 40 mg at 0535    Frequency: Every 24 hours         There are additional administrations since 04/18/24 0858 that are not shown.    No unadministered anticoagulant orders found.          Assessment/Plan   Rose Marie Toussaint has  Principal Problem:    Chronic osteomyelitis involving ankle and foot, left (Multi)  Active Problems:    Septic joint (Multi)    Alcohol withdrawal delirium (Multi)    Critical limb ischemia of right lower extremity (Multi)     On Cefazolin (MSSA)  Plan for BKA. Awaiting sister to make decision        On CIWA scale.   Other Hospital problems        Abnormal findings not addressed during hospitalization, but require out patient follow up. None        I spent 35 minutes talking and examining Rose Marie Toussaint, reviewing the labs & medications, formulating plan of care & discussing with NP and nursing staff.    Octavio Jeong MD

## 2024-04-22 ENCOUNTER — ANESTHESIA EVENT (OUTPATIENT)
Dept: OPERATING ROOM | Facility: HOSPITAL | Age: 69
DRG: 475 | End: 2024-04-22
Payer: COMMERCIAL

## 2024-04-22 ENCOUNTER — ANESTHESIA (OUTPATIENT)
Dept: OPERATING ROOM | Facility: HOSPITAL | Age: 69
DRG: 475 | End: 2024-04-22
Payer: COMMERCIAL

## 2024-04-22 PROBLEM — E27.9 ADRENAL CORTICAL NODULE: Status: ACTIVE | Noted: 2024-04-22

## 2024-04-22 PROBLEM — E27.8: Status: ACTIVE | Noted: 2024-04-22

## 2024-04-22 LAB
ALBUMIN SERPL BCP-MCNC: 3.4 G/DL (ref 3.4–5)
ALP SERPL-CCNC: 91 U/L (ref 33–136)
ALT SERPL W P-5'-P-CCNC: <3 U/L (ref 7–45)
ANION GAP SERPL CALC-SCNC: 15 MMOL/L (ref 10–20)
AST SERPL W P-5'-P-CCNC: 16 U/L (ref 9–39)
BASOPHILS # BLD AUTO: 0.1 X10*3/UL (ref 0–0.1)
BASOPHILS NFR BLD AUTO: 0.9 %
BILIRUB SERPL-MCNC: 0.2 MG/DL (ref 0–1.2)
BUN SERPL-MCNC: 12 MG/DL (ref 6–23)
CALCIUM SERPL-MCNC: 9 MG/DL (ref 8.6–10.3)
CHLORIDE SERPL-SCNC: 97 MMOL/L (ref 98–107)
CO2 SERPL-SCNC: 24 MMOL/L (ref 21–32)
CREAT SERPL-MCNC: 1.2 MG/DL (ref 0.5–1.05)
EGFRCR SERPLBLD CKD-EPI 2021: 50 ML/MIN/1.73M*2
EOSINOPHIL # BLD AUTO: 0.23 X10*3/UL (ref 0–0.7)
EOSINOPHIL NFR BLD AUTO: 2.1 %
ERYTHROCYTE [DISTWIDTH] IN BLOOD BY AUTOMATED COUNT: 13.2 % (ref 11.5–14.5)
GLUCOSE SERPL-MCNC: 87 MG/DL (ref 74–99)
HCT VFR BLD AUTO: 37.9 % (ref 36–46)
HGB BLD-MCNC: 12.5 G/DL (ref 12–16)
IMM GRANULOCYTES # BLD AUTO: 0.26 X10*3/UL (ref 0–0.7)
IMM GRANULOCYTES NFR BLD AUTO: 2.4 % (ref 0–0.9)
LYMPHOCYTES # BLD AUTO: 1.43 X10*3/UL (ref 1.2–4.8)
LYMPHOCYTES NFR BLD AUTO: 13.2 %
MAGNESIUM SERPL-MCNC: 1.8 MG/DL (ref 1.6–2.4)
MCH RBC QN AUTO: 31.6 PG (ref 26–34)
MCHC RBC AUTO-ENTMCNC: 33 G/DL (ref 32–36)
MCV RBC AUTO: 96 FL (ref 80–100)
MONOCYTES # BLD AUTO: 0.81 X10*3/UL (ref 0.1–1)
MONOCYTES NFR BLD AUTO: 7.5 %
NEUTROPHILS # BLD AUTO: 7.98 X10*3/UL (ref 1.2–7.7)
NEUTROPHILS NFR BLD AUTO: 73.9 %
NRBC BLD-RTO: 0 /100 WBCS (ref 0–0)
PLATELET # BLD AUTO: 477 X10*3/UL (ref 150–450)
POTASSIUM SERPL-SCNC: 3.3 MMOL/L (ref 3.5–5.3)
PROT SERPL-MCNC: 6.5 G/DL (ref 6.4–8.2)
RBC # BLD AUTO: 3.96 X10*6/UL (ref 4–5.2)
SODIUM SERPL-SCNC: 133 MMOL/L (ref 136–145)
WBC # BLD AUTO: 10.8 X10*3/UL (ref 4.4–11.3)

## 2024-04-22 PROCEDURE — 2500000006 HC RX 250 W HCPCS SELF ADMINISTERED DRUGS (ALT 637 FOR ALL PAYERS): Performed by: PSYCHIATRY & NEUROLOGY

## 2024-04-22 PROCEDURE — 047J3DZ DILATION OF LEFT EXTERNAL ILIAC ARTERY WITH INTRALUMINAL DEVICE, PERCUTANEOUS APPROACH: ICD-10-PCS | Performed by: STUDENT IN AN ORGANIZED HEALTH CARE EDUCATION/TRAINING PROGRAM

## 2024-04-22 PROCEDURE — 88307 TISSUE EXAM BY PATHOLOGIST: CPT | Mod: TC,AHULAB | Performed by: STUDENT IN AN ORGANIZED HEALTH CARE EDUCATION/TRAINING PROGRAM

## 2024-04-22 PROCEDURE — 2500000004 HC RX 250 GENERAL PHARMACY W/ HCPCS (ALT 636 FOR OP/ED): Performed by: ANESTHESIOLOGIST ASSISTANT

## 2024-04-22 PROCEDURE — S4991 NICOTINE PATCH NONLEGEND: HCPCS

## 2024-04-22 PROCEDURE — A27880 PR AMPUTATION LOW LEG THRU TIB/FIB: Performed by: ANESTHESIOLOGY

## 2024-04-22 PROCEDURE — 7100000002 HC RECOVERY ROOM TIME - EACH INCREMENTAL 1 MINUTE: Performed by: STUDENT IN AN ORGANIZED HEALTH CARE EDUCATION/TRAINING PROGRAM

## 2024-04-22 PROCEDURE — 88307 TISSUE EXAM BY PATHOLOGIST: CPT | Performed by: PATHOLOGY

## 2024-04-22 PROCEDURE — 2500000004 HC RX 250 GENERAL PHARMACY W/ HCPCS (ALT 636 FOR OP/ED): Performed by: INTERNAL MEDICINE

## 2024-04-22 PROCEDURE — 85025 COMPLETE CBC W/AUTO DIFF WBC: CPT | Performed by: INTERNAL MEDICINE

## 2024-04-22 PROCEDURE — 2720000007 HC OR 272 NO HCPCS: Performed by: STUDENT IN AN ORGANIZED HEALTH CARE EDUCATION/TRAINING PROGRAM

## 2024-04-22 PROCEDURE — 3600000003 HC OR TIME - INITIAL BASE CHARGE - PROCEDURE LEVEL THREE: Performed by: STUDENT IN AN ORGANIZED HEALTH CARE EDUCATION/TRAINING PROGRAM

## 2024-04-22 PROCEDURE — 2500000002 HC RX 250 W HCPCS SELF ADMINISTERED DRUGS (ALT 637 FOR MEDICARE OP, ALT 636 FOR OP/ED)

## 2024-04-22 PROCEDURE — 88311 DECALCIFY TISSUE: CPT | Performed by: PATHOLOGY

## 2024-04-22 PROCEDURE — 2500000001 HC RX 250 WO HCPCS SELF ADMINISTERED DRUGS (ALT 637 FOR MEDICARE OP): Performed by: INTERNAL MEDICINE

## 2024-04-22 PROCEDURE — 3600000008 HC OR TIME - EACH INCREMENTAL 1 MINUTE - PROCEDURE LEVEL THREE: Performed by: STUDENT IN AN ORGANIZED HEALTH CARE EDUCATION/TRAINING PROGRAM

## 2024-04-22 PROCEDURE — 3700000001 HC GENERAL ANESTHESIA TIME - INITIAL BASE CHARGE: Performed by: STUDENT IN AN ORGANIZED HEALTH CARE EDUCATION/TRAINING PROGRAM

## 2024-04-22 PROCEDURE — 80053 COMPREHEN METABOLIC PANEL: CPT

## 2024-04-22 PROCEDURE — 2500000006 HC RX 250 W HCPCS SELF ADMINISTERED DRUGS (ALT 637 FOR ALL PAYERS): Mod: MUE | Performed by: PSYCHIATRY & NEUROLOGY

## 2024-04-22 PROCEDURE — 27880 AMPUTATION OF LOWER LEG: CPT | Performed by: STUDENT IN AN ORGANIZED HEALTH CARE EDUCATION/TRAINING PROGRAM

## 2024-04-22 PROCEDURE — 2500000004 HC RX 250 GENERAL PHARMACY W/ HCPCS (ALT 636 FOR OP/ED): Performed by: STUDENT IN AN ORGANIZED HEALTH CARE EDUCATION/TRAINING PROGRAM

## 2024-04-22 PROCEDURE — 7100000001 HC RECOVERY ROOM TIME - INITIAL BASE CHARGE: Performed by: STUDENT IN AN ORGANIZED HEALTH CARE EDUCATION/TRAINING PROGRAM

## 2024-04-22 PROCEDURE — 0Y6J0Z1 DETACHMENT AT LEFT LOWER LEG, HIGH, OPEN APPROACH: ICD-10-PCS | Performed by: STUDENT IN AN ORGANIZED HEALTH CARE EDUCATION/TRAINING PROGRAM

## 2024-04-22 PROCEDURE — 36415 COLL VENOUS BLD VENIPUNCTURE: CPT | Performed by: INTERNAL MEDICINE

## 2024-04-22 PROCEDURE — A4217 STERILE WATER/SALINE, 500 ML: HCPCS | Mod: MUE | Performed by: STUDENT IN AN ORGANIZED HEALTH CARE EDUCATION/TRAINING PROGRAM

## 2024-04-22 PROCEDURE — 3700000002 HC GENERAL ANESTHESIA TIME - EACH INCREMENTAL 1 MINUTE: Performed by: STUDENT IN AN ORGANIZED HEALTH CARE EDUCATION/TRAINING PROGRAM

## 2024-04-22 PROCEDURE — 2500000005 HC RX 250 GENERAL PHARMACY W/O HCPCS: Performed by: ANESTHESIOLOGY

## 2024-04-22 PROCEDURE — 1100000001 HC PRIVATE ROOM DAILY

## 2024-04-22 PROCEDURE — A27880 PR AMPUTATION LOW LEG THRU TIB/FIB: Performed by: ANESTHESIOLOGIST ASSISTANT

## 2024-04-22 PROCEDURE — 83735 ASSAY OF MAGNESIUM: CPT

## 2024-04-22 PROCEDURE — 2500000005 HC RX 250 GENERAL PHARMACY W/O HCPCS: Performed by: ANESTHESIOLOGIST ASSISTANT

## 2024-04-22 PROCEDURE — 2500000004 HC RX 250 GENERAL PHARMACY W/ HCPCS (ALT 636 FOR OP/ED): Performed by: ANESTHESIOLOGY

## 2024-04-22 PROCEDURE — 2500000004 HC RX 250 GENERAL PHARMACY W/ HCPCS (ALT 636 FOR OP/ED): Mod: JZ | Performed by: INTERNAL MEDICINE

## 2024-04-22 RX ORDER — SODIUM CHLORIDE, SODIUM LACTATE, POTASSIUM CHLORIDE, CALCIUM CHLORIDE 600; 310; 30; 20 MG/100ML; MG/100ML; MG/100ML; MG/100ML
100 INJECTION, SOLUTION INTRAVENOUS CONTINUOUS
Status: DISCONTINUED | OUTPATIENT
Start: 2024-04-22 | End: 2024-04-22 | Stop reason: HOSPADM

## 2024-04-22 RX ORDER — VANCOMYCIN HYDROCHLORIDE 1 G/20ML
INJECTION, POWDER, LYOPHILIZED, FOR SOLUTION INTRAVENOUS AS NEEDED
Status: DISCONTINUED | OUTPATIENT
Start: 2024-04-22 | End: 2024-04-22 | Stop reason: HOSPADM

## 2024-04-22 RX ORDER — SODIUM CHLORIDE, SODIUM LACTATE, POTASSIUM CHLORIDE, CALCIUM CHLORIDE 600; 310; 30; 20 MG/100ML; MG/100ML; MG/100ML; MG/100ML
INJECTION, SOLUTION INTRAVENOUS CONTINUOUS PRN
Status: DISCONTINUED | OUTPATIENT
Start: 2024-04-22 | End: 2024-04-22

## 2024-04-22 RX ORDER — FENTANYL CITRATE 50 UG/ML
INJECTION, SOLUTION INTRAMUSCULAR; INTRAVENOUS AS NEEDED
Status: DISCONTINUED | OUTPATIENT
Start: 2024-04-22 | End: 2024-04-22

## 2024-04-22 RX ORDER — TOBRAMYCIN 1.2 G/30ML
INJECTION, POWDER, LYOPHILIZED, FOR SOLUTION INTRAVENOUS AS NEEDED
Status: DISCONTINUED | OUTPATIENT
Start: 2024-04-22 | End: 2024-04-22 | Stop reason: HOSPADM

## 2024-04-22 RX ORDER — ROCURONIUM BROMIDE 10 MG/ML
INJECTION, SOLUTION INTRAVENOUS AS NEEDED
Status: DISCONTINUED | OUTPATIENT
Start: 2024-04-22 | End: 2024-04-22

## 2024-04-22 RX ORDER — POTASSIUM CHLORIDE 14.9 MG/ML
20 INJECTION INTRAVENOUS
Status: DISPENSED | OUTPATIENT
Start: 2024-04-22 | End: 2024-04-22

## 2024-04-22 RX ORDER — METOPROLOL TARTRATE 1 MG/ML
INJECTION, SOLUTION INTRAVENOUS AS NEEDED
Status: DISCONTINUED | OUTPATIENT
Start: 2024-04-22 | End: 2024-04-22

## 2024-04-22 RX ORDER — HYDRALAZINE HYDROCHLORIDE 20 MG/ML
5 INJECTION INTRAMUSCULAR; INTRAVENOUS EVERY 30 MIN PRN
Status: DISCONTINUED | OUTPATIENT
Start: 2024-04-22 | End: 2024-04-22 | Stop reason: HOSPADM

## 2024-04-22 RX ORDER — LABETALOL HYDROCHLORIDE 5 MG/ML
5 INJECTION, SOLUTION INTRAVENOUS ONCE AS NEEDED
Status: DISCONTINUED | OUTPATIENT
Start: 2024-04-22 | End: 2024-04-22 | Stop reason: HOSPADM

## 2024-04-22 RX ORDER — SODIUM CHLORIDE 0.9 G/100ML
IRRIGANT IRRIGATION AS NEEDED
Status: DISCONTINUED | OUTPATIENT
Start: 2024-04-22 | End: 2024-04-22 | Stop reason: HOSPADM

## 2024-04-22 RX ORDER — ONDANSETRON HYDROCHLORIDE 2 MG/ML
4 INJECTION, SOLUTION INTRAVENOUS ONCE AS NEEDED
Status: DISCONTINUED | OUTPATIENT
Start: 2024-04-22 | End: 2024-04-22 | Stop reason: HOSPADM

## 2024-04-22 RX ORDER — PROPOFOL 10 MG/ML
INJECTION, EMULSION INTRAVENOUS AS NEEDED
Status: DISCONTINUED | OUTPATIENT
Start: 2024-04-22 | End: 2024-04-22

## 2024-04-22 RX ORDER — MIDAZOLAM HYDROCHLORIDE 1 MG/ML
INJECTION INTRAMUSCULAR; INTRAVENOUS AS NEEDED
Status: DISCONTINUED | OUTPATIENT
Start: 2024-04-22 | End: 2024-04-22

## 2024-04-22 RX ORDER — LIDOCAINE HYDROCHLORIDE 20 MG/ML
INJECTION, SOLUTION EPIDURAL; INFILTRATION; INTRACAUDAL; PERINEURAL AS NEEDED
Status: DISCONTINUED | OUTPATIENT
Start: 2024-04-22 | End: 2024-04-22

## 2024-04-22 RX ORDER — TRANEXAMIC ACID 100 MG/ML
INJECTION, SOLUTION INTRAVENOUS AS NEEDED
Status: DISCONTINUED | OUTPATIENT
Start: 2024-04-22 | End: 2024-04-22

## 2024-04-22 RX ORDER — HYDROMORPHONE HYDROCHLORIDE 1 MG/ML
INJECTION, SOLUTION INTRAMUSCULAR; INTRAVENOUS; SUBCUTANEOUS AS NEEDED
Status: DISCONTINUED | OUTPATIENT
Start: 2024-04-22 | End: 2024-04-22

## 2024-04-22 RX ORDER — CEFAZOLIN 1 G/1
INJECTION, POWDER, FOR SOLUTION INTRAVENOUS AS NEEDED
Status: DISCONTINUED | OUTPATIENT
Start: 2024-04-22 | End: 2024-04-22

## 2024-04-22 RX ORDER — OXYCODONE HYDROCHLORIDE 5 MG/1
5 TABLET ORAL EVERY 4 HOURS PRN
Status: DISCONTINUED | OUTPATIENT
Start: 2024-04-22 | End: 2024-04-22 | Stop reason: HOSPADM

## 2024-04-22 RX ORDER — PHENYLEPHRINE HCL IN 0.9% NACL 1 MG/10 ML
SYRINGE (ML) INTRAVENOUS AS NEEDED
Status: DISCONTINUED | OUTPATIENT
Start: 2024-04-22 | End: 2024-04-22

## 2024-04-22 RX ORDER — LIDOCAINE HYDROCHLORIDE 10 MG/ML
0.1 INJECTION, SOLUTION EPIDURAL; INFILTRATION; INTRACAUDAL; PERINEURAL ONCE
Status: DISCONTINUED | OUTPATIENT
Start: 2024-04-22 | End: 2024-04-22 | Stop reason: HOSPADM

## 2024-04-22 RX ORDER — ALBUTEROL SULFATE 0.83 MG/ML
2.5 SOLUTION RESPIRATORY (INHALATION) ONCE AS NEEDED
Status: DISCONTINUED | OUTPATIENT
Start: 2024-04-22 | End: 2024-04-22 | Stop reason: HOSPADM

## 2024-04-22 RX ORDER — ONDANSETRON HYDROCHLORIDE 2 MG/ML
INJECTION, SOLUTION INTRAVENOUS AS NEEDED
Status: DISCONTINUED | OUTPATIENT
Start: 2024-04-22 | End: 2024-04-22

## 2024-04-22 RX ADMIN — DEXAMETHASONE SODIUM PHOSPHATE 8 MG: 4 INJECTION, SOLUTION INTRAMUSCULAR; INTRAVENOUS at 18:28

## 2024-04-22 RX ADMIN — Medication 4 L/MIN: at 21:51

## 2024-04-22 RX ADMIN — CEFAZOLIN SODIUM 2 G: 2 INJECTION, SOLUTION INTRAVENOUS at 00:42

## 2024-04-22 RX ADMIN — LIDOCAINE HYDROCHLORIDE 60 MG: 20 INJECTION, SOLUTION EPIDURAL; INFILTRATION; INTRACAUDAL; PERINEURAL at 18:09

## 2024-04-22 RX ADMIN — TRANEXAMIC ACID 1000 MG: 1 INJECTION, SOLUTION INTRAVENOUS at 18:29

## 2024-04-22 RX ADMIN — PROPOFOL 150 MG: 10 INJECTION, EMULSION INTRAVENOUS at 18:09

## 2024-04-22 RX ADMIN — METOPROLOL TARTRATE 5 MG: 5 INJECTION INTRAVENOUS at 19:24

## 2024-04-22 RX ADMIN — CEFAZOLIN 2 G: 1 INJECTION, POWDER, FOR SOLUTION INTRAMUSCULAR; INTRAVENOUS at 18:15

## 2024-04-22 RX ADMIN — ONDANSETRON 4 MG: 2 INJECTION INTRAMUSCULAR; INTRAVENOUS at 20:18

## 2024-04-22 RX ADMIN — SUGAMMADEX 200 MG: 100 INJECTION, SOLUTION INTRAVENOUS at 20:10

## 2024-04-22 RX ADMIN — SERTRALINE HYDROCHLORIDE 50 MG: 50 TABLET ORAL at 14:25

## 2024-04-22 RX ADMIN — NICOTINE 1 PATCH: 14 PATCH, EXTENDED RELEASE TRANSDERMAL at 11:29

## 2024-04-22 RX ADMIN — POTASSIUM CHLORIDE 20 MEQ: 14.9 INJECTION, SOLUTION INTRAVENOUS at 11:29

## 2024-04-22 RX ADMIN — SODIUM CHLORIDE, POTASSIUM CHLORIDE, SODIUM LACTATE AND CALCIUM CHLORIDE: 600; 310; 30; 20 INJECTION, SOLUTION INTRAVENOUS at 17:51

## 2024-04-22 RX ADMIN — ROCURONIUM BROMIDE 50 MG: 10 INJECTION INTRAVENOUS at 18:10

## 2024-04-22 RX ADMIN — FENTANYL CITRATE 50 MCG: 50 INJECTION, SOLUTION INTRAMUSCULAR; INTRAVENOUS at 18:09

## 2024-04-22 RX ADMIN — DILTIAZEM HYDROCHLORIDE 240 MG: 120 CAPSULE, EXTENDED RELEASE ORAL at 14:23

## 2024-04-22 RX ADMIN — HYDROMORPHONE HYDROCHLORIDE 1 MG: 1 INJECTION, SOLUTION INTRAMUSCULAR; INTRAVENOUS; SUBCUTANEOUS at 20:13

## 2024-04-22 RX ADMIN — Medication 8 L/MIN: at 20:59

## 2024-04-22 RX ADMIN — Medication 100 MCG: at 18:25

## 2024-04-22 RX ADMIN — HYDROMORPHONE HYDROCHLORIDE 0.5 MG: 1 INJECTION, SOLUTION INTRAMUSCULAR; INTRAVENOUS; SUBCUTANEOUS at 18:45

## 2024-04-22 RX ADMIN — HYDROMORPHONE HYDROCHLORIDE 0.5 MG: 1 INJECTION, SOLUTION INTRAMUSCULAR; INTRAVENOUS; SUBCUTANEOUS at 21:51

## 2024-04-22 RX ADMIN — CEFAZOLIN SODIUM 2 G: 2 INJECTION, SOLUTION INTRAVENOUS at 15:54

## 2024-04-22 RX ADMIN — PROPOFOL 20 MG: 10 INJECTION, EMULSION INTRAVENOUS at 20:13

## 2024-04-22 RX ADMIN — PROPOFOL 30 MG: 10 INJECTION, EMULSION INTRAVENOUS at 18:45

## 2024-04-22 RX ADMIN — MIDAZOLAM HYDROCHLORIDE 2 MG: 1 INJECTION, SOLUTION INTRAMUSCULAR; INTRAVENOUS at 18:05

## 2024-04-22 RX ADMIN — HYDROMORPHONE HYDROCHLORIDE 0.5 MG: 1 INJECTION, SOLUTION INTRAMUSCULAR; INTRAVENOUS; SUBCUTANEOUS at 19:12

## 2024-04-22 RX ADMIN — FENTANYL CITRATE 50 MCG: 50 INJECTION, SOLUTION INTRAMUSCULAR; INTRAVENOUS at 18:42

## 2024-04-22 SDOH — HEALTH STABILITY: MENTAL HEALTH: CURRENT SMOKER: 1

## 2024-04-22 ASSESSMENT — LIFESTYLE VARIABLES
HEADACHE, FULLNESS IN HEAD: NOT PRESENT
HEADACHE, FULLNESS IN HEAD: NOT PRESENT
AUDITORY DISTURBANCES: NOT PRESENT
AUDITORY DISTURBANCES: NOT PRESENT
TOTAL SCORE: 4
PAROXYSMAL SWEATS: NO SWEAT VISIBLE
TOTAL SCORE: 3
ANXIETY: MODERATELY ANXIOUS, OR GUARDED, SO ANXIETY IS INFERRED
AGITATION: NORMAL ACTIVITY
VISUAL DISTURBANCES: NOT PRESENT
ANXIETY: 2
NAUSEA AND VOMITING: NO NAUSEA AND NO VOMITING
TOTAL SCORE: 3
NAUSEA AND VOMITING: NO NAUSEA AND NO VOMITING
AGITATION: NORMAL ACTIVITY
VISUAL DISTURBANCES: NOT PRESENT
ANXIETY: 2
TREMOR: NOT VISIBLE, BUT CAN BE FELT FINGERTIP TO FINGERTIP
AUDITORY DISTURBANCES: NOT PRESENT
ORIENTATION AND CLOUDING OF SENSORIUM: ORIENTED AND CAN DO SERIAL ADDITIONS
TREMOR: NOT VISIBLE, BUT CAN BE FELT FINGERTIP TO FINGERTIP
VISUAL DISTURBANCES: NOT PRESENT
AGITATION: NORMAL ACTIVITY
PAROXYSMAL SWEATS: NO SWEAT VISIBLE
HEADACHE, FULLNESS IN HEAD: NOT PRESENT
PAROXYSMAL SWEATS: NO SWEAT VISIBLE
NAUSEA AND VOMITING: NO NAUSEA AND NO VOMITING
ORIENTATION AND CLOUDING OF SENSORIUM: ORIENTED AND CAN DO SERIAL ADDITIONS
TREMOR: NO TREMOR
ORIENTATION AND CLOUDING OF SENSORIUM: ORIENTED AND CAN DO SERIAL ADDITIONS

## 2024-04-22 ASSESSMENT — COGNITIVE AND FUNCTIONAL STATUS - GENERAL
MOBILITY SCORE: 18
MOVING TO AND FROM BED TO CHAIR: A LOT
STANDING UP FROM CHAIR USING ARMS: A LITTLE
CLIMB 3 TO 5 STEPS WITH RAILING: A LOT
PERSONAL GROOMING: A LOT
DRESSING REGULAR UPPER BODY CLOTHING: A LITTLE
DRESSING REGULAR LOWER BODY CLOTHING: A LITTLE
MOVING TO AND FROM BED TO CHAIR: A LOT
DAILY ACTIVITIY SCORE: 17
MOBILITY SCORE: 18
WALKING IN HOSPITAL ROOM: A LITTLE
HELP NEEDED FOR BATHING: A LITTLE
HELP NEEDED FOR BATHING: A LITTLE
TOILETING: A LOT
CLIMB 3 TO 5 STEPS WITH RAILING: A LOT
DRESSING REGULAR UPPER BODY CLOTHING: A LITTLE
PERSONAL GROOMING: A LOT
WALKING IN HOSPITAL ROOM: A LITTLE
DRESSING REGULAR LOWER BODY CLOTHING: A LITTLE
STANDING UP FROM CHAIR USING ARMS: A LITTLE
TOILETING: A LOT
DAILY ACTIVITIY SCORE: 17

## 2024-04-22 ASSESSMENT — PAIN - FUNCTIONAL ASSESSMENT
PAIN_FUNCTIONAL_ASSESSMENT: UNABLE TO SELF-REPORT
PAIN_FUNCTIONAL_ASSESSMENT: 0-10
PAIN_FUNCTIONAL_ASSESSMENT: UNABLE TO SELF-REPORT
PAIN_FUNCTIONAL_ASSESSMENT: UNABLE TO SELF-REPORT
PAIN_FUNCTIONAL_ASSESSMENT: 0-10
PAIN_FUNCTIONAL_ASSESSMENT: UNABLE TO SELF-REPORT
PAIN_FUNCTIONAL_ASSESSMENT: 0-10

## 2024-04-22 ASSESSMENT — PAIN DESCRIPTION - DESCRIPTORS
DESCRIPTORS: THROBBING
DESCRIPTORS: THROBBING

## 2024-04-22 ASSESSMENT — PAIN SCALES - GENERAL
PAINLEVEL_OUTOF10: 5 - MODERATE PAIN
PAINLEVEL_OUTOF10: 7
PAINLEVEL_OUTOF10: 5 - MODERATE PAIN
PAINLEVEL_OUTOF10: 0 - NO PAIN
PAINLEVEL_OUTOF10: 0 - NO PAIN

## 2024-04-22 NOTE — SIGNIFICANT EVENT
Patient was seen with her sisters Janice and Josephine as well as the bedside nurse and Dr. Ernst.  Patient does demonstrate insight to her complicated situation involving her infected left lower extremity with open wounds and malalignment.  She is considering going forward with a left below-knee amputation and demonstrates understanding regarding the potential complications of an attempt for limb salvage including infection, wound healing complications, need for additional surgery and potential failure resulting in left below-knee amputation.  The patient is supported by her sisters and this decision making.      Flako Guerra MD, ALEJA  Department of Orthopaedic Surgery  [facility]

## 2024-04-22 NOTE — PROGRESS NOTES
ORTHOPAEDIC SURGERY INPATIENT PROGRESS NOTE    Subjective   NAEON.  Patient continues to improve with respect to her cognition.  She is alert and oriented to person and place.  She does recall our conversation from 04/21/2024 and was able to relay that we are considering a left below-knee amputation.  She is in agreement to go forward with the planned procedure.      REVIEW OF SYSTEMS  Constitutional: no unplanned weight loss  Psychiatric: no suicidal ideation  ENT: no vision changes, no sinus problems  Pulmonary: no shortness of breath  Lymphatic: no enlarged lymph nodes  Cardiovascular: no chest pain or shortness of breath  Gastrointestinal: no stomach problems  Genitourinary: no dysuria   Skin: no rashes  Endocrine: no thyroid problems  Neurological: no headache, no numbness  Hematological: no easy bruising  Musculoskeletal: Left ankle pain    Objective   PHYSICAL EXAMINATION  Constitutional Exam: frail appearing, does not appear stated age  Psychiatric Exam: alert and not oriented, appropriate mood and behavior  Eye Exam: EOMI  Pulmonary Exam: breathing non-labored, no apparent distress  Lymphatic exam: no appreciable lymphadenopathy in the lower extremities  Cardiovascular exam: RRR to peripheral palpation, DP pulses 2+, PT 2+, toes are pink with good capillary refill, no pitting edema  Skin exam: no open lesions, rashes, abrasions or ulcerations  Neurological exam: sensation to light touch intact in both lower extremities in peripheral and dermatomal distributions (except for any abnormalities noted in musculoskeletal exam)    Musculoskeletal exam: Left lower extremity examination.  Patient with continued obvious varus deformity of the ankle. Patient has diminished sensation to light touch grossly the plantar surface of the foot and about the dorsum of the foot.  Intact but pain limited PF/DF.  She is 1+ DP pulse palpated.    Last Recorded Vitals  Blood pressure 113/73, pulse 83, temperature 36.1 °C (97 °F),  resp. rate 18, height 1.524 m (5'), weight 56.7 kg (125 lb), SpO2 96%.    Relevant Results  Results for orders placed or performed during the hospital encounter of 04/16/24 (from the past 24 hour(s))   CBC   Result Value Ref Range    WBC 11.0 4.4 - 11.3 x10*3/uL    nRBC 0.0 0.0 - 0.0 /100 WBCs    RBC 3.75 (L) 4.00 - 5.20 x10*6/uL    Hemoglobin 11.6 (L) 12.0 - 16.0 g/dL    Hematocrit 35.5 (L) 36.0 - 46.0 %    MCV 95 80 - 100 fL    MCH 30.9 26.0 - 34.0 pg    MCHC 32.7 32.0 - 36.0 g/dL    RDW 13.1 11.5 - 14.5 %    Platelets 583 (H) 150 - 450 x10*3/uL   Comprehensive metabolic panel   Result Value Ref Range    Glucose 92 74 - 99 mg/dL    Sodium 134 (L) 136 - 145 mmol/L    Potassium 3.8 3.5 - 5.3 mmol/L    Chloride 93 (L) 98 - 107 mmol/L    Bicarbonate 30 21 - 32 mmol/L    Anion Gap 15 10 - 20 mmol/L    Urea Nitrogen 14 6 - 23 mg/dL    Creatinine 1.34 (H) 0.50 - 1.05 mg/dL    eGFR 44 (L) >60 mL/min/1.73m*2    Calcium 9.1 8.6 - 10.3 mg/dL    Albumin 3.2 (L) 3.4 - 5.0 g/dL    Alkaline Phosphatase 111 33 - 136 U/L    Total Protein 5.9 (L) 6.4 - 8.2 g/dL    AST 16 9 - 39 U/L    Bilirubin, Total 0.2 0.0 - 1.2 mg/dL    ALT <3 (L) 7 - 45 U/L   Magnesium   Result Value Ref Range    Magnesium 1.60 1.60 - 2.40 mg/dL   CBC and Auto Differential   Result Value Ref Range    WBC 10.8 4.4 - 11.3 x10*3/uL    nRBC 0.0 0.0 - 0.0 /100 WBCs    RBC 3.96 (L) 4.00 - 5.20 x10*6/uL    Hemoglobin 12.5 12.0 - 16.0 g/dL    Hematocrit 37.9 36.0 - 46.0 %    MCV 96 80 - 100 fL    MCH 31.6 26.0 - 34.0 pg    MCHC 33.0 32.0 - 36.0 g/dL    RDW 13.2 11.5 - 14.5 %    Platelets 477 (H) 150 - 450 x10*3/uL    Neutrophils % 73.9 40.0 - 80.0 %    Immature Granulocytes %, Automated 2.4 (H) 0.0 - 0.9 %    Lymphocytes % 13.2 13.0 - 44.0 %    Monocytes % 7.5 2.0 - 10.0 %    Eosinophils % 2.1 0.0 - 6.0 %    Basophils % 0.9 0.0 - 2.0 %    Neutrophils Absolute 7.98 (H) 1.20 - 7.70 x10*3/uL    Immature Granulocytes Absolute, Automated 0.26 0.00 - 0.70 x10*3/uL     Lymphocytes Absolute 1.43 1.20 - 4.80 x10*3/uL    Monocytes Absolute 0.81 0.10 - 1.00 x10*3/uL    Eosinophils Absolute 0.23 0.00 - 0.70 x10*3/uL    Basophils Absolute 0.10 0.00 - 0.10 x10*3/uL       Relevant Imaging  No new imaging.    Assessment/Plan:  67 y/o F s/p L Ankle Bi-malleolar ankle fracture ORIF with syndesmotic stabilization with Dr. Frederick from 11/20/2022 with hardware failure, draining wounds concerning for OM in the setting of presumed alcohol abuse with peripheral neuropathy and ongoing tobacco abuse.  Patient remains afebrile with stable vitals.  ESR/CRP from 04/15/2024 of 94/23, CBC from 9 from 04/16/2024.      - EDOUARD HANDLEY  - NPO @ [facility]  - ID Consulted  - Vascular Surgery Consulted, recommendations appreciated  - PO Pain control, IV for breakthrough  - Please see narrative note for further details  - Encourage IS, supplemental O2 as needed  - DVT PPX: SCDs, AMERICO hose, hold chemoppx in setting of upcoming surgery    Flako Guerra MD, ALEJA  Department of Orthopaedic Surgery  [facility]    Additional Updates:  Patient previously deemed to lack capacity to consent for surgery as per previous documentation.  Her mental status appears to be clinically improving as she was able to demonstrate recall of our conversation from 04/21/2024.  Patient is in agreement to go forward with a left below-knee amputation.  I reviewed with the patient that I would recommend conferring with her siblings including Janice and Josephine and in light of previous mental status would intend to perform a witnessed informed consent.    The diagnosis and treatment plan were reviewed with the patient. All questions were answered. The patient verbalized understanding of the treatment plan. There were no barriers to understanding identified.    Note dictated with [employer] software.  Completed without full type editing and sent to avoid delay.

## 2024-04-22 NOTE — PROGRESS NOTES
INFECTIOUS DISEASE DAILY PROGRESS NOTE    SUBJECTIVE:    Surgery planned today, left BKA. No fevers.    OBJECTIVE:  VITALS (Last 24 Hours)  /76 (BP Location: Right arm, Patient Position: Lying)   Pulse 80   Temp 36.7 °C (98 °F) (Oral)   Resp 18   Ht 1.524 m (5')   Wt 56.7 kg (125 lb)   SpO2 98%   BMI 24.41 kg/m²     PHYSICAL EXAM:  Gen - NAD, in bed  Abd - soft, no ttp, BS present  LLE - in dressings  Skin - no rashes    ABX: IV Cefazolin    LABS:  Lab Results   Component Value Date    WBC 10.8 04/22/2024    HGB 12.5 04/22/2024    HCT 37.9 04/22/2024    MCV 96 04/22/2024     (H) 04/22/2024     Lab Results   Component Value Date    GLUCOSE 87 04/22/2024    CALCIUM 9.0 04/22/2024     (L) 04/22/2024    K 3.3 (L) 04/22/2024    CO2 24 04/22/2024    CL 97 (L) 04/22/2024    BUN 12 04/22/2024    CREATININE 1.20 (H) 04/22/2024     Results from last 72 hours   Lab Units 04/22/24  0616   ALK PHOS U/L 91   BILIRUBIN TOTAL mg/dL 0.2   PROTEIN TOTAL g/dL 6.5   ALT U/L <3*   AST U/L 16   ALBUMIN g/dL 3.4     Estimated Creatinine Clearance: 36.4 mL/min (A) (by C-G formula based on SCr of 1.2 mg/dL (H)).    ASSESSMENT/PLAN:     Left Ankle Hardware Associated Infection with Suspected OM due to MSSA  CKD - CrCl 42, affects abx dosing  Penicillin Allergy - limits abx options  Alcohol Dependence - withdrawal, on CIWA protocol     BKA pending LLE. Likely will continue on abx for 24H after and then stop.    IV Cefazolin 2g Q8H.    Monitoring for adverse effects of abx such as rash/itching/diarrhea - none.     Will follow. Thanks!    Jaycob Krishna MD  [facility]  Office #[phone]

## 2024-04-22 NOTE — ANESTHESIA PROCEDURE NOTES
Airway  Date/Time: 4/22/2024 6:15 PM  Urgency: elective    Airway not difficult    Staffing  Performed: JAX   Authorized by: JAX Mary    Performed by: JAX Mary  Patient location during procedure: OR    Indications and Patient Condition  Indications for airway management: anesthesia  Spontaneous Ventilation: absent  Sedation level: deep  Preoxygenated: yes  Mask difficulty assessment: 1 - vent by mask    Final Airway Details  Final airway type: endotracheal airway      Successful airway: ETT  Cuffed: yes   Successful intubation technique: video laryngoscopy  Endotracheal tube insertion site: oral  ETT size (mm): 7.0  Cormack-Lehane Classification: grade I - full view of glottis  Placement verified by: chest auscultation and capnometry   Measured from: lips  ETT to lips (cm): 22  Number of attempts at approach: 2    Additional Comments  Grade 2 view with cricoid Griffith 2.  Switched to glidescope grade 1 view easy placement.  Easy Mask

## 2024-04-22 NOTE — H&P
H&P reviewed. The patient was examined and there are no changes to the H&P. Patient electing to proceed with surgery. Patient marked and consented.      Steven Ernst MD  PGY-2 Orthopedic Surgery  [facility]  [employer] Preferred  Pager: [phone]    Please see progress note from same date.    Flako Guerra MD, ALEJA  Department of Orthopaedic Surgery  [facility]

## 2024-04-22 NOTE — ANESTHESIA PREPROCEDURE EVALUATION
Patient: Rose Marie Toussaint    Procedure Information       Anesthesia Start Date/Time: 04/22/24 1801    Procedure: Left Leg Below Knee Amputation (Left: Leg Lower)    Location: [facility] OR 04 / [facility] OR    Surgeons: Flako Guerra MD          69 yo F hx L ankle hardware infection and OM, CKD, ETOH abuse with withdrawal on CIWA (none in last 2 days; A&Ox2), COPD, hx tobacco abuse, peripheral neuropathy    Relevant Problems   Cardiac   (+) Critical limb ischemia of right lower extremity (Multi)   (+) Hyperlipidemia   (+) Hypertension      Pulmonary   (+) Chronic obstructive pulmonary disease (Multi)   (+) MCGUIRE (dyspnea on exertion)   (+) Pneumonia due to infectious organism      Neuro   (+) Depression      ID   (+) Chronic osteomyelitis involving ankle and foot, left (Multi)   (+) Pneumonia due to infectious organism   (+) Septic joint (Multi)       Clinical information reviewed:   Tobacco  Allergies  Meds   Med Hx  Surg Hx   Fam Hx  Soc Hx        NPO Detail:  NPO/Void Status  Date of Last Liquid: 04/21/24  Time of Last Liquid: 1500  Date of Last Solid: 04/21/24  Time of Last Solid: 1500  Last Intake Type: Food  Time of Last Void: 1600         Physical Exam    Airway  Mallampati: III  TM distance: >3 FB  Neck ROM: full     Cardiovascular   Rate: normal     Dental   Comments: Poor dentition.  Denies loose teeth   Pulmonary - normal exam     Abdominal            Anesthesia Plan    History of general anesthesia?: yes  History of complications of general anesthesia?: no    ASA 3     general     The patient is a current smoker.    intravenous induction   Postoperative administration of opioids is intended.  Anesthetic plan and risks discussed with patient.

## 2024-04-22 NOTE — PROGRESS NOTES
[facility]          Admitting Provider: Octavio Jeong MD Admission Date: 4/16/2024.   Attending Provider: Octavio Jeong MD MRN: [id number]       Subjective   Rose Marie Toussaint is a 68 y.o. female on day 6 of admission presenting with Chronic osteomyelitis involving ankle and foot, left (Multi).  Interval History more alert, less confused.     Objective   Physical Exam  Last Recorded Vitals: Blood pressure 146/76, pulse 80, temperature 36.7 °C (98 °F), temperature source Oral, resp. rate 18, height 1.524 m (5'), weight 56.7 kg (125 lb), SpO2 98%.  Patient Vitals for the past 24 hrs:   BP Temp Temp src Pulse Resp SpO2   04/22/24 0750 146/76 36.7 °C (98 °F) Oral 80 18 98 %   04/22/24 0521 113/73 36.1 °C (97 °F) -- 83 -- 96 %   04/21/24 2300 136/70 36.5 °C (97.7 °F) -- 85 18 95 %   04/21/24 1900 137/69 -- -- 76 18 96 %   04/21/24 1554 119/62 36.6 °C (97.9 °F) Axillary 81 18 97 %   04/21/24 1500 (!) 112/97 -- -- 81 -- --   04/21/24 1347 115/69 36.5 °C (97.7 °F) Axillary 84 18 96 %     Body mass index is 24.41 kg/m².  GENERAL: alert, cooperative, or no distress  SKIN: no rashes  NECK: supple, no thyromegaly, JVP within normal limits  LUNGS:  not in respiratory distress, respiratory rate normal, clear to auscultation  CARDIAC: regular rate and rhythm, normal S1 and S2, no murmur, rub, or gallop  ABDOMEN: Soft, non-tender, normal bowel sounds; no bruits, organomegaly or masses.  EXTREMITIES: No edema  NEURO: Alert and oriented x 2, gait normal., reflexes normal and symmetric, strength and  sensation grossly normal  Intake/Output last 3 Shifts:  I/O last 3 completed shifts:  In: 1390 (24.5 mL/kg) [P.O.:1190; IV Piggyback:200]  Out: - (0 mL/kg)   Weight: 56.7 kg   DATA:   Diagnostic tests reviewed for today's visit:    Most recent Labs  Results for orders placed or performed during the hospital encounter of 04/16/24 (from the past 24 hour(s))   Comprehensive metabolic panel   Result Value Ref  "Range    Glucose 87 74 - 99 mg/dL    Sodium 133 (L) 136 - 145 mmol/L    Potassium 3.3 (L) 3.5 - 5.3 mmol/L    Chloride 97 (L) 98 - 107 mmol/L    Bicarbonate 24 21 - 32 mmol/L    Anion Gap 15 10 - 20 mmol/L    Urea Nitrogen 12 6 - 23 mg/dL    Creatinine 1.20 (H) 0.50 - 1.05 mg/dL    eGFR 50 (L) >60 mL/min/1.73m*2    Calcium 9.0 8.6 - 10.3 mg/dL    Albumin 3.4 3.4 - 5.0 g/dL    Alkaline Phosphatase 91 33 - 136 U/L    Total Protein 6.5 6.4 - 8.2 g/dL    AST 16 9 - 39 U/L    Bilirubin, Total 0.2 0.0 - 1.2 mg/dL    ALT <3 (L) 7 - 45 U/L   Magnesium   Result Value Ref Range    Magnesium 1.80 1.60 - 2.40 mg/dL   CBC and Auto Differential   Result Value Ref Range    WBC 10.8 4.4 - 11.3 x10*3/uL    nRBC 0.0 0.0 - 0.0 /100 WBCs    RBC 3.96 (L) 4.00 - 5.20 x10*6/uL    Hemoglobin 12.5 12.0 - 16.0 g/dL    Hematocrit 37.9 36.0 - 46.0 %    MCV 96 80 - 100 fL    MCH 31.6 26.0 - 34.0 pg    MCHC 33.0 32.0 - 36.0 g/dL    RDW 13.2 11.5 - 14.5 %    Platelets 477 (H) 150 - 450 x10*3/uL    Neutrophils % 73.9 40.0 - 80.0 %    Immature Granulocytes %, Automated 2.4 (H) 0.0 - 0.9 %    Lymphocytes % 13.2 13.0 - 44.0 %    Monocytes % 7.5 2.0 - 10.0 %    Eosinophils % 2.1 0.0 - 6.0 %    Basophils % 0.9 0.0 - 2.0 %    Neutrophils Absolute 7.98 (H) 1.20 - 7.70 x10*3/uL    Immature Granulocytes Absolute, Automated 0.26 0.00 - 0.70 x10*3/uL    Lymphocytes Absolute 1.43 1.20 - 4.80 x10*3/uL    Monocytes Absolute 0.81 0.10 - 1.00 x10*3/uL    Eosinophils Absolute 0.23 0.00 - 0.70 x10*3/uL    Basophils Absolute 0.10 0.00 - 0.10 x10*3/uL     Urine Culture   Date Value Ref Range Status   05/24/2023 NO GROWTH  Final     Blood Culture   Date Value Ref Range Status   05/23/2023   Final    No Growth at 1 days~No Growth at 2 days~No Growth at 3 days~NO GROWTH at 4 days - FINAL REPORT    No results found for: \"RESPCULTSM\" No results found for: \"PERDIAFLDCUL\" No results found for: \"STERFLDCULSM\"   No results found.  Current Facility-Administered Medications "   Medication Dose Route Frequency Provider Last Rate Last Admin    acetaminophen (Tylenol) tablet 650 mg  650 mg oral q4h PRN Octavio Jeong MD        albuterol 2.5 mg /3 mL (0.083 %) nebulizer solution 2.5 mg  2.5 mg nebulization q2h PRN Octavio Jeong MD        atenolol (Tenormin) tablet 25 mg  25 mg oral Daily Octavio Jeong MD   25 mg at 04/21/24 0939    buPROPion (Wellbutrin) tablet 100 mg  100 mg oral Daily with breakfast Manda Kessler MD        ceFAZolin in dextrose (iso-os) (Ancef) IVPB 2 g  2 g intravenous q8h Jaycob Krishna MD   Stopped at 04/22/24 0112    [Held by provider] chlorthalidone (Hygroton) tablet 25 mg  25 mg oral Daily Ernie Rizo DO        dilTIAZem CD (Cardizem CD) 24 hr capsule 240 mg  240 mg oral Daily Octavio Jeong MD   240 mg at 04/21/24 0939    [Held by provider] enoxaparin (Lovenox) syringe 40 mg  40 mg subcutaneous q24h Octavio Jeong MD   40 mg at 04/21/24 0535    folic acid (Folvite) tablet 1 mg  1 mg oral Daily Octavio Jeong MD   1 mg at 04/21/24 0939    HYDROmorphone PF (Dilaudid) injection 0.2 mg  0.2 mg intravenous q3h PRN Octavio Jeong MD        lactulose 20 gram/30 mL oral solution 20 g  20 g oral Daily Octavio Jeong MD   20 g at 04/21/24 0939    LORazepam (Ativan) tablet 0.5 mg  0.5 mg oral q2h PRN Octavio Jeong MD   0.5 mg at 04/20/24 1307    Or    LORazepam (Ativan) tablet 1 mg  1 mg oral q2h PRN Octavio Jeong MD   1 mg at 04/19/24 0357    Or    LORazepam (Ativan) tablet 2 mg  2 mg oral q2h PRN Octavio Jeong MD   2 mg at 04/18/24 2123    melatonin tablet 4.5 mg  4.5 mg oral Nightly PRN Octavio Jeong MD   4.5 mg at 04/21/24 2048    multivitamin with minerals 1 tablet  1 tablet oral Daily Octavio Jeong MD   1 tablet at 04/21/24 0939    nicotine (Nicoderm CQ) 14 mg/24 hr patch 1 patch  1 patch transdermal Daily Ernie Rizo DO   1 patch at 04/21/24 0939    ondansetron (Zofran) injection 4 mg  4 mg  intravenous q8h PRN Octavio Jeong MD        oxyCODONE (Roxicodone) immediate release tablet 10 mg  10 mg oral q6h PRN Octavio Jeong MD   10 mg at 04/21/24 2048    oxyCODONE (Roxicodone) immediate release tablet 5 mg  5 mg oral q6h PRN Octavio Jeong MD        polyethylene glycol (Glycolax, Miralax) packet 17 g  17 g oral Daily PRN Octavio Jeong MD        pravastatin (Pravachol) tablet 20 mg  20 mg oral Daily Octavio Jeong MD   20 mg at 04/21/24 0939    QUEtiapine (SEROquel) tablet 25 mg  25 mg oral Nightly PRN Manda Kessler MD        sertraline (Zoloft) tablet 50 mg  50 mg oral Daily Manda Kessler MD         No current outpatient medications on file.   ..     Medication and Non-Pharmacologic VTE Prophylaxis/Anticoagulants      Last Anticoag Admin            enoxaparin (Lovenox) syringe 40 mg    Given 40 mg at 04/21/24 0535    Frequency: Every 24 hours         There are additional administrations since 04/19/24 0810 that are not shown.    No unadministered anticoagulant orders found.          Assessment/Plan   Rose Marie Toussaint has  Principal Problem:    Chronic osteomyelitis involving ankle and foot, left (Multi)  Active Problems:    Septic joint (Multi)    Alcohol withdrawal delirium (Multi)    Critical limb ischemia of right lower extremity (Multi)     On Cefazolin (MSSA)  Plan for BKA. Awaiting sister to make decision      improved. On CIWA scale.   Other Hospital problems        Abnormal findings not addressed during hospitalization, but require out patient follow up. None      I spent 35 minutes talking and examining Rose Marie Bolivaro, reviewing the labs & medications, formulating plan of care & discussing with NP and nursing staff.      Octavio Jeong MD

## 2024-04-22 NOTE — SIGNIFICANT EVENT
Pt did not sleep all last night  Will reduce bupropion to 100 mg am only  Reduce sertraline 50 mg am  Add PRN seroquel 25 mg hs  Add PRN melatonin 3 mg hs

## 2024-04-22 NOTE — PROGRESS NOTES
04/22/24 1112   Discharge Planning   Patient expects to be discharged to: [facility]     Patient is not med ready for discharged, still needs LBKA, on IV abx plan at discharge would be to go to [facility], I will continue to monitor for discharge planning.

## 2024-04-22 NOTE — PROGRESS NOTES
Occupational Therapy                 Therapy Communication Note    Patient Name: Rose Marie Toussaint  MRN: [id number]  Today's Date: 4/22/2024     Discipline: Occupational Therapy    Missed Visit Reason: Missed Visit Reason:  (Pt scheduled for OR today for BKA)    Missed Time: Attempt

## 2024-04-22 NOTE — NURSING NOTE
Patient to PACU at approximately 1730 accompanied by staff and patient's sisters.   No apparent distress noted at that time.    An Rivers

## 2024-04-22 NOTE — CARE PLAN
The patient's goals for the shift include      The clinical goals for the shift include pt will sleep overnight and remain free from falls and injury      Problem: Pain  Goal: My pain/discomfort is manageable  Outcome: Progressing     Problem: Safety  Goal: Patient will be injury free during hospitalization  Outcome: Progressing  Goal: I will remain free of falls  Outcome: Progressing     Problem: Daily Care  Goal: Daily care needs are met  Outcome: Progressing     Problem: Psychosocial Needs  Goal: Demonstrates ability to cope with hospitalization/illness  Outcome: Progressing  Goal: Collaborate with me, my family, and caregiver to identify my specific goals  Outcome: Progressing     Problem: Discharge Barriers  Goal: My discharge needs are met  Outcome: Progressing     Problem: Skin  Goal: Participates in plan/prevention/treatment measures  Outcome: Progressing  Goal: Prevent/manage excess moisture  Outcome: Progressing  Goal: Prevent/minimize sheer/friction injuries  Outcome: Progressing  Goal: Promote/optimize nutrition  Outcome: Progressing  Goal: Promote skin healing  Outcome: Progressing     Problem: Pain - Adult  Goal: Verbalizes/displays adequate comfort level or baseline comfort level  Outcome: Progressing     Problem: Safety - Adult  Goal: Free from fall injury  Outcome: Progressing     Problem: Discharge Planning  Goal: Discharge to home or other facility with appropriate resources  Outcome: Progressing     Problem: Chronic Conditions and Co-morbidities  Goal: Patient's chronic conditions and co-morbidity symptoms are monitored and maintained or improved  Outcome: Progressing     Problem: Pain  Goal: Takes deep breaths with improved pain control throughout the shift  Outcome: Progressing  Goal: Turns in bed with improved pain control throughout the shift  Outcome: Progressing  Goal: Walks with improved pain control throughout the shift  Outcome: Progressing  Goal: Performs ADL's with improved pain  control throughout shift  Outcome: Progressing  Goal: Participates in PT with improved pain control throughout the shift  Outcome: Progressing  Goal: Free from opioid side effects throughout the shift  Outcome: Progressing  Goal: Free from acute confusion related to pain meds throughout the shift  Outcome: Progressing

## 2024-04-23 LAB
ALBUMIN SERPL BCP-MCNC: 3.4 G/DL (ref 3.4–5)
ALP SERPL-CCNC: 92 U/L (ref 33–136)
ALT SERPL W P-5'-P-CCNC: <3 U/L (ref 7–45)
ANION GAP SERPL CALC-SCNC: 15 MMOL/L (ref 10–20)
AST SERPL W P-5'-P-CCNC: 22 U/L (ref 9–39)
BILIRUB SERPL-MCNC: 0.2 MG/DL (ref 0–1.2)
BUN SERPL-MCNC: 14 MG/DL (ref 6–23)
CALCIUM SERPL-MCNC: 8.9 MG/DL (ref 8.6–10.3)
CHLORIDE SERPL-SCNC: 95 MMOL/L (ref 98–107)
CO2 SERPL-SCNC: 26 MMOL/L (ref 21–32)
CREAT SERPL-MCNC: 1.03 MG/DL (ref 0.5–1.05)
EGFRCR SERPLBLD CKD-EPI 2021: 60 ML/MIN/1.73M*2
ERYTHROCYTE [DISTWIDTH] IN BLOOD BY AUTOMATED COUNT: 13.1 % (ref 11.5–14.5)
GLUCOSE SERPL-MCNC: 98 MG/DL (ref 74–99)
HCT VFR BLD AUTO: 35.4 % (ref 36–46)
HGB BLD-MCNC: 11.6 G/DL (ref 12–16)
MAGNESIUM SERPL-MCNC: 1.4 MG/DL (ref 1.6–2.4)
MCH RBC QN AUTO: 30.8 PG (ref 26–34)
MCHC RBC AUTO-ENTMCNC: 32.8 G/DL (ref 32–36)
MCV RBC AUTO: 94 FL (ref 80–100)
NRBC BLD-RTO: 0 /100 WBCS (ref 0–0)
PLATELET # BLD AUTO: 624 X10*3/UL (ref 150–450)
POTASSIUM SERPL-SCNC: 4 MMOL/L (ref 3.5–5.3)
PROT SERPL-MCNC: 6.6 G/DL (ref 6.4–8.2)
RBC # BLD AUTO: 3.77 X10*6/UL (ref 4–5.2)
SODIUM SERPL-SCNC: 132 MMOL/L (ref 136–145)
WBC # BLD AUTO: 15.3 X10*3/UL (ref 4.4–11.3)

## 2024-04-23 PROCEDURE — 80053 COMPREHEN METABOLIC PANEL: CPT | Performed by: STUDENT IN AN ORGANIZED HEALTH CARE EDUCATION/TRAINING PROGRAM

## 2024-04-23 PROCEDURE — 2500000001 HC RX 250 WO HCPCS SELF ADMINISTERED DRUGS (ALT 637 FOR MEDICARE OP): Performed by: STUDENT IN AN ORGANIZED HEALTH CARE EDUCATION/TRAINING PROGRAM

## 2024-04-23 PROCEDURE — 2500000004 HC RX 250 GENERAL PHARMACY W/ HCPCS (ALT 636 FOR OP/ED): Performed by: STUDENT IN AN ORGANIZED HEALTH CARE EDUCATION/TRAINING PROGRAM

## 2024-04-23 PROCEDURE — 2500000004 HC RX 250 GENERAL PHARMACY W/ HCPCS (ALT 636 FOR OP/ED)

## 2024-04-23 PROCEDURE — 2500000006 HC RX 250 W HCPCS SELF ADMINISTERED DRUGS (ALT 637 FOR ALL PAYERS): Mod: MUE | Performed by: STUDENT IN AN ORGANIZED HEALTH CARE EDUCATION/TRAINING PROGRAM

## 2024-04-23 PROCEDURE — 2500000004 HC RX 250 GENERAL PHARMACY W/ HCPCS (ALT 636 FOR OP/ED): Mod: JZ | Performed by: STUDENT IN AN ORGANIZED HEALTH CARE EDUCATION/TRAINING PROGRAM

## 2024-04-23 PROCEDURE — 1100000001 HC PRIVATE ROOM DAILY

## 2024-04-23 PROCEDURE — 2500000006 HC RX 250 W HCPCS SELF ADMINISTERED DRUGS (ALT 637 FOR ALL PAYERS): Performed by: STUDENT IN AN ORGANIZED HEALTH CARE EDUCATION/TRAINING PROGRAM

## 2024-04-23 PROCEDURE — 2500000001 HC RX 250 WO HCPCS SELF ADMINISTERED DRUGS (ALT 637 FOR MEDICARE OP)

## 2024-04-23 PROCEDURE — 97168 OT RE-EVAL EST PLAN CARE: CPT | Mod: GO

## 2024-04-23 PROCEDURE — 83735 ASSAY OF MAGNESIUM: CPT | Performed by: STUDENT IN AN ORGANIZED HEALTH CARE EDUCATION/TRAINING PROGRAM

## 2024-04-23 PROCEDURE — 2500000004 HC RX 250 GENERAL PHARMACY W/ HCPCS (ALT 636 FOR OP/ED): Mod: JZ | Performed by: PHARMACIST

## 2024-04-23 PROCEDURE — 36415 COLL VENOUS BLD VENIPUNCTURE: CPT | Performed by: STUDENT IN AN ORGANIZED HEALTH CARE EDUCATION/TRAINING PROGRAM

## 2024-04-23 PROCEDURE — S4991 NICOTINE PATCH NONLEGEND: HCPCS | Performed by: STUDENT IN AN ORGANIZED HEALTH CARE EDUCATION/TRAINING PROGRAM

## 2024-04-23 PROCEDURE — 2500000002 HC RX 250 W HCPCS SELF ADMINISTERED DRUGS (ALT 637 FOR MEDICARE OP, ALT 636 FOR OP/ED): Performed by: STUDENT IN AN ORGANIZED HEALTH CARE EDUCATION/TRAINING PROGRAM

## 2024-04-23 PROCEDURE — 97161 PT EVAL LOW COMPLEX 20 MIN: CPT | Mod: GP

## 2024-04-23 PROCEDURE — 85027 COMPLETE CBC AUTOMATED: CPT | Performed by: STUDENT IN AN ORGANIZED HEALTH CARE EDUCATION/TRAINING PROGRAM

## 2024-04-23 RX ORDER — NAPROXEN SODIUM 220 MG/1
81 TABLET, FILM COATED ORAL 2 TIMES DAILY
Qty: 60 TABLET
Start: 2024-04-23

## 2024-04-23 RX ORDER — OXYCODONE HYDROCHLORIDE 5 MG/1
5 TABLET ORAL EVERY 6 HOURS PRN
Qty: 15 TABLET | Refills: 0 | Status: SHIPPED | OUTPATIENT
Start: 2024-04-23

## 2024-04-23 RX ORDER — CEFAZOLIN SODIUM 2 G/100ML
2 INJECTION, SOLUTION INTRAVENOUS EVERY 6 HOURS
Status: COMPLETED | OUTPATIENT
Start: 2024-04-23 | End: 2024-04-23

## 2024-04-23 RX ORDER — DOCUSATE SODIUM 100 MG/1
100 CAPSULE, LIQUID FILLED ORAL 2 TIMES DAILY
Qty: 28 CAPSULE
Start: 2024-04-23 | End: 2024-05-07

## 2024-04-23 RX ORDER — POLYETHYLENE GLYCOL 3350 17 G/17G
17 POWDER, FOR SOLUTION ORAL DAILY
Status: DISCONTINUED | OUTPATIENT
Start: 2024-04-23 | End: 2024-04-29 | Stop reason: HOSPADM

## 2024-04-23 RX ORDER — ACETAMINOPHEN 325 MG/1
650 TABLET ORAL EVERY 6 HOURS PRN
Start: 2024-04-23

## 2024-04-23 RX ORDER — OXYCODONE HYDROCHLORIDE 5 MG/1
5 TABLET ORAL EVERY 6 HOURS PRN
Qty: 15 TABLET | Refills: 0 | Status: SHIPPED | OUTPATIENT
Start: 2024-04-23 | End: 2024-04-23

## 2024-04-23 RX ORDER — DOCUSATE SODIUM 100 MG/1
100 CAPSULE, LIQUID FILLED ORAL 2 TIMES DAILY
Status: DISCONTINUED | OUTPATIENT
Start: 2024-04-23 | End: 2024-04-29 | Stop reason: HOSPADM

## 2024-04-23 RX ADMIN — CEFAZOLIN SODIUM 2 G: 2 INJECTION, SOLUTION INTRAVENOUS at 14:26

## 2024-04-23 RX ADMIN — POLYETHYLENE GLYCOL 3350 17 G: 17 POWDER, FOR SOLUTION ORAL at 09:57

## 2024-04-23 RX ADMIN — OXYCODONE HYDROCHLORIDE 10 MG: 5 TABLET ORAL at 06:01

## 2024-04-23 RX ADMIN — SERTRALINE HYDROCHLORIDE 50 MG: 50 TABLET ORAL at 09:58

## 2024-04-23 RX ADMIN — CEFAZOLIN SODIUM 2 G: 2 INJECTION, SOLUTION INTRAVENOUS at 10:04

## 2024-04-23 RX ADMIN — CEFAZOLIN SODIUM 2 G: 2 INJECTION, SOLUTION INTRAVENOUS at 03:43

## 2024-04-23 RX ADMIN — OXYCODONE HYDROCHLORIDE 10 MG: 5 TABLET ORAL at 14:26

## 2024-04-23 RX ADMIN — PRAVASTATIN SODIUM 20 MG: 20 TABLET ORAL at 09:58

## 2024-04-23 RX ADMIN — DILTIAZEM HYDROCHLORIDE 240 MG: 120 CAPSULE, EXTENDED RELEASE ORAL at 09:58

## 2024-04-23 RX ADMIN — OXYCODONE HYDROCHLORIDE 10 MG: 5 TABLET ORAL at 22:59

## 2024-04-23 RX ADMIN — HYDROMORPHONE HYDROCHLORIDE 0.2 MG: 0.2 INJECTION, SOLUTION INTRAMUSCULAR; INTRAVENOUS; SUBCUTANEOUS at 10:01

## 2024-04-23 RX ADMIN — LACTULOSE 20 G: 20 SOLUTION ORAL at 09:57

## 2024-04-23 RX ADMIN — HYDROMORPHONE HYDROCHLORIDE 0.2 MG: 0.2 INJECTION, SOLUTION INTRAMUSCULAR; INTRAVENOUS; SUBCUTANEOUS at 21:41

## 2024-04-23 RX ADMIN — Medication 1 TABLET: at 09:57

## 2024-04-23 RX ADMIN — NICOTINE 1 PATCH: 14 PATCH, EXTENDED RELEASE TRANSDERMAL at 09:59

## 2024-04-23 RX ADMIN — DOCUSATE SODIUM 100 MG: 100 CAPSULE, LIQUID FILLED ORAL at 09:58

## 2024-04-23 RX ADMIN — BUPROPION HYDROCHLORIDE 100 MG: 100 TABLET, FILM COATED ORAL at 10:04

## 2024-04-23 RX ADMIN — ENOXAPARIN SODIUM 40 MG: 40 INJECTION SUBCUTANEOUS at 05:57

## 2024-04-23 RX ADMIN — ATENOLOL 25 MG: 25 TABLET ORAL at 10:01

## 2024-04-23 RX ADMIN — Medication 4.5 MG: at 22:59

## 2024-04-23 RX ADMIN — QUETIAPINE FUMARATE 25 MG: 25 TABLET ORAL at 21:50

## 2024-04-23 RX ADMIN — FOLIC ACID 1 MG: 1 TABLET ORAL at 09:58

## 2024-04-23 ASSESSMENT — COGNITIVE AND FUNCTIONAL STATUS - GENERAL
MOVING TO AND FROM BED TO CHAIR: A LITTLE
DRESSING REGULAR UPPER BODY CLOTHING: A LITTLE
TOILETING: A LOT
TOILETING: A LITTLE
TURNING FROM BACK TO SIDE WHILE IN FLAT BAD: A LITTLE
DRESSING REGULAR LOWER BODY CLOTHING: A LITTLE
DAILY ACTIVITIY SCORE: 19
STANDING UP FROM CHAIR USING ARMS: A LOT
DAILY ACTIVITIY SCORE: 19
DRESSING REGULAR LOWER BODY CLOTHING: A LOT
MOBILITY SCORE: 17
CLIMB 3 TO 5 STEPS WITH RAILING: TOTAL
HELP NEEDED FOR BATHING: A LITTLE
WALKING IN HOSPITAL ROOM: A LOT
WALKING IN HOSPITAL ROOM: TOTAL
STANDING UP FROM CHAIR USING ARMS: A LITTLE
PERSONAL GROOMING: A LITTLE
TURNING FROM BACK TO SIDE WHILE IN FLAT BAD: A LITTLE
MOBILITY SCORE: 18
DAILY ACTIVITIY SCORE: 18
TOILETING: A LITTLE
CLIMB 3 TO 5 STEPS WITH RAILING: TOTAL
CLIMB 3 TO 5 STEPS WITH RAILING: A LOT
HELP NEEDED FOR BATHING: A LOT
STANDING UP FROM CHAIR USING ARMS: A LITTLE
MOVING TO AND FROM BED TO CHAIR: A LOT
HELP NEEDED FOR BATHING: A LOT
WALKING IN HOSPITAL ROOM: A LITTLE
MOBILITY SCORE: 14
DRESSING REGULAR LOWER BODY CLOTHING: A LOT

## 2024-04-23 ASSESSMENT — LIFESTYLE VARIABLES
AGITATION: NORMAL ACTIVITY
VISUAL DISTURBANCES: NOT PRESENT
TOTAL SCORE: 0
ANXIETY: NO ANXIETY, AT EASE
TREMOR: NO TREMOR
ORIENTATION AND CLOUDING OF SENSORIUM: ORIENTED AND CAN DO SERIAL ADDITIONS
PAROXYSMAL SWEATS: NO SWEAT VISIBLE
ORIENTATION AND CLOUDING OF SENSORIUM: ORIENTED AND CAN DO SERIAL ADDITIONS
NAUSEA AND VOMITING: NO NAUSEA AND NO VOMITING
TREMOR: NO TREMOR
ANXIETY: NO ANXIETY, AT EASE
PAROXYSMAL SWEATS: NO SWEAT VISIBLE
HEADACHE, FULLNESS IN HEAD: NOT PRESENT
AGITATION: NORMAL ACTIVITY
VISUAL DISTURBANCES: NOT PRESENT
TOTAL SCORE: 0
HEADACHE, FULLNESS IN HEAD: NOT PRESENT
AUDITORY DISTURBANCES: NOT PRESENT
NAUSEA AND VOMITING: NO NAUSEA AND NO VOMITING
AUDITORY DISTURBANCES: NOT PRESENT

## 2024-04-23 ASSESSMENT — PAIN DESCRIPTION - DESCRIPTORS: DESCRIPTORS: THROBBING

## 2024-04-23 ASSESSMENT — PAIN DESCRIPTION - LOCATION
LOCATION: KNEE
LOCATION: LEG

## 2024-04-23 ASSESSMENT — PAIN - FUNCTIONAL ASSESSMENT
PAIN_FUNCTIONAL_ASSESSMENT: 0-10

## 2024-04-23 ASSESSMENT — PAIN SCALES - GENERAL
PAINLEVEL_OUTOF10: 5 - MODERATE PAIN
PAINLEVEL_OUTOF10: 7
PAINLEVEL_OUTOF10: 2
PAINLEVEL_OUTOF10: 3
PAINLEVEL_OUTOF10: 9
PAINLEVEL_OUTOF10: 8
PAINLEVEL_OUTOF10: 5 - MODERATE PAIN
PAINLEVEL_OUTOF10: 8
PAINLEVEL_OUTOF10: 2
PAINLEVEL_OUTOF10: 7

## 2024-04-23 ASSESSMENT — ACTIVITIES OF DAILY LIVING (ADL): ADL_ASSISTANCE: INDEPENDENT

## 2024-04-23 ASSESSMENT — PAIN DESCRIPTION - ORIENTATION: ORIENTATION: LEFT

## 2024-04-23 NOTE — NURSING NOTE
Pt returned to unit post op. Pt agitated and restless but redirectable, vitals stable. Call light in reach, Bed alarm on, safety maintained.

## 2024-04-23 NOTE — BRIEF OP NOTE
Date: 2024  OR Location: [facility] OR    Name: Rose Marie Toussaint, : 1955, Age: 68 y.o., MRN: [id number], Sex: female    Diagnosis  Pre-op Diagnosis     * Chronic osteomyelitis involving ankle and foot, left (Multi) [M86.672] Post-op Diagnosis     * Chronic osteomyelitis involving ankle and foot, left (Multi) [M86.672]     Procedures  Left Leg Below Knee Amputation  86921 - MN AMPUTATION LEG THROUGH TIBIA&FIBULA      Surgeons      * Flako Guerra - Primary    Resident/Fellow/Other Assistant:  Surgeons and Role:     * Steven Ernst MD - Resident - Assisting    Procedure Summary  Anesthesia: General  ASA: ASA status not filed in the log.  Anesthesia Staff: Anesthesiologist: Matias Hickey MD  C-AA: JAX Mary  Estimated Blood Loss: 20mL  Intra-op Medications:   Administrations occurring from 1800 to 2030 on 24:   Medication Name Total Dose   sodium chloride 0.9 % irrigation solution 13,000 mL   tobramycin (Nebcin) injection 1,200 mg   vancomycin (Vancocin) vial for injection 1 g              Anesthesia Record               Intraprocedure I/O Totals          Intake    Tranexamic Acid 0.00 mL    The total shown is the total volume documented since Anesthesia Start was filed.    Total Intake 0 mL          Specimen:   ID Type Source Tests Collected by Time   1 : LEFT BELOW KNEE AMPUTATION Tissue LEG AMPUTATION BELOW THE KNEE LEFT SURGICAL PATHOLOGY EXAM Flako Guerra MD 2024 1842        Staff:   Circulator: Carmen Barton RN  Relief Circulator: Rose Marie Barboza RN  Relief Scrub: Rayna Mcnair  Scrub Person: Bonnie Staples    Findings: see operative report    Complications:  None; patient tolerated the procedure well.     Disposition: PACU - hemodynamically stable.  Condition: stable  Specimens Collected:   ID Type Source Tests Collected by Time   1 : LEFT BELOW KNEE AMPUTATION Tissue LEG AMPUTATION BELOW THE KNEE LEFT SURGICAL PATHOLOGY EXAM Flako GAONA  MD Mari 4/22/2024 1842         Flako Guerra MD, ALEJA  Department of Orthopaedic Surgery  [facility]

## 2024-04-23 NOTE — PROGRESS NOTES
Music Therapy Note      Therapy Session  Referral Type: New referral this admission  Visit Type: New visit  Session Start Time: 1055  Session End Time: 1100  Intervention Delivery: In-person  Conflict of Service: None  Number of family members present: 2  Family Participation: Supportive     Treatment/Interventions  Music Therapy Interventions: Assessment    Post-assessment  Total Session Time (min): 5 minutes    Narrative  Assessment Detail: Met with pt at bedside as part of interdisciplinary rounds with assistant nurse manager. MT introduced and offered music therapy support this admission.  Intervention: Pt and family were agreeable to music therapy services with plan for session this afternoon or tomorrow.  Follow-up: Will follow up as able.

## 2024-04-23 NOTE — PROGRESS NOTES
4/23/2024 10;52 AM I met with patient and her two sisters. They had questions about paying for SNF and long term care. I discussed medicare coverage for SNF but long term is private pay or medicaid Patient will be going to [facility].  I discussed medicaid guidelines.  Patient's spouse may need ICF care. Cherelle GOVEA

## 2024-04-23 NOTE — ANESTHESIA POSTPROCEDURE EVALUATION
Patient: Rose Marie Toussaint    Procedure Summary       Date: 04/22/24 Room / Location: [facility] OR 04 / [facility] OR    Anesthesia Start: 1801 Anesthesia Stop: 2101    Procedure: Left Leg Below Knee Amputation (Left: Leg Lower) Diagnosis:       Chronic osteomyelitis involving ankle and foot, left (Multi)      (Chronic osteomyelitis involving ankle and foot, left (Multi) [M86.672])    Surgeons: Flako Guerra MD Responsible Provider: Matias Hickey MD    Anesthesia Type: general ASA Status: 3            Anesthesia Type: general    Vitals Value Taken Time   /97 04/22/24 2217   Temp 36.4 °C (97.5 °F) 04/22/24 2215   Pulse 85 04/22/24 2217   Resp 16 04/22/24 2215   SpO2 95 % 04/22/24 2218   Vitals shown include unfiled device data.    Anesthesia Post Evaluation    Patient participation: complete - patient participated  Level of consciousness: awake  Pain management: adequate  Airway patency: patent  Cardiovascular status: acceptable and hemodynamically stable  Respiratory status: acceptable  Hydration status: acceptable  Postoperative Nausea and Vomiting: none        No notable events documented.

## 2024-04-23 NOTE — OP NOTE
Left Leg Below Knee Amputation (L) Operative Note     Date: 2024  OR Location: [facility] OR    Name: Rose Marie Toussaint, : 1955, Age: 68 y.o., MRN: [id number], Sex: female    Diagnosis  Pre-op Diagnosis     * Chronic osteomyelitis involving ankle and foot, left (Multi) [M86.672] Post-op Diagnosis     * Chronic osteomyelitis involving ankle and foot, left (Multi) [M86.672]     Procedures  Left Leg Below Knee Amputation  57754 - IA AMPUTATION LEG THROUGH TIBIA&FIBULA      Surgeons      * Flako Guerra - Primary    Resident/Fellow/Other Assistant:  Surgeons and Role:     * Steven Ernst MD - Resident - Assisting    Procedure Summary  Anesthesia: General  ASA: ASA status not filed in the log.  Anesthesia Staff: Anesthesiologist: Matias Hickey MD  C-AA: JAX Mary  Estimated Blood Loss: <10 mL  Intra-op Medications:   Administrations occurring from 1800 to 2030 on 24:   Medication Name Total Dose   sodium chloride 0.9 % irrigation solution 13,000 mL   tobramycin (Nebcin) injection 1,200 mg   vancomycin (Vancocin) vial for injection 1 g              Anesthesia Record               Intraprocedure I/O Totals          Intake    Tranexamic Acid 0.00 mL    The total shown is the total volume documented since Anesthesia Start was filed.    LR infusion 900.00 mL    Total Intake 900 mL       Output    Est. Blood Loss 50 mL    Total Output 50 mL       Net    Net Volume 850 mL          Specimen:   ID Type Source Tests Collected by Time   1 : LEFT BELOW KNEE AMPUTATION Tissue LEG AMPUTATION BELOW THE KNEE LEFT SURGICAL PATHOLOGY EXAM Flako Guerra MD 2024 184        Staff:   Circulator: Carmen Barton RN  Relief Circulator: Rose Marie Barboza RN  Relief Scrub: Rayna Mcnair  Scrub Person: Bonnie Staples         Drains and/or Catheters:   Closed/Suction Drain 1 Inferior;Left;Anterior Leg 15 Fr. (Active)   Dressing Status Clean;Dry 24   Drainage  Appearance Serosanguineous 04/22/24 2059   Status To bulb suction 04/22/24 2059       Tourniquet Times:     Total Tourniquet Time Documented:  Thigh (Left) - 121 minutes  Total: Thigh (Left) - 121 minutes      Findings: consistent with diagnosis    Patient Name: Rose Marie Toussaint  MRN: [id number]  YOB: 1955  Date of Service: 04/22/24  Report Type: Operative    PREOPERATIVE DIAGNOSIS:    Left Ankle/Foot Chronic Osteomyelitis    POSTOPERATIVE DIAGNOSIS:    Left Ankle/Foot Chronic Osteomyelitis    OPERATION/PROCEDURE:    Left Below Knee Amputation    SURGEON:  Flako Guerra MD    ASSISTANT(S):  Steven Ernst MD (PGY-II)    ANESTHESIA:  Anesthesia type not filed in the log.    ESTIMATED BLOOD LOSS: 10 ml    COMPLICATIONS: None apparent    DISPOSITION: PACU/RNF    BRIEF CLINICAL HISTORY: 68-year-old female with a past medical history significant for alcohol abuse and tobacco abuse s/p left bimalleolar ankle fracture ORIF with syndesmotic stabilization with Dr. Frederick from 11/20/2022 who presented to [facility] as a transfer from [facility] with hardware failure, draining wounds elevated inflammatory markers and concern for osteomyelitis.  Patient was significantly cognitively impaired at index presentation and was deemed not to have capacity.  Through the course of her hospitalization, with medical management of her alcohol withdrawal her mental status has improved.  She was able to recall both my role and surgical plan as of morning of 04/22/2024 following conversation from 04/21/2024.  I have previously had a long discussion with the patient and her family regarding treatment options given her complex medical and social circumstances, please see previous documentation, I did recommend the patient undergo a left below-knee amputation as opposed to limb salvage.  I specifically reviewed the patient will remain at increased risk of infection, wound healing complications, persistent pain, blood loss  requiring a blood transfusion and possibility of hematoma formation.  Further risks included but were not limited to need for further surgery, persistent or worsening pain, postoperative stiffness, bleeding, blood loss requiring a blood transfusion, neurovascular injury, failure of the procedure, complications of anesthesia, stroke, DVT/PE, myocardial infarction and death. Benefits included decreased pain, improve function, primary source control. Alternatives included conservative management which I would not recommend and limb salvage. The patient voiced understanding of the risks, benefits and alternatives and the informed consent was signed, with both myself and the patient present.    OPERATIVE REPORT: On the day of surgery 04/22/2024, the patient was met in the preoperative holding area by members of the orthopedic, anesthesia and nursing teams.  I marked the patient's left lower extremity with indelible ink with the patient as my witness.  The informed consent was again reviewed.  All remaining questions were answered. The patient had been previously medically optimized for surgery by the internal medicine service.    The patient was then brought back to the operating room on [facility].  I led a preoperative timeout, verifying the correct patient, procedure and laterality of procedure to be performed.  We confirmed the appropriate availability of all surgical equipment,  implants and confirmed the administration of pre-surgical IV antibiotic prophylaxis within 1 hour of incision time, 2 g Ancef and weight-based TXA.  All present were in agreement.    Care was handed over to the anesthesia team who provided GETA.  The patient was then transferred onto the operating room table in the supine position.  All bony prominences were padded and an SCD was placed on the contra-lateral extremity. A nonsterile, well-padded thigh tourniquet was placed.  The patient's left lower extremity was then prepped and draped  in usual sterile fashion with sterile prep with ChloraPrep.    The level of tibia transection was marked out, approximately 1 hand breath distal to the tibial tubercle. The AP distance at this level was measured and utilized to design medial and lateral flaps of the same length with additional skin to allow for contouring of the flap. Incision was made sharply through skin and subcutaneous tissue down to the level of the fascial layer. Meticulous hemostasis was achieved.  Medially, the saphenous nerve and vein were identified and isolated.  The saphenous vein was suture-ligated and transected.  The saphenous nerve was pulled on traction distally and sharply transected to allow retraction into the soft tissues proximally.  Laterally the superficial peroneal nerve was identified as it exited through the crural fascia between the EDL and PL.  The fascia was incised proximally and the nerve again pulled distally and sharply transected to allow retraction into the soft tissue proximally.    Next a Shah was used to bluntly elevate the anterior compartment musculature from the tibia and the muscles were transected distally with Bovie electrocautery.  The DPN was identified deep to the TA and EDL muscle bellies and sharply transected under traction to allow retraction into the soft tissues proximally.  The AT vessels were doubly suture-ligated at the level of the muscular transection.  At this point the tibia was osteotomized at the aforementioned level with an oscillating saw under cold water irrigation with blunt retraction to protect the surrounding soft tissues.  The osteotomy edges were contoured to a smooth edge.    The lateral compartment musculature was then bluntly elevated with a Shah and dissected free from the fibula and divided with Bovie electrocautery distally.  After completion of circumferential fibular dissection, the fibula osteotomy was completed approximately 1 cm superior to the tibia again under cold  water irrigation with an oscillating saw and blunt retraction to protect the surrounding soft tissues.    At this point the distal tibia/fibula were controlled and a combination of amputation knife and Bovie electrocautery were utilized to elevate the deep posterior compartment muscles from the underlying bone.  This completed the amputation and the extremity was removed from the operative field. The extremity was then sent to pathology as specimen.     The deep posterior compartment musculature was then elevated from distal to proximal and excised, leaving the soleus fascia intact.  At this point, the PT and peroneal vessels were identified and doubly suture-ligated distally.  The tibial nerve was then identified in the interval between the superficial and deep posterior compartment, pulled distally and sharply transected to allow retraction into the proximal soft tissue.  The sural nerve was then identified in the midline of the flap, again pulled distally and sharply transected to allow retraction into the proximal soft tissue.  The small saphenous vein was then isolated and suture-ligated.    The tibial and fibular osteotomies were beveled and contoured to avoid any prominence.  The stump and wound bed was then thoroughly irrigated with 6 L of cystalloid.  1 g of vancomycin powder and 1.2 g of tobramycin powder were then placed into the wound bed.  A Hemovac drain was then placed exiting proximal and laterally.    The soleus fascia was then approximated to the anterior tibial periosteum with 0 Vicryl.  The deep dermal layer was closed with 3-0 Vicryl in a buried interrupted fashion.  The skin was then closed with 3-0 nylon in a simple interrupted fashion.  By this point, the tourniquet had been let down and there was adequate reperfusion of the distal stump noted.  The skin was cleansed and dried and dry, sterile dressings were placed.  The sterile drapes were removed the patient was then placed into a  well-padded long-leg splint.    All surgical counts were noted to be correct. The patient was then awoken from anesthesia without complication and transferred from the operating room table onto the hospital [facility] and then brought back to the PACU in stable condition.    Post-Operative Plan:   The patient will remain nonweightbearing in their left lower extremity.  They will continue on 24 hours of perioperative antibiotics and continue on IV antibiotics per ID.  She may begin chemical DVT prophylaxis at the discretion of the primary team, may consider Lovenox or aspirin.  I will continue to follow patient's drain output and clinical course closely while she remains admitted to the hospital.    Complications:  None; patient tolerated the procedure well.    Disposition: PACU - hemodynamically stable.  Condition: stable     Attending Attestation: I was present and scrubbed for the entire procedure.    Flako Guerra MD, ALEJA  Department of Orthopaedic Surgery  [facility]    Note dictated with [email or web address] software.  Completed without full type editing and sent to avoid delay.

## 2024-04-23 NOTE — DISCHARGE INSTRUCTIONS
Regarding your stent in your left leg artery, please continue with Aspirin and Plavix. Follow up with Dr. Christie in 4 weeks with plan for vascular testing prior to visit. Appointment requested. Please call the office if you do not hear anything in 3 business days               CARDIAC CATHETERIZATION DISCHARGE INSTRUCTIONS     FOR SUDDEN AND SEVERE CHEST PAIN, SHORTNESS OF BREATH, EXCESSIVE BLEEDING, SIGNS OF STROKE, OR CHANGES IN MENTAL STATUS YOU SHOULD CALL 911 IMMEDIATELY.     If your provider has prescribed aspirin and/or clopidogrel (Plavix), or prasugrel (Effient), or ticagrelor (Brilinta), DO NOT STOP THESE MEDICATIONS for any reason without talking to your cardiologist first. If any of these were prescribed, you must take them every day without missing a single dose. If you are getting low on these medications, contact your provider immediately for a refill.     FOR NEXT 24 HOURS  - Upon discharge, you should return home and rest for the remainder of the day and evening. You do not have to stay on bed rest but should not be very active.  It is recommended a responsible adult be with you for the first 24 hours after the procedure.    - No driving for 24 hours after procedure. Please arrange for someone to drive you home from the hospital today.     - Do not drive, operate machinery, or use power tools for 24 hours after your procedure.     - Do not make any legal decisions for 24 hours after your procedure.     - Do not drink alcoholic beverages for 24 hours after your procedure.    WOUND CARE   *FOR FEMORAL (LEG) ACCESS*  ·      Avoid heavy lifting (over 10 pounds) for 7 days, squatting or excessive bending for 2 days, and strenuous exercise for 7 days.  ·      No submerged bathing, swimming, or hot tubs for the next 7 days, or until fully healed.  ·      Avoid sexual activity for 3-4 days until any groin discomfort has ceased.     *FOR RADIAL (WRIST) ACCESS*  ·      No lifting more than 5 pounds or  excessive use of the wrist for 24 hours - for example, treat your wrist as if it is sprained.  ·      Do not engage in vigorous activities (tennis, golf, bowling, weights) for at least 48 hours after the procedure.  ·      Do not submerge the wrist for 7 days after the procedure.  ·      You should expect mild tingling in your hand and tenderness at the puncture site for up to 3 days.    - The transparent dressing should be removed from the site 24 hours after the procedure.  Wash the site gently with soap and water. Rinse well and pat dry. Keep the area clean and dry. You may apply a Band-Aid to the site. Avoid lotions, ointments, or powders until fully healed.     - You may shower the day after your procedure.      - It is normal to notice a small bruise around the puncture site and/or a small grape sized or smaller lump. Any large bruising or large lump warrants a call to the office.     - If bleeding should occur, lay down and apply pressure to the affected area for 10 minutes.  If the bleeding stops notify your physician.  If there is a large amount of bleeding or spurting of blood CALL 911 immediately.  DO NOT drive yourself to the hospital.    - You may experience some tenderness, bruising or minimal inflammation.  If you have any concerns, you may contact the Cath Lab or if any of these symptoms become excessive, contact your cardiologist or go to the emergency room.     OTHER INSTRUCTIONS  - You may take acetaminophen (Tylenol) as directed for discomfort.  If pain is not relieved with acetaminophen (Tylenol), contact your doctor.    - If you notice or experience any of the following, you should notify your doctor or seek medical attention  Chest pain or discomfort  Change in mental status or weakness in extremities.  Dizziness, light headedness, or feeling faint.  Change in the site where the procedure was performed, such as bleeding or an increased area of bruising or swelling.  Tingling, numbness, pain,  or coolness in the leg/arm beyond the site where the procedure was performed.  Signs of infection (i.e. shaking chills, temperature > 100 degrees Fahrenheit, warmth, redness) in the leg/arm area where the procedure was performed.  Changes in urination   Bloody or black stools  Vomiting blood  Severe nose bleeds  Any excessive bleeding                          Diet: resume your regular diet    Activity: non-weight bearing left leg, walker/crutches for balance.    No driving    Anticoagulation: patient should be on chemical prophylaxis for 4 weeks after surgery. Recommend ASA 81 mg twice daily x 4 weeks for blood clot prevention.     Kiko Hose: Should be worn during the day for the next 4 weeks on your right leg. Can remove at night     Pain Meds: You will be sent home with a prescription for pain meds as well as a stool softener to help with constipation.  If you need a refill on your pain meds please contact Dr. Guerra's office. A 48 hour notice will need to be given for all pain medication refills.       You have a splint applied to your left leg; do not allow to get wet. This will be removed at one of your follow up appointments with orthopedics.      -  apply a waterproof barrier for bathing      Follow-up: Dr. Guerra's office in 2 weeks.    CALL PHYSICIAN:   Excessive nausea, vomiting, diarrhea greater than 24 hours.   Inability to urinate every 8-12 hours and bladder becomes too full and is painful.   Incision site: increased redness and or swelling; increased pain and or tenderness; progressive drainage or an unusual odor.  Call office if drainage continues beyond 5 days of surgery.   Excessive Bleeding: Slow general oozing that completely soaks dressing or fresh bright red bleeding or bleeding that will not stop.    Operative leg: has a change in color, numbness, tingling, and coldness to the touch or excessive pain.

## 2024-04-23 NOTE — PROGRESS NOTES
Rose Marie Toussaint is a 68 y.o. female on day 7 of admission presenting with Chronic osteomyelitis involving ankle and foot, left (Multi).      Subjective   RONALDO. Reports good pain control and diet toleration. Reports +void but nothing documented.        Objective     PE:  Constitutional: calm and cooperative, NAD  Eyes: EOMI, clear sclera   Cardiovascular: Normal rate and regular rhythm. No murmurs  Respiratory/Thorax: CTAB, on RA  Genitourinary: Voiding independently   Musculoskeletal: left BKA covered with C/D splint, HV with ss output  Extremities: No peripheral edema of right extremity   Neurological: appears oriented to time, place, situation   Psychological: pleasant mood and behavior      Last Recorded Vitals  Vitals:    04/22/24 2215 04/23/24 0001 04/23/24 0513 04/23/24 0734   BP: 173/89 157/83 136/82 (!) 154/94   BP Location: Right arm Left arm Left arm    Patient Position: Lying Lying Lying Lying   Pulse: 87 84 83 93   Resp: 16 17 17 18   Temp: 36.4 °C (97.5 °F) 36.5 °C (97.7 °F) 36.3 °C (97.4 °F) 36.1 °C (97 °F)   TempSrc: Temporal      SpO2: 95% 96% 97% 97%   Weight:       Height:             Relevant Results    Imaging:     .=== 04/16/24 ===    XR CHEST 1 VIEW    - Impression -  Streaky probable scarring or atelectasis in the right apex similar to  05/26/2023.    MACRO:  None.    Signed by: Екатерина Mejia 4/17/2024 7:47 AM  Dictation workstation:   [id number]   .=== 04/16/24 ===    CT HEAD WO IV CONTRAST    - Impression -  No evidence of acute cortical infarct or intracranial hemorrhage.    No evidence of intracranial hemorrhage or displaced skull fracture.    MACRO:  None    Signed by: Shahida Bhakta 4/17/2024 3:31 PM  Dictation workstation:   [id number]         Lab Results:   Lab Results   Component Value Date    WBC 15.3 (H) 04/23/2024    HGB 11.6 (L) 04/23/2024    HCT 35.4 (L) 04/23/2024    MCV 94 04/23/2024     (H) 04/23/2024     Lab Results   Component Value Date    GLUCOSE 98 04/23/2024     CALCIUM 8.9 04/23/2024     (L) 04/23/2024    K 4.0 04/23/2024    CO2 26 04/23/2024    CL 95 (L) 04/23/2024    BUN 14 04/23/2024    CREATININE 1.03 04/23/2024     Results from last 72 hours   Lab Units 04/23/24  0613   ALK PHOS U/L 92   BILIRUBIN TOTAL mg/dL 0.2   PROTEIN TOTAL g/dL 6.6   ALT U/L <3*   AST U/L 22   ALBUMIN g/dL 3.4     Estimated Creatinine Clearance: 42.4 mL/min (by C-G formula based on SCr of 1.03 mg/dL).  C-Reactive Protein   Date/Time Value Ref Range Status   04/15/2024 05:49 AM 23.19 (H) <1.00 mg/dL Final         Assessment/Plan   Rose Marie Toussaint is a 68 y.o. female on day 7 of admission presenting with Chronic osteomyelitis involving ankle and foot, left (Multi).      s/p Left BKA 2/2 ankle OM/fx/dislocation,  POD #1 doing well  HV: no documented output yet  A/P:  - Afebrile, VSS  - NWB LLE  - PT/OT  - Regular diet, scheduled bowel regimen   - document voids, if no void in 6 hours bladder scan, straight cath if > 500 and unable to void   - DVT prophylaxis, AMERICO/SCD/lovenox. Recommend 4 weeks of chempxx for blood clot prevention. ASA 81 MG twice daily is most affordable.   - Continue current pain control regimen  - maintain splint, monitor HV output, pull as output begins to downtrend   - clyde-op ancef, ID following, appreciate recs     Dispo: Discussed with Dr. Guerra. Will continue to follow along and monitor HV output.     I spent 35 minutes in the professional and overall care of this patient.      Екатерина Purvis PA-C

## 2024-04-23 NOTE — PERIOPERATIVE NURSING NOTE
2059 Pt arrived to pacu bay at this time, report received from OR and anesthesia staff. Phase 1 care started. OPA in place.  2115 OPA removed pt weaned to 2L NC. Pt resting quietly, responsive to voice.   2130 pt tolerating sips of water. oxygen turned up to 4L NC.   2140 report to 507 RN via secure chat. Pt onto RA due to continued attempts to pull out cannula.   2151 pt medicated per order for reports of 7/10 pain. Pt placed onto purewick due to restlessness in bed.   2203 nursing supervisor made aware of need for possible sitter due to continued disorientation to situation.   2115 pt becoming more awake forgetful but redirectable. Pain decreased pt tolerating water.   2221 Pt taken up to room by this RN at this time in stable condition. Bedside handoff to nurse. Report previously sent to receiving RN. All belongings taken with pt to room, family updated on pt status.

## 2024-04-23 NOTE — PROGRESS NOTES
Occupational Therapy    Re-Evaluation/Treatment    Patient Name: Rose Marie Toussaint  MRN: [id number]  : 1955  Today's Date: 24  Time Calculation  Start Time: 1451  Stop Time: 1504  Time Calculation (min): 13 min       Assessment:  OT Assessment: 67 yo presenting now s/p L BKA, decline in ADLs and functional mobility/ Will follow per POC, anticipate High intensity OT at DC  Prognosis: Excellent  Barriers to Discharge: None  Evaluation/Treatment Tolerance: Patient tolerated treatment well  Medical Staff Made Aware: Yes  End of Session Communication: Bedside nurse  End of Session Patient Position: Alarm on, Up in chair  OT Assessment Results: Decreased ADL status, Decreased safe judgment during ADL, Decreased endurance, Decreased functional mobility, Decreased gross motor control, Decreased IADLs  Prognosis: Excellent  Barriers to Discharge: None  Evaluation/Treatment Tolerance: Patient tolerated treatment well  Medical Staff Made Aware: Yes  Strengths: Ability to acquire knowledge, Attitude of self, Coping skills, Support of Caregivers  Barriers to Participation: Comorbidities  Plan:  Treatment Interventions: ADL retraining, Functional transfer training, Endurance training, Patient/family training, Equipment evaluation/education, Neuromuscular reeducation, Compensatory technique education  OT Frequency: 3 times per week  OT Discharge Recommendations: Moderate intensity level of continued care  Equipment Recommended upon Discharge: Other (comment) (TBD)  OT Recommended Transfer Status: Minimal assist, Assist of 1  OT - OK to Discharge: Yes  Treatment Interventions: ADL retraining, Functional transfer training, Endurance training, Patient/family training, Equipment evaluation/education, Neuromuscular reeducation, Compensatory technique education    General:   OT Received On: 24  General  Reason for Re-eval: decline in ADLs; POD#1 L BKA   Referred By: Екатерина Purvis PA-C  Missed Visit:  No  Family/Caregiver Present: No  Co-Treatment: PT  Co-Treatment Reason: To maximize pt safety and participation with skilled therapeutic intervention techniques  Prior to Session Communication: Bedside nurse  Patient Position Received: Up in bathroom (On bedside commode)  Preferred Learning Style: auditory, kinesthetic, verbal, visual  General Comment: Pt received after noticed call light on. Pt indicated need for assist after toileting. Educated on purpose for session. Agreeable for participation in PT/OT eval.  Precautions:  Hearing/Visual Limitations: Glasses  LE Weight Bearing Status: Left Non-Weight Bearing  Medical Precautions: Fall precautions    Pain:  Pain Assessment  Pain Assessment: 0-10  Pain Score: 2  Pain Type: Acute pain, Surgical pain  Pain Orientation:  (L residual limb)    Objective   Cognition:  Overall Cognitive Status: Within Functional Limits  Orientation Level: Oriented X4  Following Commands: Follows one step commands with repetition  Attention: Exceptions to WFL (tangential and requires frequent redirection to tasks)  Insight: Mild  Impulsive: Severely  Task Initiation: Initiates with cues  Planning: Reduced planning skills  Organization: Moderately disorganized  Processing Speed:  (pt requires frequent redirection throughout session; does demo improved cognitive status since last OT session is oriented/able to follow commands with cues; tangential)       LE Dressing  LE Dressing: Yes  Adult Briefs Level of Assistance: Moderate assistance  LE Dressing Where Assessed: Bedsied commode  LE Dressing Comments: pt able to pull underwear to knees prior to standing, mod assist to hike over hips once in standing as pt required BUE support via walker for balance and safety    Toileting  Toileting Level of Assistance: Setup  Where Assessed: Bedside commode  Toileting Comments: able to perform anterior/posterior clyde care while seated on toilet    Bed Mobility/Transfers: Bed Mobility  Bed Mobility:  "Yes  Bed Mobility 1  Bed Mobility 1: Sitting to supine, Supine to sitting  Level of Assistance 1: Close supervision  Bed Mobility Comments 1: cues for sequencing  Bed Mobility 3  Bed Mobility 3: Supine sitting    Transfers  Transfer: Yes  Transfer 1  Technique 1: Sit to stand  Transfer Device 1: Walker  Transfer Level of Assistance 1: Minimum assistance, Moderate verbal cues  Trials/Comments 1: max v/t cues for hand placement  Transfers 2  Technique 2: Stand to sit  Transfer Level of Assistance 2: Moderate verbal cues, Minimum assistance  Trials/Comments 2: fair eccentric control      Functional Mobility:  Functional Mobility  Functional Mobility Performed: Yes  Functional Mobility 1  Device 1: Rolling walker  Assistance 1: Minimum assistance  Quality of Functional Mobility 1:  (pt unable to clear R foot from floor, performs \"swivel\" technique to advance. no overt balance loss)  Comments 1: bed to chair distance  Sitting Balance:  Static Sitting Balance  Static Sitting-Level of Assistance: Independent  Dynamic Sitting Balance  Dynamic Sitting-Balance:  (independent)  Standing Balance:  Static Standing Balance  Static Standing-Level of Assistance: Contact guard  Dynamic Standing Balance  Dynamic Standing-Balance:  (min ax1)    Strength:  Strength Comments: WFL      Coordination:  Movements are Fluid and Coordinated: Yes   Hand Function:     Extremities: RUE   RUE : Within Functional Limits and LUE   LUE: Within Functional Limits      Outcome Measures: [facility] Daily Activity  Putting on and taking off regular lower body clothing: A lot  Bathing (including washing, rinsing, drying): A lot  Putting on and taking off regular upper body clothing: None  Toileting, which includes using toilet, bedpan or urinal: A little  Taking care of personal grooming such as brushing teeth: None  Eating Meals: None  Daily Activity - Total Score: 19        Education Documentation  Precautions, taught by Eric Claire OT at 4/23/2024  " 4:49 PM.  Learner: Patient  Readiness: Acceptance  Method: Explanation  Response: Needs Reinforcement    ADL Training, taught by Eric Claire OT at 4/23/2024  4:49 PM.  Learner: Patient  Readiness: Acceptance  Method: Explanation  Response: Needs Reinforcement    Education Comments  No comments found.      Goals:  Encounter Problems       Encounter Problems (Active)       ADLs       Patient will perform UB bathing 50% with minimal assist  level of assistance and adaptive equipment prn . (Progressing)       Start:  04/18/24    Expected End:  04/27/24            Patient with complete upper body dressing with minimal assist  level of assistance donning and doffing all UE clothes with PRN adaptive equipment while supported sitting (Progressing)       Start:  04/18/24    Expected End:  04/27/24            Patient will complete daily grooming tasks brushing teeth and washing face/hair with modified independent level of assistance and PRN adaptive equipment while supported sitting. (Progressing)       Start:  04/18/24    Expected End:  04/27/24               BALANCE       Pt will maintain static and dynamic standing balance during ADL task with contact guard assist level of assistance in order to demonstrate decreased risk of falling and improved postural control.       Start:  04/23/24    Expected End:  05/07/24                  Encounter Problems (Resolved)       COGNITION/SAFETY       Pt will follow Simple 25% time during OT tx session with max cues . (Met)       Start:  04/18/24    Expected End:  04/27/24    Resolved:  04/23/24         Patient will demonstrated orientation x 3 with verbal cues, visual cues, and auditory cues. (Met)       Start:  04/18/24    Expected End:  04/27/24    Resolved:  04/23/24    ORIENTATION            TRANSFERS       Patient will perform bed mobility minimal assist  level of assistance and bed rails and draw sheet in order to improve safety and independence with mobility (Met)        Start:  04/18/24    Expected End:  04/27/24    Resolved:  04/23/24

## 2024-04-23 NOTE — PROGRESS NOTES
Physical Therapy    Physical Therapy Evaluation    Patient Name: Rose Marie Toussaint  MRN: [id number]  Today's Date: 4/23/2024   Time Calculation  Start Time: 1450  Stop Time: 1504  Time Calculation (min): 14 min    Assessment/Plan   PT Assessment  Completion of this session and documentation performed under the supervision of Srinivas Miller PT, DPT  PT Assessment Results: Decreased endurance, Impaired balance, Decreased mobility, Decreased coordination, Impaired judgement, Decreased safety awareness, Pain  Rehab Prognosis: Fair  Barriers to Discharge: Decreased safety awareness making home and community ambulation unsafe. Decreased balance needed for stair negotiation.  Evaluation/Treatment Tolerance: Patient tolerated treatment well  Medical Staff Made Aware: Yes  Strengths: Attitude of self, Support of Caregivers  Barriers to Participation: Comorbidities, Housing layout  End of Session Communication: Bedside nurse  Assessment Comment: PT eval complete. Pt demonstrated mobility well but needing increased education for safety awareness. Pt denied pain throughout session. Recommend continued acute PT services to further improve functional deficits.  End of Session Patient Position: Up in chair, Alarm on  IP OR SWING BED PT PLAN  Inpatient or Swing Bed: Inpatient  PT Plan  Treatment/Interventions: Bed mobility, Transfer training, Gait training, Stair training, Balance training, Neuromuscular re-education, Positioning  PT Plan: Skilled PT  PT Frequency: Daily  PT Discharge Recommendations: High intensity level of continued care  Equipment Recommended upon Discharge: Wheeled walker (Pt currently owns)  PT Recommended Transfer Status: Assist x1  PT - OK to Discharge: Yes (Per PT POC)      Subjective   General Visit Information:  General  Reason for Referral: CLAY [facility] 4/22  Referred By: Екатерина Purvis PA-C  Past Medical History Relevant to Rehab:   Past Medical History:   Diagnosis Date    Cognitive disorder     COPD (chronic  obstructive pulmonary disease) (Multi)     Depression     Hypertension     Substance addiction (Multi)      Past Surgical History:   Procedure Laterality Date    ANKLE FRACTURE SURGERY         Missed Visit: No  Family/Caregiver Present: No  Co-Treatment: OT  Co-Treatment Reason: To maximize pt safety and participation  Prior to Session Communication: Bedside nurse  Patient Position Received: Up in bathroom (On bedside commode)  Preferred Learning Style: auditory, kinesthetic, verbal, visual  General Comment: Pt received after noticed call light on. Pt indicated need for assist after toileting. Educated on purpose for session. Agreeable for participation in PT/OT eval.  Home Living:  Home Living  Type of Home: House  Lives With: Spouse  Home Adaptive Equipment: Walker rolling or standard  Home Layout: Multi-level, Able to live on main level with bedroom/bathroom  Home Access: Stairs to enter without rails  Entrance Stairs-Rails: Right  Entrance Stairs-Number of Steps: 2  Bathroom Shower/Tub: Tub/shower unit  Bathroom Toilet: Standard  Bathroom Equipment: Grab bars in shower, Tub transfer bench  Home Living Comments: Pt states she will make home modifications if necessary  Prior Level of Function:  Prior Function Per Pt/Caregiver Report  Level of [place]: Independent with ADLs and functional transfers, Independent with homemaking with ambulation  Receives Help From: Family  ADL Assistance: Independent  Homemaking Assistance: Independent  Ambulatory Assistance: Independent (Walker)  Hand Dominance: Right  Prior Function Comments: + drives; pt reports independence with all ADLs and IADLs as well as walking daily.  Precautions:  Precautions  Hearing/Visual Limitations: Glasses  LE Weight Bearing Status: Left Non-Weight Bearing  Medical Precautions: Fall precautions; Recent L BKA    Objective   Pain:  Pain Assessment  Pain Assessment: 0-10  Pain Score: 2  Pain Type: Surgical pain  Pain Orientation: Left  Pain  Interventions: Medication (See MAR), Repositioned  Response to Interventions: Increased comfort  Cognition:  Cognition  Overall Cognitive Status: Within Functional Limits  Orientation Level: Oriented X4  Following Commands: Follows multistep commands with repetition  Attention: Exceptions to WFL (Needing for redirection)  Safety/Judgement: Exceptions to WFL  Insight: Mild  Impulsive: Severely  Task Initiation: Initiates with cues  Planning: Reduced planning skills  Organization: Mildly disorganized    General Assessments:  General Observation  General Observation: Pt impulsive with mobility initiation and decreased safety awareness. Pt needing minimal assistance for transfers and ambulation with increased cueing throughout.     Activity Tolerance  Endurance: Endurance does not limit participation in activity  Activity Tolerance Comments: Pt denies symptoms throughout session    Sensation  Light Touch: No apparent deficits    Strength  Strength Comments: WFL for functional transfers; LLE strength not assessed due to sx.    Coordination  Movements are Fluid and Coordinated: Yes    Postural Control  Postural Control: Impaired  Posture Comment: Able to find COG with cueing    Static Sitting Balance  Static Sitting-Balance Support: Feet supported (R foot supported)  Static Sitting-Level of Assistance: Modified independent  Static Sitting-Comment/Number of Minutes: No concerns  Dynamic Sitting Balance  Dynamic Sitting-Balance Support: Feet supported (R foot supported)  Dynamic Sitting-Balance: Trunk control activities, Lateral lean, Forward lean  Dynamic Sitting-Comments: SBA for safety; pt able to assist in donning of underwear    Static Standing Balance  Static Standing-Balance Support: Bilateral upper extremity supported  Static Standing-Level of Assistance: Contact guard  Static Standing-Comment/Number of Minutes: CGA for trunk stability; pt denies symptoms  Dynamic Standing Balance  Dynamic Standing-Balance Support:  Bilateral upper extremity supported  Dynamic Standing-Balance: Turning  Dynamic Standing-Comments: SLS on RLE. Increased LOB with ankle strategy and self correction.  CGA for stability with mod cueing for use of walker.    Functional Assessments:  Bed Mobility  Bed Mobility: Yes  Bed Mobility 1  Bed Mobility 1: Sitting to supine, Supine to sitting  Level of Assistance 1: Close supervision  Bed Mobility Comments 1: Increased cueing for sequencing. No hands on assistance required    Transfers  Transfer: Yes  Transfer 1  Technique 1: Sit to stand  Transfer Device 1: Walker  Transfer Level of Assistance 1: Minimum assistance, Moderate verbal cues  Trials/Comments 1: x2 trials; min assist with verbal cueing for appopriate technique and use of walker. Pt impulsive with movement.  Transfers 2  Technique 2: Stand to sit  Transfer Device 2: Walker  Transfer Level of Assistance 2: Moderate verbal cues, Minimum assistance  Trials/Comments 2: x2 trials; lack of eccentric control with first trial. Improved with second trial with increased cueing.    Ambulation/Gait Training  Ambulation/Gait Training Performed: Yes  Ambulation/Gait Training 1  Surface 1: Level tile  Device 1: Rolling walker  Assistance 1: Minimum assistance, Moderate verbal cues  Comments/Distance (ft) 1: 2' x2; during initial trial demonstrated hop to step pattern. Second trial initiated heel to toe swivel step. Assist with trunk management for loss of COG    Stairs  Stairs: No  Extremity/Trunk Assessments:  RLE   RLE : Within Functional Limits  LLE   LLE : Exceptions to WFL (L BKA; strength not assessed at this time)  Outcome Measures:  [facility] Basic Mobility  Turning from your back to your side while in a flat bed without using bedrails: None  Moving from lying on your back to sitting on the side of a flat bed without using bedrails: A little  Moving to and from bed to chair (including a wheelchair): A little  Standing up from a chair using your arms (e.g.  wheelchair or bedside chair): A little  To walk in hospital room: A little  Climbing 3-5 steps with railing: A lot  Basic Mobility - Total Score: 18    Encounter Problems       Encounter Problems (Active)       Balance       STG - Maintains dynamic standing balance with upper extremity support At MOD I, safely, without LOB, device PRN        Start:  04/23/24    Expected End:  05/08/24       INTERVENTIONS:  1. Practice standing with minimal support.  2. Educate patient about standing tolerance.  3. Educate patient about independence with gait, transfers, and ADL's.  4. Educate patient about use of assistive device.  5. Educate patient about self-directed care.            Mobility       STG - Patient will ambulate At At Supervision using Wheeled walker for 50' while maintaining NWB on LLE without LOB or gross gait deviations        Start:  04/23/24    Expected End:  05/08/24            STG - Patient will ascend and descend four to six stairs At Min Ax1 while using No device and Right HR no LOB        Start:  04/23/24    Expected End:  05/08/24            Endurance - Pt to tolerate >/= 20 minutes therex, theract, gait and/or NMR with </= 5 minutes of rest breaks'       Start:  04/23/24    Expected End:  05/08/24               PT Transfers       STG - Patient will transfer sit to and from stand At Supervision, safely, without LOB, device PRN        Start:  04/23/24    Expected End:  05/08/24               Safety       Pt will verbalize and apply safety awareness and precautions 100% of time throughout entire session         Start:  04/23/24    Expected End:  05/08/24                   Education Documentation  Precautions, taught by Gabby Graham at 4/23/2024  3:22 PM.  Learner: Patient  Readiness: Acceptance  Method: Explanation  Response: Verbalizes Understanding, Needs Reinforcement  Comment: Education regarding POC    Body Mechanics, taught by Gabby Graham at 4/23/2024  3:22 PM.  Learner:  Patient  Readiness: Acceptance  Method: Explanation  Response: Verbalizes Understanding, Needs Reinforcement  Comment: Education regarding POC    Mobility Training, taught by Gabby Graham at 4/23/2024  3:22 PM.  Learner: Patient  Readiness: Acceptance  Method: Explanation  Response: Verbalizes Understanding, Needs Reinforcement  Comment: Education regarding POC    Education Comments  No comments found.

## 2024-04-23 NOTE — PROGRESS NOTES
04/23/24 1029   Discharge Planning   Patient expects to be discharged to: [facility]     I met with this patient at her bedside to confirm discharge planning to [facility], patient is not med clear for discharge LBKA completed, has a drain, ID following Ortho Following, once med clear patient will be discharged to [facility], I will continue to monitor for discharge planning.

## 2024-04-23 NOTE — CARE PLAN
The patient's goals for the shift include      The clinical goals for the shift include pt will remain safe and ambulate with help throughout shift

## 2024-04-23 NOTE — PROGRESS NOTES
Occupational Therapy    Evaluation    Patient Name: Rose Marie Toussaint  MRN: [id number]  Today's Date: 4/23/2024  Time Calculation  Start Time: 1451  Stop Time: 1504  Time Calculation (min): 13 min      Assessment:  OT Assessment: 67 yo presenting now s/p L BKA, decline in ADLs and functional mobility/ Will follow per POC, anticipate High intensity OT at DC  Prognosis: Excellent  Barriers to Discharge: None  Evaluation/Treatment Tolerance: Patient tolerated treatment well  Medical Staff Made Aware: Yes  End of Session Communication: Bedside nurse  End of Session Patient Position: Alarm on, Up in chair  OT Assessment Results: Decreased ADL status, Decreased safe judgment during ADL, Decreased endurance, Decreased functional mobility, Decreased gross motor control, Decreased IADLs  Prognosis: Excellent  Barriers to Discharge: None  Evaluation/Treatment Tolerance: Patient tolerated treatment well  Medical Staff Made Aware: Yes  Strengths: Ability to acquire knowledge, Attitude of self, Coping skills, Support of Caregivers  Barriers to Participation: Comorbidities  Plan:  Treatment Interventions: ADL retraining, Functional transfer training, Endurance training, Patient/family training, Equipment evaluation/education, Neuromuscular reeducation, Compensatory technique education  OT Frequency: 3 times per week  OT Recommended Transfer Status: Minimal assist, Assist of 1  OT - OK to Discharge: Yes  Treatment Interventions: ADL retraining, Functional transfer training, Endurance training, Patient/family training, Equipment evaluation/education, Neuromuscular reeducation, Compensatory technique education    Subjective   Current Problem:  1. Septic joint (Multi)  acetaminophen (Tylenol) tablet 650 mg    HYDROmorphone PF (Dilaudid) injection 0.2 mg    ondansetron (Zofran) injection 4 mg    oxyCODONE (Roxicodone) immediate release tablet 10 mg    oxyCODONE (Roxicodone) immediate release tablet 5 mg    DISCONTINUED: vancomycin in  dextrose 5 % (Vancocin) IVPB 750 mg    DISCONTINUED: vancomycin (Vancocin) pharmacy to dose - pharmacy monitoring      2. Hypertension, unspecified type  atenolol (Tenormin) tablet 25 mg    chlorthalidone (Hygroton) tablet 25 mg    dilTIAZem CD (Cardizem CD) 24 hr capsule 240 mg      3. Depression, unspecified depression type  buPROPion (Wellbutrin) tablet 100 mg    sertraline (Zoloft) tablet 50 mg    DISCONTINUED: buPROPion (Wellbutrin) tablet 100 mg    DISCONTINUED: busPIRone (Buspar) tablet 30 mg    DISCONTINUED: sertraline (Zoloft) tablet 200 mg    DISCONTINUED: sertraline (Zoloft) tablet 100 mg      4. Encounter for deep vein thrombosis (DVT) prophylaxis  enoxaparin (Lovenox) syringe 40 mg      5. Alcohol abuse  thiamine in dextrose 5% 100 mL  mg    DISCONTINUED: folic acid (Folvite) tablet 1 mg    DISCONTINUED: multivitamin with minerals 1 tablet    DISCONTINUED: thiamine (Vitamin B-1) tablet 100 mg      6. Nicotine abuse  nicotine (Nicoderm CQ) 14 mg/24 hr patch 1 patch      7. Hyperlipidemia, unspecified hyperlipidemia type  pravastatin (Pravachol) tablet 20 mg      8. Chronic obstructive pulmonary disease, unspecified COPD type (Multi)  DISCONTINUED: albuterol 90 mcg/actuation inhaler 2 puff      9. Insomnia, unspecified type  melatonin tablet 4.5 mg      10. Constipation, unspecified constipation type  DISCONTINUED: polyethylene glycol (Glycolax, Miralax) packet 17 g      11. Chronic osteomyelitis involving ankle and foot, left (Multi)  Case Request Operating Room: Irrigation and Debridement Ankle, left ankle Hardware Removal    Case Request Operating Room: Irrigation and Debridement Ankle, left ankle Hardware Removal    Vascular US PVR without exercise    Vascular US PVR without exercise    Surgical Pathology Exam    Surgical Pathology Exam    CANCELED: FL less than 1 hour    CANCELED: FL less than 1 hour      12. Other specified symptoms and signs involving the circulatory and respiratory systems   Vascular US PVR without exercise    Vascular US PVR without exercise      13. Critical limb ischemia of right lower extremity (Multi)  CANCELED: Case Request Cath Lab: Lower Extremity Angiogram    CANCELED: Case Request Cath Lab: Lower Extremity Angiogram    CANCELED: Invasive vascular procedure    CANCELED: Invasive vascular procedure      14. Acute post-operative pain  oxyCODONE (Roxicodone) 5 mg immediate release tablet    aspirin 81 mg chewable tablet    docusate sodium (Colace) 100 mg capsule    acetaminophen (Tylenol) 325 mg tablet    DISCONTINUED: oxyCODONE (Roxicodone) 5 mg immediate release tablet        General:  General  Reason for Re-eval decline in ADLs; POD#1 L BKA 4/22  Referred By: Екатерина Purvis PA-C  Past Medical History Relevant to Rehab:   Past Medical History:   Diagnosis Date    Cognitive disorder     COPD (chronic obstructive pulmonary disease) (Multi)     Depression     Hypertension     Substance addiction (Multi)      Missed Visit: No  Family/Caregiver Present: No  Co-Treatment: PT  Co-Treatment Reason: To maximize pt safety and participation with skilled therapeutic intervention techniques  Prior to Session Communication: Bedside nurse  Patient Position Received: Up in bathroom (On bedside commode)  Preferred Learning Style: auditory, kinesthetic, verbal, visual  General Comment: Pt received after noticed call light on. Pt indicated need for assist after toileting. Educated on purpose for session. Agreeable for participation in PT/OT eval.  Precautions:  Hearing/Visual Limitations: Glasses  LE Weight Bearing Status: Left Non-Weight Bearing  Medical Precautions: Fall precautions  Vital Signs:     Pain:  Pain Assessment  Pain Assessment: 0-10  Pain Score: 2  Pain Type: Acute pain, Surgical pain  Pain Orientation:  (L residual limb)    Objective   Cognition:  Overall Cognitive Status: Within Functional Limits  Orientation Level: Oriented X4  Following Commands: Follows one step commands with  "repetition  Attention: Exceptions to WFL (tangential and requires frequent redirection to tasks)  Insight: Mild  Impulsive: Severely  Task Initiation: Initiates with cues  Planning: Reduced planning skills  Organization: Moderately disorganized  Processing Speed:  (pt requires frequent redirection throughout session; does demo improved cognitive status since last OT session is oriented/able to follow commands with cues; tangential)       Bed Mobility/Transfers: Bed Mobility  Bed Mobility: Yes  Bed Mobility 1  Bed Mobility 1: Sitting to supine, Supine to sitting  Level of Assistance 1: Close supervision  Bed Mobility Comments 1: cues for sequencing  Bed Mobility 3  Bed Mobility 3: Supine sitting    Transfers  Transfer: Yes  Transfer 1  Technique 1: Sit to stand  Transfer Device 1: Walker  Transfer Level of Assistance 1: Minimum assistance, Moderate verbal cues  Trials/Comments 1: max v/t cues for hand placement  Transfers 2  Technique 2: Stand to sit  Transfer Level of Assistance 2: Moderate verbal cues, Minimum assistance  Trials/Comments 2: fair eccentric control      Functional Mobility:  Functional Mobility  Functional Mobility Performed: Yes  Functional Mobility 1  Device 1: Rolling walker  Assistance 1: Minimum assistance  Quality of Functional Mobility 1:  (pt unable to clear R foot from floor, performs \"swivel\" technique to advance. no overt balance loss)  Comments 1: bed to chair distance  Sitting Balance:  Static Sitting Balance  Static Sitting-Level of Assistance: Independent  Dynamic Sitting Balance  Dynamic Sitting-Balance:  (independent)  Standing Balance:  Static Standing Balance  Static Standing-Level of Assistance: Contact guard  Dynamic Standing Balance  Dynamic Standing-Balance:  (min ax1)     Strength:  Strength Comments: WFL  Perception:     Coordination:  Movements are Fluid and Coordinated: Yes        Extremities: RUE   RUE : Within Functional Limits and LUE   LUE: Within Functional " Limits      Outcome Measures:  [facility] Daily Activity  Putting on and taking off regular lower body clothing: A lot  Bathing (including washing, rinsing, drying): A lot  Putting on and taking off regular upper body clothing: None  Toileting, which includes using toilet, bedpan or urinal: A little  Taking care of personal grooming such as brushing teeth: None  Eating Meals: None  Daily Activity - Total Score: 19      Education Documentation  Precautions, taught by Eric Claire OT at 4/23/2024  4:49 PM.  Learner: Patient  Readiness: Acceptance  Method: Explanation  Response: Needs Reinforcement    ADL Training, taught by Eric Claire OT at 4/23/2024  4:49 PM.  Learner: Patient  Readiness: Acceptance  Method: Explanation  Response: Needs Reinforcement    Education Comments  No comments found.        Goals:  Encounter Problems       Encounter Problems (Active)       ADLs       Patient will perform UB bathing 50% with minimal assist  level of assistance and adaptive equipment prn . (Progressing)       Start:  04/18/24    Expected End:  04/27/24            Patient with complete upper body dressing with minimal assist  level of assistance donning and doffing all UE clothes with PRN adaptive equipment while supported sitting (Progressing)       Start:  04/18/24    Expected End:  04/27/24            Patient will complete daily grooming tasks brushing teeth and washing face/hair with modified independent level of assistance and PRN adaptive equipment while supported sitting. (Progressing)       Start:  04/18/24    Expected End:  04/27/24               TRANSFERS       Patient will perform bed mobility minimal assist  level of assistance and bed rails and draw sheet in order to improve safety and independence with mobility (Progressing)       Start:  04/18/24    Expected End:  04/27/24                  Encounter Problems (Resolved)       COGNITION/SAFETY       Pt will follow Simple 25% time during OT tx session with max  cues . (Met)       Start:  04/18/24    Expected End:  04/27/24    Resolved:  04/23/24         Patient will demonstrated orientation x 3 with verbal cues, visual cues, and auditory cues. (Met)       Start:  04/18/24    Expected End:  04/27/24    Resolved:  04/23/24    ORIENTATION

## 2024-04-23 NOTE — PROGRESS NOTES
[facility]          Admitting Provider: Octavio Jenog MD Admission Date: 4/16/2024.   Attending Provider: Octavio Jeong MD MRN: [id number]       Subjective   Rose Marie Toussaint is a 68 y.o. female on day 7 of admission presenting with Chronic osteomyelitis involving ankle and foot, left (Multi).  Interval History Had Left BKA. Has some pain.     Objective   Physical Exam  Last Recorded Vitals: Blood pressure (!) 154/94, pulse 93, temperature 36.1 °C (97 °F), resp. rate 18, height 1.524 m (5'), weight 56.7 kg (125 lb), SpO2 97%.  Patient Vitals for the past 24 hrs:   BP Temp Temp src Pulse Resp SpO2   04/23/24 0734 (!) 154/94 36.1 °C (97 °F) -- 93 18 97 %   04/23/24 0513 136/82 36.3 °C (97.4 °F) -- 83 17 97 %   04/23/24 0001 157/83 36.5 °C (97.7 °F) -- 84 17 96 %   04/22/24 2215 173/89 36.4 °C (97.5 °F) Temporal 87 16 95 %   04/22/24 2200 (!) 186/86 36.4 °C (97.5 °F) Temporal 87 16 94 %   04/22/24 2151 -- -- -- -- -- 90 %   04/22/24 2145 (!) 179/103 -- -- 92 16 94 %   04/22/24 2140 -- -- -- -- -- 96 %   04/22/24 2130 (!) 178/94 -- -- 90 16 99 %   04/22/24 2115 160/79 -- -- 82 16 100 %   04/22/24 2100 135/83 36 °C (96.8 °F) Temporal 77 18 98 %   04/22/24 2059 135/83 36 °C (96.8 °F) Temporal 77 18 98 %   04/22/24 1720 (!) 165/102 36.4 °C (97.5 °F) Temporal 82 19 96 %   04/22/24 1521 172/90 36.4 °C (97.5 °F) Oral 84 18 97 %     Body mass index is 24.41 kg/m².  GENERAL: alert, cooperative, or no distress  SKIN: no rashes  NECK: supple, no thyromegaly, JVP within normal limits  LUNGS:  not in respiratory distress, respiratory rate normal, clear to auscultation  CARDIAC: regular rate and rhythm, normal S1 and S2, no murmur, rub, or gallop  ABDOMEN: Soft, non-tender, normal bowel sounds; no bruits, organomegaly or masses.  EXTREMITIES: No edema Let BKA.  NEURO: Alert and oriented x 2-3, reflexes normal and symmetric, strength and  sensation grossly normal  Intake/Output last 3 Shifts:  I/O  "last 3 completed shifts:  In: 1830 (32.3 mL/kg) [P.O.:930; I.V.:900 (15.9 mL/kg)]  Out: 50 (0.9 mL/kg) [Blood:50]  Weight: 56.7 kg   DATA:   Diagnostic tests reviewed for today's visit:    Most recent Labs  Results for orders placed or performed during the hospital encounter of 04/16/24 (from the past 24 hour(s))   CBC   Result Value Ref Range    WBC 15.3 (H) 4.4 - 11.3 x10*3/uL    nRBC 0.0 0.0 - 0.0 /100 WBCs    RBC 3.77 (L) 4.00 - 5.20 x10*6/uL    Hemoglobin 11.6 (L) 12.0 - 16.0 g/dL    Hematocrit 35.4 (L) 36.0 - 46.0 %    MCV 94 80 - 100 fL    MCH 30.8 26.0 - 34.0 pg    MCHC 32.8 32.0 - 36.0 g/dL    RDW 13.1 11.5 - 14.5 %    Platelets 624 (H) 150 - 450 x10*3/uL   Comprehensive metabolic panel   Result Value Ref Range    Glucose 98 74 - 99 mg/dL    Sodium 132 (L) 136 - 145 mmol/L    Potassium 4.0 3.5 - 5.3 mmol/L    Chloride 95 (L) 98 - 107 mmol/L    Bicarbonate 26 21 - 32 mmol/L    Anion Gap 15 10 - 20 mmol/L    Urea Nitrogen 14 6 - 23 mg/dL    Creatinine 1.03 0.50 - 1.05 mg/dL    eGFR 60 (L) >60 mL/min/1.73m*2    Calcium 8.9 8.6 - 10.3 mg/dL    Albumin 3.4 3.4 - 5.0 g/dL    Alkaline Phosphatase 92 33 - 136 U/L    Total Protein 6.6 6.4 - 8.2 g/dL    AST 22 9 - 39 U/L    Bilirubin, Total 0.2 0.0 - 1.2 mg/dL    ALT <3 (L) 7 - 45 U/L   Magnesium   Result Value Ref Range    Magnesium 1.40 (L) 1.60 - 2.40 mg/dL     Urine Culture   Date Value Ref Range Status   05/24/2023 NO GROWTH  Final     Blood Culture   Date Value Ref Range Status   05/23/2023   Final    No Growth at 1 days~No Growth at 2 days~No Growth at 3 days~NO GROWTH at 4 days - FINAL REPORT    No results found for: \"RESPCULTSM\" No results found for: \"PERDIAFLDCUL\" No results found for: \"STERFLDCULSM\"   No results found.  Current Facility-Administered Medications   Medication Dose Route Frequency Provider Last Rate Last Admin    acetaminophen (Tylenol) tablet 650 mg  650 mg oral q4h PRN Екатерина Purvis PA-C        albuterol 2.5 mg /3 mL (0.083 %) nebulizer " solution 2.5 mg  2.5 mg nebulization q2h PRN Екатерина MCDANIEL Sephel, PA-C        atenolol (Tenormin) tablet 25 mg  25 mg oral Daily Екатерина E Sephel, PA-C   25 mg at 04/21/24 0939    buPROPion (Wellbutrin) tablet 100 mg  100 mg oral Daily with breakfast Екатерина Edmondsel, PA-C        ceFAZolin in dextrose (iso-os) (Ancef) IVPB 2 g  2 g intravenous q6h Екатерина E Sephel, PA-C   Stopped at 04/23/24 0413    [Held by provider] chlorthalidone (Hygroton) tablet 25 mg  25 mg oral Daily Екатерина E Sephel, PA-C        dilTIAZem CD (Cardizem CD) 24 hr capsule 240 mg  240 mg oral Daily Екатерина E Sephel, PA-C   240 mg at 04/22/24 1423    enoxaparin (Lovenox) syringe 40 mg  40 mg subcutaneous q24h Екатерина MCDANIEL Sephel, PA-C   40 mg at 04/23/24 0557    folic acid (Folvite) tablet 1 mg  1 mg oral Daily Екатерина E Sephel, PA-C   1 mg at 04/21/24 0939    HYDROmorphone PF (Dilaudid) injection 0.2 mg  0.2 mg intravenous q3h PRN Екатерина E Sephel, PA-C        lactulose 20 gram/30 mL oral solution 20 g  20 g oral Daily Екатерина E Sephel, PA-C   20 g at 04/21/24 0939    LORazepam (Ativan) tablet 0.5 mg  0.5 mg oral q2h PRN Екатерина E Sephel, PA-C   0.5 mg at 04/20/24 1307    Or    LORazepam (Ativan) tablet 1 mg  1 mg oral q2h PRN Екатерина E Sephel, PA-C   1 mg at 04/19/24 0357    Or    LORazepam (Ativan) tablet 2 mg  2 mg oral q2h PRN Екатерина E Sephel, PA-C   2 mg at 04/18/24 2123    melatonin tablet 4.5 mg  4.5 mg oral Nightly PRN Екатерина E Sephel, PA-C   4.5 mg at 04/21/24 2048    multivitamin with minerals 1 tablet  1 tablet oral Daily Екатерина E Sephel, PA-C   1 tablet at 04/21/24 0939    nicotine (Nicoderm CQ) 14 mg/24 hr patch 1 patch  1 patch transdermal Daily Екатерина Purvis PA-C   1 patch at 04/22/24 1129    ondansetron (Zofran) injection 4 mg  4 mg intravenous q8h PRN Екатерина Purvis PA-C        oxyCODONE (Roxicodone) immediate release tablet 10 mg  10 mg oral q6h PRN Екатерина Purvis PA-C   10 mg at 04/23/24 0601    oxyCODONE (Roxicodone) immediate release tablet 5 mg  5 mg  oral q6h PRN Екатерина E Sephel, PA-C        polyethylene glycol (Glycolax, Miralax) packet 17 g  17 g oral Daily PRN Екатерина E Sephel, PA-C        pravastatin (Pravachol) tablet 20 mg  20 mg oral Daily Екатерина E Sephel, PA-C   20 mg at 04/21/24 0939    QUEtiapine (SEROquel) tablet 25 mg  25 mg oral Nightly PRN Екатерина E Sephel, PA-C        sertraline (Zoloft) tablet 50 mg  50 mg oral Daily Екатерина E Sephel, PA-C   50 mg at 04/22/24 1425     No current outpatient medications on file.   ..     Medication and Non-Pharmacologic VTE Prophylaxis/Anticoagulants      Last Anticoag Admin            enoxaparin (Lovenox) syringe 40 mg    Given 40 mg at 0557    Frequency: Every 24 hours         There are additional administrations since 04/20/24 0754 that are not shown.    No unadministered anticoagulant orders found.          Assessment/Plan   Rose Marie Toussaint has  Principal Problem:    Chronic osteomyelitis involving ankle and foot, left (Multi)  Active Problems:    Septic joint (Multi)    Alcohol withdrawal delirium (Multi)    Adrenal cortical nodule (Multi)    Critical limb ischemia of right lower extremity (Multi)     On Cefazolin (MSSA)  S/P BKA. Awaiting sister to make decision      improved. On CIWA scale.   Other Hospital problems        Abnormal findings not addressed during hospitalization, but require out patient follow up. Adrenal nodule      I spent 35 minutes talking and examining Rose Marie Toussaint, reviewing the labs & medications, formulating plan of care & discussing with NP and nursing staff.  Octavio Jeong MD

## 2024-04-23 NOTE — PROGRESS NOTES
INFECTIOUS DISEASE DAILY PROGRESS NOTE    SUBJECTIVE:    Had left BKA - there is some concern for residual infection in soft tissue at the distal BKA. No fevers. She is doing OK, some pain but under control. WBC has risen a bit post-op    OBJECTIVE:  VITALS (Last 24 Hours)  BP (!) 154/94 (Patient Position: Lying)   Pulse 93   Temp 36.1 °C (97 °F)   Resp 18   Ht 1.524 m (5')   Wt 56.7 kg (125 lb)   SpO2 97%   BMI 24.41 kg/m²     PHYSICAL EXAM:  Gen - NAD, in bed  Abd - soft, no ttp, BS present  LLE - s/p BKA in surgical dressings  Skin - no rash    ABX: IV Cefazolin    LABS:  Lab Results   Component Value Date    WBC 15.3 (H) 04/23/2024    HGB 11.6 (L) 04/23/2024    HCT 35.4 (L) 04/23/2024    MCV 94 04/23/2024     (H) 04/23/2024     Lab Results   Component Value Date    GLUCOSE 98 04/23/2024    CALCIUM 8.9 04/23/2024     (L) 04/23/2024    K 4.0 04/23/2024    CO2 26 04/23/2024    CL 95 (L) 04/23/2024    BUN 14 04/23/2024    CREATININE 1.03 04/23/2024     Results from last 72 hours   Lab Units 04/23/24  0613   ALK PHOS U/L 92   BILIRUBIN TOTAL mg/dL 0.2   PROTEIN TOTAL g/dL 6.6   ALT U/L <3*   AST U/L 22   ALBUMIN g/dL 3.4     Estimated Creatinine Clearance: 42.4 mL/min (by C-G formula based on SCr of 1.03 mg/dL).    ASSESSMENT/PLAN:     Left Ankle Hardware Associated Infection with Suspected OM due to MSSA - s/p BKA 4/22 but some residual soft tissue infection present  CKD - CrCl 42, affects abx dosing  Penicillin Allergy - limits abx options. No issues on Cefazolin thus far.  Alcohol Dependence - withdrawal, on CIWA protocol     IV Cefazolin 2g Q8H still. Will continue to monitor for now. Leukocytosis is post-op reactive.    Monitoring for adverse effects of abx such as rash/itching/diarrhea - none.     Will follow. Thanks! D/w Dr. Mari Krishna MD  [facility]  Office #[phone]

## 2024-04-24 PROBLEM — I70.222 CRITICAL LIMB ISCHEMIA OF LEFT LOWER EXTREMITY (MULTI): Status: ACTIVE | Noted: 2024-04-16

## 2024-04-24 LAB
ALBUMIN SERPL BCP-MCNC: 3.3 G/DL (ref 3.4–5)
ALP SERPL-CCNC: 85 U/L (ref 33–136)
ALT SERPL W P-5'-P-CCNC: <3 U/L (ref 7–45)
ANION GAP SERPL CALC-SCNC: 15 MMOL/L (ref 10–20)
AST SERPL W P-5'-P-CCNC: 33 U/L (ref 9–39)
BASOPHILS # BLD AUTO: 0.08 X10*3/UL (ref 0–0.1)
BASOPHILS NFR BLD AUTO: 0.7 %
BILIRUB SERPL-MCNC: 0.2 MG/DL (ref 0–1.2)
BUN SERPL-MCNC: 11 MG/DL (ref 6–23)
CALCIUM SERPL-MCNC: 8.6 MG/DL (ref 8.6–10.3)
CHLORIDE SERPL-SCNC: 96 MMOL/L (ref 98–107)
CO2 SERPL-SCNC: 26 MMOL/L (ref 21–32)
CREAT SERPL-MCNC: 0.98 MG/DL (ref 0.5–1.05)
EGFRCR SERPLBLD CKD-EPI 2021: 64 ML/MIN/1.73M*2
EOSINOPHIL # BLD AUTO: 0.14 X10*3/UL (ref 0–0.7)
EOSINOPHIL NFR BLD AUTO: 1.2 %
ERYTHROCYTE [DISTWIDTH] IN BLOOD BY AUTOMATED COUNT: 13.2 % (ref 11.5–14.5)
GLUCOSE SERPL-MCNC: 82 MG/DL (ref 74–99)
HCT VFR BLD AUTO: 35 % (ref 36–46)
HGB BLD-MCNC: 11.5 G/DL (ref 12–16)
IMM GRANULOCYTES # BLD AUTO: 0.09 X10*3/UL (ref 0–0.7)
IMM GRANULOCYTES NFR BLD AUTO: 0.8 % (ref 0–0.9)
LYMPHOCYTES # BLD AUTO: 2.31 X10*3/UL (ref 1.2–4.8)
LYMPHOCYTES NFR BLD AUTO: 20.6 %
MAGNESIUM SERPL-MCNC: 1.5 MG/DL (ref 1.6–2.4)
MCH RBC QN AUTO: 31.3 PG (ref 26–34)
MCHC RBC AUTO-ENTMCNC: 32.9 G/DL (ref 32–36)
MCV RBC AUTO: 95 FL (ref 80–100)
MONOCYTES # BLD AUTO: 0.85 X10*3/UL (ref 0.1–1)
MONOCYTES NFR BLD AUTO: 7.6 %
NEUTROPHILS # BLD AUTO: 7.76 X10*3/UL (ref 1.2–7.7)
NEUTROPHILS NFR BLD AUTO: 69.1 %
NRBC BLD-RTO: 0 /100 WBCS (ref 0–0)
PLATELET # BLD AUTO: 568 X10*3/UL (ref 150–450)
POTASSIUM SERPL-SCNC: 3.6 MMOL/L (ref 3.5–5.3)
PROT SERPL-MCNC: 6.4 G/DL (ref 6.4–8.2)
RBC # BLD AUTO: 3.67 X10*6/UL (ref 4–5.2)
SODIUM SERPL-SCNC: 133 MMOL/L (ref 136–145)
WBC # BLD AUTO: 11.2 X10*3/UL (ref 4.4–11.3)

## 2024-04-24 PROCEDURE — 2500000001 HC RX 250 WO HCPCS SELF ADMINISTERED DRUGS (ALT 637 FOR MEDICARE OP): Performed by: STUDENT IN AN ORGANIZED HEALTH CARE EDUCATION/TRAINING PROGRAM

## 2024-04-24 PROCEDURE — 2500000006 HC RX 250 W HCPCS SELF ADMINISTERED DRUGS (ALT 637 FOR ALL PAYERS): Performed by: STUDENT IN AN ORGANIZED HEALTH CARE EDUCATION/TRAINING PROGRAM

## 2024-04-24 PROCEDURE — 2500000004 HC RX 250 GENERAL PHARMACY W/ HCPCS (ALT 636 FOR OP/ED): Performed by: STUDENT IN AN ORGANIZED HEALTH CARE EDUCATION/TRAINING PROGRAM

## 2024-04-24 PROCEDURE — 2500000006 HC RX 250 W HCPCS SELF ADMINISTERED DRUGS (ALT 637 FOR ALL PAYERS): Mod: MUE | Performed by: STUDENT IN AN ORGANIZED HEALTH CARE EDUCATION/TRAINING PROGRAM

## 2024-04-24 PROCEDURE — 2500000002 HC RX 250 W HCPCS SELF ADMINISTERED DRUGS (ALT 637 FOR MEDICARE OP, ALT 636 FOR OP/ED): Performed by: STUDENT IN AN ORGANIZED HEALTH CARE EDUCATION/TRAINING PROGRAM

## 2024-04-24 PROCEDURE — 99223 1ST HOSP IP/OBS HIGH 75: CPT | Performed by: STUDENT IN AN ORGANIZED HEALTH CARE EDUCATION/TRAINING PROGRAM

## 2024-04-24 PROCEDURE — 85025 COMPLETE CBC W/AUTO DIFF WBC: CPT | Performed by: INTERNAL MEDICINE

## 2024-04-24 PROCEDURE — 2500000004 HC RX 250 GENERAL PHARMACY W/ HCPCS (ALT 636 FOR OP/ED): Performed by: INTERNAL MEDICINE

## 2024-04-24 PROCEDURE — 1100000001 HC PRIVATE ROOM DAILY

## 2024-04-24 PROCEDURE — S4991 NICOTINE PATCH NONLEGEND: HCPCS | Performed by: STUDENT IN AN ORGANIZED HEALTH CARE EDUCATION/TRAINING PROGRAM

## 2024-04-24 PROCEDURE — 83735 ASSAY OF MAGNESIUM: CPT | Performed by: STUDENT IN AN ORGANIZED HEALTH CARE EDUCATION/TRAINING PROGRAM

## 2024-04-24 PROCEDURE — 80053 COMPREHEN METABOLIC PANEL: CPT | Performed by: STUDENT IN AN ORGANIZED HEALTH CARE EDUCATION/TRAINING PROGRAM

## 2024-04-24 PROCEDURE — 36415 COLL VENOUS BLD VENIPUNCTURE: CPT | Performed by: STUDENT IN AN ORGANIZED HEALTH CARE EDUCATION/TRAINING PROGRAM

## 2024-04-24 RX ORDER — MAGNESIUM SULFATE HEPTAHYDRATE 40 MG/ML
2 INJECTION, SOLUTION INTRAVENOUS ONCE
Status: COMPLETED | OUTPATIENT
Start: 2024-04-24 | End: 2024-04-24

## 2024-04-24 RX ADMIN — OXYCODONE HYDROCHLORIDE 10 MG: 5 TABLET ORAL at 11:10

## 2024-04-24 RX ADMIN — NICOTINE 1 PATCH: 14 PATCH, EXTENDED RELEASE TRANSDERMAL at 11:00

## 2024-04-24 RX ADMIN — DOCUSATE SODIUM 100 MG: 100 CAPSULE, LIQUID FILLED ORAL at 10:35

## 2024-04-24 RX ADMIN — MAGNESIUM SULFATE HEPTAHYDRATE 2 G: 40 INJECTION, SOLUTION INTRAVENOUS at 11:18

## 2024-04-24 RX ADMIN — SERTRALINE HYDROCHLORIDE 50 MG: 50 TABLET ORAL at 10:34

## 2024-04-24 RX ADMIN — ENOXAPARIN SODIUM 40 MG: 40 INJECTION SUBCUTANEOUS at 06:37

## 2024-04-24 RX ADMIN — ATENOLOL 25 MG: 25 TABLET ORAL at 10:34

## 2024-04-24 RX ADMIN — OXYCODONE HYDROCHLORIDE 10 MG: 5 TABLET ORAL at 17:43

## 2024-04-24 RX ADMIN — QUETIAPINE FUMARATE 25 MG: 25 TABLET ORAL at 21:15

## 2024-04-24 RX ADMIN — Medication 1 TABLET: at 10:34

## 2024-04-24 RX ADMIN — DILTIAZEM HYDROCHLORIDE 240 MG: 120 CAPSULE, EXTENDED RELEASE ORAL at 10:34

## 2024-04-24 RX ADMIN — FOLIC ACID 1 MG: 1 TABLET ORAL at 09:00

## 2024-04-24 RX ADMIN — HYDROMORPHONE HYDROCHLORIDE 0.2 MG: 0.2 INJECTION, SOLUTION INTRAMUSCULAR; INTRAVENOUS; SUBCUTANEOUS at 06:37

## 2024-04-24 RX ADMIN — BUPROPION HYDROCHLORIDE 100 MG: 100 TABLET, FILM COATED ORAL at 10:59

## 2024-04-24 RX ADMIN — PRAVASTATIN SODIUM 20 MG: 20 TABLET ORAL at 10:34

## 2024-04-24 RX ADMIN — HYDROMORPHONE HYDROCHLORIDE 0.2 MG: 0.2 INJECTION, SOLUTION INTRAMUSCULAR; INTRAVENOUS; SUBCUTANEOUS at 21:01

## 2024-04-24 ASSESSMENT — COGNITIVE AND FUNCTIONAL STATUS - GENERAL
DRESSING REGULAR LOWER BODY CLOTHING: A LOT
HELP NEEDED FOR BATHING: A LOT
PERSONAL GROOMING: A LITTLE
DRESSING REGULAR LOWER BODY CLOTHING: A LOT
DRESSING REGULAR UPPER BODY CLOTHING: A LITTLE
WALKING IN HOSPITAL ROOM: TOTAL
MOBILITY SCORE: 15
HELP NEEDED FOR BATHING: A LOT
PERSONAL GROOMING: A LITTLE
DAILY ACTIVITIY SCORE: 16
STANDING UP FROM CHAIR USING ARMS: A LOT
TOILETING: A LOT
CLIMB 3 TO 5 STEPS WITH RAILING: TOTAL
DRESSING REGULAR UPPER BODY CLOTHING: A LITTLE
MOVING TO AND FROM BED TO CHAIR: A LITTLE
MOVING TO AND FROM BED TO CHAIR: A LITTLE
MOBILITY SCORE: 15
WALKING IN HOSPITAL ROOM: TOTAL
DAILY ACTIVITIY SCORE: 16
TOILETING: A LOT
STANDING UP FROM CHAIR USING ARMS: A LOT
CLIMB 3 TO 5 STEPS WITH RAILING: TOTAL

## 2024-04-24 ASSESSMENT — PAIN - FUNCTIONAL ASSESSMENT
PAIN_FUNCTIONAL_ASSESSMENT: 0-10

## 2024-04-24 ASSESSMENT — PAIN SCALES - GENERAL
PAINLEVEL_OUTOF10: 8
PAINLEVEL_OUTOF10: 9
PAINLEVEL_OUTOF10: 4
PAINLEVEL_OUTOF10: 9

## 2024-04-24 ASSESSMENT — PAIN DESCRIPTION - LOCATION: LOCATION: LEG

## 2024-04-24 ASSESSMENT — PAIN DESCRIPTION - ORIENTATION: ORIENTATION: LEFT

## 2024-04-24 NOTE — PROGRESS NOTES
04/24/24 1027   Discharge Planning   Patient expects to be discharged to: SNF     Patient not med clear for discharge, per notes in [facility] will update on beds , currently it does not look like they have open beds, patient will need a pre-cert for a SNF, reached out to PCN to contact patients sister to get more choices for placement.

## 2024-04-24 NOTE — PROGRESS NOTES
INFECTIOUS DISEASE DAILY PROGRESS NOTE    SUBJECTIVE:    No overnight events. No new complaints. Afebrile. No rash/itching/diarrhea. WBC is down to 11.2.    OBJECTIVE:  VITALS (Last 24 Hours)  /82 (Patient Position: Lying)   Pulse 77   Temp 36.1 °C (97 °F)   Resp 18   Ht 1.524 m (5')   Wt 56.7 kg (125 lb)   SpO2 94%   BMI 24.41 kg/m²     PHYSICAL EXAM:  Gen - NAD, in bed  Abd - soft, no ttp, BS present  LLE - s/p BKA in surgical dressings  Skin - no rash    ABX: IV Cefazolin    LABS:  Lab Results   Component Value Date    WBC 11.2 04/24/2024    HGB 11.5 (L) 04/24/2024    HCT 35.0 (L) 04/24/2024    MCV 95 04/24/2024     (H) 04/24/2024     Lab Results   Component Value Date    GLUCOSE 82 04/24/2024    CALCIUM 8.6 04/24/2024     (L) 04/24/2024    K 3.6 04/24/2024    CO2 26 04/24/2024    CL 96 (L) 04/24/2024    BUN 11 04/24/2024    CREATININE 0.98 04/24/2024     Results from last 72 hours   Lab Units 04/24/24  0533   ALK PHOS U/L 85   BILIRUBIN TOTAL mg/dL 0.2   PROTEIN TOTAL g/dL 6.4   ALT U/L <3*   AST U/L 33   ALBUMIN g/dL 3.3*     Estimated Creatinine Clearance: 44.6 mL/min (by C-G formula based on SCr of 0.98 mg/dL).    ASSESSMENT/PLAN:     Left Ankle Hardware Associated Infection with Suspected OM due to MSSA - s/p BKA 4/22 but some residual soft tissue infection present  CKD - CrCl 44, affects abx dosing  Penicillin Allergy - limits abx options. No issues on Cefazolin thus far.  Alcohol Dependence - withdrawal, on CIWA protocol     IV Cefazolin 2g Q8H still. Leukocytosis resolving.    Monitoring for adverse effects of abx such as rash/itching/diarrhea - none.     Will follow. Thanks!    Jaycob Krishna MD  [facility]  Office #[phone]

## 2024-04-24 NOTE — PROGRESS NOTES
[facility]          Admitting Provider: Octavio Jeong MD Admission Date: 4/16/2024.   Attending Provider: Octavio Jeong MD MRN: [id number]       Subjective   Rose Marie Toussaint is a 68 y.o. female on day 8 of admission presenting with Chronic osteomyelitis involving ankle and foot, left (Multi).  Interval History no significant complaints.     Objective   Physical Exam  Last Recorded Vitals: Blood pressure 148/82, pulse 80, temperature 36.5 °C (97.7 °F), resp. rate 18, height 1.524 m (5'), weight 56.7 kg (125 lb), SpO2 93%.  Patient Vitals for the past 24 hrs:   BP Temp Temp src Pulse Resp SpO2   04/24/24 0756 148/82 36.5 °C (97.7 °F) -- 80 18 93 %   04/24/24 0747 148/82 36.1 °C (97 °F) -- 77 18 94 %   04/24/24 0413 133/80 36.4 °C (97.5 °F) Temporal 78 18 94 %   04/23/24 2125 (!) 182/97 36.7 °C (98.1 °F) Oral 86 18 94 %   04/23/24 1546 138/88 36.2 °C (97.1 °F) -- 80 18 94 %   04/23/24 1125 (!) 153/99 36.1 °C (97 °F) -- 85 18 95 %     Body mass index is 24.41 kg/m².  GENERAL: alert, cooperative, or no distress  SKIN: no rashes  NECK: supple, no thyromegaly, JVP within normal limits  LUNGS:  not in respiratory distress, respiratory rate normal, clear to auscultation  CARDIAC: regular rate and rhythm, normal S1 and S2, no murmur, rub, or gallop  ABDOMEN: Soft, non-tender, normal bowel sounds; no bruits, organomegaly or masses.  EXTREMITIES: No edema Left BKA  NEURO: Alert and oriented x 3,  reflexes normal and symmetric, strength and  sensation grossly normal  Intake/Output last 3 Shifts:  I/O last 3 completed shifts:  In: 2340 (41.3 mL/kg) [P.O.:1340; I.V.:900 (15.9 mL/kg); IV Piggyback:100]  Out: 1261 (22.2 mL/kg) [Urine:1211 (0.6 mL/kg/hr); Blood:50]  Weight: 56.7 kg   DATA:   Diagnostic tests reviewed for today's visit:    Most recent Labs  Results for orders placed or performed during the hospital encounter of 04/16/24 (from the past 24 hour(s))   Comprehensive metabolic panel  Addended by: VANESSA WISE on: 6/28/2023 08:35 AM     Modules accepted: Orders     "  Result Value Ref Range    Glucose 82 74 - 99 mg/dL    Sodium 133 (L) 136 - 145 mmol/L    Potassium 3.6 3.5 - 5.3 mmol/L    Chloride 96 (L) 98 - 107 mmol/L    Bicarbonate 26 21 - 32 mmol/L    Anion Gap 15 10 - 20 mmol/L    Urea Nitrogen 11 6 - 23 mg/dL    Creatinine 0.98 0.50 - 1.05 mg/dL    eGFR 64 >60 mL/min/1.73m*2    Calcium 8.6 8.6 - 10.3 mg/dL    Albumin 3.3 (L) 3.4 - 5.0 g/dL    Alkaline Phosphatase 85 33 - 136 U/L    Total Protein 6.4 6.4 - 8.2 g/dL    AST 33 9 - 39 U/L    Bilirubin, Total 0.2 0.0 - 1.2 mg/dL    ALT <3 (L) 7 - 45 U/L   Magnesium   Result Value Ref Range    Magnesium 1.50 (L) 1.60 - 2.40 mg/dL   CBC and Auto Differential   Result Value Ref Range    WBC 11.2 4.4 - 11.3 x10*3/uL    nRBC 0.0 0.0 - 0.0 /100 WBCs    RBC 3.67 (L) 4.00 - 5.20 x10*6/uL    Hemoglobin 11.5 (L) 12.0 - 16.0 g/dL    Hematocrit 35.0 (L) 36.0 - 46.0 %    MCV 95 80 - 100 fL    MCH 31.3 26.0 - 34.0 pg    MCHC 32.9 32.0 - 36.0 g/dL    RDW 13.2 11.5 - 14.5 %    Platelets 568 (H) 150 - 450 x10*3/uL    Neutrophils % 69.1 40.0 - 80.0 %    Immature Granulocytes %, Automated 0.8 0.0 - 0.9 %    Lymphocytes % 20.6 13.0 - 44.0 %    Monocytes % 7.6 2.0 - 10.0 %    Eosinophils % 1.2 0.0 - 6.0 %    Basophils % 0.7 0.0 - 2.0 %    Neutrophils Absolute 7.76 (H) 1.20 - 7.70 x10*3/uL    Immature Granulocytes Absolute, Automated 0.09 0.00 - 0.70 x10*3/uL    Lymphocytes Absolute 2.31 1.20 - 4.80 x10*3/uL    Monocytes Absolute 0.85 0.10 - 1.00 x10*3/uL    Eosinophils Absolute 0.14 0.00 - 0.70 x10*3/uL    Basophils Absolute 0.08 0.00 - 0.10 x10*3/uL     Urine Culture   Date Value Ref Range Status   05/24/2023 NO GROWTH  Final     Blood Culture   Date Value Ref Range Status   05/23/2023   Final    No Growth at 1 days~No Growth at 2 days~No Growth at 3 days~NO GROWTH at 4 days - FINAL REPORT    No results found for: \"RESPCULTSM\" No results found for: \"PERDIAFLDCUL\" No results found for: \"STERFLDCULSM\"   No results found.  Current " Facility-Administered Medications   Medication Dose Route Frequency Provider Last Rate Last Admin    acetaminophen (Tylenol) tablet 650 mg  650 mg oral q4h PRN Екатерина E Sephel, PA-C        albuterol 2.5 mg /3 mL (0.083 %) nebulizer solution 2.5 mg  2.5 mg nebulization q2h PRN Екатерина E Sephel, PA-C        atenolol (Tenormin) tablet 25 mg  25 mg oral Daily Екатерина E Sephel, PA-C   25 mg at 04/23/24 1001    buPROPion (Wellbutrin) tablet 100 mg  100 mg oral Daily with breakfast Екатерина E Sephel, PA-C   100 mg at 04/23/24 1004    [Held by provider] chlorthalidone (Hygroton) tablet 25 mg  25 mg oral Daily Екатерина E Sephel, PA-C        dilTIAZem CD (Cardizem CD) 24 hr capsule 240 mg  240 mg oral Daily Екатерина E Sephel, PA-C   240 mg at 04/23/24 0958    docusate sodium (Colace) capsule 100 mg  100 mg oral BID Екатерина E Sephel, PA-C   100 mg at 04/23/24 0958    enoxaparin (Lovenox) syringe 40 mg  40 mg subcutaneous q24h Екатерина E Sephel, PA-C   40 mg at 04/24/24 0637    folic acid (Folvite) tablet 1 mg  1 mg oral Daily Екатерина E Sephel, PA-C   1 mg at 04/23/24 0958    HYDROmorphone PF (Dilaudid) injection 0.2 mg  0.2 mg intravenous q3h PRN Екатерина E Sephel, PA-C   0.2 mg at 04/24/24 0637    lactulose 20 gram/30 mL oral solution 20 g  20 g oral Daily Екатерина E Sephel, PA-C   20 g at 04/23/24 0957    LORazepam (Ativan) tablet 0.5 mg  0.5 mg oral q2h PRN Екатерина E Sephel, PA-C   0.5 mg at 04/20/24 1307    Or    LORazepam (Ativan) tablet 1 mg  1 mg oral q2h PRN Екатерина E Sephel, PA-C   1 mg at 04/19/24 0357    Or    LORazepam (Ativan) tablet 2 mg  2 mg oral q2h PRN Екатерина E Sephel, PA-C   2 mg at 04/18/24 2123    melatonin tablet 4.5 mg  4.5 mg oral Nightly PRN Екатерина Purvis PA-C   4.5 mg at 04/23/24 2259    multivitamin with minerals 1 tablet  1 tablet oral Daily Екатерина Purvis PA-C   1 tablet at 04/23/24 0957    nicotine (Nicoderm CQ) 14 mg/24 hr patch 1 patch  1 patch transdermal Daily Екатерина Purvis PA-C   1 patch at 04/23/24 0959    ondansetron  (Zofran) injection 4 mg  4 mg intravenous q8h PRN Екатерина E Sephel, PA-C        oxyCODONE (Roxicodone) immediate release tablet 10 mg  10 mg oral q6h PRN Екатерина E Sephel, PA-C   10 mg at 04/23/24 2259    oxyCODONE (Roxicodone) immediate release tablet 5 mg  5 mg oral q6h PRN Екатерина E Sephel, PA-C        polyethylene glycol (Glycolax, Miralax) packet 17 g  17 g oral Daily Екатерина E Sephel, PA-C   17 g at 04/23/24 0957    pravastatin (Pravachol) tablet 20 mg  20 mg oral Daily Екатерина E Sephel, PA-C   20 mg at 04/23/24 0958    QUEtiapine (SEROquel) tablet 25 mg  25 mg oral Nightly PRN Екатерина E Sephel, PA-C   25 mg at 04/23/24 2150    sertraline (Zoloft) tablet 50 mg  50 mg oral Daily Екатерина E Sephel, PA-C   50 mg at 04/23/24 0958     Current Outpatient Medications   Medication Sig Dispense Refill    acetaminophen (Tylenol) 325 mg tablet Take 2 tablets (650 mg) by mouth every 6 hours if needed for mild pain (1 - 3).      aspirin 81 mg chewable tablet Chew 1 tablet (81 mg) 2 times a day. 60 tablet     docusate sodium (Colace) 100 mg capsule Take 1 capsule (100 mg) by mouth 2 times a day for 14 days. 28 capsule     oxyCODONE (Roxicodone) 5 mg immediate release tablet Take 1 tablet (5 mg) by mouth every 6 hours if needed for severe pain (7 - 10). 15 tablet 0   ..     Medication and Non-Pharmacologic VTE Prophylaxis/Anticoagulants      Last Anticoag Admin            enoxaparin (Lovenox) syringe 40 mg    Given 40 mg at 0637    Frequency: Every 24 hours         There are additional administrations since 04/21/24 0847 that are not shown.    No unadministered anticoagulant orders found.          Assessment/Plan   Rose Marie Toussaint has  Principal Problem:    Chronic osteomyelitis involving ankle and foot, left (Multi)  Active Problems:    Septic joint (Multi)    Alcohol withdrawal delirium (Multi)    Adrenal cortical nodule (Multi)    Critical limb ischemia of right lower extremity (Multi)     S/P BKA. Awaiting sister to make decision       improved. On CIWA scale.      Patient made aware. Needs follow up CT in 4 months   Other Hospital problems        Abnormal findings not addressed during hospitalization, but require out patient follow up. Adrenal nodule         I spent 35 minutes talking and examining Rose Marie Toussaint, reviewing the labs & medications, formulating plan of care & discussing with NP and nursing staff.    Octavio Jeong MD

## 2024-04-24 NOTE — PROGRESS NOTES
Spoke with patient's sister to update on referral to [facility] and to obtain additional choices. A choice list was emailed to her and someone will need to follow up for choices.progress

## 2024-04-24 NOTE — CARE PLAN
Problem: Pain  Goal: My pain/discomfort is manageable  Outcome: Progressing     Problem: Safety  Goal: Patient will be injury free during hospitalization  Outcome: Progressing  Goal: I will remain free of falls  Outcome: Progressing     Problem: Daily Care  Goal: Daily care needs are met  Outcome: Progressing     Problem: Psychosocial Needs  Goal: Demonstrates ability to cope with hospitalization/illness  Outcome: Progressing  Goal: Collaborate with me, my family, and caregiver to identify my specific goals  Outcome: Progressing     Problem: Discharge Barriers  Goal: My discharge needs are met  Outcome: Progressing     Problem: Skin  Goal: Participates in plan/prevention/treatment measures  Outcome: Progressing  Goal: Prevent/manage excess moisture  Outcome: Progressing  Goal: Prevent/minimize sheer/friction injuries  Outcome: Progressing  Goal: Promote/optimize nutrition  Outcome: Progressing  Goal: Promote skin healing  Outcome: Progressing     Problem: Pain - Adult  Goal: Verbalizes/displays adequate comfort level or baseline comfort level  Outcome: Progressing     Problem: Safety - Adult  Goal: Free from fall injury  Outcome: Progressing   The patient's goals for the shift include      The clinical goals for the shift include pt will remain safe and ambulate with help throughout shift

## 2024-04-24 NOTE — PROGRESS NOTES
"Physical Therapy                 Therapy Communication Note    Patient Name: Rose Marie Toussaint  MRN: [id number]  Today's Date: 4/24/2024     Discipline: Physical Therapy    Missed Visit Reason: Missed Visit Reason: Patient refused (Pt very anxiouos. Stating she Had a \"very bad night\" alot of pain and no sleep, she has many important phone calls to make at this time before \"they all close for the day\" and she must complete calls to keep husbands stress level low to avoid stoke.)    Missed Time: Attempt    "

## 2024-04-24 NOTE — PROGRESS NOTES
ENDOVASCULAR LIMB SALVAGE PROGRESS NOTE  [facility] [place]  Subjective   Patient admitted with left ankle osteomyelitis in setting of hardware, now s/p L BKA by orthopedic surgery on 4/22. There was concern for poor bleeding from cut surfaces during the case, and our service has been asked to re-evaluate a left iliac stenosis as restricting flow to the leg. Hospitalization has also been complicated by alcohol withdrawal and AMS, now improved.     Objective   Heart Rate:  [77-86]   Temp:  [36.1 °C (97 °F)-36.7 °C (98.1 °F)]   Resp:  [18]   BP: (133-182)/(80-97)   SpO2:  [93 %-94 %]     Physical Exam:  General: NAD, AAOx3  Neuro: no gross deficits, speech WNL  HEENT: NC/AT  CV: RRR  Pulse exam: 2+ right femoral 1+ left femoral; LLE wrapped in ACE, drain with serosanguinous drainage  Pulm: normal respiratory effort on RA  Abd: soft, NTND  Extr: FROM, no deformities, no swelling  Skin: no lesions or rashes     Labs:  Results from last 7 days   Lab Units 04/24/24  0533 04/23/24  0613 04/22/24  0616   WBC AUTO x10*3/uL 11.2 15.3* 10.8   HEMOGLOBIN g/dL 11.5* 11.6* 12.5   HEMATOCRIT % 35.0* 35.4* 37.9   PLATELETS AUTO x10*3/uL 568* 624* 477*     Results from last 7 days   Lab Units 04/24/24  0533 04/23/24  0613 04/22/24  0616   SODIUM mmol/L 133* 132* 133*   POTASSIUM mmol/L 3.6 4.0 3.3*   CHLORIDE mmol/L 96* 95* 97*   CO2 mmol/L 26 26 24   BUN mg/dL 11 14 12   CREATININE mg/dL 0.98 1.03 1.20*   GLUCOSE mg/dL 82 98 87   CALCIUM mg/dL 8.6 8.9 9.0       Assessment/Plan   68 y.o. female s/p L BKA. High grade L EIA stenosis on CTA from 4/17. Will plan for angiogram tomorrow with possible intervention. NPO after midnight tonight. Please hold lovenox ppx tomorrow morning.    D/w attending, Dr. Pratik Amado, MD  Vascular Surgery Fellow

## 2024-04-24 NOTE — PROGRESS NOTES
Rose Marie Toussaint is a 68 y.o. female on day 8 of admission presenting with Chronic osteomyelitis involving ankle and foot, left (Multi).      Subjective   RONALDO. Patient upset this morning. Did not have good pain control overnight as nurses were not answering her call light in a timely manner. Patient also not sure why she is NPO and not able to have anything to eat or drink.        Objective     PE:  Constitutional: calm and cooperative, NAD  Eyes: EOMI, clear sclera   Cardiovascular: Normal rate and regular rhythm. No murmurs  Respiratory/Thorax: CTAB, on RA  Genitourinary: Voiding independently   Musculoskeletal: left BKA covered with C/D splint, HV with ss output (nothing charted in 2 days)  Extremities: No peripheral edema of right extremity   Neurological: appears oriented to time, place, situation   Psychological: pleasant mood and behavior      Last Recorded Vitals  Vitals:    04/23/24 2125 04/24/24 0413 04/24/24 0747 04/24/24 0756   BP: (!) 182/97 133/80 148/82 148/82   BP Location: Right arm Right arm     Patient Position: Sitting Lying Lying Lying   Pulse: 86 78 77 80   Resp: 18 18 18 18   Temp: 36.7 °C (98.1 °F) 36.4 °C (97.5 °F) 36.1 °C (97 °F) 36.5 °C (97.7 °F)   TempSrc: Oral Temporal     SpO2: 94% 94% 94% 93%   Weight:       Height:             Relevant Results    Imaging:     .=== 04/16/24 ===    XR CHEST 1 VIEW    - Impression -  Streaky probable scarring or atelectasis in the right apex similar to  05/26/2023.    MACRO:  None.    Signed by: Екатерина Mejia 4/17/2024 7:47 AM  Dictation workstation:   [id number]   .=== 04/16/24 ===    CT HEAD WO IV CONTRAST    - Impression -  No evidence of acute cortical infarct or intracranial hemorrhage.    No evidence of intracranial hemorrhage or displaced skull fracture.    MACRO:  None    Signed by: Shahida Bhakta 4/17/2024 3:31 PM  Dictation workstation:   [id number]         Lab Results:   Lab Results   Component Value Date    WBC 11.2 04/24/2024    HGB 11.5  (L) 04/24/2024    HCT 35.0 (L) 04/24/2024    MCV 95 04/24/2024     (H) 04/24/2024     Lab Results   Component Value Date    GLUCOSE 82 04/24/2024    CALCIUM 8.6 04/24/2024     (L) 04/24/2024    K 3.6 04/24/2024    CO2 26 04/24/2024    CL 96 (L) 04/24/2024    BUN 11 04/24/2024    CREATININE 0.98 04/24/2024     Results from last 72 hours   Lab Units 04/24/24  0533   ALK PHOS U/L 85   BILIRUBIN TOTAL mg/dL 0.2   PROTEIN TOTAL g/dL 6.4   ALT U/L <3*   AST U/L 33   ALBUMIN g/dL 3.3*     Estimated Creatinine Clearance: 44.6 mL/min (by C-G formula based on SCr of 0.98 mg/dL).  C-Reactive Protein   Date/Time Value Ref Range Status   04/15/2024 05:49 AM 23.19 (H) <1.00 mg/dL Final         Assessment/Plan   Rose Marie Toussaint is a 68 y.o. female on day 8 of admission presenting with Chronic osteomyelitis involving ankle and foot, left (Multi).      s/p Left BKA 2/2 ankle OM/fx/dislocation,  POD #2 doing well  HV: no documented output in 2 days  A/P:  - Afebrile, VSS  - NWB LLE  - PT/OT  - Regular diet, scheduled bowel regimen   - Voiding (1.2L/24h)  - DVT prophylaxis, AMERICO/SCD/lovenox. Recommend 4 weeks of chempxx for blood clot prevention. ASA 81 MG twice daily is most affordable.   - Continue current pain control regimen  - maintain splint, monitor HV output  - clyde-op ancef, ID following, appreciate recs     Dispo: Will continue to follow along and monitor HV output.     I spent 35 minutes in the professional and overall care of this patient.      Chelsea Duron, WESTLEY-CNP

## 2024-04-25 LAB
ALBUMIN SERPL BCP-MCNC: 3.4 G/DL (ref 3.4–5)
ALP SERPL-CCNC: 89 U/L (ref 33–136)
ALT SERPL W P-5'-P-CCNC: 3 U/L (ref 7–45)
ANION GAP SERPL CALC-SCNC: 17 MMOL/L (ref 10–20)
AST SERPL W P-5'-P-CCNC: 29 U/L (ref 9–39)
BILIRUB SERPL-MCNC: 0.3 MG/DL (ref 0–1.2)
BUN SERPL-MCNC: 10 MG/DL (ref 6–23)
CALCIUM SERPL-MCNC: 8.8 MG/DL (ref 8.6–10.3)
CHLORIDE SERPL-SCNC: 94 MMOL/L (ref 98–107)
CO2 SERPL-SCNC: 24 MMOL/L (ref 21–32)
CREAT SERPL-MCNC: 0.98 MG/DL (ref 0.5–1.05)
EGFRCR SERPLBLD CKD-EPI 2021: 64 ML/MIN/1.73M*2
ERYTHROCYTE [DISTWIDTH] IN BLOOD BY AUTOMATED COUNT: 13.2 % (ref 11.5–14.5)
GLUCOSE SERPL-MCNC: 100 MG/DL (ref 74–99)
HCT VFR BLD AUTO: 35 % (ref 36–46)
HGB BLD-MCNC: 12.3 G/DL (ref 12–16)
MAGNESIUM SERPL-MCNC: 1.7 MG/DL (ref 1.6–2.4)
MCH RBC QN AUTO: 32.8 PG (ref 26–34)
MCHC RBC AUTO-ENTMCNC: 35.1 G/DL (ref 32–36)
MCV RBC AUTO: 93 FL (ref 80–100)
NRBC BLD-RTO: 0 /100 WBCS (ref 0–0)
PLATELET # BLD AUTO: 661 X10*3/UL (ref 150–450)
POTASSIUM SERPL-SCNC: 3.6 MMOL/L (ref 3.5–5.3)
PROT SERPL-MCNC: 6.6 G/DL (ref 6.4–8.2)
RBC # BLD AUTO: 3.75 X10*6/UL (ref 4–5.2)
SODIUM SERPL-SCNC: 131 MMOL/L (ref 136–145)
WBC # BLD AUTO: 11.6 X10*3/UL (ref 4.4–11.3)

## 2024-04-25 PROCEDURE — 99152 MOD SED SAME PHYS/QHP 5/>YRS: CPT | Performed by: HOSPITALIST

## 2024-04-25 PROCEDURE — 2500000006 HC RX 250 W HCPCS SELF ADMINISTERED DRUGS (ALT 637 FOR ALL PAYERS): Performed by: STUDENT IN AN ORGANIZED HEALTH CARE EDUCATION/TRAINING PROGRAM

## 2024-04-25 PROCEDURE — 2500000001 HC RX 250 WO HCPCS SELF ADMINISTERED DRUGS (ALT 637 FOR MEDICARE OP): Performed by: STUDENT IN AN ORGANIZED HEALTH CARE EDUCATION/TRAINING PROGRAM

## 2024-04-25 PROCEDURE — 80053 COMPREHEN METABOLIC PANEL: CPT | Performed by: STUDENT IN AN ORGANIZED HEALTH CARE EDUCATION/TRAINING PROGRAM

## 2024-04-25 PROCEDURE — 2500000006 HC RX 250 W HCPCS SELF ADMINISTERED DRUGS (ALT 637 FOR ALL PAYERS): Mod: MUE | Performed by: STUDENT IN AN ORGANIZED HEALTH CARE EDUCATION/TRAINING PROGRAM

## 2024-04-25 PROCEDURE — 37221 HC REVASCULARIZE ILIAC ARTERY,ANGIOPLASTY/STENT, INITIAL VESSEL: CPT | Performed by: HOSPITALIST

## 2024-04-25 PROCEDURE — 2500000001 HC RX 250 WO HCPCS SELF ADMINISTERED DRUGS (ALT 637 FOR MEDICARE OP): Performed by: INTERNAL MEDICINE

## 2024-04-25 PROCEDURE — 2780000003 HC OR 278 NO HCPCS: Performed by: HOSPITALIST

## 2024-04-25 PROCEDURE — 2550000001 HC RX 255 CONTRASTS: Performed by: HOSPITALIST

## 2024-04-25 PROCEDURE — 75710 ARTERY X-RAYS ARM/LEG: CPT | Performed by: HOSPITALIST

## 2024-04-25 PROCEDURE — C1760 CLOSURE DEV, VASC: HCPCS | Performed by: HOSPITALIST

## 2024-04-25 PROCEDURE — 2500000005 HC RX 250 GENERAL PHARMACY W/O HCPCS: Performed by: HOSPITALIST

## 2024-04-25 PROCEDURE — C1753 CATH, INTRAVAS ULTRASOUND: HCPCS | Performed by: HOSPITALIST

## 2024-04-25 PROCEDURE — S4991 NICOTINE PATCH NONLEGEND: HCPCS | Performed by: STUDENT IN AN ORGANIZED HEALTH CARE EDUCATION/TRAINING PROGRAM

## 2024-04-25 PROCEDURE — 2500000004 HC RX 250 GENERAL PHARMACY W/ HCPCS (ALT 636 FOR OP/ED): Mod: JZ | Performed by: INTERNAL MEDICINE

## 2024-04-25 PROCEDURE — C1876 STENT, NON-COA/NON-COV W/DEL: HCPCS | Performed by: HOSPITALIST

## 2024-04-25 PROCEDURE — C1894 INTRO/SHEATH, NON-LASER: HCPCS | Performed by: HOSPITALIST

## 2024-04-25 PROCEDURE — 37220 HC REVASCULARIZE ILIAC ARTERY,ANGIOPLASTY, INITIAL VESSEL: CPT | Performed by: HOSPITALIST

## 2024-04-25 PROCEDURE — 85027 COMPLETE CBC AUTOMATED: CPT | Performed by: STUDENT IN AN ORGANIZED HEALTH CARE EDUCATION/TRAINING PROGRAM

## 2024-04-25 PROCEDURE — 2500000004 HC RX 250 GENERAL PHARMACY W/ HCPCS (ALT 636 FOR OP/ED): Performed by: HOSPITALIST

## 2024-04-25 PROCEDURE — 99153 MOD SED SAME PHYS/QHP EA: CPT | Performed by: HOSPITALIST

## 2024-04-25 PROCEDURE — C1766 INTRO/SHEATH,STRBLE,NON-PEEL: HCPCS | Performed by: HOSPITALIST

## 2024-04-25 PROCEDURE — 2500000005 HC RX 250 GENERAL PHARMACY W/O HCPCS: Performed by: NURSE PRACTITIONER

## 2024-04-25 PROCEDURE — 97530 THERAPEUTIC ACTIVITIES: CPT | Mod: GP,CQ | Performed by: PHYSICAL THERAPY ASSISTANT

## 2024-04-25 PROCEDURE — 2500000001 HC RX 250 WO HCPCS SELF ADMINISTERED DRUGS (ALT 637 FOR MEDICARE OP): Performed by: HOSPITALIST

## 2024-04-25 PROCEDURE — 83735 ASSAY OF MAGNESIUM: CPT | Performed by: STUDENT IN AN ORGANIZED HEALTH CARE EDUCATION/TRAINING PROGRAM

## 2024-04-25 PROCEDURE — 36415 COLL VENOUS BLD VENIPUNCTURE: CPT | Performed by: STUDENT IN AN ORGANIZED HEALTH CARE EDUCATION/TRAINING PROGRAM

## 2024-04-25 PROCEDURE — 2500000004 HC RX 250 GENERAL PHARMACY W/ HCPCS (ALT 636 FOR OP/ED): Performed by: STUDENT IN AN ORGANIZED HEALTH CARE EDUCATION/TRAINING PROGRAM

## 2024-04-25 PROCEDURE — 85347 COAGULATION TIME ACTIVATED: CPT

## 2024-04-25 PROCEDURE — 76937 US GUIDE VASCULAR ACCESS: CPT | Performed by: HOSPITALIST

## 2024-04-25 PROCEDURE — 97535 SELF CARE MNGMENT TRAINING: CPT | Mod: GO

## 2024-04-25 PROCEDURE — 1200000002 HC GENERAL ROOM WITH TELEMETRY DAILY

## 2024-04-25 PROCEDURE — C1769 GUIDE WIRE: HCPCS | Performed by: HOSPITALIST

## 2024-04-25 PROCEDURE — 97129 THER IVNTJ 1ST 15 MIN: CPT | Mod: GO

## 2024-04-25 PROCEDURE — 2500000002 HC RX 250 W HCPCS SELF ADMINISTERED DRUGS (ALT 637 FOR MEDICARE OP, ALT 636 FOR OP/ED): Performed by: STUDENT IN AN ORGANIZED HEALTH CARE EDUCATION/TRAINING PROGRAM

## 2024-04-25 PROCEDURE — 99024 POSTOP FOLLOW-UP VISIT: CPT | Performed by: STUDENT IN AN ORGANIZED HEALTH CARE EDUCATION/TRAINING PROGRAM

## 2024-04-25 PROCEDURE — 97116 GAIT TRAINING THERAPY: CPT | Mod: GP,CQ | Performed by: PHYSICAL THERAPY ASSISTANT

## 2024-04-25 PROCEDURE — 2720000007 HC OR 272 NO HCPCS: Performed by: HOSPITALIST

## 2024-04-25 PROCEDURE — 2500000004 HC RX 250 GENERAL PHARMACY W/ HCPCS (ALT 636 FOR OP/ED): Performed by: NURSE PRACTITIONER

## 2024-04-25 PROCEDURE — C1725 CATH, TRANSLUMIN NON-LASER: HCPCS | Performed by: HOSPITALIST

## 2024-04-25 DEVICE — STENT EVD35-08-080-120 EF ENTRUST V01
Type: IMPLANTABLE DEVICE | Site: ARTERIAL | Status: FUNCTIONAL
Brand: EVERFLEX™

## 2024-04-25 RX ORDER — SODIUM CHLORIDE 9 MG/ML
100 INJECTION, SOLUTION INTRAVENOUS CONTINUOUS
Status: DISCONTINUED | OUTPATIENT
Start: 2024-04-25 | End: 2024-04-26

## 2024-04-25 RX ORDER — CEFAZOLIN SODIUM 2 G/100ML
2 INJECTION, SOLUTION INTRAVENOUS EVERY 8 HOURS
Status: DISCONTINUED | OUTPATIENT
Start: 2024-04-25 | End: 2024-04-29 | Stop reason: HOSPADM

## 2024-04-25 RX ORDER — PROTAMINE SULFATE 10 MG/ML
INJECTION, SOLUTION INTRAVENOUS CONTINUOUS PRN
Status: COMPLETED | OUTPATIENT
Start: 2024-04-25 | End: 2024-04-25

## 2024-04-25 RX ORDER — NAPROXEN SODIUM 220 MG/1
TABLET, FILM COATED ORAL AS NEEDED
Status: DISCONTINUED | OUTPATIENT
Start: 2024-04-25 | End: 2024-04-25 | Stop reason: HOSPADM

## 2024-04-25 RX ORDER — NAPROXEN SODIUM 220 MG/1
81 TABLET, FILM COATED ORAL DAILY
Status: DISCONTINUED | OUTPATIENT
Start: 2024-04-26 | End: 2024-04-29 | Stop reason: HOSPADM

## 2024-04-25 RX ORDER — CLOPIDOGREL BISULFATE 300 MG/1
TABLET, FILM COATED ORAL AS NEEDED
Status: DISCONTINUED | OUTPATIENT
Start: 2024-04-25 | End: 2024-04-25 | Stop reason: HOSPADM

## 2024-04-25 RX ORDER — HYDRALAZINE HYDROCHLORIDE 20 MG/ML
INJECTION INTRAMUSCULAR; INTRAVENOUS AS NEEDED
Status: DISCONTINUED | OUTPATIENT
Start: 2024-04-25 | End: 2024-04-25 | Stop reason: HOSPADM

## 2024-04-25 RX ORDER — SODIUM CHLORIDE 9 MG/ML
100 INJECTION, SOLUTION INTRAVENOUS CONTINUOUS
Status: CANCELLED | OUTPATIENT
Start: 2024-04-25 | End: 2024-04-25

## 2024-04-25 RX ORDER — HEPARIN SODIUM 1000 [USP'U]/ML
INJECTION, SOLUTION INTRAVENOUS; SUBCUTANEOUS AS NEEDED
Status: DISCONTINUED | OUTPATIENT
Start: 2024-04-25 | End: 2024-04-25 | Stop reason: HOSPADM

## 2024-04-25 RX ORDER — FENTANYL CITRATE 50 UG/ML
INJECTION, SOLUTION INTRAMUSCULAR; INTRAVENOUS AS NEEDED
Status: DISCONTINUED | OUTPATIENT
Start: 2024-04-25 | End: 2024-04-25 | Stop reason: HOSPADM

## 2024-04-25 RX ORDER — CLOPIDOGREL BISULFATE 75 MG/1
75 TABLET ORAL DAILY
Status: DISCONTINUED | OUTPATIENT
Start: 2024-04-26 | End: 2024-04-29 | Stop reason: HOSPADM

## 2024-04-25 RX ORDER — MIDAZOLAM HYDROCHLORIDE 1 MG/ML
INJECTION, SOLUTION INTRAMUSCULAR; INTRAVENOUS AS NEEDED
Status: DISCONTINUED | OUTPATIENT
Start: 2024-04-25 | End: 2024-04-25 | Stop reason: HOSPADM

## 2024-04-25 RX ORDER — LIDOCAINE HYDROCHLORIDE 10 MG/ML
INJECTION, SOLUTION EPIDURAL; INFILTRATION; INTRACAUDAL; PERINEURAL AS NEEDED
Status: DISCONTINUED | OUTPATIENT
Start: 2024-04-25 | End: 2024-04-25 | Stop reason: HOSPADM

## 2024-04-25 RX ADMIN — CHLORTHALIDONE 25 MG: 25 TABLET ORAL at 09:09

## 2024-04-25 RX ADMIN — SERTRALINE HYDROCHLORIDE 50 MG: 50 TABLET ORAL at 09:09

## 2024-04-25 RX ADMIN — DILTIAZEM HYDROCHLORIDE 240 MG: 120 CAPSULE, EXTENDED RELEASE ORAL at 09:09

## 2024-04-25 RX ADMIN — Medication 2 L/MIN: at 20:00

## 2024-04-25 RX ADMIN — HYDROMORPHONE HYDROCHLORIDE 0.2 MG: 0.2 INJECTION, SOLUTION INTRAMUSCULAR; INTRAVENOUS; SUBCUTANEOUS at 19:40

## 2024-04-25 RX ADMIN — OXYCODONE HYDROCHLORIDE 10 MG: 5 TABLET ORAL at 06:12

## 2024-04-25 RX ADMIN — SODIUM CHLORIDE 100 ML/HR: 9 INJECTION, SOLUTION INTRAVENOUS at 21:49

## 2024-04-25 RX ADMIN — HYDROMORPHONE HYDROCHLORIDE 0.2 MG: 0.2 INJECTION, SOLUTION INTRAMUSCULAR; INTRAVENOUS; SUBCUTANEOUS at 23:19

## 2024-04-25 RX ADMIN — ATENOLOL 25 MG: 25 TABLET ORAL at 09:10

## 2024-04-25 RX ADMIN — FOLIC ACID 1 MG: 1 TABLET ORAL at 09:09

## 2024-04-25 RX ADMIN — DOCUSATE SODIUM 100 MG: 100 CAPSULE, LIQUID FILLED ORAL at 21:49

## 2024-04-25 RX ADMIN — BUPROPION HYDROCHLORIDE 100 MG: 100 TABLET, FILM COATED ORAL at 09:09

## 2024-04-25 RX ADMIN — ENOXAPARIN SODIUM 40 MG: 40 INJECTION SUBCUTANEOUS at 05:26

## 2024-04-25 RX ADMIN — OXYCODONE HYDROCHLORIDE 10 MG: 5 TABLET ORAL at 17:39

## 2024-04-25 RX ADMIN — CEFAZOLIN SODIUM 2 G: 2 INJECTION, SOLUTION INTRAVENOUS at 10:22

## 2024-04-25 RX ADMIN — PRAVASTATIN SODIUM 20 MG: 20 TABLET ORAL at 09:09

## 2024-04-25 RX ADMIN — HYDROMORPHONE HYDROCHLORIDE 0.2 MG: 0.2 INJECTION, SOLUTION INTRAMUSCULAR; INTRAVENOUS; SUBCUTANEOUS at 09:09

## 2024-04-25 RX ADMIN — Medication 1 TABLET: at 09:00

## 2024-04-25 RX ADMIN — NICOTINE 1 PATCH: 14 PATCH, EXTENDED RELEASE TRANSDERMAL at 09:11

## 2024-04-25 RX ADMIN — CEFAZOLIN SODIUM 2 G: 2 INJECTION, SOLUTION INTRAVENOUS at 19:39

## 2024-04-25 ASSESSMENT — COGNITIVE AND FUNCTIONAL STATUS - GENERAL
MOVING TO AND FROM BED TO CHAIR: A LITTLE
DRESSING REGULAR UPPER BODY CLOTHING: A LITTLE
DRESSING REGULAR LOWER BODY CLOTHING: A LOT
MOBILITY SCORE: 17
DAILY ACTIVITIY SCORE: 16
MOVING TO AND FROM BED TO CHAIR: A LITTLE
DRESSING REGULAR LOWER BODY CLOTHING: A LOT
TURNING FROM BACK TO SIDE WHILE IN FLAT BAD: A LITTLE
HELP NEEDED FOR BATHING: A LOT
DRESSING REGULAR UPPER BODY CLOTHING: A LITTLE
DRESSING REGULAR UPPER BODY CLOTHING: A LITTLE
MOBILITY SCORE: 17
CLIMB 3 TO 5 STEPS WITH RAILING: TOTAL
MOBILITY SCORE: 15
HELP NEEDED FOR BATHING: A LOT
CLIMB 3 TO 5 STEPS WITH RAILING: TOTAL
WALKING IN HOSPITAL ROOM: TOTAL
TOILETING: A LOT
DRESSING REGULAR LOWER BODY CLOTHING: A LOT
TOILETING: A LITTLE
STANDING UP FROM CHAIR USING ARMS: A LITTLE
CLIMB 3 TO 5 STEPS WITH RAILING: TOTAL
STANDING UP FROM CHAIR USING ARMS: A LOT
TURNING FROM BACK TO SIDE WHILE IN FLAT BAD: A LITTLE
WALKING IN HOSPITAL ROOM: A LITTLE
DAILY ACTIVITIY SCORE: 17
PERSONAL GROOMING: A LITTLE
HELP NEEDED FOR BATHING: A LOT
PERSONAL GROOMING: A LITTLE
MOVING TO AND FROM BED TO CHAIR: A LITTLE
WALKING IN HOSPITAL ROOM: A LITTLE
STANDING UP FROM CHAIR USING ARMS: A LITTLE
DAILY ACTIVITIY SCORE: 17
PERSONAL GROOMING: A LITTLE
TOILETING: A LITTLE

## 2024-04-25 ASSESSMENT — PAIN DESCRIPTION - ORIENTATION
ORIENTATION: LEFT
ORIENTATION: LEFT

## 2024-04-25 ASSESSMENT — PAIN SCALES - GENERAL
PAINLEVEL_OUTOF10: 9
PAINLEVEL_OUTOF10: 8
PAINLEVEL_OUTOF10: 8
PAINLEVEL_OUTOF10: 9
PAINLEVEL_OUTOF10: 0 - NO PAIN
PAINLEVEL_OUTOF10: 7
PAINLEVEL_OUTOF10: 3
PAINLEVEL_OUTOF10: 3
PAINLEVEL_OUTOF10: 5 - MODERATE PAIN
PAINLEVEL_OUTOF10: 3

## 2024-04-25 ASSESSMENT — PAIN - FUNCTIONAL ASSESSMENT
PAIN_FUNCTIONAL_ASSESSMENT: 0-10

## 2024-04-25 ASSESSMENT — PAIN DESCRIPTION - LOCATION
LOCATION: LEG
LOCATION: LEG

## 2024-04-25 ASSESSMENT — ACTIVITIES OF DAILY LIVING (ADL): HOME_MANAGEMENT_TIME_ENTRY: 15

## 2024-04-25 NOTE — PROGRESS NOTES
4/25/2024 1:37 PM I spoke with Admission director from [facility]. Patient has not been accepted there yet. She has questions about DPOA and family contacts. I gave her phone number for patient's sisters. I spoke with patient's sister. She is looking into an eldercare . I told her that I would leave a senior living guide in patient's room. Cherelle RESTREPO

## 2024-04-25 NOTE — CARE PLAN
Problem: Safety  Goal: Patient will be injury free during hospitalization  Outcome: Progressing  Goal: I will remain free of falls  Outcome: Progressing     Problem: Skin  Goal: Participates in plan/prevention/treatment measures  Outcome: Progressing  Flowsheets (Taken 4/24/2024 2330)  Participates in plan/prevention/treatment measures: Elevate heels  Goal: Prevent/manage excess moisture  Outcome: Progressing  Flowsheets (Taken 4/24/2024 2330)  Prevent/manage excess moisture: Follow provider orders for dressing changes  Goal: Prevent/minimize sheer/friction injuries  Outcome: Progressing  Flowsheets (Taken 4/24/2024 2330)  Prevent/minimize sheer/friction injuries: Turn/reposition every 2 hours/use positioning/transfer devices  Goal: Promote/optimize nutrition  Outcome: Progressing  Flowsheets (Taken 4/24/2024 2330)  Promote/optimize nutrition: Consume > 50% meals/supplements  Goal: Promote skin healing  Outcome: Progressing  Flowsheets (Taken 4/24/2024 2330)  Promote skin healing: Turn/reposition every 2 hours/use positioning/transfer devices   The patient's goals for the shift include      The clinical goals for the shift include pt safety will  be maintained in duration of the shift.    Over the shift, the patient did make progress toward the following goals.

## 2024-04-25 NOTE — CARE PLAN
The patient's goals for the shift include      The clinical goals for the shift include pt safety will  be maintained in duration of the shift.    Problem: Pain  Goal: My pain/discomfort is manageable  Outcome: Progressing     Problem: Safety  Goal: Patient will be injury free during hospitalization  Outcome: Progressing  Goal: I will remain free of falls  Outcome: Progressing

## 2024-04-25 NOTE — POST-PROCEDURE NOTE
Physician Transition of Care Summary  Invasive Cardiovascular Lab    Procedure Date: 4/25/2024  Attending:    Murtaza Christie - Primary  Resident/Fellow/Other Assistant: Surgeons and Role:  * No surgeons found with a matching role *    Indications:   Pre-op Diagnosis     * Critical limb ischemia of left lower extremity (Multi) [I70.222]    Post-procedure diagnosis:   Post-op Diagnosis     * Critical limb ischemia of left lower extremity (Multi) [I70.222]    Procedure(s):   Lower Extremity Angiogram  35021 - CHG ANGIOGRAPHY EXTREMITY UNILATERAL RS&I    IVUS - Peripheral  57352 - MT ENDOLUMINAL CORONARY IVUS OCT I&R INITIAL VESSEL    Stent - Lower Extremity    Procedure Findings:   Status post successful IVUS-guide stenting of left external iliac artery using 8.0 x 80 mm self-expanding stent.    Access of the Procedure:   6 Prydeinig right CFA, closed with Vascade no pressure.    Complications:   None.    Stents/Implants:   Implants       Stent    Stent System, Everflex, 8 X 80 X 120cm - [id number] - Implanted        Inventory item: STENT SYSTEM, EVERFLEX, 8 X 80 X 120CM Model/Cat number: [id number]    : [employer] EV3 [employer] VAS Lot number: [id number]    Device identifier: [id number]        As of 4/25/2024       Status: Implanted                              Anticoagulation/Antiplatelet Plan:   Heparin to maintain therapeutic ACT throughout the procedure.    Estimated Blood Loss:   5 mL.    Anesthesia: Moderate Sedation Anesthesia Staff: No anesthesia staff entered.    Any Specimen(s) Removed:   No specimens collected during this procedure.    Disposition:   -5-hour bedrest.  -Aspirin and Plavix loaded in the Cath Lab, continue aspirin 81 mg once daily for life and Plavix 75 mg once daily for at least 3 months  -Further management as per podiatry team.  -Will follow-up in the office in 4 weeks with LUSI and TBI.      Electronically signed by: Carmen Christie MD, 4/25/2024 4:51 PM

## 2024-04-25 NOTE — PROGRESS NOTES
Occupational Therapy    Occupational Therapy Treatment    Name: Rose Marie Toussaint  MRN: [id number]  : 1955  Date: 24  Time Calculation  Start Time: 1354  Stop Time: 1424  Time Calculation (min): 30 min    Assessment:  OT Assessment: Pt seen for OT tx. Pt continues with decreased cognition, assist with mobility and ADLS. Pt impulsive t times during session. Pt would benefit from mod intensity therpay at d/c to increase functional mobility and independence  Prognosis: Good  Barriers to Discharge: None  Evaluation/Treatment Tolerance: Patient tolerated treatment well  Medical Staff Made Aware: Yes  End of Session Communication: Bedside nurse  End of Session Patient Position:  (transport in to take pt for procedure)  Plan:  Treatment Interventions: ADL retraining, Functional transfer training, Cognitive reorientation, Compensatory technique education  OT Frequency: 3 times per week  OT Discharge Recommendations: Moderate intensity level of continued care  Equipment Recommended upon Discharge: Wheeled walker  OT Recommended Transfer Status: Minimal assist, Assist of 1  OT - OK to Discharge: Yes    Subjective   Previous Visit Info:  OT Last Visit  OT Received On: 24  General:  General  Reason for Referral: decline in ADLs; L BKA   Patient Position Received: Up in chair, Alarm on  Preferred Learning Style: verbal, visual, auditory  General Comment: Pt agreeable to OT tx.  Precautions:  Hearing/Visual Limitations: Glasses  LE Weight Bearing Status: Left Non-Weight Bearing  Medical Precautions: Fall precautions  Vitals:     Pain Assessment:  Pain Assessment  Pain Assessment: 0-10  Pain Score:  (reports pain was not able to give number)  Pain Type: Acute pain, Surgical pain     Objective   Cognition:  Overall Cognitive Status: Impaired  Orientation Level: Oriented X4  Following Commands:  (follows one step commands with repetition, increased time 75% of tx session)  Attention: Exceptions to WFL (stephanyily  distracted)  Memory: Exceptions to WFL  Long-Term Memory: Impaired  Short-Term Memory: Impaired  Insight: Moderate  Impulsive: Severely  Task Initiation: Initiates with cues  Activities of Daily Living: Toileting  Toileting Level of Assistance: Setup  Where Assessed: Bedside commode  Toileting Comments: Pt able to copletetoileting hygiene indpendent ly while sitting on toilet.     Bed Mobility/Transfers: Bed Mobility  Bed Mobility: Yes  Bed Mobility 1  Bed Mobility 1: Sitting to supine  Level of Assistance 1: Close supervision  Bed Mobility Comments 1: ioncreased verbal cues to complete task    Transfers  Transfer: Yes  Transfer 1  Technique 1: Sit to stand, Stand to sit  Transfer Device 1: Walker  Transfer Level of Assistance 1: Minimum assistance, Minimal verbal cues, Minimal tactile cues  Trials/Comments 1: VCs for hand placement  Transfers 2  Transfer From 2: Stand to  Transfer to 2:  (bedside commode)  Technique 2: Stand to sit, Sit to stand  Transfer Device 2: Walker  Transfer Level of Assistance 2: Minimum assistance, Moderate verbal cues, Moderate tactile cues  Trials/Comments 2: increased cuing to assure aligned with commod, before sitting    Toilet Transfers  Toilet Transfer From: Walker  Toilet Transfer Type: To and from  Toilet Transfer to: Standard bedside commode  Toilet Transfers: Minimal assistance, Verbal cues  Toilet Transfers Comments: BSC to chair with wheeled walker, and verbal cues, min a X1    Functional Mobility:  Functional Mobility  Functional Mobility Performed: Yes  Functional Mobility 1  Surface 1: Level tile  Device 1: Rolling walker  Assistance 1: Minimum assistance, Maximum verbal cues, Maximum tactile cues  Comments 1: bed to chair and along side of bed  Sitting Balance:  Static Sitting Balance  Static Sitting-Balance Support: Feet supported  Static Sitting-Level of Assistance: Independent  IADL's:   Communication:     Splinting:     Therapy/Activity:    Sensory:     Cognitive Skill  Development:  Cognitive Skill Development  Cognitive Skill Development Activity 1: Pt oriented x4  Cognitive Skill Development Activity 2: pt unable to recall dtr number and unable to dial phone. pt with increased anxiety requiring assist ance to utilize phone  Cognitive Skill Development Activity 3: pt continues with decreaased memory. Unable to complete memory exercises without difficulty ableto recall 3/5, 4/5 3/5 4/8 when given items to recall  Cognitive Skill Development Activity 4: Pt with increased impulsive ness throughout session aware it is difficult to recall evants, phone numbers a=or how to complete a task. Pt fidgity throughout session moving drain and needing to be told several times not to be pulling on box or tubing     Strength:  Strength  Strength Comments: MYLES UE WFL     Outcome Measures:  [facility] Daily Activity  Putting on and taking off regular lower body clothing: A lot  Bathing (including washing, rinsing, drying): A lot  Putting on and taking off regular upper body clothing: A little  Toileting, which includes using toilet, bedpan or urinal: A little  Taking care of personal grooming such as brushing teeth: A little  Eating Meals: None  Daily Activity - Total Score: 17        Education Documentation  Handouts, taught by Reyna Guy OT at 4/25/2024  3:00 PM.  Learner: Patient  Readiness: Acceptance  Method: Explanation, Demonstration  Response: Verbalizes Understanding, Needs Reinforcement    Precautions, taught by Reyna Guy OT at 4/25/2024  3:00 PM.  Learner: Patient  Readiness: Acceptance  Method: Explanation, Demonstration  Response: Verbalizes Understanding, Needs Reinforcement    ADL Training, taught by Reyna Guy OT at 4/25/2024  3:00 PM.  Learner: Patient  Readiness: Acceptance  Method: Explanation, Demonstration  Response: Verbalizes Understanding, Needs Reinforcement    Education Comments  No comments found.      Goals:  Encounter Problems       Encounter  Problems (Active)       ADLs       Patient will perform UB bathing 50% with minimal assist  level of assistance and adaptive equipment prn . (Progressing)       Start:  04/18/24    Expected End:  04/27/24            Patient with complete upper body dressing with minimal assist  level of assistance donning and doffing all UE clothes with PRN adaptive equipment while supported sitting (Progressing)       Start:  04/18/24    Expected End:  04/27/24            Patient will complete daily grooming tasks brushing teeth and washing face/hair with modified independent level of assistance and PRN adaptive equipment while supported sitting. (Progressing)       Start:  04/18/24    Expected End:  04/27/24               BALANCE       Pt will maintain static and dynamic standing balance during ADL task with contact guard assist level of assistance in order to demonstrate decreased risk of falling and improved postural control. (Progressing)       Start:  04/23/24    Expected End:  05/07/24               COGNITION/SAFETY       75%Patient will follow 90% Simple commands to allow improved ADL performance. (Progressing)       Start:  04/25/24    Expected End:  05/09/24            Pt will recall 75% of tx session with cues prn to increase independence with ADLS/IADLS (Progressing)       Start:  04/25/24    Expected End:  05/09/24                  Encounter Problems (Resolved)       COGNITION/SAFETY       Pt will follow Simple 25% time during OT tx session with max cues . (Met)       Start:  04/18/24    Expected End:  04/27/24    Resolved:  04/23/24         Patient will demonstrated orientation x 3 with verbal cues, visual cues, and auditory cues. (Met)       Start:  04/18/24    Expected End:  04/27/24    Resolved:  04/23/24    ORIENTATION            TRANSFERS       Patient will perform bed mobility minimal assist  level of assistance and bed rails and draw sheet in order to improve safety and independence with mobility (Met)        Start:  04/18/24    Expected End:  04/27/24    Resolved:  04/23/24

## 2024-04-25 NOTE — PROGRESS NOTES
ORTHOPAEDIC SURGERY INPATIENT PROGRESS NOTE    Subjective   NAEON.  Patient has had some issues with timing of pain medications overnight. NPO awaiting angiogram.    Objective   PHYSICAL EXAMINATION  Constitutional Exam: frail appearing, does not appear stated age  Psychiatric Exam: alert and not oriented, appropriate mood and behavior  Eye Exam: EOMI  Pulmonary Exam: breathing non-labored, no apparent distress  Lymphatic exam: no appreciable lymphadenopathy in the lower extremities  Cardiovascular exam: RRR to peripheral palpation, DP pulses 2+, PT 2+, toes are pink with good capillary refill, no pitting edema  Skin exam: no open lesions, rashes, abrasions or ulcerations  Neurological exam: sensation to light touch intact in both lower extremities in peripheral and dermatomal distributions (except for any abnormalities noted in musculoskeletal exam)    Musculoskeletal exam: Left lower extremity examination limited 2/2 to LLE long leg splint. Splint remains c/d/I. Hemovac drain with primarily and minimal serosanguineous drainage in suction tubing.    Last Recorded Vitals  Blood pressure 147/89, pulse 87, temperature 36.6 °C (97.9 °F), resp. rate 18, height 1.524 m (5'), weight 56.7 kg (125 lb), SpO2 94%.    Relevant Results  Results for orders placed or performed during the hospital encounter of 04/16/24 (from the past 24 hour(s))   Comprehensive metabolic panel   Result Value Ref Range    Glucose 100 (H) 74 - 99 mg/dL    Sodium 131 (L) 136 - 145 mmol/L    Potassium 3.6 3.5 - 5.3 mmol/L    Chloride 94 (L) 98 - 107 mmol/L    Bicarbonate 24 21 - 32 mmol/L    Anion Gap 17 10 - 20 mmol/L    Urea Nitrogen 10 6 - 23 mg/dL    Creatinine 0.98 0.50 - 1.05 mg/dL    eGFR 64 >60 mL/min/1.73m*2    Calcium 8.8 8.6 - 10.3 mg/dL    Albumin 3.4 3.4 - 5.0 g/dL    Alkaline Phosphatase 89 33 - 136 U/L    Total Protein 6.6 6.4 - 8.2 g/dL    AST 29 9 - 39 U/L    Bilirubin, Total 0.3 0.0 - 1.2 mg/dL    ALT 3 (L) 7 - 45 U/L   Magnesium    Result Value Ref Range    Magnesium 1.70 1.60 - 2.40 mg/dL       Relevant Imaging  No new imaging.    Assessment/Plan:  67 y/o F s/p L Ankle Bi-malleolar ankle fracture ORIF with syndesmotic stabilization with Dr. Frederick from 11/20/2022 with hardware failure, draining wounds concerning for OM in the setting of presumed alcohol abuse with peripheral neuropathy and ongoing tobacco abuse s/p L BKA from 04/22/2024.    - NWB LLE in LLS, continue splint until follow-up  - NPO  - ID consulted, will clarify abx plan  - Discussed angiogram with Dr. Christie following surgery, planning for angiogram today  - Continue hemovac drain, monitor and record drain output q8hrs  - PO Pain control, IV for breakthrough  - Encourage IS, supplemental O2 as needed  - DVT PPX: SCDs, AMERICO hose, hold lovenox for angio, will plan for atleast 81 mg ASA BID for DVT PPX  - PT/OT    Flako Guerra MD, ALEJA  Department of Orthopaedic Surgery  [facility]

## 2024-04-25 NOTE — PROGRESS NOTES
04/25/24 0753   Discharge Planning   Patient expects to be discharged to: [facility] [place]. still awaiting final ID cx results to determine AB plan at [facility]. Angio today. Will have Sw rechoice sister     Rose Marie Toussaint is a 68 y.o. female on day 9 of admission presenting with Chronic osteomyelitis involving ankle and foot, left (Multi).    Bess Singh RN  Met with patient. She is in agreement for sister/ daughter to make choices for snf for her. [place] notified

## 2024-04-25 NOTE — PROGRESS NOTES
[facility]          Admitting Provider: Octavio Jeong MD Admission Date: 4/16/2024.   Attending Provider: Octavio Jeong MD MRN: [id number]       Subjective   Rose Marie Toussaint is a 68 y.o. female on day 9 of admission presenting with Chronic osteomyelitis involving ankle and foot, left (Multi).  Interval History No complaints.     Objective   Physical Exam  Last Recorded Vitals: Blood pressure 147/89, pulse 87, temperature 36.6 °C (97.9 °F), resp. rate 18, height 1.524 m (5'), weight 56.7 kg (125 lb), SpO2 94%.  Patient Vitals for the past 24 hrs:   BP Temp Pulse Resp SpO2   04/25/24 0505 147/89 36.6 °C (97.9 °F) 87 -- 94 %   04/24/24 2000 146/85 36.5 °C (97.7 °F) 79 -- 93 %   04/24/24 1530 179/82 36.7 °C (98 °F) 79 18 97 %     Body mass index is 24.41 kg/m².  GENERAL: alert, cooperative, or no distress  SKIN: no rashes  NECK: supple, no thyromegaly, JVP within normal limits  LUNGS:  not in respiratory distress, respiratory rate normal, clear to auscultation  CARDIAC: regular rate and rhythm, normal S1 and S2, no murmur, rub, or gallop  ABDOMEN: Soft, non-tender, normal bowel sounds; no bruits, organomegaly or masses.  EXTREMITIES: No edema, left, BKA  NEURO: Alert and oriented x 3, reflexes normal and symmetric, strength and  sensation grossly normal  Intake/Output last 3 Shifts:  I/O last 3 completed shifts:  In: 400 (7.1 mL/kg) [P.O.:400]  Out: 300 (5.3 mL/kg) [Urine:300 (0.1 mL/kg/hr)]  Weight: 56.7 kg   DATA:   Diagnostic tests reviewed for today's visit:    Most recent imaging  Results for orders placed or performed during the hospital encounter of 04/16/24 (from the past 24 hour(s))   Comprehensive metabolic panel   Result Value Ref Range    Glucose 100 (H) 74 - 99 mg/dL    Sodium 131 (L) 136 - 145 mmol/L    Potassium 3.6 3.5 - 5.3 mmol/L    Chloride 94 (L) 98 - 107 mmol/L    Bicarbonate 24 21 - 32 mmol/L    Anion Gap 17 10 - 20 mmol/L    Urea Nitrogen 10 6 - 23 mg/dL     "Creatinine 0.98 0.50 - 1.05 mg/dL    eGFR 64 >60 mL/min/1.73m*2    Calcium 8.8 8.6 - 10.3 mg/dL    Albumin 3.4 3.4 - 5.0 g/dL    Alkaline Phosphatase 89 33 - 136 U/L    Total Protein 6.6 6.4 - 8.2 g/dL    AST 29 9 - 39 U/L    Bilirubin, Total 0.3 0.0 - 1.2 mg/dL    ALT 3 (L) 7 - 45 U/L   Magnesium   Result Value Ref Range    Magnesium 1.70 1.60 - 2.40 mg/dL     Urine Culture   Date Value Ref Range Status   05/24/2023 NO GROWTH  Final     Blood Culture   Date Value Ref Range Status   05/23/2023   Final    No Growth at 1 days~No Growth at 2 days~No Growth at 3 days~NO GROWTH at 4 days - FINAL REPORT    No results found for: \"RESPCULTSM\" No results found for: \"PERDIAFLDCUL\" No results found for: \"STERFLDCULSM\"   No results found.  Current Facility-Administered Medications   Medication Dose Route Frequency Provider Last Rate Last Admin    acetaminophen (Tylenol) tablet 650 mg  650 mg oral q4h PRN Екатерина Purvis, PA-C        albuterol 2.5 mg /3 mL (0.083 %) nebulizer solution 2.5 mg  2.5 mg nebulization q2h PRN Екатерина Purvis, PA-C        atenolol (Tenormin) tablet 25 mg  25 mg oral Daily Екатерина Purvis, PA-C   25 mg at 04/24/24 1034    buPROPion (Wellbutrin) tablet 100 mg  100 mg oral Daily with breakfast Екатерина Purvis, PA-C   100 mg at 04/24/24 1059    chlorthalidone (Hygroton) tablet 25 mg  25 mg oral Daily Octavio Jeong MD        dilTIAZem CD (Cardizem CD) 24 hr capsule 240 mg  240 mg oral Daily Екатерина E Kendalel, PA-C   240 mg at 04/24/24 1034    docusate sodium (Colace) capsule 100 mg  100 mg oral BID Екатерина Purvis, PA-C   100 mg at 04/24/24 1035    enoxaparin (Lovenox) syringe 40 mg  40 mg subcutaneous q24h Екатерина Purvis, PA-C   40 mg at 04/25/24 0526    folic acid (Folvite) tablet 1 mg  1 mg oral Daily Екатерина Purvis, PA-C   1 mg at 04/24/24 0900    HYDROmorphone PF (Dilaudid) injection 0.2 mg  0.2 mg intravenous q3h PRN Екатерина Purvis, PA-C   0.2 mg at 04/24/24 2101    lactulose 20 gram/30 mL oral solution " 20 g  20 g oral Daily Екатерина E Sephel, PA-C   20 g at 04/23/24 0957    melatonin tablet 4.5 mg  4.5 mg oral Nightly PRN Екатерина E Sephel, PA-C   4.5 mg at 04/23/24 2259    multivitamin with minerals 1 tablet  1 tablet oral Daily Екатерина E Sephel, PA-C   1 tablet at 04/24/24 1034    nicotine (Nicoderm CQ) 14 mg/24 hr patch 1 patch  1 patch transdermal Daily Екатерина E Sephel, PA-C   1 patch at 04/24/24 1100    ondansetron (Zofran) injection 4 mg  4 mg intravenous q8h PRN Екатерина E Sephel, PA-C        oxyCODONE (Roxicodone) immediate release tablet 10 mg  10 mg oral q6h PRN Екатерина E Sephel, PA-C   10 mg at 04/25/24 0612    oxyCODONE (Roxicodone) immediate release tablet 5 mg  5 mg oral q6h PRN Екатерина E Sephel, PA-C        polyethylene glycol (Glycolax, Miralax) packet 17 g  17 g oral Daily Екатерина E Sephel, PA-C   17 g at 04/23/24 0957    pravastatin (Pravachol) tablet 20 mg  20 mg oral Daily Екатерина E Sephel, PA-C   20 mg at 04/24/24 1034    QUEtiapine (SEROquel) tablet 25 mg  25 mg oral Nightly PRN Екатерина E Sephel, PA-C   25 mg at 04/24/24 2115    sertraline (Zoloft) tablet 50 mg  50 mg oral Daily Екатерина E Sephel, PA-C   50 mg at 04/24/24 1034     Current Outpatient Medications   Medication Sig Dispense Refill    acetaminophen (Tylenol) 325 mg tablet Take 2 tablets (650 mg) by mouth every 6 hours if needed for mild pain (1 - 3).      aspirin 81 mg chewable tablet Chew 1 tablet (81 mg) 2 times a day. 60 tablet     docusate sodium (Colace) 100 mg capsule Take 1 capsule (100 mg) by mouth 2 times a day for 14 days. 28 capsule     oxyCODONE (Roxicodone) 5 mg immediate release tablet Take 1 tablet (5 mg) by mouth every 6 hours if needed for severe pain (7 - 10). 15 tablet 0   ..     Medication and Non-Pharmacologic VTE Prophylaxis/Anticoagulants      Last Anticoag Admin            enoxaparin (Lovenox) syringe 40 mg    Given 40 mg at 0526    Frequency: Every 24 hours         There are additional administrations since 04/22/24 0822 that are  not shown.    No unadministered anticoagulant orders found.          Assessment/Plan   Rose Marie Toussaint has  Principal Problem:    Chronic osteomyelitis involving ankle and foot, left (Multi)  Active Problems:    Septic joint (Multi)    Alcohol withdrawal delirium (Multi)    Adrenal cortical nodule (Multi)      Critical limb ischemia of right lower extremity (Multi)       S/P BKA. Patient needs skilled rehabilitation          improved. On CIWA scale.  Patient made aware. Needs follow up CT in 4 months  Angiogram with possible intervention for left external iliac artery.   Other Hospital problems        Abnormal findings not addressed during hospitalization, but require out patient follow up. Adrenal nodule         I spent 35 minutes talking and examining Rose Marie Toussaint, reviewing the labs & medications, formulating plan of care & discussing with NP and nursing staff.    Octavio Jeong MD

## 2024-04-25 NOTE — PROGRESS NOTES
Physical Therapy    Physical Therapy Treatment    Patient Name: Rose Marie Toussaint  MRN: [id number]  Today's Date: 4/25/2024  Time Calculation  Start Time: 1020  Stop Time: 1050  Time Calculation (min): 30 min       Assessment/Plan   PT Assessment  End of Session Communication: Bedside nurse  End of Session Patient Position: Up in chair, Alarm on  PT Plan  Inpatient/Swing Bed or Outpatient: Inpatient  PT Plan  Treatment/Interventions: Bed mobility, Transfer training, Gait training  PT Plan: Skilled PT  PT Frequency: Daily  PT Discharge Recommendations: High intensity level of continued care  PT Recommended Transfer Status: Assist x1  PT - OK to Discharge:  (per POC)      General Visit Information:   PT  Visit  PT Received On: 04/25/24  General  Reason for Referral: CLAY EDGAR 4/22  Referred By: Екатерина Purvis PA-C  Family/Caregiver Present: No  Prior to Session Communication: Bedside nurse  Patient Position Received: Bed, 3 rail up, Alarm on  General Comment:  (pt agreeable and supine in bed upon arrival. pt req increased time secondary to anxiousness, slow alba, and more time to process one step commands)    Subjective   Precautions:  Precautions  LE Weight Bearing Status: Left Non-Weight Bearing  Medical Precautions: Fall precautions  Precautions Comment: pt compliant with NWB on LLE    Objective   Pain:  Pain Assessment  Pain Assessment: 0-10  Pain Score: 7  Pain Type: Acute pain, Surgical pain  Pain Location: Leg  Pain Orientation: Left  Pain Interventions: Medication (See MAR)  Cognition:  Cognition  Overall Cognitive Status: Within Functional Limits  Orientation Level: Oriented X4  Postural Control:  Static Sitting Balance  Static Sitting-Balance Support: Bilateral upper extremity supported, Feet supported (RLE supported)  Static Sitting-Level of Assistance: Modified independent  Static Sitting-Comment/Number of Minutes: ~ 2 mins (pt demonstrated good static sitting balance)  Static Standing Balance  Static  Standing-Balance Support: Bilateral upper extremity supported  Static Standing-Level of Assistance: Contact guard  Static Standing-Comment/Number of Minutes: CGA for trunk stability    Treatments:  Bed Mobility  Bed Mobility: Yes  Bed Mobility 1  Bed Mobility 1: Supine to sitting  Level of Assistance 1: Close supervision  Bed Mobility Comments 1: pt req increased vc for sequencing and pt used bed rail for assist    Ambulation/Gait Training  Ambulation/Gait Training Performed: Yes  Ambulation/Gait Training 1  Surface 1: Level tile  Device 1: Rolling walker  Assistance 1: Minimum assistance, Maximum tactile cues, Maximum verbal cues  Comments/Distance (ft) 1: 3 ft (pt demonstrated a heel to toe swivel step w/ RLE. pt req max vc/tc for sequencing, hand placement, and FWW management. pt req min assist for trunk management w/ no LOB)  Transfers  Transfer: Yes  Transfer 1  Technique 1: Sit to stand, Stand to sit  Transfer Device 1: Walker, Gait belt  Transfer Level of Assistance 1: Minimum assistance, Minimal verbal cues  Trials/Comments 1: pt req min vc for hand placement and sequencing    Outcome Measures:  [facility] Basic Mobility  Turning from your back to your side while in a flat bed without using bedrails: None  Moving from lying on your back to sitting on the side of a flat bed without using bedrails: A little  Moving to and from bed to chair (including a wheelchair): A little  Standing up from a chair using your arms (e.g. wheelchair or bedside chair): A little  To walk in hospital room: A little  Climbing 3-5 steps with railing: Total  Basic Mobility - Total Score: 17    Participated in performance of tx and documentation under the direct supervision of CI, [facility]     Education Documentation  Precautions, taught by RAYMUNDO Butt at 4/25/2024 12:33 PM.  Learner: Patient  Readiness: Acceptance  Method: Explanation  Response: Verbalizes Understanding    Body Mechanics, taught by Ngoc  RAYMUNDO Alexander at 4/25/2024 12:33 PM.  Learner: Patient  Readiness: Acceptance  Method: Explanation  Response: Verbalizes Understanding    Mobility Training, taught by RAYMUNDO Butt at 4/25/2024 12:33 PM.  Learner: Patient  Readiness: Acceptance  Method: Explanation  Response: Verbalizes Understanding    Education Comments  No comments found.        OP EDUCATION:       Encounter Problems       Encounter Problems (Active)       Balance       STG - Maintains dynamic standing balance with upper extremity support At MOD I, safely, without LOB, device PRN  (Progressing)       Start:  04/23/24    Expected End:  05/08/24       INTERVENTIONS:  1. Practice standing with minimal support.  2. Educate patient about standing tolerance.  3. Educate patient about independence with gait, transfers, and ADL's.  4. Educate patient about use of assistive device.  5. Educate patient about self-directed care.            Mobility       STG - Patient will ambulate At At Supervision using Wheeled walker for 50' while maintaining NWB on LLE without LOB or gross gait deviations  (Progressing)       Start:  04/23/24    Expected End:  05/08/24            STG - Patient will ascend and descend four to six stairs At Min Ax1 while using No device and Right HR no LOB        Start:  04/23/24    Expected End:  05/08/24            Endurance - Pt to tolerate >/= 20 minutes therex, theract, gait and/or NMR with </= 5 minutes of rest breaks' (Progressing)       Start:  04/23/24    Expected End:  05/08/24               PT Transfers       STG - Patient will transfer sit to and from stand At Supervision, safely, without LOB, device PRN  (Progressing)       Start:  04/23/24    Expected End:  05/08/24               Pain - Adult          Safety       Pt will verbalize and apply safety awareness and precautions 100% of time throughout entire session   (Progressing)       Start:  04/23/24    Expected End:  05/08/24

## 2024-04-25 NOTE — PRE-PROCEDURE NOTE
Patient planned for LLE peripheral angiogram +/- intervention today 4/25/24 with Dr. Christie for L EIA stenosis. See H&P note by Vascular Fellow, Dr. Amado.     Mallampati Score: Class IV    ASA 3    Misti Alex, APRN-CNP  Interventional Cath Lab  [facility]

## 2024-04-25 NOTE — PROGRESS NOTES
INFECTIOUS DISEASE DAILY PROGRESS NOTE    SUBJECTIVE:    No overnight events. Feels OK. No fevers. Abx had been stopped but I will resume now.    OBJECTIVE:  VITALS (Last 24 Hours)  /85 (Patient Position: Lying)   Pulse 96   Temp 36.1 °C (97 °F)   Resp 18   Ht 1.524 m (5')   Wt 56.7 kg (125 lb)   SpO2 96%   BMI 24.41 kg/m²     PHYSICAL EXAM:  Gen - NAD, in bed  Abd - soft, no ttp, BS present  LLE - s/p BKA in surgical dressings  Skin - no rash    ABX: IV Cefazolin    LABS:  Lab Results   Component Value Date    WBC 11.2 04/24/2024    HGB 11.5 (L) 04/24/2024    HCT 35.0 (L) 04/24/2024    MCV 95 04/24/2024     (H) 04/24/2024     Lab Results   Component Value Date    GLUCOSE 100 (H) 04/25/2024    CALCIUM 8.8 04/25/2024     (L) 04/25/2024    K 3.6 04/25/2024    CO2 24 04/25/2024    CL 94 (L) 04/25/2024    BUN 10 04/25/2024    CREATININE 0.98 04/25/2024     Results from last 72 hours   Lab Units 04/25/24  0607   ALK PHOS U/L 89   BILIRUBIN TOTAL mg/dL 0.3   PROTEIN TOTAL g/dL 6.6   ALT U/L 3*   AST U/L 29   ALBUMIN g/dL 3.4     Estimated Creatinine Clearance: 44.6 mL/min (by C-G formula based on SCr of 0.98 mg/dL).    ASSESSMENT/PLAN:     Left Ankle Hardware Associated Infection with Suspected OM due to MSSA - s/p BKA 4/22 but some residual soft tissue infection present  CKD - CrCl 44, affects abx dosing  Penicillin Allergy - limits abx options. No issues on Cefazolin thus far.  Alcohol Dependence     IV Cefazolin 2g Q8H while here. On discharge can be on PO Keflex 500mg QID through 5/5/24 for 2 weeks additional therapy post-amputation to treat any residual soft tissue infection.    Will sign off. Please call back with questions. Thanks!    Jaycob Krishna MD  [facility]  Office #[phone]

## 2024-04-25 NOTE — CARE PLAN
Problem: Skin  Goal: Prevent/minimize sheer/friction injuries  Outcome: Progressing  Goal: Promote/optimize nutrition  Outcome: Progressing  Goal: Promote skin healing  Outcome: Progressing   T

## 2024-04-25 NOTE — CARE PLAN
The patient's goals for the shift include      The clinical goals for the shift include patient will remain free from falls    Over the shift, the patient did not make progress toward the following goals. Barriers to progression include patient's forgetfulness. Recommendations to address these barriers include frequent rounding to ensure patient's needs are met..

## 2024-04-25 NOTE — INTERVAL H&P NOTE
H&P reviewed. The patient was examined. Since admission has had amputation. Has L EIA stenosis on CTA, needs angioplasty, planned for today.

## 2024-04-26 LAB
ALBUMIN SERPL BCP-MCNC: 3.1 G/DL (ref 3.4–5)
ALP SERPL-CCNC: 84 U/L (ref 33–136)
ALT SERPL W P-5'-P-CCNC: 3 U/L (ref 7–45)
ANION GAP SERPL CALC-SCNC: 17 MMOL/L (ref 10–20)
AST SERPL W P-5'-P-CCNC: 24 U/L (ref 9–39)
BILIRUB SERPL-MCNC: 0.3 MG/DL (ref 0–1.2)
BUN SERPL-MCNC: 9 MG/DL (ref 6–23)
CALCIUM SERPL-MCNC: 8.5 MG/DL (ref 8.6–10.3)
CHLORIDE SERPL-SCNC: 96 MMOL/L (ref 98–107)
CO2 SERPL-SCNC: 24 MMOL/L (ref 21–32)
CREAT SERPL-MCNC: 0.88 MG/DL (ref 0.5–1.05)
EGFRCR SERPLBLD CKD-EPI 2021: 73 ML/MIN/1.73M*2
ERYTHROCYTE [DISTWIDTH] IN BLOOD BY AUTOMATED COUNT: 13.1 % (ref 11.5–14.5)
GLUCOSE BLD MANUAL STRIP-MCNC: 101 MG/DL (ref 74–99)
GLUCOSE BLD MANUAL STRIP-MCNC: 114 MG/DL (ref 74–99)
GLUCOSE BLD MANUAL STRIP-MCNC: 119 MG/DL (ref 74–99)
GLUCOSE BLD MANUAL STRIP-MCNC: 127 MG/DL (ref 74–99)
GLUCOSE SERPL-MCNC: 83 MG/DL (ref 74–99)
HCT VFR BLD AUTO: 33.9 % (ref 36–46)
HGB BLD-MCNC: 11.4 G/DL (ref 12–16)
MAGNESIUM SERPL-MCNC: 1.5 MG/DL (ref 1.6–2.4)
MCH RBC QN AUTO: 31.1 PG (ref 26–34)
MCHC RBC AUTO-ENTMCNC: 33.6 G/DL (ref 32–36)
MCV RBC AUTO: 92 FL (ref 80–100)
NRBC BLD-RTO: 0 /100 WBCS (ref 0–0)
PLATELET # BLD AUTO: 501 X10*3/UL (ref 150–450)
POTASSIUM SERPL-SCNC: 3.1 MMOL/L (ref 3.5–5.3)
PROT SERPL-MCNC: 6.3 G/DL (ref 6.4–8.2)
RBC # BLD AUTO: 3.67 X10*6/UL (ref 4–5.2)
SODIUM SERPL-SCNC: 134 MMOL/L (ref 136–145)
WBC # BLD AUTO: 12.1 X10*3/UL (ref 4.4–11.3)

## 2024-04-26 PROCEDURE — 2500000004 HC RX 250 GENERAL PHARMACY W/ HCPCS (ALT 636 FOR OP/ED): Performed by: INTERNAL MEDICINE

## 2024-04-26 PROCEDURE — 2500000001 HC RX 250 WO HCPCS SELF ADMINISTERED DRUGS (ALT 637 FOR MEDICARE OP): Performed by: STUDENT IN AN ORGANIZED HEALTH CARE EDUCATION/TRAINING PROGRAM

## 2024-04-26 PROCEDURE — 2500000001 HC RX 250 WO HCPCS SELF ADMINISTERED DRUGS (ALT 637 FOR MEDICARE OP): Performed by: INTERNAL MEDICINE

## 2024-04-26 PROCEDURE — 2500000004 HC RX 250 GENERAL PHARMACY W/ HCPCS (ALT 636 FOR OP/ED): Performed by: STUDENT IN AN ORGANIZED HEALTH CARE EDUCATION/TRAINING PROGRAM

## 2024-04-26 PROCEDURE — S4991 NICOTINE PATCH NONLEGEND: HCPCS | Performed by: STUDENT IN AN ORGANIZED HEALTH CARE EDUCATION/TRAINING PROGRAM

## 2024-04-26 PROCEDURE — 97530 THERAPEUTIC ACTIVITIES: CPT | Mod: GP,CQ | Performed by: PHYSICAL THERAPY ASSISTANT

## 2024-04-26 PROCEDURE — 85027 COMPLETE CBC AUTOMATED: CPT

## 2024-04-26 PROCEDURE — 2500000004 HC RX 250 GENERAL PHARMACY W/ HCPCS (ALT 636 FOR OP/ED): Mod: JZ | Performed by: INTERNAL MEDICINE

## 2024-04-26 PROCEDURE — 2500000001 HC RX 250 WO HCPCS SELF ADMINISTERED DRUGS (ALT 637 FOR MEDICARE OP): Performed by: NURSE PRACTITIONER

## 2024-04-26 PROCEDURE — 83735 ASSAY OF MAGNESIUM: CPT

## 2024-04-26 PROCEDURE — 36415 COLL VENOUS BLD VENIPUNCTURE: CPT

## 2024-04-26 PROCEDURE — 80053 COMPREHEN METABOLIC PANEL: CPT

## 2024-04-26 PROCEDURE — 2500000006 HC RX 250 W HCPCS SELF ADMINISTERED DRUGS (ALT 637 FOR ALL PAYERS): Performed by: INTERNAL MEDICINE

## 2024-04-26 PROCEDURE — 2500000006 HC RX 250 W HCPCS SELF ADMINISTERED DRUGS (ALT 637 FOR ALL PAYERS): Mod: MUE | Performed by: INTERNAL MEDICINE

## 2024-04-26 PROCEDURE — 97116 GAIT TRAINING THERAPY: CPT | Mod: GP,CQ | Performed by: PHYSICAL THERAPY ASSISTANT

## 2024-04-26 PROCEDURE — 2500000006 HC RX 250 W HCPCS SELF ADMINISTERED DRUGS (ALT 637 FOR ALL PAYERS): Performed by: STUDENT IN AN ORGANIZED HEALTH CARE EDUCATION/TRAINING PROGRAM

## 2024-04-26 PROCEDURE — 82947 ASSAY GLUCOSE BLOOD QUANT: CPT

## 2024-04-26 PROCEDURE — 99024 POSTOP FOLLOW-UP VISIT: CPT | Performed by: STUDENT IN AN ORGANIZED HEALTH CARE EDUCATION/TRAINING PROGRAM

## 2024-04-26 PROCEDURE — 2500000006 HC RX 250 W HCPCS SELF ADMINISTERED DRUGS (ALT 637 FOR ALL PAYERS): Mod: MUE | Performed by: STUDENT IN AN ORGANIZED HEALTH CARE EDUCATION/TRAINING PROGRAM

## 2024-04-26 PROCEDURE — 2500000002 HC RX 250 W HCPCS SELF ADMINISTERED DRUGS (ALT 637 FOR MEDICARE OP, ALT 636 FOR OP/ED): Performed by: STUDENT IN AN ORGANIZED HEALTH CARE EDUCATION/TRAINING PROGRAM

## 2024-04-26 PROCEDURE — 1200000002 HC GENERAL ROOM WITH TELEMETRY DAILY

## 2024-04-26 RX ORDER — MAGNESIUM SULFATE HEPTAHYDRATE 40 MG/ML
2 INJECTION, SOLUTION INTRAVENOUS ONCE
Status: COMPLETED | OUTPATIENT
Start: 2024-04-26 | End: 2024-04-26

## 2024-04-26 RX ORDER — POTASSIUM CHLORIDE 20 MEQ/1
40 TABLET, EXTENDED RELEASE ORAL ONCE
Status: COMPLETED | OUTPATIENT
Start: 2024-04-26 | End: 2024-04-26

## 2024-04-26 RX ADMIN — Medication 1 TABLET: at 09:35

## 2024-04-26 RX ADMIN — ENOXAPARIN SODIUM 40 MG: 40 INJECTION SUBCUTANEOUS at 09:38

## 2024-04-26 RX ADMIN — SERTRALINE HYDROCHLORIDE 50 MG: 50 TABLET ORAL at 09:37

## 2024-04-26 RX ADMIN — MAGNESIUM SULFATE HEPTAHYDRATE 2 G: 40 INJECTION, SOLUTION INTRAVENOUS at 08:30

## 2024-04-26 RX ADMIN — DILTIAZEM HYDROCHLORIDE 240 MG: 120 CAPSULE, EXTENDED RELEASE ORAL at 09:35

## 2024-04-26 RX ADMIN — FOLIC ACID 1 MG: 1 TABLET ORAL at 09:37

## 2024-04-26 RX ADMIN — ASPIRIN 81 MG 81 MG: 81 TABLET ORAL at 09:35

## 2024-04-26 RX ADMIN — LACTULOSE 20 G: 20 SOLUTION ORAL at 09:37

## 2024-04-26 RX ADMIN — PRAVASTATIN SODIUM 20 MG: 20 TABLET ORAL at 09:37

## 2024-04-26 RX ADMIN — ATENOLOL 25 MG: 25 TABLET ORAL at 09:37

## 2024-04-26 RX ADMIN — OXYCODONE HYDROCHLORIDE 10 MG: 5 TABLET ORAL at 11:00

## 2024-04-26 RX ADMIN — CHLORTHALIDONE 25 MG: 25 TABLET ORAL at 09:37

## 2024-04-26 RX ADMIN — CLOPIDOGREL 75 MG: 75 TABLET ORAL at 09:37

## 2024-04-26 RX ADMIN — CEFAZOLIN SODIUM 2 G: 2 INJECTION, SOLUTION INTRAVENOUS at 02:54

## 2024-04-26 RX ADMIN — BUPROPION HYDROCHLORIDE 100 MG: 100 TABLET, FILM COATED ORAL at 09:35

## 2024-04-26 RX ADMIN — NICOTINE 1 PATCH: 14 PATCH, EXTENDED RELEASE TRANSDERMAL at 09:35

## 2024-04-26 RX ADMIN — HYDROMORPHONE HYDROCHLORIDE 0.2 MG: 0.2 INJECTION, SOLUTION INTRAMUSCULAR; INTRAVENOUS; SUBCUTANEOUS at 08:30

## 2024-04-26 RX ADMIN — CEFAZOLIN SODIUM 2 G: 2 INJECTION, SOLUTION INTRAVENOUS at 09:38

## 2024-04-26 RX ADMIN — POTASSIUM CHLORIDE 40 MEQ: 1500 TABLET, EXTENDED RELEASE ORAL at 09:37

## 2024-04-26 RX ADMIN — HYDROMORPHONE HYDROCHLORIDE 0.2 MG: 0.2 INJECTION, SOLUTION INTRAMUSCULAR; INTRAVENOUS; SUBCUTANEOUS at 16:12

## 2024-04-26 RX ADMIN — CEFAZOLIN SODIUM 2 G: 2 INJECTION, SOLUTION INTRAVENOUS at 16:15

## 2024-04-26 RX ADMIN — OXYCODONE HYDROCHLORIDE 10 MG: 5 TABLET ORAL at 20:17

## 2024-04-26 ASSESSMENT — PAIN SCALES - GENERAL
PAINLEVEL_OUTOF10: 8
PAINLEVEL_OUTOF10: 4
PAINLEVEL_OUTOF10: 8
PAINLEVEL_OUTOF10: 5 - MODERATE PAIN
PAINLEVEL_OUTOF10: 3
PAINLEVEL_OUTOF10: 0 - NO PAIN
PAINLEVEL_OUTOF10: 8

## 2024-04-26 ASSESSMENT — COGNITIVE AND FUNCTIONAL STATUS - GENERAL
PERSONAL GROOMING: A LITTLE
STANDING UP FROM CHAIR USING ARMS: A LITTLE
TURNING FROM BACK TO SIDE WHILE IN FLAT BAD: A LITTLE
DRESSING REGULAR LOWER BODY CLOTHING: A LOT
HELP NEEDED FOR BATHING: A LOT
WALKING IN HOSPITAL ROOM: A LOT
DRESSING REGULAR UPPER BODY CLOTHING: A LITTLE
STANDING UP FROM CHAIR USING ARMS: A LITTLE
DRESSING REGULAR UPPER BODY CLOTHING: A LITTLE
HELP NEEDED FOR BATHING: A LOT
DAILY ACTIVITIY SCORE: 18
TURNING FROM BACK TO SIDE WHILE IN FLAT BAD: A LITTLE
MOBILITY SCORE: 17
CLIMB 3 TO 5 STEPS WITH RAILING: TOTAL
MOVING TO AND FROM BED TO CHAIR: A LITTLE
MOBILITY SCORE: 16
MOVING TO AND FROM BED TO CHAIR: A LITTLE
TOILETING: A LITTLE
WALKING IN HOSPITAL ROOM: A LOT
TURNING FROM BACK TO SIDE WHILE IN FLAT BAD: A LITTLE
CLIMB 3 TO 5 STEPS WITH RAILING: TOTAL
WALKING IN HOSPITAL ROOM: A LITTLE
DRESSING REGULAR LOWER BODY CLOTHING: A LITTLE
MOVING TO AND FROM BED TO CHAIR: A LITTLE
TOILETING: A LOT
STANDING UP FROM CHAIR USING ARMS: A LITTLE
CLIMB 3 TO 5 STEPS WITH RAILING: TOTAL
MOBILITY SCORE: 16
DAILY ACTIVITIY SCORE: 17

## 2024-04-26 ASSESSMENT — PAIN DESCRIPTION - ORIENTATION: ORIENTATION: LEFT

## 2024-04-26 ASSESSMENT — PAIN - FUNCTIONAL ASSESSMENT
PAIN_FUNCTIONAL_ASSESSMENT: 0-10

## 2024-04-26 ASSESSMENT — PAIN DESCRIPTION - LOCATION: LOCATION: LEG

## 2024-04-26 NOTE — PROGRESS NOTES
04/26/24 1427   Discharge Planning   Patient expects to be discharged to: [facility]     approved 4/26-4/30 NRD 4/30 auth#[id number] , received auth for [facility] Goss MD aware

## 2024-04-26 NOTE — PROGRESS NOTES
[facility]          Admitting Provider: Octavio Jeong MD Admission Date: 4/16/2024.   Attending Provider: Octavio Jeong MD MRN: [id number]       Subjective   Rose Marie Toussaint is a 68 y.o. female on day 10 of admission presenting with Chronic osteomyelitis involving ankle and foot, left (Multi).  Interval History Had angioplasty of LLE.      Objective   Physical Exam  Last Recorded Vitals: Blood pressure 116/80, pulse 97, temperature 36.7 °C (98.1 °F), temperature source Temporal, resp. rate 16, height 1.524 m (5'), weight 56.7 kg (125 lb), SpO2 98%.  Patient Vitals for the past 24 hrs:   BP Temp Temp src Pulse Resp SpO2   04/25/24 2050 116/80 36.7 °C (98.1 °F) Temporal 97 16 98 %   04/25/24 1900 136/80 -- -- 91 -- 97 %   04/25/24 1845 140/75 -- -- 93 -- 97 %   04/25/24 1830 122/77 -- -- 90 15 97 %   04/25/24 1815 149/77 -- -- 92 14 98 %   04/25/24 1800 125/75 -- -- 91 16 97 %   04/25/24 1745 145/74 -- -- 88 15 99 %   04/25/24 1730 (!) 148/91 -- -- 86 16 99 %   04/25/24 1720 139/79 36.1 °C (97 °F) Temporal 91 15 100 %   04/25/24 1427 (!) 164/93 -- -- 77 15 --     Body mass index is 24.41 kg/m².  GENERAL: alert, cooperative, or no distress  SKIN: no rashes  NECK: supple, no thyromegaly, JVP within normal limits  LUNGS:  not in respiratory distress, respiratory rate normal, clear to auscultation  CARDIAC: regular rate and rhythm, normal S1 and S2, no murmur, rub, or gallop  ABDOMEN: Soft, non-tender, normal bowel sounds; no bruits, organomegaly or masses.  EXTREMITIES: No edema  NEURO: Alert and oriented x 3 , gait normal ., reflexes normal and symmetric, strength and  sensation grossly normal  Intake/Output last 3 Shifts:  I/O last 3 completed shifts:  In: 655 (11.6 mL/kg) [I.V.:555 (9.8 mL/kg); IV Piggyback:100]  Out: 5 (0.1 mL/kg) [Blood:5]  Weight: 56.7 kg   DATA:   Diagnostic tests reviewed for today's visit:    Most recent Labs  Results for orders placed or performed during the  "hospital encounter of 04/16/24 (from the past 24 hour(s))   CBC   Result Value Ref Range    WBC 11.6 (H) 4.4 - 11.3 x10*3/uL    nRBC 0.0 0.0 - 0.0 /100 WBCs    RBC 3.75 (L) 4.00 - 5.20 x10*6/uL    Hemoglobin 12.3 12.0 - 16.0 g/dL    Hematocrit 35.0 (L) 36.0 - 46.0 %    MCV 93 80 - 100 fL    MCH 32.8 26.0 - 34.0 pg    MCHC 35.1 32.0 - 36.0 g/dL    RDW 13.2 11.5 - 14.5 %    Platelets 661 (H) 150 - 450 x10*3/uL   CBC   Result Value Ref Range    WBC 12.1 (H) 4.4 - 11.3 x10*3/uL    nRBC 0.0 0.0 - 0.0 /100 WBCs    RBC 3.67 (L) 4.00 - 5.20 x10*6/uL    Hemoglobin 11.4 (L) 12.0 - 16.0 g/dL    Hematocrit 33.9 (L) 36.0 - 46.0 %    MCV 92 80 - 100 fL    MCH 31.1 26.0 - 34.0 pg    MCHC 33.6 32.0 - 36.0 g/dL    RDW 13.1 11.5 - 14.5 %    Platelets 501 (H) 150 - 450 x10*3/uL   Comprehensive metabolic panel   Result Value Ref Range    Glucose 83 74 - 99 mg/dL    Sodium 134 (L) 136 - 145 mmol/L    Potassium 3.1 (L) 3.5 - 5.3 mmol/L    Chloride 96 (L) 98 - 107 mmol/L    Bicarbonate 24 21 - 32 mmol/L    Anion Gap 17 10 - 20 mmol/L    Urea Nitrogen 9 6 - 23 mg/dL    Creatinine 0.88 0.50 - 1.05 mg/dL    eGFR 73 >60 mL/min/1.73m*2    Calcium 8.5 (L) 8.6 - 10.3 mg/dL    Albumin 3.1 (L) 3.4 - 5.0 g/dL    Alkaline Phosphatase 84 33 - 136 U/L    Total Protein 6.3 (L) 6.4 - 8.2 g/dL    AST 24 9 - 39 U/L    Bilirubin, Total 0.3 0.0 - 1.2 mg/dL    ALT 3 (L) 7 - 45 U/L   Magnesium   Result Value Ref Range    Magnesium 1.50 (L) 1.60 - 2.40 mg/dL     Urine Culture   Date Value Ref Range Status   05/24/2023 NO GROWTH  Final     Blood Culture   Date Value Ref Range Status   05/23/2023   Final    No Growth at 1 days~No Growth at 2 days~No Growth at 3 days~NO GROWTH at 4 days - FINAL REPORT    No results found for: \"RESPCULTSM\" No results found for: \"PERDIAFLDCUL\" No results found for: \"STERFLDCULSM\"   Invasive vascular procedure    Result Date: 4/25/2024   [facility], Cath Lab, [address] [place] [address] Cardiovascular " Catheterization Report Patient Name:     MANN GRAHAM     Performing Physician:  [id number] Christie MD Study Date:       4/25/2024           Verifying Physician:   [id number] Christie MD MRN/PID:          [id number]            Cardiologist/Co-scrub: Accession#:       [id number]        Ordering Physician:    [id number] CHRISTIE Date of           1955 / 68      Fellow:                Galen Amado Birth/Age:        years Gender:           F                   Fellow: Encounter#:       [id number]  Study:            Peripheral Angiogram Additional Study: Peripheral Intervention  Procedure Description: After infiltration with 2% Lidocaine utilizing two-dimensional ultrasound and fluoroscopic guidance, the right femoral artery was cannulated with a Micro-Access Kit using a modified Seldinger technique. Subsequently a 5 Mongolian sheath was placed contralateral in the right femoral artery. The arterial sheath was sized up to 6 Mongolian. After completion of the procedure, A 6/7F Vascade Closure System was placed per protocol. Following routine access via the right CFA, we crossed up and over into the left iliac system. Angiogram views were obtained for diagnostic purposes.  Peripheral Interventions:  The short 5F right CFA sheath was exchanged to a 6F 45 cm sheath. Next, we crossed the left EIA lesion using Wholey wire, exchanged to 0.014 Spartacore wire. IVUS was performed, followed by predilation using 7x40 mm followed by deploymenet of 8x80 mm self-expanding stent, and post dilated again using the 7.0 mm balloon. Final angiogram with good results and no complications.  Percutaneous peripheral intervention of the entire left external iliac artery. The vessel was dilated using a compliant 7.0 mm x 40 mm balloon. Lesion was stented with a 8.0 mm x 80 mm EverFlex stent. The stent was post dilated using a  compliant 7.0 mm x 40 mm balloon. The stenosis was successfully reduced from 90% to 0%.  Complications: No in-lab complications observed.  Cardiac Cath Post Procedure Notes: Post Procedure Diagnosis: See below. Blood Loss:               Estimated blood loss during the procedure was 5 mls. Specimens Removed:        Number of specimen(s) removed: none. ____________________________________________________________________________________ CONCLUSIONS:  1. Status post successful IVUS-guide stenting of left external iliac artery using 8.0 x 80 mm self-expanding stent.  2. Aspirin and Plavix loaded in the Cath Lab, continue aspirin 81 mg once daily for life and Plavix 75 mg once daily for at least 3 months.  3. Will follow-up in the office in 4 weeks with LUIS and TBI. 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, left leg-I70.262  CPT Codes: Moderate Sedation Services 1st additional 15 minutes patient >5 years-35782; Moderate Sedation Services 3rd additional 15 minutes patient >5 years-98544; Moderate Sedation Services 4th additional 15 minutes patient >5 years-33045; Moderate Sedation Services 2nd additional 15 minutes patient >5 years-70170; Ultrasound guidance for vascular access-23177; IVUS non coronary initial vessel-36968; Revasc Iliac Angioplasty unilat(PER)-99274; Revasc Iliac w/Stent Unilat initial vessel (PER)-38502  [id number] Carmen Christie MD Performing Physician Electronically signed by [id number] Carmen Christie MD on 4/25/2024 at 5:09:30 PM  ** Final **    Current Facility-Administered Medications   Medication Dose Route Frequency Provider Last Rate Last Admin    acetaminophen (Tylenol) tablet 650 mg  650 mg oral q4h PRN Екатерина Purvis PA-C        albuterol 2.5 mg /3 mL (0.083 %) nebulizer solution 2.5 mg  2.5 mg nebulization q2h PRN Екатерина Purvis PA-C        aspirin chewable tablet 81 mg  81 mg oral Daily Misti Alex, APRN-CNP        atenolol (Tenormin) tablet 25 mg  25 mg oral  Daily Екатерина Edmondsel, PA-C   25 mg at 04/25/24 0910    buPROPion (Wellbutrin) tablet 100 mg  100 mg oral Daily with breakfast Екатерина Purvis, PA-C   100 mg at 04/25/24 0909    ceFAZolin in dextrose (iso-os) (Ancef) IVPB 2 g  2 g intravenous q8h Jaycob Krishna MD   Stopped at 04/26/24 0324    chlorthalidone (Hygroton) tablet 25 mg  25 mg oral Daily Octavio Jeong MD   25 mg at 04/25/24 0909    clopidogrel (Plavix) tablet 75 mg  75 mg oral Daily Misti Alex, APRN-CNP        dilTIAZem CD (Cardizem CD) 24 hr capsule 240 mg  240 mg oral Daily Екатерина Purvis, PA-C   240 mg at 04/25/24 0909    docusate sodium (Colace) capsule 100 mg  100 mg oral BID Екатерина Purvis, PA-C   100 mg at 04/25/24 2149    enoxaparin (Lovenox) syringe 40 mg  40 mg subcutaneous q24h Екатерина Edmondsel, PA-C   40 mg at 04/25/24 0526    folic acid (Folvite) tablet 1 mg  1 mg oral Daily Екатерина Purvis, PA-C   1 mg at 04/25/24 0909    HYDROmorphone PF (Dilaudid) injection 0.2 mg  0.2 mg intravenous q3h PRN Екатерина MCDANIEL Sephel, PA-C   0.2 mg at 04/25/24 2319    lactulose 20 gram/30 mL oral solution 20 g  20 g oral Daily Екатерина MCDANIEL Sephel, PA-C   20 g at 04/23/24 0957    melatonin tablet 4.5 mg  4.5 mg oral Nightly PRN Екатерина MCDANIEL Sephel, PA-C   4.5 mg at 04/23/24 2259    multivitamin with minerals 1 tablet  1 tablet oral Daily Екатерина E Sephel, PA-C   1 tablet at 04/25/24 0900    nicotine (Nicoderm CQ) 14 mg/24 hr patch 1 patch  1 patch transdermal Daily Екатерина Edmondsel, PA-C   1 patch at 04/25/24 0911    ondansetron (Zofran) injection 4 mg  4 mg intravenous q8h PRN Екатерина E Sephel, PA-C        oxyCODONE (Roxicodone) immediate release tablet 10 mg  10 mg oral q6h PRN Екатерина Purvis PA-C   10 mg at 04/25/24 8989    oxyCODONE (Roxicodone) immediate release tablet 5 mg  5 mg oral q6h PRN Екатерина Purvis PA-C        oxygen (O2) therapy   inhalation Continuous - 02/gases WESTLEY Cordoba-CNP   2 L/min at 04/25/24 2000    polyethylene glycol (Glycolax,  Miralax) packet 17 g  17 g oral Daily Екатерина Edmondsel, PA-C   17 g at 04/23/24 0957    pravastatin (Pravachol) tablet 20 mg  20 mg oral Daily Екатерина E Sephel, PA-C   20 mg at 04/25/24 0909    QUEtiapine (SEROquel) tablet 25 mg  25 mg oral Nightly PRN Екатерина MCDANIEL Sephel, PA-C   25 mg at 04/24/24 2115    sertraline (Zoloft) tablet 50 mg  50 mg oral Daily Екатерина E Sephel, PA-C   50 mg at 04/25/24 0909    sodium chloride 0.9% infusion  100 mL/hr intravenous Continuous Misti Alex, APRN- mL/hr at 04/26/24 0322 100 mL/hr at 04/26/24 0322     Current Outpatient Medications   Medication Sig Dispense Refill    acetaminophen (Tylenol) 325 mg tablet Take 2 tablets (650 mg) by mouth every 6 hours if needed for mild pain (1 - 3).      aspirin 81 mg chewable tablet Chew 1 tablet (81 mg) 2 times a day. 60 tablet     docusate sodium (Colace) 100 mg capsule Take 1 capsule (100 mg) by mouth 2 times a day for 14 days. 28 capsule     oxyCODONE (Roxicodone) 5 mg immediate release tablet Take 1 tablet (5 mg) by mouth every 6 hours if needed for severe pain (7 - 10). 15 tablet 0   ..     Medication and Non-Pharmacologic VTE Prophylaxis/Anticoagulants      Last Anticoag Admin            heparin 1,000 unit/mL injection    Given 6,000 Units at 04/25/24 1541    Frequency: As needed         There are additional administrations since 04/23/24 0813 that are not shown.    No unadministered anticoagulant orders found.          Assessment/Plan   Rose Marie A Aho has  Principal Problem:    Chronic osteomyelitis involving ankle and foot, left (Multi)  Active Problems:    Septic joint (Multi)    Alcohol withdrawal delirium (Multi)    Adrenal cortical nodule (Multi)      Critical limb ischemia of right lower extremity (Multi)         S/P BKA. Patient needs skilled rehabilitation            improved. On CIWA scale.  Patient made aware. Needs follow up CT in 4 months  Angiogram with IVUS-guide stenting of left external iliac artery using 8.0 x 80  mm self-expanding stent. xternal iliac artery.   Other Hospital problems        Abnormal findings not addressed during hospitalization, but require out patient follow up. Adrenal nodule         I spent 35 minutes talking and examining Rose Marie Toussaint, reviewing the labs & medications, formulating plan of care & discussing with NP and nursing staff.  Octavio Jeong MD

## 2024-04-26 NOTE — PROGRESS NOTES
4/26/2024 2:09 PM Patient agrees to short term SNF at [facility]. Cherelle RESTREPO

## 2024-04-26 NOTE — PROGRESS NOTES
Physical Therapy    Physical Therapy Treatment    Patient Name: Rose Marie Toussaint  MRN: [id number]  Today's Date: 4/26/2024  Time Calculation  Start Time: 1035  Stop Time: 1105  Time Calculation (min): 30 min       Assessment/Plan   PT Assessment  End of Session Communication: Bedside nurse  End of Session Patient Position: Up in chair, Alarm on  PT Plan  Inpatient/Swing Bed or Outpatient: Inpatient  PT Plan  Treatment/Interventions: Bed mobility, Transfer training, Gait training  PT Plan: Skilled PT  PT Frequency: Daily  PT Discharge Recommendations: High intensity level of continued care  PT Recommended Transfer Status: Assist x1  PT - OK to Discharge:  (per POC)      General Visit Information:   PT  Visit  PT Received On: 04/26/24  General  Reason for Referral: decline in ADLs; L BKA 4/22  Referred By: Екатерина Purvis PA-C  Family/Caregiver Present: No  Prior to Session Communication: Bedside nurse  Patient Position Received: Bed, 3 rail up, Alarm on  General Comment: pt agreeable and supine in bed upon arrival. pt req increased time secondary to slow alba and more time to process one step commands    Subjective   Precautions:  Precautions  LE Weight Bearing Status: Left Non-Weight Bearing  Medical Precautions: Fall precautions  Precautions Comment: pt compliant with NWB on LLE  Vital Signs:       Objective   Pain:  Pain Assessment  Pain Assessment: 0-10  Pain Score: 4  Pain Type: Acute pain, Surgical pain  Pain Interventions: Medication (See MAR)  Cognition:  Cognition  Overall Cognitive Status: Within Functional Limits  Orientation Level: Oriented X4  Postural Control:  Static Sitting Balance  Static Sitting-Balance Support: Bilateral upper extremity supported, Feet supported (RLE supported)  Static Sitting-Level of Assistance: Modified independent  Static Sitting-Comment/Number of Minutes: ~2 mins (pt demonstrated good static sitting balance)  Static Standing Balance  Static Standing-Balance Support: Bilateral  upper extremity supported  Static Standing-Level of Assistance: Contact guard  Static Standing-Comment/Number of Minutes: ~ 1 min (pt req CGA for trunk stability)  Extremity/Trunk Assessments:    Activity Tolerance:     Treatments:  Bed Mobility  Bed Mobility: Yes  Bed Mobility 1  Bed Mobility 1: Supine to sitting  Level of Assistance 1: Close supervision  Bed Mobility Comments 1: HOB elevated, pt req min vc for sequencing and used bed rail for assist    Ambulation/Gait Training  Ambulation/Gait Training Performed: Yes  Ambulation/Gait Training 1  Surface 1: Level tile  Device 1: Rolling walker  Assistance 1: Minimum assistance, Maximum tactile cues, Maximum verbal cues  Comments/Distance (ft) 1: 3 ft (pt performed hop to pattern, 6 hops. pt req max vc for sequencing, FWW management, upright posture.)  Transfers  Transfer: Yes  Transfer 1  Technique 1: Sit to stand, Stand to sit  Transfer Device 1: Walker, Gait belt  Transfer Level of Assistance 1: Minimum assistance, Minimal verbal cues, Minimal tactile cues  Trials/Comments 1: pt req min vc for hand placement    Outcome Measures:  [facility] Basic Mobility  Turning from your back to your side while in a flat bed without using bedrails: None  Moving from lying on your back to sitting on the side of a flat bed without using bedrails: A little  Moving to and from bed to chair (including a wheelchair): A little  Standing up from a chair using your arms (e.g. wheelchair or bedside chair): A little  To walk in hospital room: A lot  Climbing 3-5 steps with railing: Total  Basic Mobility - Total Score: 16    Participated in performance of tx and documentation under the direct supervision of CI, [facility]     Education Documentation  Precautions, taught by RAYMUNDO Butt at 4/26/2024  1:13 PM.  Learner: Patient  Readiness: Acceptance  Method: Explanation  Response: Verbalizes Understanding    Body Mechanics, taught by RAYMUNDO Butt at  4/26/2024  1:13 PM.  Learner: Patient  Readiness: Acceptance  Method: Explanation  Response: Verbalizes Understanding    Mobility Training, taught by RAYMUNDO Butt at 4/26/2024  1:13 PM.  Learner: Patient  Readiness: Acceptance  Method: Explanation  Response: Verbalizes Understanding    Education Comments  No comments found.        OP EDUCATION:       Encounter Problems       Encounter Problems (Active)       Balance       STG - Maintains dynamic standing balance with upper extremity support At MOD I, safely, without LOB, device PRN  (Progressing)       Start:  04/23/24    Expected End:  05/08/24       INTERVENTIONS:  1. Practice standing with minimal support.  2. Educate patient about standing tolerance.  3. Educate patient about independence with gait, transfers, and ADL's.  4. Educate patient about use of assistive device.  5. Educate patient about self-directed care.            Mobility       STG - Patient will ambulate At At Supervision using Wheeled walker for 50' while maintaining NWB on LLE without LOB or gross gait deviations  (Progressing)       Start:  04/23/24    Expected End:  05/08/24            STG - Patient will ascend and descend four to six stairs At Min Ax1 while using No device and Right HR no LOB        Start:  04/23/24    Expected End:  05/08/24            Endurance - Pt to tolerate >/= 20 minutes therex, theract, gait and/or NMR with </= 5 minutes of rest breaks' (Progressing)       Start:  04/23/24    Expected End:  05/08/24               PT Transfers       STG - Patient will transfer sit to and from stand At Supervision, safely, without LOB, device PRN  (Progressing)       Start:  04/23/24    Expected End:  05/08/24               Pain - Adult          Safety       Pt will verbalize and apply safety awareness and precautions 100% of time throughout entire session   (Progressing)       Start:  04/23/24    Expected End:  05/08/24

## 2024-04-26 NOTE — PROGRESS NOTES
Music Therapy Note        Therapy Session  Referral Type: New referral this admission  Visit Type: New visit  Session Start Time: 1414  Session End Time: 1439  Intervention Delivery: In-person  Conflict of Service: None  Number of family members present: 0  Family Present for Session: None  Number of staff members present: 0     Pre-assessment  Unable to Assess Reason: Outcomes not assessed  Mood/Affect: Calm  Verbalized Emotional State: Acceptance         Treatment/Interventions  Areas of Focus: Emotional support  Music Therapy Interventions: Assessment, Empathic listening/validating emotions  Interruption: No  Patient Fell Asleep at End of Session: No    Post-assessment  Unable to Assess Reason: Outcomes not evaluated  Mood/Affect: Calm (bright, motivated)  Verbalized Emotional State: Acceptance, Hopefulness  Continue Visiting: Yes  Total Session Time (min): 25 minutes    Narrative  Assessment Detail: Upon arrival of MT pt was sitting in bedside chair displaying calm affect. MT introduced self and services, offering music interventions to assist with this admission. Pt began to discuss love for music particularly The Beatles, Rolling Stones, and [place]. Pt shared the struggles with hospital stay but  expressed gratitude for her family and doctors. Pt also shared desire to quit smoking and drinking to continue choosing a healthy lifestyle. MT and pt discussed coping skills and tools for stress  management. Pt expressed feeling hopeful for the future.  Follow-up: MT will follow up with pt as able.    Education Documentation  No documentation found.

## 2024-04-26 NOTE — PROGRESS NOTES
ORTHOPAEDIC SURGERY INPATIENT PROGRESS NOTE    Subjective   NAEON. L EIA stenting yesterday, recovering well.    Objective   PHYSICAL EXAMINATION  Constitutional Exam: frail appearing, does not appear stated age  Psychiatric Exam: alert and not oriented, appropriate mood and behavior  Eye Exam: EOMI  Pulmonary Exam: breathing non-labored, no apparent distress  Lymphatic exam: no appreciable lymphadenopathy in the lower extremities  Cardiovascular exam: RRR to peripheral palpation, DP pulses 2+, PT 2+, toes are pink with good capillary refill, no pitting edema  Skin exam: no open lesions, rashes, abrasions or ulcerations  Neurological exam: sensation to light touch intact in both lower extremities in peripheral and dermatomal distributions (except for any abnormalities noted in musculoskeletal exam)    Musculoskeletal exam: Left lower extremity examination, splint windowed to visualize anterior warm. BKA stump site appears warm and well perfused. Wound c/d/I. SILT grossly in distal stump site, exam limited 2/2 to splint. Hemovac drain with primarily sanguineous drainage in suction tubing.     limited 2/2 to LLE long leg splint. Splint remains c/d/I. Hemovac drain with primarily and minimal serosanguineous drainage in suction tubing.    Last Recorded Vitals  Blood pressure 132/77, pulse 68, temperature 36.4 °C (97.5 °F), temperature source Oral, resp. rate 18, height 1.524 m (5'), weight 56.7 kg (125 lb), SpO2 97%.    Relevant Imaging  No new imaging.    Assessment/Plan:  69 y/o F s/p L Ankle Bi-malleolar ankle fracture ORIF with syndesmotic stabilization with Dr. Frederick from 11/20/2022 with hardware failure, draining wounds concerning for OM in the setting of presumed alcohol abuse with peripheral neuropathy and ongoing tobacco abuse s/p L BKA from 04/22/2024 and L EIA stend stent with interventional cardiology form 04/25/2024.    - NWB LLE in LLS, continue splint until follow-up  - Regular diet  - ID consulted,  abx plan finalized  - Continue hemovac drain, monitor and record drain output q8hrs, anticipate removal 04/27/2024  - PO Pain control, IV for breakthrough  - Encourage IS, supplemental O2 as needed  - DVT PPX: SCDs, AMERICO hose, ASA + clopidogrel per cardiology  - PT/OT    Flako Guerra MD, ALEJA  Department of Orthopaedic Surgery  [facility]

## 2024-04-26 NOTE — PROGRESS NOTES
4/26/2024 10:44 AM Patient wants to go to the same facility as her .  has not been accepted at [facility]. I called patient's stepdaughter to find out what facility patient's  is going to. Stepdaughter thought he was going to [facility]. I followed up with patient. She agrees to referral to [facility]. Cherelle GOVEA

## 2024-04-26 NOTE — CARE PLAN
The patient's goals for the shift include      The clinical goals for the shift include PT WILL REMAIN FREE FROM INJURY THIS SHIFT      Problem: Pain  Goal: My pain/discomfort is manageable  Outcome: Progressing     Problem: Safety  Goal: Patient will be injury free during hospitalization  Outcome: Progressing  Goal: I will remain free of falls  Outcome: Progressing     Problem: Daily Care  Goal: Daily care needs are met  Outcome: Progressing     Problem: Psychosocial Needs  Goal: Demonstrates ability to cope with hospitalization/illness  Outcome: Progressing  Goal: Collaborate with me, my family, and caregiver to identify my specific goals  Outcome: Progressing     Problem: Discharge Barriers  Goal: My discharge needs are met  Outcome: Progressing     Problem: Skin  Goal: Participates in plan/prevention/treatment measures  Outcome: Progressing  Flowsheets (Taken 4/25/2024 2336)  Participates in plan/prevention/treatment measures:   Elevate heels   Discuss with provider PT/OT consult  Goal: Prevent/manage excess moisture  Outcome: Progressing  Flowsheets (Taken 4/25/2024 2336)  Prevent/manage excess moisture:   Cleanse incontinence/protect with barrier cream   Follow provider orders for dressing changes   Moisturize dry skin  Goal: Prevent/minimize sheer/friction injuries  Outcome: Progressing  Flowsheets (Taken 4/25/2024 2336)  Prevent/minimize sheer/friction injuries:   HOB 30 degrees or less   Use pull sheet  Goal: Promote/optimize nutrition  Outcome: Progressing  Flowsheets (Taken 4/25/2024 2336)  Promote/optimize nutrition:   Consume > 50% meals/supplements   Monitor/record intake including meals  Goal: Promote skin healing  Outcome: Progressing  Flowsheets (Taken 4/25/2024 2336)  Promote skin healing: Protective dressings over bony prominences     Problem: Pain - Adult  Goal: Verbalizes/displays adequate comfort level or baseline comfort level  Outcome: Progressing     Problem: Safety - Adult  Goal: Free from  fall injury  Outcome: Progressing     Problem: Discharge Planning  Goal: Discharge to home or other facility with appropriate resources  Outcome: Progressing     Problem: Chronic Conditions and Co-morbidities  Goal: Patient's chronic conditions and co-morbidity symptoms are monitored and maintained or improved  Outcome: Progressing

## 2024-04-26 NOTE — CARE PLAN
The patient's goals for the shift include remain safe    The clinical goals for the shift include remain comfortable    Over the shift, the patient did make progress toward the following goals.

## 2024-04-26 NOTE — PROGRESS NOTES
4/26/2024 12:33 PM Sister agrees for patient to go to [facility] for short  term SNF. I have asked to start auth. Cherelle RESTREPO

## 2024-04-27 LAB
ALBUMIN SERPL BCP-MCNC: 3.2 G/DL (ref 3.4–5)
ALP SERPL-CCNC: 77 U/L (ref 33–136)
ALT SERPL W P-5'-P-CCNC: 3 U/L (ref 7–45)
ANION GAP SERPL CALC-SCNC: 15 MMOL/L (ref 10–20)
ANION GAP SERPL CALC-SCNC: 15 MMOL/L (ref 10–20)
AST SERPL W P-5'-P-CCNC: 25 U/L (ref 9–39)
BILIRUB SERPL-MCNC: 0.2 MG/DL (ref 0–1.2)
BUN SERPL-MCNC: 11 MG/DL (ref 6–23)
BUN SERPL-MCNC: 12 MG/DL (ref 6–23)
CALCIUM SERPL-MCNC: 8.9 MG/DL (ref 8.6–10.3)
CALCIUM SERPL-MCNC: 9.1 MG/DL (ref 8.6–10.3)
CHLORIDE SERPL-SCNC: 93 MMOL/L (ref 98–107)
CHLORIDE SERPL-SCNC: 96 MMOL/L (ref 98–107)
CO2 SERPL-SCNC: 25 MMOL/L (ref 21–32)
CO2 SERPL-SCNC: 26 MMOL/L (ref 21–32)
CREAT SERPL-MCNC: 1.09 MG/DL (ref 0.5–1.05)
CREAT SERPL-MCNC: 1.11 MG/DL (ref 0.5–1.05)
EGFRCR SERPLBLD CKD-EPI 2021: 55 ML/MIN/1.73M*2
EGFRCR SERPLBLD CKD-EPI 2021: 56 ML/MIN/1.73M*2
ERYTHROCYTE [DISTWIDTH] IN BLOOD BY AUTOMATED COUNT: 13.4 % (ref 11.5–14.5)
GLUCOSE SERPL-MCNC: 109 MG/DL (ref 74–99)
GLUCOSE SERPL-MCNC: 95 MG/DL (ref 74–99)
HCT VFR BLD AUTO: 32.2 % (ref 36–46)
HGB BLD-MCNC: 10.6 G/DL (ref 12–16)
HOLD SPECIMEN: NORMAL
MAGNESIUM SERPL-MCNC: 1.8 MG/DL (ref 1.6–2.4)
MCH RBC QN AUTO: 30.8 PG (ref 26–34)
MCHC RBC AUTO-ENTMCNC: 32.9 G/DL (ref 32–36)
MCV RBC AUTO: 94 FL (ref 80–100)
NRBC BLD-RTO: 0 /100 WBCS (ref 0–0)
PLATELET # BLD AUTO: 507 X10*3/UL (ref 150–450)
POTASSIUM SERPL-SCNC: 3.2 MMOL/L (ref 3.5–5.3)
POTASSIUM SERPL-SCNC: 4 MMOL/L (ref 3.5–5.3)
PROT SERPL-MCNC: 5.8 G/DL (ref 6.4–8.2)
RBC # BLD AUTO: 3.44 X10*6/UL (ref 4–5.2)
SODIUM SERPL-SCNC: 130 MMOL/L (ref 136–145)
SODIUM SERPL-SCNC: 133 MMOL/L (ref 136–145)
WBC # BLD AUTO: 12.2 X10*3/UL (ref 4.4–11.3)

## 2024-04-27 PROCEDURE — 80048 BASIC METABOLIC PNL TOTAL CA: CPT | Mod: CCI | Performed by: INTERNAL MEDICINE

## 2024-04-27 PROCEDURE — 36415 COLL VENOUS BLD VENIPUNCTURE: CPT

## 2024-04-27 PROCEDURE — 2500000006 HC RX 250 W HCPCS SELF ADMINISTERED DRUGS (ALT 637 FOR ALL PAYERS): Performed by: INTERNAL MEDICINE

## 2024-04-27 PROCEDURE — 2500000004 HC RX 250 GENERAL PHARMACY W/ HCPCS (ALT 636 FOR OP/ED): Performed by: STUDENT IN AN ORGANIZED HEALTH CARE EDUCATION/TRAINING PROGRAM

## 2024-04-27 PROCEDURE — 2500000004 HC RX 250 GENERAL PHARMACY W/ HCPCS (ALT 636 FOR OP/ED): Mod: JZ | Performed by: INTERNAL MEDICINE

## 2024-04-27 PROCEDURE — 83735 ASSAY OF MAGNESIUM: CPT

## 2024-04-27 PROCEDURE — 2500000001 HC RX 250 WO HCPCS SELF ADMINISTERED DRUGS (ALT 637 FOR MEDICARE OP): Performed by: STUDENT IN AN ORGANIZED HEALTH CARE EDUCATION/TRAINING PROGRAM

## 2024-04-27 PROCEDURE — 2500000002 HC RX 250 W HCPCS SELF ADMINISTERED DRUGS (ALT 637 FOR MEDICARE OP, ALT 636 FOR OP/ED): Performed by: STUDENT IN AN ORGANIZED HEALTH CARE EDUCATION/TRAINING PROGRAM

## 2024-04-27 PROCEDURE — 36415 COLL VENOUS BLD VENIPUNCTURE: CPT | Performed by: INTERNAL MEDICINE

## 2024-04-27 PROCEDURE — 2500000001 HC RX 250 WO HCPCS SELF ADMINISTERED DRUGS (ALT 637 FOR MEDICARE OP): Performed by: NURSE PRACTITIONER

## 2024-04-27 PROCEDURE — 97530 THERAPEUTIC ACTIVITIES: CPT | Mod: GP,CQ

## 2024-04-27 PROCEDURE — 1200000002 HC GENERAL ROOM WITH TELEMETRY DAILY

## 2024-04-27 PROCEDURE — 99024 POSTOP FOLLOW-UP VISIT: CPT | Performed by: STUDENT IN AN ORGANIZED HEALTH CARE EDUCATION/TRAINING PROGRAM

## 2024-04-27 PROCEDURE — S4991 NICOTINE PATCH NONLEGEND: HCPCS | Performed by: STUDENT IN AN ORGANIZED HEALTH CARE EDUCATION/TRAINING PROGRAM

## 2024-04-27 PROCEDURE — 97110 THERAPEUTIC EXERCISES: CPT | Mod: GP,CQ

## 2024-04-27 PROCEDURE — 84075 ASSAY ALKALINE PHOSPHATASE: CPT

## 2024-04-27 PROCEDURE — 85027 COMPLETE CBC AUTOMATED: CPT

## 2024-04-27 PROCEDURE — 2500000001 HC RX 250 WO HCPCS SELF ADMINISTERED DRUGS (ALT 637 FOR MEDICARE OP): Performed by: INTERNAL MEDICINE

## 2024-04-27 PROCEDURE — 2500000006 HC RX 250 W HCPCS SELF ADMINISTERED DRUGS (ALT 637 FOR ALL PAYERS): Mod: MUE | Performed by: STUDENT IN AN ORGANIZED HEALTH CARE EDUCATION/TRAINING PROGRAM

## 2024-04-27 RX ORDER — POTASSIUM CHLORIDE 20 MEQ/1
20 TABLET, EXTENDED RELEASE ORAL ONCE
Status: COMPLETED | OUTPATIENT
Start: 2024-04-27 | End: 2024-04-27

## 2024-04-27 RX ORDER — POTASSIUM CHLORIDE 20 MEQ/1
40 TABLET, EXTENDED RELEASE ORAL ONCE
Status: COMPLETED | OUTPATIENT
Start: 2024-04-27 | End: 2024-04-27

## 2024-04-27 RX ADMIN — CEFAZOLIN SODIUM 2 G: 2 INJECTION, SOLUTION INTRAVENOUS at 10:09

## 2024-04-27 RX ADMIN — CLOPIDOGREL 75 MG: 75 TABLET ORAL at 09:27

## 2024-04-27 RX ADMIN — PRAVASTATIN SODIUM 20 MG: 20 TABLET ORAL at 09:28

## 2024-04-27 RX ADMIN — ACETAMINOPHEN 650 MG: 325 TABLET ORAL at 18:28

## 2024-04-27 RX ADMIN — DILTIAZEM HYDROCHLORIDE 240 MG: 120 CAPSULE, EXTENDED RELEASE ORAL at 09:27

## 2024-04-27 RX ADMIN — OXYCODONE HYDROCHLORIDE 10 MG: 5 TABLET ORAL at 09:35

## 2024-04-27 RX ADMIN — HYDROMORPHONE HYDROCHLORIDE 0.2 MG: 0.2 INJECTION, SOLUTION INTRAMUSCULAR; INTRAVENOUS; SUBCUTANEOUS at 20:20

## 2024-04-27 RX ADMIN — NICOTINE 1 PATCH: 14 PATCH, EXTENDED RELEASE TRANSDERMAL at 09:27

## 2024-04-27 RX ADMIN — CEFAZOLIN SODIUM 2 G: 2 INJECTION, SOLUTION INTRAVENOUS at 18:05

## 2024-04-27 RX ADMIN — BUPROPION HYDROCHLORIDE 100 MG: 100 TABLET, FILM COATED ORAL at 09:36

## 2024-04-27 RX ADMIN — POTASSIUM CHLORIDE 20 MEQ: 1500 TABLET, EXTENDED RELEASE ORAL at 16:24

## 2024-04-27 RX ADMIN — OXYCODONE HYDROCHLORIDE 10 MG: 5 TABLET ORAL at 03:46

## 2024-04-27 RX ADMIN — Medication 1 TABLET: at 09:27

## 2024-04-27 RX ADMIN — CEFAZOLIN SODIUM 2 G: 2 INJECTION, SOLUTION INTRAVENOUS at 03:46

## 2024-04-27 RX ADMIN — ATENOLOL 25 MG: 25 TABLET ORAL at 09:28

## 2024-04-27 RX ADMIN — FOLIC ACID 1 MG: 1 TABLET ORAL at 09:28

## 2024-04-27 RX ADMIN — ENOXAPARIN SODIUM 40 MG: 40 INJECTION SUBCUTANEOUS at 04:13

## 2024-04-27 RX ADMIN — ASPIRIN 81 MG 81 MG: 81 TABLET ORAL at 09:27

## 2024-04-27 RX ADMIN — OXYCODONE HYDROCHLORIDE 5 MG: 5 TABLET ORAL at 14:11

## 2024-04-27 RX ADMIN — CHLORTHALIDONE 25 MG: 25 TABLET ORAL at 09:28

## 2024-04-27 RX ADMIN — POTASSIUM CHLORIDE 40 MEQ: 1500 TABLET, EXTENDED RELEASE ORAL at 15:05

## 2024-04-27 RX ADMIN — SERTRALINE HYDROCHLORIDE 50 MG: 50 TABLET ORAL at 09:28

## 2024-04-27 RX ADMIN — HYDROMORPHONE HYDROCHLORIDE 0.2 MG: 0.2 INJECTION, SOLUTION INTRAMUSCULAR; INTRAVENOUS; SUBCUTANEOUS at 16:28

## 2024-04-27 ASSESSMENT — PAIN - FUNCTIONAL ASSESSMENT
PAIN_FUNCTIONAL_ASSESSMENT: 0-10

## 2024-04-27 ASSESSMENT — PAIN SCALES - GENERAL
PAINLEVEL_OUTOF10: 5 - MODERATE PAIN
PAINLEVEL_OUTOF10: 8
PAINLEVEL_OUTOF10: 7
PAINLEVEL_OUTOF10: 5 - MODERATE PAIN
PAINLEVEL_OUTOF10: 8
PAINLEVEL_OUTOF10: 8
PAINLEVEL_OUTOF10: 5 - MODERATE PAIN
PAINLEVEL_OUTOF10: 2
PAINLEVEL_OUTOF10: 0 - NO PAIN
PAINLEVEL_OUTOF10: 10 - WORST POSSIBLE PAIN
PAINLEVEL_OUTOF10: 5 - MODERATE PAIN
PAINLEVEL_OUTOF10: 5 - MODERATE PAIN

## 2024-04-27 ASSESSMENT — COGNITIVE AND FUNCTIONAL STATUS - GENERAL
STANDING UP FROM CHAIR USING ARMS: A LITTLE
DRESSING REGULAR LOWER BODY CLOTHING: A LITTLE
DAILY ACTIVITIY SCORE: 18
CLIMB 3 TO 5 STEPS WITH RAILING: TOTAL
STANDING UP FROM CHAIR USING ARMS: A LITTLE
TURNING FROM BACK TO SIDE WHILE IN FLAT BAD: A LITTLE
HELP NEEDED FOR BATHING: A LOT
STANDING UP FROM CHAIR USING ARMS: A LITTLE
WALKING IN HOSPITAL ROOM: A LOT
MOBILITY SCORE: 16
DRESSING REGULAR LOWER BODY CLOTHING: A LITTLE
DRESSING REGULAR UPPER BODY CLOTHING: A LITTLE
TOILETING: A LOT
CLIMB 3 TO 5 STEPS WITH RAILING: TOTAL
MOVING TO AND FROM BED TO CHAIR: A LITTLE
HELP NEEDED FOR BATHING: A LOT
TURNING FROM BACK TO SIDE WHILE IN FLAT BAD: A LITTLE
DRESSING REGULAR UPPER BODY CLOTHING: A LITTLE
WALKING IN HOSPITAL ROOM: A LOT
CLIMB 3 TO 5 STEPS WITH RAILING: TOTAL
TOILETING: A LOT
MOBILITY SCORE: 16
WALKING IN HOSPITAL ROOM: A LOT
MOBILITY SCORE: 16
TURNING FROM BACK TO SIDE WHILE IN FLAT BAD: A LITTLE
DAILY ACTIVITIY SCORE: 18

## 2024-04-27 ASSESSMENT — PAIN DESCRIPTION - ORIENTATION
ORIENTATION: LEFT

## 2024-04-27 ASSESSMENT — PAIN DESCRIPTION - LOCATION
LOCATION: LEG
LOCATION: LEG

## 2024-04-27 NOTE — PROGRESS NOTES
[facility]          Admitting Provider: Octavio Jeong MD Admission Date: 4/16/2024.   Attending Provider: Octavio Jeong MD MRN: [id number]       Subjective   Rose Marie Toussaint is a 68 y.o. female on day 11 of admission presenting with Chronic osteomyelitis involving ankle and foot, left (Multi).  Interval History no complaints.     Objective   Physical Exam  Last Recorded Vitals: Blood pressure 131/72, pulse 104, temperature 36.7 °C (98 °F), resp. rate 18, height 1.524 m (5'), weight 56.7 kg (125 lb), SpO2 98%.  Patient Vitals for the past 24 hrs:   BP Temp Temp src Pulse Resp SpO2   04/27/24 0758 131/72 36.7 °C (98 °F) -- 104 18 98 %   04/26/24 2300 156/72 36.4 °C (97.5 °F) -- 84 18 95 %   04/26/24 1900 137/82 36.7 °C (98.1 °F) Oral 87 18 97 %   04/26/24 1555 118/69 36.2 °C (97.2 °F) Oral 85 18 99 %   04/26/24 1126 132/77 36.4 °C (97.5 °F) Oral 68 18 97 %     Body mass index is 24.41 kg/m².  GENERAL: alert, cooperative, or no distress  SKIN: no rashes  NECK: supple, no thyromegaly, JVP within normal limits  LUNGS:  not  in respiratory distress, respiratory rate normal, clear to auscultation  CARDIAC: regular rate and rhythm, normal S1 and S2, no murmur, rub, or gallop  ABDOMEN: Soft, non-tender, normal bowel sounds; no bruits, organomegaly or masses.  EXTREMITIES: No edema, left, BKA  NEURO: Alert and oriented x 3 , gait normal ., reflexes normal and symmetric, strength and  sensation grossly normal  Intake/Output last 3 Shifts:  I/O last 3 completed shifts:  In: 1260 (22.2 mL/kg) [P.O.:480; I.V.:680 (12 mL/kg); IV Piggyback:100]  Out: 400 (7.1 mL/kg) [Urine:200 (0.1 mL/kg/hr); Stool:200]  Weight: 56.7 kg   DATA:   Diagnostic tests reviewed for today's visit:    Most recent Labs  Results for orders placed or performed during the hospital encounter of 04/16/24 (from the past 24 hour(s))   POCT GLUCOSE   Result Value Ref Range    POCT Glucose 119 (H) 74 - 99 mg/dL   POCT GLUCOSE  "  Result Value Ref Range    POCT Glucose 127 (H) 74 - 99 mg/dL   POCT GLUCOSE   Result Value Ref Range    POCT Glucose 114 (H) 74 - 99 mg/dL   CBC   Result Value Ref Range    WBC 12.2 (H) 4.4 - 11.3 x10*3/uL    nRBC 0.0 0.0 - 0.0 /100 WBCs    RBC 3.44 (L) 4.00 - 5.20 x10*6/uL    Hemoglobin 10.6 (L) 12.0 - 16.0 g/dL    Hematocrit 32.2 (L) 36.0 - 46.0 %    MCV 94 80 - 100 fL    MCH 30.8 26.0 - 34.0 pg    MCHC 32.9 32.0 - 36.0 g/dL    RDW 13.4 11.5 - 14.5 %    Platelets 507 (H) 150 - 450 x10*3/uL   Comprehensive metabolic panel   Result Value Ref Range    Glucose 95 74 - 99 mg/dL    Sodium 133 (L) 136 - 145 mmol/L    Potassium 4.0 3.5 - 5.3 mmol/L    Chloride 96 (L) 98 - 107 mmol/L    Bicarbonate 26 21 - 32 mmol/L    Anion Gap 15 10 - 20 mmol/L    Urea Nitrogen 12 6 - 23 mg/dL    Creatinine 1.11 (H) 0.50 - 1.05 mg/dL    eGFR 55 (L) >60 mL/min/1.73m*2    Calcium 8.9 8.6 - 10.3 mg/dL    Albumin 3.2 (L) 3.4 - 5.0 g/dL    Alkaline Phosphatase 77 33 - 136 U/L    Total Protein 5.8 (L) 6.4 - 8.2 g/dL    AST 25 9 - 39 U/L    Bilirubin, Total 0.2 0.0 - 1.2 mg/dL    ALT 3 (L) 7 - 45 U/L   Magnesium   Result Value Ref Range    Magnesium 1.80 1.60 - 2.40 mg/dL     Urine Culture   Date Value Ref Range Status   05/24/2023 NO GROWTH  Final     Blood Culture   Date Value Ref Range Status   05/23/2023   Final    No Growth at 1 days~No Growth at 2 days~No Growth at 3 days~NO GROWTH at 4 days - FINAL REPORT    No results found for: \"RESPCULTSM\" No results found for: \"PERDIAFLDCUL\" No results found for: \"STERFLDCULSM\"   Invasive vascular procedure    Result Date: 4/25/2024   [facility], Cath Lab, [address] [place] [address] Cardiovascular Catheterization Report Patient Name:     MANN GRAHAM     Performing Physician:  [id number] Christie MD Study Date:       4/25/2024           Verifying Physician:   [id number] Christie                                    "                           MD MRN/PID:          [id number]            Cardiologist/Co-scrub: Accession#:       [id number]        Ordering Physician:    [id number] VILLA Date of           1955 / 68      Fellow:                Galen Amado Birth/Age:        years Gender:           F                   Fellow: Encounter#:       [id number]  Study:            Peripheral Angiogram Additional Study: Peripheral Intervention  Procedure Description: After infiltration with 2% Lidocaine utilizing two-dimensional ultrasound and fluoroscopic guidance, the right femoral artery was cannulated with a Micro-Access Kit using a modified Seldinger technique. Subsequently a 5 Scottish sheath was placed contralateral in the right femoral artery. The arterial sheath was sized up to 6 Scottish. After completion of the procedure, A 6/7F Vascade Closure System was placed per protocol. Following routine access via the right CFA, we crossed up and over into the left iliac system. Angiogram views were obtained for diagnostic purposes.  Peripheral Interventions:  The short 5F right CFA sheath was exchanged to a 6F 45 cm sheath. Next, we crossed the left EIA lesion using Wholey wire, exchanged to 0.014 Spartacore wire. IVUS was performed, followed by predilation using 7x40 mm followed by deploymenet of 8x80 mm self-expanding stent, and post dilated again using the 7.0 mm balloon. Final angiogram with good results and no complications.  Percutaneous peripheral intervention of the entire left external iliac artery. The vessel was dilated using a compliant 7.0 mm x 40 mm balloon. Lesion was stented with a 8.0 mm x 80 mm EverFlex stent. The stent was post dilated using a compliant 7.0 mm x 40 mm balloon. The stenosis was successfully reduced from 90% to 0%.  Complications: No in-lab complications observed.  Cardiac Cath Post Procedure Notes: Post Procedure Diagnosis: See below. Blood Loss:               Estimated blood loss during the procedure was  5 mls. Specimens Removed:        Number of specimen(s) removed: none. ____________________________________________________________________________________ CONCLUSIONS:  1. Status post successful IVUS-guide stenting of left external iliac artery using 8.0 x 80 mm self-expanding stent.  2. Aspirin and Plavix loaded in the Cath Lab, continue aspirin 81 mg once daily for life and Plavix 75 mg once daily for at least 3 months.  3. Will follow-up in the office in 4 weeks with LUIS and TBI. 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, left leg-I70.262  CPT Codes: Moderate Sedation Services 1st additional 15 minutes patient >5 years-36939; Moderate Sedation Services 3rd additional 15 minutes patient >5 years-15322; Moderate Sedation Services 4th additional 15 minutes patient >5 years-60990; Moderate Sedation Services 2nd additional 15 minutes patient >5 years-56827; Ultrasound guidance for vascular access-22032; IVUS non coronary initial vessel-73687; Revasc Iliac Angioplasty unilat(PER)-45436; Revasc Iliac w/Stent Unilat initial vessel (PER)-79995  [id number] Carmen Christie MD Performing Physician Electronically signed by [id number] Carmen Christie MD on 4/25/2024 at 5:09:30 PM  ** Final **    Current Facility-Administered Medications   Medication Dose Route Frequency Provider Last Rate Last Admin    acetaminophen (Tylenol) tablet 650 mg  650 mg oral q4h PRN Екатерина Purvis PA-C        albuterol 2.5 mg /3 mL (0.083 %) nebulizer solution 2.5 mg  2.5 mg nebulization q2h PRN Екатерина Purvis PA-C        aspirin chewable tablet 81 mg  81 mg oral Daily WESTLEY Cordoba-CNP   81 mg at 04/26/24 0935    atenolol (Tenormin) tablet 25 mg  25 mg oral Daily Екатерина Purvis PA-C   25 mg at 04/26/24 0937    buPROPion (Wellbutrin) tablet 100 mg  100 mg oral Daily with breakfast Екатерина Purvis PA-C   100 mg at 04/26/24 0935    ceFAZolin in dextrose (iso-os) (Ancef) IVPB 2 g  2 g intravenous q8h Jaycob Krishna MD    Stopped at 04/27/24 0416    chlorthalidone (Hygroton) tablet 25 mg  25 mg oral Daily Octavio Jeong MD   25 mg at 04/26/24 0937    clopidogrel (Plavix) tablet 75 mg  75 mg oral Daily SARAHI Cordoba   75 mg at 04/26/24 0937    dilTIAZem CD (Cardizem CD) 24 hr capsule 240 mg  240 mg oral Daily Екатерина Purvis, PA-C   240 mg at 04/26/24 0935    docusate sodium (Colace) capsule 100 mg  100 mg oral BID Екатерина MCDANIEL Sephel, PA-C   100 mg at 04/25/24 2149    enoxaparin (Lovenox) syringe 40 mg  40 mg subcutaneous q24h Екатерина Purvis, PA-C   40 mg at 04/27/24 0413    folic acid (Folvite) tablet 1 mg  1 mg oral Daily Екатерина Purvis, PA-C   1 mg at 04/26/24 0937    HYDROmorphone PF (Dilaudid) injection 0.2 mg  0.2 mg intravenous q3h PRN Екатерина MCDANIEL Sephel, PA-C   0.2 mg at 04/26/24 1612    lactulose 20 gram/30 mL oral solution 20 g  20 g oral Daily Екатерина Purvis, PA-C   20 g at 04/26/24 0937    melatonin tablet 4.5 mg  4.5 mg oral Nightly PRN Екатерина Purvis, PA-C   4.5 mg at 04/23/24 2259    multivitamin with minerals 1 tablet  1 tablet oral Daily Екатерина Purvis, PA-C   1 tablet at 04/26/24 0935    nicotine (Nicoderm CQ) 14 mg/24 hr patch 1 patch  1 patch transdermal Daily Екатерина Purvis, PA-C   1 patch at 04/26/24 0935    ondansetron (Zofran) injection 4 mg  4 mg intravenous q8h PRN Екатерина Purvis, PA-C        oxyCODONE (Roxicodone) immediate release tablet 10 mg  10 mg oral q6h PRN Екатерина Purvis, PA-C   10 mg at 04/27/24 0346    oxyCODONE (Roxicodone) immediate release tablet 5 mg  5 mg oral q6h PRN Екатерина Purvis, PA-C        oxygen (O2) therapy   inhalation Continuous - 02/gases Misti Alex, WESTLEY-CNP   2 L/min at 04/25/24 2000    polyethylene glycol (Glycolax, Miralax) packet 17 g  17 g oral Daily Екатерина Purvis PA-C   17 g at 04/23/24 0957    pravastatin (Pravachol) tablet 20 mg  20 mg oral Daily Екатерина Purvis PA-C   20 mg at 04/26/24 0937    QUEtiapine (SEROquel) tablet 25 mg  25 mg oral Nightly  PRN Екатерина Purvis PA-C   25 mg at 04/24/24 2115    sertraline (Zoloft) tablet 50 mg  50 mg oral Daily Екатерина Purvis PA-C   50 mg at 04/26/24 0937     Current Outpatient Medications   Medication Sig Dispense Refill    acetaminophen (Tylenol) 325 mg tablet Take 2 tablets (650 mg) by mouth every 6 hours if needed for mild pain (1 - 3).      aspirin 81 mg chewable tablet Chew 1 tablet (81 mg) 2 times a day. 60 tablet     docusate sodium (Colace) 100 mg capsule Take 1 capsule (100 mg) by mouth 2 times a day for 14 days. 28 capsule     oxyCODONE (Roxicodone) 5 mg immediate release tablet Take 1 tablet (5 mg) by mouth every 6 hours if needed for severe pain (7 - 10). 15 tablet 0   ..     Medication and Non-Pharmacologic VTE Prophylaxis/Anticoagulants      Last Anticoag Admin            enoxaparin (Lovenox) syringe 40 mg    Given 40 mg at 0413    Frequency: Every 24 hours         There are additional administrations since 04/24/24 0902 that are not shown.    No unadministered anticoagulant orders found.          Assessment/Plan   Rose Marie Toussaint has  Principal Problem:    Chronic osteomyelitis involving ankle and foot, left (Multi)  Active Problems:    Septic joint (Multi)    Alcohol withdrawal delirium (Multi)    Adrenal cortical nodule (Multi)    Critical limb ischemia of right lower extremity (Multi)    Critical limb ischemia of left lower extremity (Multi)  ARELI ?due to contrast   S/P BKA. Patient needs skilled rehabilitation           improved.   Patient aware. Needs rpt CT in 4 months  Angiogram with IVUS-guide stenting of left external iliac artery using 8.0 x 80 mm self-expanding stent. xternal iliac artery.    Repeat BMP   Other Hospital problems        Abnormal findings not addressed during hospitalization, but require out patient follow up. Adrenal nodule         I spent 35 minutes talking and examining Rose Marie Toussaint, reviewing the labs & medications, formulating plan of care & discussing with NP and nursing  staff.     Octavio Jeong MD

## 2024-04-27 NOTE — PROGRESS NOTES
ORTHOPAEDIC SURGERY INPATIENT PROGRESS NOTE    Subjective   NAEON. Resting comfortably in bed, spacing out pain medication by report. Reporting improved pain control by comparison to pre-op. Denies new n/t/w in LLE.     Objective   PHYSICAL EXAMINATION  Constitutional Exam: frail appearing, does not appear stated age  Psychiatric Exam: alert and not oriented, appropriate mood and behavior  Eye Exam: EOMI  Pulmonary Exam: breathing non-labored, no apparent distress  Lymphatic exam: no appreciable lymphadenopathy in the lower extremities  Cardiovascular exam: RRR to peripheral palpation, DP pulses 2+, PT 2+, toes are pink with good capillary refill, no pitting edema  Skin exam: no open lesions, rashes, abrasions or ulcerations  Neurological exam: sensation to light touch intact in both lower extremities in peripheral and dermatomal distributions (except for any abnormalities noted in musculoskeletal exam)    Musculoskeletal exam: Left lower extremity examination, LLE splint removed. Wound well appearing, c/d/I with appropriate clyde-incisional tenderness to palpation without erythema, induration, fluctuance, drainage or expressible drainage. SILT grossly in distal stump. Stump warm and well perfused. HV drain w/ minimal s/s drainage.    Last Recorded Vitals  Blood pressure 131/72, pulse 104, temperature 36.7 °C (98 °F), resp. rate 18, height 1.524 m (5'), weight 56.7 kg (125 lb), SpO2 98%.    Relevant Imaging  No new imaging.    Assessment/Plan:  69 y/o F s/p L Ankle Bi-malleolar ankle fracture ORIF with syndesmotic stabilization with Dr. Frederick from 11/20/2022 with hardware failure, draining wounds concerning for OM in the setting of presumed alcohol abuse with peripheral neuropathy and ongoing tobacco abuse s/p L BKA from 04/22/2024 and L EIA stend stent with interventional cardiology form 04/25/2024.    - NWB LLE in LLS (reapplied)  - Regular diet  - ID consulted, abx plan finalized  - Drain removed  - PO Pain  control, IV for breakthrough  - Encourage IS, supplemental O2 as needed  - DVT PPX: SCDs, AMERICO hose, ASA + clopidogrel per cardiology  - PT/OT  - RTC in 2 weeks for wound check    Flako Guerra MD, ALEJA  Department of Orthopaedic Surgery  [facility]

## 2024-04-27 NOTE — CARE PLAN
Problem: Pain  Goal: My pain/discomfort is manageable  Outcome: Progressing     Problem: Safety  Goal: Patient will be injury free during hospitalization  Outcome: Progressing  Goal: I will remain free of falls  Outcome: Progressing     Problem: Daily Care  Goal: Daily care needs are met  Outcome: Progressing     Problem: Psychosocial Needs  Goal: Demonstrates ability to cope with hospitalization/illness  Outcome: Progressing  Goal: Collaborate with me, my family, and caregiver to identify my specific goals  Outcome: Progressing     Problem: Discharge Barriers  Goal: My discharge needs are met  Outcome: Progressing     Problem: Skin  Goal: Participates in plan/prevention/treatment measures  Outcome: Progressing  Goal: Prevent/manage excess moisture  Outcome: Progressing  Goal: Prevent/minimize sheer/friction injuries  Outcome: Progressing  Goal: Promote/optimize nutrition  Outcome: Progressing  Goal: Promote skin healing  Outcome: Progressing     Problem: Pain - Adult  Goal: Verbalizes/displays adequate comfort level or baseline comfort level  Outcome: Progressing     Problem: Safety - Adult  Goal: Free from fall injury  Outcome: Progressing     Problem: Discharge Planning  Goal: Discharge to home or other facility with appropriate resources  Outcome: Progressing     Problem: Chronic Conditions and Co-morbidities  Goal: Patient's chronic conditions and co-morbidity symptoms are monitored and maintained or improved  Outcome: Progressing     Problem: Pain  Goal: Takes deep breaths with improved pain control throughout the shift  Outcome: Progressing  Goal: Turns in bed with improved pain control throughout the shift  Outcome: Progressing  Goal: Walks with improved pain control throughout the shift  Outcome: Progressing  Goal: Performs ADL's with improved pain control throughout shift  Outcome: Progressing  Goal: Participates in PT with improved pain control throughout the shift  Outcome: Progressing  Goal: Free from  opioid side effects throughout the shift  Outcome: Progressing  Goal: Free from acute confusion related to pain meds throughout the shift  Outcome: Progressing   The patient's goals for the shift include remain safe    The clinical goals for the shift include remain comfortable

## 2024-04-27 NOTE — CARE PLAN
The patient's goals for the shift include remain safe    The clinical goals for the shift include Pt will remain hemodynamically stable    Over the shift, the patient did not make progress toward the following goals. Barriers to progression include . Recommendations to address these barriers include   Problem: Pain  Goal: My pain/discomfort is manageable  Outcome: Progressing     Problem: Safety  Goal: Patient will be injury free during hospitalization  Outcome: Progressing  Goal: I will remain free of falls  Outcome: Progressing     Problem: Skin  Goal: Prevent/manage excess moisture  Outcome: Progressing  Goal: Promote/optimize nutrition  Outcome: Progressing  Goal: Promote skin healing  Outcome: Progressing  Flowsheets (Taken 4/27/2024 1054)  Promote skin healing:   Assess skin/pad under line(s)/device(s)   Rotate device position/do not position patient on device   .

## 2024-04-27 NOTE — PROGRESS NOTES
Physical Therapy    Physical Therapy Treatment    Patient Name: Rose Marie Toussaint  MRN: [id number]  Today's Date: 4/27/2024  Time Calculation  Start Time: 1422  Stop Time: 1500  Time Calculation (min): 38 min       Assessment/Plan   PT Assessment  PT Assessment Results: Decreased endurance, Impaired balance, Decreased mobility, Decreased coordination, Impaired judgement, Decreased safety awareness, Pain  Rehab Prognosis: Fair  Barriers to Discharge: Patient c/o pain and fatigue.  Patient is talkative and difficulty to keep on task.  Evaluation/Treatment Tolerance: Patient tolerated treatment well  End of Session Patient Position: Bed, 3 rail up  PT Plan  Inpatient/Swing Bed or Outpatient: Inpatient  PT Plan  Treatment/Interventions: Bed mobility, Transfer training, Balance training, Neuromuscular re-education, Strengthening, Endurance training, Range of motion, Therapeutic exercise, Therapeutic activity, Home exercise program, Positioning  PT Plan: Skilled PT  PT Frequency: Daily  PT Discharge Recommendations: High intensity level of continued care  Equipment Recommended upon Discharge: Wheeled walker  PT Recommended Transfer Status: Assist x1  PT - OK to Discharge: Yes (Per POC)      General Visit Information:   PT  Visit  PT Received On: 04/27/24  Response to Previous Treatment: Patient with no complaints from previous session.  General  Reason for Referral: Chronic osetomylitis L ankle and foot  Referred By: Екатерина Purvis PA-C  Family/Caregiver Present: No  Prior to Session Communication: Bedside nurse  Patient Position Received: Bed, 3 rail up, Alarm on  Preferred Learning Style: verbal, visual, auditory  General Comment: Paient is alert and cooperative. Agreeable to therapy session.    Subjective   Precautions:  Precautions  LE Weight Bearing Status: Left Non-Weight Bearing  Medical Precautions: Fall precautions  Precautions Comment: pt compliant with NWB on LLE  Vital Signs:       Objective   Pain:  Pain  Assessment  Pain Assessment: 0-10  Pain Score: 8  Cognition:  Cognition  Overall Cognitive Status: Within Functional Limits  Orientation Level: Oriented X4  Following Commands: Follows multistep commands without difficulty  Postural Control:  Postural Control  Postural Control: Within Functional Limits  Static Sitting Balance  Static Sitting-Balance Support: Bilateral upper extremity supported, Feet supported  Static Sitting-Level of Assistance: Modified independent  Static Sitting-Comment/Number of Minutes: Patient with good sitting balance  Dynamic Sitting Balance  Dynamic Sitting-Balance Support: Feet supported  Dynamic Sitting-Balance: Trunk control activities, Lateral lean, Forward lean  Dynamic Sitting-Comments: Patient with good trunk control during reaching activities.  Static Standing Balance  Static Standing-Balance Support: Bilateral upper extremity supported  Static Standing-Level of Assistance: Contact guard  Static Standing-Comment/Number of Minutes: CGA for trunk stability.  No falls noted.  Dynamic Standing Balance  Dynamic Standing-Balance Support: Bilateral upper extremity supported  Dynamic Standing-Balance: Turning  Extremity/Trunk Assessments:    Activity Tolerance:  Activity Tolerance  Endurance: Tolerates 30 min exercise with multiple rests  Treatments:  Therapeutic Exercise  Therapeutic Exercise Performed: Yes  Therapeutic Exercise Activity 1: Patient is instructed in and performs supine and seated bilateral lower extremity exercises x 15reps x1.    Therapeutic Activity  Therapeutic Activity Performed: Yes    Balance/Neuromuscular Re-Education  Balance/Neuromuscular Re-Education Activity Performed: Yes    Bed Mobility  Bed Mobility: Yes  Bed Mobility 1  Bed Mobility 1: Supine to sitting  Level of Assistance 1: Close supervision  Bed Mobility Comments 1: Patient demonstrates good transfers with minimal cues needed  Bed Mobility 2  Bed Mobility  2: Scooting  Level of Assistance 2: Minimal  verbal cues    Ambulation/Gait Training  Ambulation/Gait Training Performed: No            Outcome Measures:  [facility] Basic Mobility  Turning from your back to your side while in a flat bed without using bedrails: None  Moving from lying on your back to sitting on the side of a flat bed without using bedrails: A little  Moving to and from bed to chair (including a wheelchair): A little  Standing up from a chair using your arms (e.g. wheelchair or bedside chair): A little  To walk in hospital room: A lot  Climbing 3-5 steps with railing: Total  Basic Mobility - Total Score: 16    Education Documentation  Precautions, taught by Bettina Magallon PTA at 4/27/2024  3:57 PM.  Learner: Patient  Readiness: Acceptance  Method: Explanation, Demonstration, Teach-back  Response: Verbalizes Understanding, Demonstrated Understanding    Body Mechanics, taught by Bettina Magallon PTA at 4/27/2024  3:57 PM.  Learner: Patient  Readiness: Acceptance  Method: Explanation, Demonstration, Teach-back  Response: Verbalizes Understanding, Demonstrated Understanding    Mobility Training, taught by Bettina Magallon PTA at 4/27/2024  3:57 PM.  Learner: Patient  Readiness: Acceptance  Method: Explanation, Demonstration, Teach-back  Response: Verbalizes Understanding, Demonstrated Understanding    Education Comments  No comments found.        OP EDUCATION:       Encounter Problems       Encounter Problems (Active)       Balance       STG - Maintains dynamic standing balance with upper extremity support At MOD I, safely, without LOB, device PRN  (Progressing)       Start:  04/23/24    Expected End:  05/08/24       INTERVENTIONS:  1. Practice standing with minimal support.  2. Educate patient about standing tolerance.  3. Educate patient about independence with gait, transfers, and ADL's.  4. Educate patient about use of assistive device.  5. Educate patient about self-directed care.            Mobility       STG - Patient will ambulate At At Supervision using  Wheeled walker for 50' while maintaining NWB on LLE without LOB or gross gait deviations  (Progressing)       Start:  04/23/24    Expected End:  05/08/24            STG - Patient will ascend and descend four to six stairs At Min Ax1 while using No device and Right HR no LOB  (Progressing)       Start:  04/23/24    Expected End:  05/08/24            Endurance - Pt to tolerate >/= 20 minutes therex, theract, gait and/or NMR with </= 5 minutes of rest breaks' (Progressing)       Start:  04/23/24    Expected End:  05/08/24               PT Transfers       STG - Patient will transfer sit to and from stand At Supervision, safely, without LOB, device PRN  (Progressing)       Start:  04/23/24    Expected End:  05/08/24               Pain - Adult          Safety       Pt will verbalize and apply safety awareness and precautions 100% of time throughout entire session   (Progressing)       Start:  04/23/24    Expected End:  05/08/24

## 2024-04-28 LAB
ALBUMIN SERPL BCP-MCNC: 3.3 G/DL (ref 3.4–5)
ALP SERPL-CCNC: 78 U/L (ref 33–136)
ALT SERPL W P-5'-P-CCNC: 3 U/L (ref 7–45)
ANION GAP SERPL CALC-SCNC: 15 MMOL/L (ref 10–20)
AST SERPL W P-5'-P-CCNC: 23 U/L (ref 9–39)
BILIRUB SERPL-MCNC: 0.3 MG/DL (ref 0–1.2)
BUN SERPL-MCNC: 11 MG/DL (ref 6–23)
CALCIUM SERPL-MCNC: 9.3 MG/DL (ref 8.6–10.3)
CHLORIDE SERPL-SCNC: 98 MMOL/L (ref 98–107)
CO2 SERPL-SCNC: 24 MMOL/L (ref 21–32)
CREAT SERPL-MCNC: 1.03 MG/DL (ref 0.5–1.05)
EGFRCR SERPLBLD CKD-EPI 2021: 60 ML/MIN/1.73M*2
ERYTHROCYTE [DISTWIDTH] IN BLOOD BY AUTOMATED COUNT: 13.2 % (ref 11.5–14.5)
GLUCOSE SERPL-MCNC: 93 MG/DL (ref 74–99)
HCT VFR BLD AUTO: 33.1 % (ref 36–46)
HGB BLD-MCNC: 10.9 G/DL (ref 12–16)
MAGNESIUM SERPL-MCNC: 1.5 MG/DL (ref 1.6–2.4)
MCH RBC QN AUTO: 31.5 PG (ref 26–34)
MCHC RBC AUTO-ENTMCNC: 32.9 G/DL (ref 32–36)
MCV RBC AUTO: 96 FL (ref 80–100)
NRBC BLD-RTO: 0 /100 WBCS (ref 0–0)
PLATELET # BLD AUTO: 501 X10*3/UL (ref 150–450)
POTASSIUM SERPL-SCNC: 3.7 MMOL/L (ref 3.5–5.3)
PROT SERPL-MCNC: 6.3 G/DL (ref 6.4–8.2)
RBC # BLD AUTO: 3.46 X10*6/UL (ref 4–5.2)
SODIUM SERPL-SCNC: 133 MMOL/L (ref 136–145)
WBC # BLD AUTO: 10.6 X10*3/UL (ref 4.4–11.3)

## 2024-04-28 PROCEDURE — 2500000004 HC RX 250 GENERAL PHARMACY W/ HCPCS (ALT 636 FOR OP/ED): Performed by: STUDENT IN AN ORGANIZED HEALTH CARE EDUCATION/TRAINING PROGRAM

## 2024-04-28 PROCEDURE — 2500000001 HC RX 250 WO HCPCS SELF ADMINISTERED DRUGS (ALT 637 FOR MEDICARE OP): Performed by: STUDENT IN AN ORGANIZED HEALTH CARE EDUCATION/TRAINING PROGRAM

## 2024-04-28 PROCEDURE — 2500000002 HC RX 250 W HCPCS SELF ADMINISTERED DRUGS (ALT 637 FOR MEDICARE OP, ALT 636 FOR OP/ED): Performed by: STUDENT IN AN ORGANIZED HEALTH CARE EDUCATION/TRAINING PROGRAM

## 2024-04-28 PROCEDURE — 36415 COLL VENOUS BLD VENIPUNCTURE: CPT

## 2024-04-28 PROCEDURE — 2500000001 HC RX 250 WO HCPCS SELF ADMINISTERED DRUGS (ALT 637 FOR MEDICARE OP): Performed by: INTERNAL MEDICINE

## 2024-04-28 PROCEDURE — 80053 COMPREHEN METABOLIC PANEL: CPT

## 2024-04-28 PROCEDURE — S4991 NICOTINE PATCH NONLEGEND: HCPCS | Performed by: STUDENT IN AN ORGANIZED HEALTH CARE EDUCATION/TRAINING PROGRAM

## 2024-04-28 PROCEDURE — 85027 COMPLETE CBC AUTOMATED: CPT

## 2024-04-28 PROCEDURE — 2500000004 HC RX 250 GENERAL PHARMACY W/ HCPCS (ALT 636 FOR OP/ED): Mod: JZ | Performed by: INTERNAL MEDICINE

## 2024-04-28 PROCEDURE — 2500000006 HC RX 250 W HCPCS SELF ADMINISTERED DRUGS (ALT 637 FOR ALL PAYERS): Mod: MUE | Performed by: STUDENT IN AN ORGANIZED HEALTH CARE EDUCATION/TRAINING PROGRAM

## 2024-04-28 PROCEDURE — 83735 ASSAY OF MAGNESIUM: CPT

## 2024-04-28 PROCEDURE — 1100000001 HC PRIVATE ROOM DAILY

## 2024-04-28 PROCEDURE — 2500000001 HC RX 250 WO HCPCS SELF ADMINISTERED DRUGS (ALT 637 FOR MEDICARE OP): Performed by: NURSE PRACTITIONER

## 2024-04-28 RX ORDER — CLOPIDOGREL BISULFATE 75 MG/1
75 TABLET ORAL DAILY
Qty: 30 TABLET | Refills: 11 | Status: SHIPPED | OUTPATIENT
Start: 2024-04-28 | End: 2025-04-26

## 2024-04-28 RX ORDER — QUETIAPINE FUMARATE 25 MG/1
25 TABLET, FILM COATED ORAL NIGHTLY PRN
Start: 2024-04-28

## 2024-04-28 RX ORDER — DOCUSATE SODIUM 100 MG/1
100 CAPSULE, LIQUID FILLED ORAL 2 TIMES DAILY
Start: 2024-04-28

## 2024-04-28 RX ORDER — MULTIVIT-MIN/IRON FUM/FOLIC AC 7.5 MG-4
1 TABLET ORAL DAILY
Start: 2024-04-28

## 2024-04-28 RX ORDER — POLYETHYLENE GLYCOL 3350 17 G/17G
17 POWDER, FOR SOLUTION ORAL DAILY
Start: 2024-04-28

## 2024-04-28 RX ORDER — FOLIC ACID 1 MG/1
1 TABLET ORAL DAILY
Start: 2024-04-28

## 2024-04-28 RX ORDER — ACETAMINOPHEN 500 MG
5 TABLET ORAL NIGHTLY PRN
Start: 2024-04-28

## 2024-04-28 RX ORDER — POTASSIUM CHLORIDE 750 MG/1
20 TABLET, FILM COATED, EXTENDED RELEASE ORAL DAILY
Start: 2024-04-28

## 2024-04-28 RX ADMIN — DILTIAZEM HYDROCHLORIDE 240 MG: 120 CAPSULE, EXTENDED RELEASE ORAL at 09:35

## 2024-04-28 RX ADMIN — OXYCODONE HYDROCHLORIDE 10 MG: 5 TABLET ORAL at 09:38

## 2024-04-28 RX ADMIN — HYDROMORPHONE HYDROCHLORIDE 0.2 MG: 0.2 INJECTION, SOLUTION INTRAMUSCULAR; INTRAVENOUS; SUBCUTANEOUS at 14:08

## 2024-04-28 RX ADMIN — OXYCODONE HYDROCHLORIDE 10 MG: 5 TABLET ORAL at 17:35

## 2024-04-28 RX ADMIN — SERTRALINE HYDROCHLORIDE 50 MG: 50 TABLET ORAL at 09:35

## 2024-04-28 RX ADMIN — CEFAZOLIN SODIUM 2 G: 2 INJECTION, SOLUTION INTRAVENOUS at 02:26

## 2024-04-28 RX ADMIN — LACTULOSE 20 G: 20 SOLUTION ORAL at 09:35

## 2024-04-28 RX ADMIN — FOLIC ACID 1 MG: 1 TABLET ORAL at 09:34

## 2024-04-28 RX ADMIN — CEFAZOLIN SODIUM 2 G: 2 INJECTION, SOLUTION INTRAVENOUS at 09:33

## 2024-04-28 RX ADMIN — NICOTINE 1 PATCH: 14 PATCH, EXTENDED RELEASE TRANSDERMAL at 09:35

## 2024-04-28 RX ADMIN — BUPROPION HYDROCHLORIDE 100 MG: 100 TABLET, FILM COATED ORAL at 09:35

## 2024-04-28 RX ADMIN — CHLORTHALIDONE 25 MG: 25 TABLET ORAL at 09:35

## 2024-04-28 RX ADMIN — ENOXAPARIN SODIUM 40 MG: 40 INJECTION SUBCUTANEOUS at 06:24

## 2024-04-28 RX ADMIN — ASPIRIN 81 MG 81 MG: 81 TABLET ORAL at 09:34

## 2024-04-28 RX ADMIN — CLOPIDOGREL 75 MG: 75 TABLET ORAL at 09:34

## 2024-04-28 RX ADMIN — ATENOLOL 25 MG: 25 TABLET ORAL at 09:35

## 2024-04-28 RX ADMIN — CEFAZOLIN SODIUM 2 G: 2 INJECTION, SOLUTION INTRAVENOUS at 17:29

## 2024-04-28 RX ADMIN — Medication 1 TABLET: at 09:34

## 2024-04-28 RX ADMIN — PRAVASTATIN SODIUM 20 MG: 20 TABLET ORAL at 09:34

## 2024-04-28 ASSESSMENT — COGNITIVE AND FUNCTIONAL STATUS - GENERAL
DAILY ACTIVITIY SCORE: 18
WALKING IN HOSPITAL ROOM: A LOT
TURNING FROM BACK TO SIDE WHILE IN FLAT BAD: A LITTLE
STANDING UP FROM CHAIR USING ARMS: A LITTLE
MOVING TO AND FROM BED TO CHAIR: A LITTLE
TOILETING: A LOT
MOBILITY SCORE: 16
DRESSING REGULAR UPPER BODY CLOTHING: A LITTLE
CLIMB 3 TO 5 STEPS WITH RAILING: TOTAL
HELP NEEDED FOR BATHING: A LOT
DRESSING REGULAR LOWER BODY CLOTHING: A LITTLE

## 2024-04-28 ASSESSMENT — PAIN - FUNCTIONAL ASSESSMENT
PAIN_FUNCTIONAL_ASSESSMENT: 0-10

## 2024-04-28 ASSESSMENT — PAIN SCALES - GENERAL
PAINLEVEL_OUTOF10: 9
PAINLEVEL_OUTOF10: 5 - MODERATE PAIN
PAINLEVEL_OUTOF10: 9
PAINLEVEL_OUTOF10: 8

## 2024-04-28 NOTE — PROGRESS NOTES
Occupational Therapy                 Therapy Communication Note    Patient Name: Rose Marie Toussaint  MRN: [id number]  Today's Date: 4/28/2024     Discipline: Occupational Therapy    Missed Visit Reason: Missed Visit Reason:  (Pt to d/c to SNF at 1400)    Missed Time: Attempt    Comment:

## 2024-04-28 NOTE — PROGRESS NOTES
Care Coordinator Note:    Plan: Patient ORIF L ankle. Med ready for dc to [facility]. Has auth through 4/30. Waiting on facility to confirm patient can come today then will set up transport.      Status: inpatient  Payor:  Disposition: [facility]  Barrier: SNF acceptance today  ADOD: today    1405- TCC spoke with Rose Marie at [facility] - she spoke with admissions who stated they have not been able to confirm auth so patient can not come until Monday once they are able to confirm.   MD NP and SEC all notified that patient will NOT dc this weekend.     Mignon Pfeiffer [facility]      04/28/24 1204   Discharge Planning   Patient expects to be discharged to: [facility]- has auth

## 2024-04-28 NOTE — PROGRESS NOTES
[facility]          Admitting Provider: Octavio Jeong MD Admission Date: 4/16/2024.   Attending Provider: Octavio Jeong MD MRN: [id number]       Subjective   Rose Marie Toussaint is a 68 y.o. female on day 12 of admission presenting with Chronic osteomyelitis involving ankle and foot, left (Multi).  Interval History no complaints.     Objective   Physical Exam  Last Recorded Vitals: Blood pressure 147/89, pulse 83, temperature 36.7 °C (98 °F), temperature source Temporal, resp. rate 18, height 1.524 m (5'), weight 56.7 kg (125 lb), SpO2 96%.  Patient Vitals for the past 24 hrs:   BP Temp Temp src Pulse Resp SpO2   04/28/24 0754 147/89 36.7 °C (98 °F) Temporal 83 18 96 %   04/28/24 0346 93/56 36.8 °C (98.2 °F) Oral 73 18 97 %   04/27/24 2342 133/75 36.8 °C (98.2 °F) Oral 65 18 97 %   04/27/24 1954 119/79 36.9 °C (98.4 °F) Oral 71 18 97 %   04/27/24 1542 135/66 36.7 °C (98 °F) -- 100 18 98 %     Body mass index is 24.41 kg/m².  GENERAL: alert, cooperative, or no distress  SKIN: no rashes  NECK: supple, no thyromegaly, JVP within normal limits  LUNGS:  not in respiratory distress, respiratory rate normal, clear to auscultation  CARDIAC: regular rate and rhythm, normal S1 and S2, no murmur, rub, or gallop  ABDOMEN: Soft, non-tender, normal bowel sounds; no bruits, organomegaly or masses.  EXTREMITIES: No edema, left, BKA  NEURO: Alert and oriented x 3,  reflexes normal and symmetric, strength and  sensation grossly normal  Intake/Output last 3 Shifts:  I/O last 3 completed shifts:  In: - (0 mL/kg)   Out: 300 (5.3 mL/kg) [Urine:300 (0.1 mL/kg/hr)]  Weight: 56.7 kg   DATA:   Diagnostic tests reviewed for today's visit:    Most recent Labs  Results for orders placed or performed during the hospital encounter of 04/16/24 (from the past 24 hour(s))   Basic metabolic panel   Result Value Ref Range    Glucose 109 (H) 74 - 99 mg/dL    Sodium 130 (L) 136 - 145 mmol/L    Potassium 3.2 (L) 3.5 - 5.3  "mmol/L    Chloride 93 (L) 98 - 107 mmol/L    Bicarbonate 25 21 - 32 mmol/L    Anion Gap 15 10 - 20 mmol/L    Urea Nitrogen 11 6 - 23 mg/dL    Creatinine 1.09 (H) 0.50 - 1.05 mg/dL    eGFR 56 (L) >60 mL/min/1.73m*2    Calcium 9.1 8.6 - 10.3 mg/dL   Lavender Top   Result Value Ref Range    Extra Tube Hold for add-ons.    CBC   Result Value Ref Range    WBC 10.6 4.4 - 11.3 x10*3/uL    nRBC 0.0 0.0 - 0.0 /100 WBCs    RBC 3.46 (L) 4.00 - 5.20 x10*6/uL    Hemoglobin 10.9 (L) 12.0 - 16.0 g/dL    Hematocrit 33.1 (L) 36.0 - 46.0 %    MCV 96 80 - 100 fL    MCH 31.5 26.0 - 34.0 pg    MCHC 32.9 32.0 - 36.0 g/dL    RDW 13.2 11.5 - 14.5 %    Platelets 501 (H) 150 - 450 x10*3/uL   Comprehensive metabolic panel   Result Value Ref Range    Glucose 93 74 - 99 mg/dL    Sodium 133 (L) 136 - 145 mmol/L    Potassium 3.7 3.5 - 5.3 mmol/L    Chloride 98 98 - 107 mmol/L    Bicarbonate 24 21 - 32 mmol/L    Anion Gap 15 10 - 20 mmol/L    Urea Nitrogen 11 6 - 23 mg/dL    Creatinine 1.03 0.50 - 1.05 mg/dL    eGFR 60 (L) >60 mL/min/1.73m*2    Calcium 9.3 8.6 - 10.3 mg/dL    Albumin 3.3 (L) 3.4 - 5.0 g/dL    Alkaline Phosphatase 78 33 - 136 U/L    Total Protein 6.3 (L) 6.4 - 8.2 g/dL    AST 23 9 - 39 U/L    Bilirubin, Total 0.3 0.0 - 1.2 mg/dL    ALT 3 (L) 7 - 45 U/L   Magnesium   Result Value Ref Range    Magnesium 1.50 (L) 1.60 - 2.40 mg/dL     Urine Culture   Date Value Ref Range Status   05/24/2023 NO GROWTH  Final     Blood Culture   Date Value Ref Range Status   05/23/2023   Final    No Growth at 1 days~No Growth at 2 days~No Growth at 3 days~NO GROWTH at 4 days - FINAL REPORT    No results found for: \"RESPCULTSM\" No results found for: \"PERDIAFLDCUL\" No results found for: \"STERFLDCULSM\"   No results found.  Current Facility-Administered Medications   Medication Dose Route Frequency Provider Last Rate Last Admin    acetaminophen (Tylenol) tablet 650 mg  650 mg oral q4h PRN Екатерина Purvis PA-C   650 mg at 04/27/24 1828    albuterol 2.5 mg /3 " mL (0.083 %) nebulizer solution 2.5 mg  2.5 mg nebulization q2h PRN Екатерина Purvis, PA-C        aspirin chewable tablet 81 mg  81 mg oral Daily SARAHI Cordoba   81 mg at 04/28/24 0934    atenolol (Tenormin) tablet 25 mg  25 mg oral Daily Екатерина Purvis, PA-C   25 mg at 04/28/24 0935    buPROPion (Wellbutrin) tablet 100 mg  100 mg oral Daily with breakfast Екатерина Purvis, PA-C   100 mg at 04/28/24 0935    ceFAZolin in dextrose (iso-os) (Ancef) IVPB 2 g  2 g intravenous q8h Jaycob Krishna MD 0 mL/hr at 04/28/24 0256 2 g at 04/28/24 0933    chlorthalidone (Hygroton) tablet 25 mg  25 mg oral Daily Octavio Jeong MD   25 mg at 04/28/24 0935    clopidogrel (Plavix) tablet 75 mg  75 mg oral Daily SARAHI Cordoba   75 mg at 04/28/24 0934    dilTIAZem CD (Cardizem CD) 24 hr capsule 240 mg  240 mg oral Daily Екатерина Purvis, PA-C   240 mg at 04/28/24 0935    docusate sodium (Colace) capsule 100 mg  100 mg oral BID Екатерина Purvis, PA-C   100 mg at 04/25/24 2149    enoxaparin (Lovenox) syringe 40 mg  40 mg subcutaneous q24h Екатерина Purvis, PA-C   40 mg at 04/28/24 0624    folic acid (Folvite) tablet 1 mg  1 mg oral Daily Екатерина Purvis, PA-C   1 mg at 04/28/24 0934    HYDROmorphone PF (Dilaudid) injection 0.2 mg  0.2 mg intravenous q3h PRN Екатерина Purvis, PA-C   0.2 mg at 04/27/24 2020    lactulose 20 gram/30 mL oral solution 20 g  20 g oral Daily Екатерина Purvis, PA-C   20 g at 04/28/24 0935    melatonin tablet 4.5 mg  4.5 mg oral Nightly PRN Екатерина Purvis, PA-C   4.5 mg at 04/23/24 2259    multivitamin with minerals 1 tablet  1 tablet oral Daily Екатерина Purvis PA-C   1 tablet at 04/28/24 0934    nicotine (Nicoderm CQ) 14 mg/24 hr patch 1 patch  1 patch transdermal Daily Екатерина Purvis PA-C   1 patch at 04/28/24 0935    ondansetron (Zofran) injection 4 mg  4 mg intravenous q8h PRN Екатерина Purvis PA-C        oxyCODONE (Roxicodone) immediate release tablet 10 mg  10 mg oral q6h PRN Екатерина MCDANIEL  TAMMY Purvis   10 mg at 04/28/24 0938    oxyCODONE (Roxicodone) immediate release tablet 5 mg  5 mg oral q6h PRN Екатерина Purvis PA-C   5 mg at 04/27/24 1411    oxygen (O2) therapy   inhalation Continuous - 02/gases Misti Alex, APRN-CNP   2 L/min at 04/25/24 2000    polyethylene glycol (Glycolax, Miralax) packet 17 g  17 g oral Daily Екатерина Purvis PA-C   17 g at 04/23/24 0957    pravastatin (Pravachol) tablet 20 mg  20 mg oral Daily Екатерина Purvis PA-C   20 mg at 04/28/24 0934    QUEtiapine (SEROquel) tablet 25 mg  25 mg oral Nightly PRN Екатерина Purvis PA-C   25 mg at 04/24/24 2115    sertraline (Zoloft) tablet 50 mg  50 mg oral Daily SHWETA Sherman-C   50 mg at 04/28/24 0935     Current Outpatient Medications   Medication Sig Dispense Refill    acetaminophen (Tylenol) 325 mg tablet Take 2 tablets (650 mg) by mouth every 6 hours if needed for mild pain (1 - 3).      aspirin 81 mg chewable tablet Chew 1 tablet (81 mg) 2 times a day. 60 tablet     clopidogrel (Plavix) 75 mg tablet Take 1 tablet (75 mg) by mouth once daily for 363 doses. 30 tablet 11    docusate sodium (Colace) 100 mg capsule Take 1 capsule (100 mg) by mouth 2 times a day for 14 days. 28 capsule     docusate sodium (Colace) 100 mg capsule Take 1 capsule (100 mg) by mouth 2 times a day.      folic acid (Folvite) 1 mg tablet Take 1 tablet (1 mg) by mouth once daily.      melatonin 5 mg tablet Take 1 tablet (5 mg) by mouth as needed at bedtime for sleep.      multivitamin with minerals tablet Take 1 tablet by mouth once daily.      oxyCODONE (Roxicodone) 5 mg immediate release tablet Take 1 tablet (5 mg) by mouth every 6 hours if needed for severe pain (7 - 10). 15 tablet 0    polyethylene glycol (Glycolax, Miralax) 17 gram packet Take 17 g by mouth once daily.      potassium chloride CR 10 mEq ER tablet Take 2 tablets (20 mEq) by mouth once daily. Do not crush, chew, or split.      QUEtiapine (SEROquel) 25 mg tablet Take 1 tablet (25 mg)  by mouth as needed at bedtime (insomnia).     ..     Medication and Non-Pharmacologic VTE Prophylaxis/Anticoagulants      Last Anticoag Admin            enoxaparin (Lovenox) syringe 40 mg    Given 40 mg at 0624    Frequency: Every 24 hours         There are additional administrations since 04/25/24 0940 that are not shown.    No unadministered anticoagulant orders found.          Assessment/Plan   Rose Marie Toussaint has  Principal Problem:    Chronic osteomyelitis involving ankle and foot, left (Multi)  Active Problems:    Septic joint (Multi)    Alcohol withdrawal delirium (Multi)    Adrenal cortical nodule (Multi)    Critical limb ischemia of right lower extremity (Multi)    Critical limb ischemia of left lower extremity (Multi)     S/P BKA. Patient needs skilled rehabilitation           improved.   Patient aware. Needs rpt CT in 4 months  Angiogram with IVUS-guide stenting of left external iliac artery using 8.0 x 80 mm self-expanding stent. xternal iliac artery.   Other Hospital problems        Abnormal findings not addressed during hospitalization, but require out patient follow up. Adrenal nodule       I spent 40 minutes coordinating discharge of Rose Marie Toussaint, Including reconciling medications, reviewing the labs & formulating discharge plan. discussing with NP and nursing staff.  Octavio Jeong MD

## 2024-04-28 NOTE — PROGRESS NOTES
Physical Therapy                 Therapy Communication Note    Patient Name: Rose Marie Toussaint  MRN: [id number]  Today's Date: 4/28/2024     Discipline: Physical Therapy    Missed Visit Reason: Missed Visit Reason: Other (Comment) (Per RN, pt discharging for SNF shortly, no acute PT needs at this time.)    Missed Time: Attempt

## 2024-04-29 ENCOUNTER — APPOINTMENT (OUTPATIENT)
Dept: VASCULAR MEDICINE | Facility: HOSPITAL | Age: 69
DRG: 475 | End: 2024-04-29
Payer: COMMERCIAL

## 2024-04-29 VITALS
HEART RATE: 86 BPM | BODY MASS INDEX: 24.54 KG/M2 | DIASTOLIC BLOOD PRESSURE: 81 MMHG | HEIGHT: 60 IN | OXYGEN SATURATION: 97 % | TEMPERATURE: 98.7 F | SYSTOLIC BLOOD PRESSURE: 125 MMHG | RESPIRATION RATE: 18 BRPM | WEIGHT: 125 LBS

## 2024-04-29 LAB
ACT BLD: 284 SEC (ref 89–169)
ACT BLD: 341 SEC (ref 89–169)
ALBUMIN SERPL BCP-MCNC: 3.4 G/DL (ref 3.4–5)
ALP SERPL-CCNC: 82 U/L (ref 33–136)
ALT SERPL W P-5'-P-CCNC: 4 U/L (ref 7–45)
ANION GAP SERPL CALC-SCNC: 15 MMOL/L (ref 10–20)
AST SERPL W P-5'-P-CCNC: 23 U/L (ref 9–39)
BILIRUB SERPL-MCNC: 0.3 MG/DL (ref 0–1.2)
BUN SERPL-MCNC: 12 MG/DL (ref 6–23)
CALCIUM SERPL-MCNC: 9.2 MG/DL (ref 8.6–10.3)
CHLORIDE SERPL-SCNC: 96 MMOL/L (ref 98–107)
CO2 SERPL-SCNC: 26 MMOL/L (ref 21–32)
CREAT SERPL-MCNC: 1.01 MG/DL (ref 0.5–1.05)
EGFRCR SERPLBLD CKD-EPI 2021: 62 ML/MIN/1.73M*2
ERYTHROCYTE [DISTWIDTH] IN BLOOD BY AUTOMATED COUNT: 13.1 % (ref 11.5–14.5)
GLUCOSE SERPL-MCNC: 101 MG/DL (ref 74–99)
HCT VFR BLD AUTO: 31.6 % (ref 36–46)
HGB BLD-MCNC: 10.6 G/DL (ref 12–16)
MAGNESIUM SERPL-MCNC: 1.4 MG/DL (ref 1.6–2.4)
MCH RBC QN AUTO: 31.4 PG (ref 26–34)
MCHC RBC AUTO-ENTMCNC: 33.5 G/DL (ref 32–36)
MCV RBC AUTO: 94 FL (ref 80–100)
NRBC BLD-RTO: 0 /100 WBCS (ref 0–0)
PLATELET # BLD AUTO: 463 X10*3/UL (ref 150–450)
POTASSIUM SERPL-SCNC: 3.6 MMOL/L (ref 3.5–5.3)
PROT SERPL-MCNC: 6.8 G/DL (ref 6.4–8.2)
RBC # BLD AUTO: 3.38 X10*6/UL (ref 4–5.2)
SODIUM SERPL-SCNC: 133 MMOL/L (ref 136–145)
WBC # BLD AUTO: 11.2 X10*3/UL (ref 4.4–11.3)

## 2024-04-29 PROCEDURE — 93923 UPR/LXTR ART STDY 3+ LVLS: CPT | Mod: REDUCED SERVICES | Performed by: SURGERY

## 2024-04-29 PROCEDURE — 36415 COLL VENOUS BLD VENIPUNCTURE: CPT

## 2024-04-29 PROCEDURE — 97530 THERAPEUTIC ACTIVITIES: CPT | Mod: GO

## 2024-04-29 PROCEDURE — 85027 COMPLETE CBC AUTOMATED: CPT

## 2024-04-29 PROCEDURE — 97110 THERAPEUTIC EXERCISES: CPT | Mod: GP,CQ

## 2024-04-29 PROCEDURE — 97535 SELF CARE MNGMENT TRAINING: CPT | Mod: GO

## 2024-04-29 PROCEDURE — 2500000001 HC RX 250 WO HCPCS SELF ADMINISTERED DRUGS (ALT 637 FOR MEDICARE OP): Performed by: STUDENT IN AN ORGANIZED HEALTH CARE EDUCATION/TRAINING PROGRAM

## 2024-04-29 PROCEDURE — 93923 UPR/LXTR ART STDY 3+ LVLS: CPT | Mod: 52

## 2024-04-29 PROCEDURE — 2500000002 HC RX 250 W HCPCS SELF ADMINISTERED DRUGS (ALT 637 FOR MEDICARE OP, ALT 636 FOR OP/ED): Performed by: STUDENT IN AN ORGANIZED HEALTH CARE EDUCATION/TRAINING PROGRAM

## 2024-04-29 PROCEDURE — 2500000001 HC RX 250 WO HCPCS SELF ADMINISTERED DRUGS (ALT 637 FOR MEDICARE OP): Performed by: INTERNAL MEDICINE

## 2024-04-29 PROCEDURE — 2500000004 HC RX 250 GENERAL PHARMACY W/ HCPCS (ALT 636 FOR OP/ED): Mod: JZ | Performed by: INTERNAL MEDICINE

## 2024-04-29 PROCEDURE — 83735 ASSAY OF MAGNESIUM: CPT

## 2024-04-29 PROCEDURE — 80053 COMPREHEN METABOLIC PANEL: CPT

## 2024-04-29 PROCEDURE — S4991 NICOTINE PATCH NONLEGEND: HCPCS | Performed by: STUDENT IN AN ORGANIZED HEALTH CARE EDUCATION/TRAINING PROGRAM

## 2024-04-29 PROCEDURE — 2500000006 HC RX 250 W HCPCS SELF ADMINISTERED DRUGS (ALT 637 FOR ALL PAYERS): Performed by: STUDENT IN AN ORGANIZED HEALTH CARE EDUCATION/TRAINING PROGRAM

## 2024-04-29 PROCEDURE — 2500000001 HC RX 250 WO HCPCS SELF ADMINISTERED DRUGS (ALT 637 FOR MEDICARE OP): Performed by: NURSE PRACTITIONER

## 2024-04-29 RX ADMIN — DOCUSATE SODIUM 100 MG: 100 CAPSULE, LIQUID FILLED ORAL at 08:37

## 2024-04-29 RX ADMIN — OXYCODONE HYDROCHLORIDE 10 MG: 5 TABLET ORAL at 00:28

## 2024-04-29 RX ADMIN — LACTULOSE 20 G: 20 SOLUTION ORAL at 08:36

## 2024-04-29 RX ADMIN — CHLORTHALIDONE 25 MG: 25 TABLET ORAL at 08:37

## 2024-04-29 RX ADMIN — Medication 1 TABLET: at 08:37

## 2024-04-29 RX ADMIN — CEFAZOLIN SODIUM 2 G: 2 INJECTION, SOLUTION INTRAVENOUS at 02:26

## 2024-04-29 RX ADMIN — ASPIRIN 81 MG 81 MG: 81 TABLET ORAL at 08:37

## 2024-04-29 RX ADMIN — CLOPIDOGREL 75 MG: 75 TABLET ORAL at 08:37

## 2024-04-29 RX ADMIN — DILTIAZEM HYDROCHLORIDE 240 MG: 120 CAPSULE, EXTENDED RELEASE ORAL at 08:40

## 2024-04-29 RX ADMIN — ATENOLOL 25 MG: 25 TABLET ORAL at 08:37

## 2024-04-29 RX ADMIN — PRAVASTATIN SODIUM 20 MG: 20 TABLET ORAL at 08:37

## 2024-04-29 RX ADMIN — FOLIC ACID 1 MG: 1 TABLET ORAL at 08:37

## 2024-04-29 RX ADMIN — OXYCODONE HYDROCHLORIDE 10 MG: 5 TABLET ORAL at 08:37

## 2024-04-29 RX ADMIN — NICOTINE 1 PATCH: 14 PATCH, EXTENDED RELEASE TRANSDERMAL at 08:36

## 2024-04-29 RX ADMIN — SERTRALINE HYDROCHLORIDE 50 MG: 50 TABLET ORAL at 08:37

## 2024-04-29 ASSESSMENT — COGNITIVE AND FUNCTIONAL STATUS - GENERAL
MOVING TO AND FROM BED TO CHAIR: A LITTLE
TURNING FROM BACK TO SIDE WHILE IN FLAT BAD: A LITTLE
DRESSING REGULAR UPPER BODY CLOTHING: A LITTLE
EATING MEALS: A LITTLE
MOVING TO AND FROM BED TO CHAIR: A LITTLE
DRESSING REGULAR LOWER BODY CLOTHING: A LITTLE
MOBILITY SCORE: 15
TOILETING: A LOT
PERSONAL GROOMING: A LITTLE
DRESSING REGULAR UPPER BODY CLOTHING: A LITTLE
STANDING UP FROM CHAIR USING ARMS: A LITTLE
CLIMB 3 TO 5 STEPS WITH RAILING: TOTAL
HELP NEEDED FOR BATHING: A LOT
CLIMB 3 TO 5 STEPS WITH RAILING: TOTAL
DAILY ACTIVITIY SCORE: 18
WALKING IN HOSPITAL ROOM: A LOT
HELP NEEDED FOR BATHING: A LITTLE
WALKING IN HOSPITAL ROOM: A LOT
MOVING FROM LYING ON BACK TO SITTING ON SIDE OF FLAT BED WITH BEDRAILS: A LITTLE
TURNING FROM BACK TO SIDE WHILE IN FLAT BAD: A LITTLE
DAILY ACTIVITIY SCORE: 18
DRESSING REGULAR LOWER BODY CLOTHING: A LITTLE
STANDING UP FROM CHAIR USING ARMS: A LITTLE
TOILETING: A LITTLE
MOBILITY SCORE: 16

## 2024-04-29 ASSESSMENT — PAIN SCALES - GENERAL
PAINLEVEL_OUTOF10: 6
PAINLEVEL_OUTOF10: 9
PAINLEVEL_OUTOF10: 9
PAINLEVEL_OUTOF10: 6
PAINLEVEL_OUTOF10: 9

## 2024-04-29 ASSESSMENT — PAIN - FUNCTIONAL ASSESSMENT
PAIN_FUNCTIONAL_ASSESSMENT: 0-10

## 2024-04-29 ASSESSMENT — ACTIVITIES OF DAILY LIVING (ADL): HOME_MANAGEMENT_TIME_ENTRY: 23

## 2024-04-29 NOTE — PROGRESS NOTES
Occupational Therapy    Occupational Therapy Treatment    Name: Rose Marie Toussaint  MRN: [id number]  : 1955  Date: 24  Time Calculation  Start Time: 849  Stop Time: 923  Time Calculation (min): 34 min    Assessment:  Prognosis: Fair  Barriers to Discharge: None  Evaluation/Treatment Tolerance: Patient tolerated treatment well  Medical Staff Made Aware: Yes  End of Session Communication: Bedside nurse (RN notified)  End of Session Patient Position: Bed, 3 rail up, Alarm on  Plan:  Treatment Interventions:  (Student participated in performance of tx and documentation under the direct supervision of Jez CASTELLON SOTA.)  OT Frequency: 3 times per week  OT Discharge Recommendations: Moderate intensity level of continued care  Equipment Recommended upon Discharge: Wheeled walker  OT Recommended Transfer Status: Maximum assist, Assist of 2  OT - OK to Discharge: Yes (Per POC)    Subjective   Previous Visit Info:  OT Last Visit  OT Received On: 24  General:  General  Reason for Referral: Chronic osetomylitis L ankle and foot  Referred By: Екатерина Purvis PA-C  Prior to Session Communication: Bedside nurse  Patient Position Received: Bed, 3 rail up, Alarm off, not on at start of session  Preferred Learning Style: auditory, verbal  General Comment: Upon arrival pt supine in bed with R LE off bed increased time needed d/t re positioning pt and stump wrapping loose RN called to adjust wrap RN notified ortho. Pt required increased time for all task easily distracted Max VC for redirection this day  Precautions:  Hearing/Visual Limitations: Glasses  LE Weight Bearing Status: Left Non-Weight Bearing  Medical Precautions: Fall precautions    Pain Assessment:  Pain Assessment  Pain Assessment: 0-10  Pain Score: 9  Pain Type: Acute pain  Pain Location: Leg  Pain Orientation: Left     Objective   Cognition:  Overall Cognitive Status: Impaired  Orientation Level: Oriented X4  Following Commands: Follows one step  commands with repetition  Safety Judgment: Decreased awareness of need for assistance  Problem Solving: Assistance required to generate solutions  Alternating Attention: Impaired  Divided Attention: Impaired  Impulsive: Moderately  Activities of Daily Living: Feeding  Feeding Level of Assistance: Minimum assistance  Feeding Where Assessed: Bed level  Feeding Comments: Pt able to bring cup towards mouth no VC needed. Assist for pt to reach cup    Grooming  Grooming Level of Assistance: Close supervision, Maximum verbal cues  Grooming Where Assessed: Bed level  Grooming Comments: Close S for face washing, Pt required Mod A for hair combing d/t knotting and not being able to sequence steps by step of task (VC for redirection, initiation and step by step of task)    UE Bathing  UE Bathing Level of Assistance: Close supervision, Moderate verbal cues  UE Bathing Where Assessed: Bed level  UE Bathing Comments: Pt completed UB bathing task. VC for intiation and completion of task    UE Dressing  UE Dressing Level of Assistance: Minimum assistance, Moderate verbal cues  UE Dressing Where Assessed: Bed level  UE Dressing Comments: Pt completed donning/doffing gown assistance required to don gown over shoulders (VC for intiation)    LE Dressing  LE Dressing: Yes  Sock Level of Assistance: Close supervision, Maximum verbal cues  LE Dressing Where Assessed: Bed level  LE Dressing Comments: Pt used figure 4 technique to don sock on R LE. increased time d/t pt easily distractable      Bed Mobility/Transfers: Bed Mobility  Bed Mobility: Yes  Bed Mobility 1  Bed Mobility 1: Supine to sitting, Sitting to supine  Level of Assistance 1: Minimum assistance, Moderate verbal cues  Bed Mobility Comments 1: HOB elevated. Assist for L LE advancement (VC for sequencing)  Bed Mobility 2  Bed Mobility  2: Scooting  Level of Assistance 2: Minimum assistance, Minimal tactile cues, Moderate verbal cues  Bed Mobility Comments 2: to scoot towards  HOB. Increased time needed d/t pt easily forgetful and needed repetition to complete task (VC/TC to flex R LE to assist in scooting)    Sitting Balance:  Static Sitting Balance  Static Sitting-Balance Support: Feet supported, Bilateral upper extremity supported  Static Sitting-Level of Assistance: Close supervision  Static Sitting-Comment/Number of Minutes: Pt tolerated ~4 minutes of static sitting no LOB noted      Outcome Measures:  [facility] Daily Activity  Putting on and taking off regular lower body clothing: A little  Bathing (including washing, rinsing, drying): A little  Putting on and taking off regular upper body clothing: A little  Toileting, which includes using toilet, bedpan or urinal: A little  Taking care of personal grooming such as brushing teeth: A little  Eating Meals: A little  Daily Activity - Total Score: 18        Education Documentation  Handouts, taught by ZANRDA Jean at 4/29/2024  9:55 AM.  Learner: Patient  Readiness: Acceptance  Method: Explanation  Response: Verbalizes Understanding, Needs Reinforcement, No Evidence of Learning    Precautions, taught by ZANDRA Jean at 4/29/2024  9:55 AM.  Learner: Patient  Readiness: Acceptance  Method: Explanation  Response: Verbalizes Understanding, Needs Reinforcement, No Evidence of Learning    ADL Training, taught by ZANDRA Jean at 4/29/2024  9:55 AM.  Learner: Patient  Readiness: Acceptance  Method: Explanation  Response: Verbalizes Understanding, Needs Reinforcement, No Evidence of Learning    Education Comments  No comments found.      Goals:  Encounter Problems       Encounter Problems (Active)       ADLs       Patient will perform UB bathing 50% with minimal assist  level of assistance and adaptive equipment prn . (Progressing)       Start:  04/18/24    Expected End:  04/27/24            Patient with complete upper body dressing with minimal assist  level of assistance donning and doffing all UE clothes with PRN  adaptive equipment while supported sitting (Progressing)       Start:  04/18/24    Expected End:  04/27/24            Patient will complete daily grooming tasks brushing teeth and washing face/hair with modified independent level of assistance and PRN adaptive equipment while supported sitting. (Progressing)       Start:  04/18/24    Expected End:  04/27/24               BALANCE       Pt will maintain static and dynamic standing balance during ADL task with contact guard assist level of assistance in order to demonstrate decreased risk of falling and improved postural control. (Not Progressing)       Start:  04/23/24    Expected End:  05/07/24               COGNITION/SAFETY       75%Patient will follow 90% Simple commands to allow improved ADL performance. (Progressing)       Start:  04/25/24    Expected End:  05/09/24            Pt will recall 75% of tx session with cues prn to increase independence with ADLS/IADLS (Progressing)       Start:  04/25/24    Expected End:  05/09/24                  Encounter Problems (Resolved)       COGNITION/SAFETY       Pt will follow Simple 25% time during OT tx session with max cues . (Met)       Start:  04/18/24    Expected End:  04/27/24    Resolved:  04/23/24         Patient will demonstrated orientation x 3 with verbal cues, visual cues, and auditory cues. (Met)       Start:  04/18/24    Expected End:  04/27/24    Resolved:  04/23/24    ORIENTATION            TRANSFERS       Patient will perform bed mobility minimal assist  level of assistance and bed rails and draw sheet in order to improve safety and independence with mobility (Met)       Start:  04/18/24    Expected End:  04/27/24    Resolved:  04/23/24

## 2024-04-29 NOTE — PROGRESS NOTES
[facility]          Admitting Provider: Octavio Jeong MD Admission Date: 4/16/2024.   Attending Provider: Octavio Jeong MD MRN: [id number]       Subjective   Rose Marie Toussaint is a 68 y.o. female on day 13 of admission presenting with Chronic osteomyelitis involving ankle and foot, left (Multi).  Interval History no complaints.     Objective   Physical Exam  Last Recorded Vitals: Blood pressure 134/83, pulse 64, temperature 36.7 °C (98 °F), temperature source Oral, resp. rate 16, height 1.524 m (5'), weight 56.7 kg (125 lb), SpO2 97%.  Patient Vitals for the past 24 hrs:   BP Temp Temp src Pulse Resp SpO2   04/29/24 0028 134/83 36.7 °C (98 °F) Oral 64 16 97 %   04/28/24 1956 119/73 36.7 °C (98.1 °F) Oral 71 18 97 %   04/28/24 1703 147/66 37.1 °C (98.7 °F) Temporal 77 18 96 %   04/28/24 1206 152/73 36.9 °C (98.5 °F) Temporal 79 -- 99 %   04/28/24 0754 147/89 36.7 °C (98 °F) Temporal 83 18 96 %     Body mass index is 24.41 kg/m².  GENERAL: alert, cooperative, or no distress  SKIN: no rashes  NECK: supple, no thyromegaly, JVP within normal limits  LUNGS:  not in respiratory distress, respiratory rate normal, clear to auscultation  CARDIAC: regular rate and rhythm, normal S1 and S2, no murmur, rub, or gallop  ABDOMEN: Soft, non-tender, normal bowel sounds; no bruits, organomegaly or masses.  EXTREMITIES: No edema Left BKA   NEURO: Alert and oriented x 3,  reflexes normal and symmetric, strength and  sensation grossly normal  Intake/Output last 3 Shifts:  I/O last 3 completed shifts:  In: - (0 mL/kg)   Out: 300 (5.3 mL/kg) [Urine:300 (0.1 mL/kg/hr)]  Weight: 56.7 kg   DATA:   Diagnostic tests reviewed for today's visit:    Most recent Labs  No results found for this or any previous visit (from the past 24 hour(s)).  Urine Culture   Date Value Ref Range Status   05/24/2023 NO GROWTH  Final     Blood Culture   Date Value Ref Range Status   05/23/2023   Final    No Growth at 1 days~No  "Growth at 2 days~No Growth at 3 days~NO GROWTH at 4 days - FINAL REPORT    No results found for: \"RESPCULTSM\" No results found for: \"PERDIAFLDCUL\" No results found for: \"STERFLDCULSM\"   No results found.  Current Facility-Administered Medications   Medication Dose Route Frequency Provider Last Rate Last Admin    acetaminophen (Tylenol) tablet 650 mg  650 mg oral q4h PRN Ектаерина Edmondsel, PA-C   650 mg at 04/27/24 1828    albuterol 2.5 mg /3 mL (0.083 %) nebulizer solution 2.5 mg  2.5 mg nebulization q2h PRN Екатерина Purvis, PA-C        aspirin chewable tablet 81 mg  81 mg oral Daily WESTLEY Cordoba-CNP   81 mg at 04/28/24 0934    atenolol (Tenormin) tablet 25 mg  25 mg oral Daily Екатерина E Yaniv, PA-C   25 mg at 04/28/24 0935    buPROPion (Wellbutrin) tablet 100 mg  100 mg oral Daily with breakfast Екатерина Edmondsel, PA-C   100 mg at 04/28/24 0935    ceFAZolin in dextrose (iso-os) (Ancef) IVPB 2 g  2 g intravenous q8h Jaycob Krishna MD   Stopped at 04/29/24 0256    chlorthalidone (Hygroton) tablet 25 mg  25 mg oral Daily Octavio Jeong MD   25 mg at 04/28/24 0935    clopidogrel (Plavix) tablet 75 mg  75 mg oral Daily WESTLEY Cordoba-CNP   75 mg at 04/28/24 0934    dilTIAZem CD (Cardizem CD) 24 hr capsule 240 mg  240 mg oral Daily Екатерина E Sephel, PA-C   240 mg at 04/28/24 0935    docusate sodium (Colace) capsule 100 mg  100 mg oral BID Екатерина E Sephel, PA-C   100 mg at 04/25/24 2149    enoxaparin (Lovenox) syringe 40 mg  40 mg subcutaneous q24h Екатерина MCDANIEL Sephel, PA-C   40 mg at 04/28/24 0624    folic acid (Folvite) tablet 1 mg  1 mg oral Daily Екатерина E Sephel, PA-C   1 mg at 04/28/24 0934    HYDROmorphone PF (Dilaudid) injection 0.2 mg  0.2 mg intravenous q3h PRN Екатерина Purvis PA-C   0.2 mg at 04/28/24 1408    lactulose 20 gram/30 mL oral solution 20 g  20 g oral Daily Екатерина Purvis PA-C   20 g at 04/28/24 0935    melatonin tablet 4.5 mg  4.5 mg oral Nightly PRN Екатерина Purvis PA-C   4.5 mg at " 04/23/24 2259    multivitamin with minerals 1 tablet  1 tablet oral Daily Екатерина E Sephel, PA-C   1 tablet at 04/28/24 0934    nicotine (Nicoderm CQ) 14 mg/24 hr patch 1 patch  1 patch transdermal Daily Екатерина E Sephel, PA-C   1 patch at 04/28/24 0935    ondansetron (Zofran) injection 4 mg  4 mg intravenous q8h PRN Екатерина E Sephel, PA-C        oxyCODONE (Roxicodone) immediate release tablet 10 mg  10 mg oral q6h PRN Екатерина E Sephel, PA-C   10 mg at 04/29/24 0028    oxyCODONE (Roxicodone) immediate release tablet 5 mg  5 mg oral q6h PRN Екатерина E Sephel, PA-C   5 mg at 04/27/24 1411    oxygen (O2) therapy   inhalation Continuous - 02/gases Misti Alex, APRN-CNP   2 L/min at 04/25/24 2000    polyethylene glycol (Glycolax, Miralax) packet 17 g  17 g oral Daily Екатерина E Sephel, PA-C   17 g at 04/23/24 0957    pravastatin (Pravachol) tablet 20 mg  20 mg oral Daily Екатерина E Sephel, PA-C   20 mg at 04/28/24 0934    QUEtiapine (SEROquel) tablet 25 mg  25 mg oral Nightly PRN Екатерина E Sephel, PA-C   25 mg at 04/24/24 2115    sertraline (Zoloft) tablet 50 mg  50 mg oral Daily Екатерина E Sephel, PA-C   50 mg at 04/28/24 0935     Current Outpatient Medications   Medication Sig Dispense Refill    acetaminophen (Tylenol) 325 mg tablet Take 2 tablets (650 mg) by mouth every 6 hours if needed for mild pain (1 - 3).      aspirin 81 mg chewable tablet Chew 1 tablet (81 mg) 2 times a day. 60 tablet     clopidogrel (Plavix) 75 mg tablet Take 1 tablet (75 mg) by mouth once daily for 363 doses. 30 tablet 11    docusate sodium (Colace) 100 mg capsule Take 1 capsule (100 mg) by mouth 2 times a day for 14 days. 28 capsule     docusate sodium (Colace) 100 mg capsule Take 1 capsule (100 mg) by mouth 2 times a day.      folic acid (Folvite) 1 mg tablet Take 1 tablet (1 mg) by mouth once daily.      melatonin 5 mg tablet Take 1 tablet (5 mg) by mouth as needed at bedtime for sleep.      multivitamin with minerals tablet Take 1 tablet by mouth once  daily.      oxyCODONE (Roxicodone) 5 mg immediate release tablet Take 1 tablet (5 mg) by mouth every 6 hours if needed for severe pain (7 - 10). 15 tablet 0    polyethylene glycol (Glycolax, Miralax) 17 gram packet Take 17 g by mouth once daily.      potassium chloride CR 10 mEq ER tablet Take 2 tablets (20 mEq) by mouth once daily. Do not crush, chew, or split.      QUEtiapine (SEROquel) 25 mg tablet Take 1 tablet (25 mg) by mouth as needed at bedtime (insomnia).     ..     Medication and Non-Pharmacologic VTE Prophylaxis/Anticoagulants      Last Anticoag Admin            enoxaparin (Lovenox) syringe 40 mg    Given 40 mg at 04/28/24 0624    Frequency: Every 24 hours         There are additional administrations since 04/26/24 0751 that are not shown.    No unadministered anticoagulant orders found.          Assessment/Plan   Rose Marie Toussaint has  Principal Problem:    Chronic osteomyelitis involving ankle and foot, left (Multi)  Active Problems:    Septic joint (Multi)    Alcohol withdrawal delirium (Multi)    Adrenal cortical nodule (Multi)    Critical limb ischemia of right lower extremity (Multi)    Critical limb ischemia of left lower extremity (Multi)     S/P BKA. Patient needs skilled rehabilitation           improved.   Patient aware. Needs rpt CT in 4 months  Angiogram with IVUS-guide stenting of left external iliac artery using 8.0 x 80 mm self-expanding stent. xternal iliac artery.   Other Hospital problems        Abnormal findings not addressed during hospitalization, but require out patient follow up. Adrenal nodule         I spent 40 minutes coordinating discharge of Rose Marie Toussaint, Including reconciling medications, reviewing the labs & formulating discharge plan. discussing with NP and nursing staff.  Octavio Jeong MD

## 2024-04-29 NOTE — PROGRESS NOTES
4/29/2024 9:20 AM Sister informed of 10:00 AM discharge transport to [facility]. Cherelle RESTREPO

## 2024-04-29 NOTE — PROGRESS NOTES
Physical Therapy    Physical Therapy Treatment    Patient Name: Rose Marie Toussaint  MRN: [id number]  Today's Date: 4/29/2024  Time Calculation  Start Time: 1046  Stop Time: 1056  Time Calculation (min): 10 min       Assessment/Plan   PT Assessment  End of Session Communication: Bedside nurse  End of Session Patient Position: Bed, 3 rail up (RN in room upon leaving, pt sitting at EOB with RN)  PT Plan  Inpatient/Swing Bed or Outpatient: Inpatient  PT Plan  Treatment/Interventions: Bed mobility, Strengthening  PT Plan: Skilled PT  PT Frequency: Daily  PT Discharge Recommendations: High intensity level of continued care  Equipment Recommended upon Discharge: Wheeled walker  PT Recommended Transfer Status: Assist x1  PT - OK to Discharge: Yes (per POC)      General Visit Information:   PT  Visit  PT Received On: 04/29/24  General  Reason for Referral: Chronic osetomylitis L ankle and foot  Referred By: Екатерина Purvis PA-C  Past Medical History Relevant to Rehab:   Past Medical History:   Diagnosis Date    Cognitive disorder     COPD (chronic obstructive pulmonary disease) (Multi)     Depression     Hypertension     Substance addiction (Multi)        Prior to Session Communication: Bedside nurse  Patient Position Received: Bed, 3 rail up, Alarm on  General Comment: PT session short due to pt being d/c and transported. Pt appears anxious and requires redirection to remain on task    Subjective   Precautions:  Precautions  Hearing/Visual Limitations: Glasses  LE Weight Bearing Status: Left Non-Weight Bearing  Medical Precautions: Fall precautions  Vital Signs:       Objective   Pain:  Pain Assessment  Pain Assessment: 0-10  Pain Score: 9  Pain Type: Acute pain  Pain Location: Leg  Pain Orientation: Left  Cognition:     Postural Control:  Static Sitting Balance  Static Sitting-Balance Support: Bilateral upper extremity supported, Feet supported  Static Sitting-Level of Assistance: Distant supervision  Static  Sitting-Comment/Number of Minutes: pt impulsive, distant supervision for safety  Extremity/Trunk Assessments:    Activity Tolerance:     Treatments:  Therapeutic Exercise  Therapeutic Exercise Performed: Yes  Therapeutic Exercise Activity 1: Pt performs seated at EOB x 15 reps LAQ     Bed Mobility  Bed Mobility: Yes  Bed Mobility 1  Bed Mobility 1: Supine to sitting  Level of Assistance 1: Close supervision  Bed Mobility Comments 1: HOB elevated. pt able to use bed rail for trunk elevation    Ambulation/Gait Training  Ambulation/Gait Training Performed: No  Transfers  Transfer: No    Outcome Measures:  [facility] Basic Mobility  Turning from your back to your side while in a flat bed without using bedrails: A little  Moving from lying on your back to sitting on the side of a flat bed without using bedrails: A little  Moving to and from bed to chair (including a wheelchair): A little  Standing up from a chair using your arms (e.g. wheelchair or bedside chair): A little  To walk in hospital room: A lot  Climbing 3-5 steps with railing: Total  Basic Mobility - Total Score: 15    Education Documentation  Precautions, taught by Annetta Guerra PTA at 4/29/2024 11:09 AM.  Learner: Patient  Readiness: Acceptance  Method: Explanation  Response: Needs Reinforcement, Verbalizes Understanding  Comment: Education and importance of ROM to return to functional mobility    Mobility Training, taught by Annetta Guerra PTA at 4/29/2024 11:09 AM.  Learner: Patient  Readiness: Acceptance  Method: Explanation  Response: Needs Reinforcement, Verbalizes Understanding  Comment: Education and importance of ROM to return to functional mobility      Education Comments  No comments found.        OP EDUCATION:       Encounter Problems       Encounter Problems (Resolved)       Balance       STG - Maintains dynamic standing balance with upper extremity support At MOD I, safely, without LOB, device PRN  (Adequate for Discharge)       Start:  04/23/24     Expected End:  05/08/24    Resolved:  04/29/24    INTERVENTIONS:  1. Practice standing with minimal support.  2. Educate patient about standing tolerance.  3. Educate patient about independence with gait, transfers, and ADL's.  4. Educate patient about use of assistive device.  5. Educate patient about self-directed care.            Mobility       STG - Patient will ambulate At At Supervision using Wheeled walker for 50' while maintaining NWB on LLE without LOB or gross gait deviations  (Adequate for Discharge)       Start:  04/23/24    Expected End:  05/08/24    Resolved:  04/29/24         STG - Patient will ascend and descend four to six stairs At Min Ax1 while using No device and Right HR no LOB  (Adequate for Discharge)       Start:  04/23/24    Expected End:  05/08/24    Resolved:  04/29/24         Endurance - Pt to tolerate >/= 20 minutes therex, theract, gait and/or NMR with </= 5 minutes of rest breaks' (Adequate for Discharge)       Start:  04/23/24    Expected End:  05/08/24    Resolved:  04/29/24            PT Transfers       STG - Patient will transfer sit to and from stand At Supervision, safely, without LOB, device PRN  (Adequate for Discharge)       Start:  04/23/24    Expected End:  05/08/24    Resolved:  04/29/24            Pain - Adult          Safety       Pt will verbalize and apply safety awareness and precautions 100% of time throughout entire session   (Adequate for Discharge)       Start:  04/23/24    Expected End:  05/08/24    Resolved:  04/29/24

## 2024-04-30 ENCOUNTER — NURSING HOME VISIT (OUTPATIENT)
Dept: POST ACUTE CARE | Facility: EXTERNAL LOCATION | Age: 69
End: 2024-04-30
Payer: COMMERCIAL

## 2024-04-30 DIAGNOSIS — G47.00 INSOMNIA, UNSPECIFIED TYPE: ICD-10-CM

## 2024-04-30 DIAGNOSIS — I73.9 PAD (PERIPHERAL ARTERY DISEASE) (CMS-HCC): ICD-10-CM

## 2024-04-30 DIAGNOSIS — F10.11 H/O ETOH ABUSE: ICD-10-CM

## 2024-04-30 DIAGNOSIS — J44.9 CHRONIC OBSTRUCTIVE PULMONARY DISEASE, UNSPECIFIED COPD TYPE (MULTI): ICD-10-CM

## 2024-04-30 DIAGNOSIS — F32.A DEPRESSION, UNSPECIFIED DEPRESSION TYPE: ICD-10-CM

## 2024-04-30 DIAGNOSIS — K59.00 CONSTIPATION, UNSPECIFIED CONSTIPATION TYPE: ICD-10-CM

## 2024-04-30 DIAGNOSIS — R53.81 PHYSICAL DECONDITIONING: ICD-10-CM

## 2024-04-30 DIAGNOSIS — F41.9 ANXIETY: ICD-10-CM

## 2024-04-30 DIAGNOSIS — I10 PRIMARY HYPERTENSION: ICD-10-CM

## 2024-04-30 DIAGNOSIS — E87.6 HYPOKALEMIA: ICD-10-CM

## 2024-04-30 DIAGNOSIS — I70.221 CRITICAL LIMB ISCHEMIA OF RIGHT LOWER EXTREMITY (MULTI): Primary | ICD-10-CM

## 2024-04-30 DIAGNOSIS — R52 PAIN: ICD-10-CM

## 2024-04-30 DIAGNOSIS — E78.5 HYPERLIPIDEMIA, UNSPECIFIED HYPERLIPIDEMIA TYPE: Primary | ICD-10-CM

## 2024-04-30 DIAGNOSIS — E83.42 HYPOMAGNESEMIA: ICD-10-CM

## 2024-04-30 DIAGNOSIS — Z89.512 HX OF BKA, LEFT (MULTI): ICD-10-CM

## 2024-04-30 PROCEDURE — 99309 SBSQ NF CARE MODERATE MDM 30: CPT | Performed by: NURSE PRACTITIONER

## 2024-05-01 VITALS
BODY MASS INDEX: 21.95 KG/M2 | DIASTOLIC BLOOD PRESSURE: 73 MMHG | WEIGHT: 112.4 LBS | OXYGEN SATURATION: 98 % | TEMPERATURE: 98.1 F | RESPIRATION RATE: 16 BRPM | SYSTOLIC BLOOD PRESSURE: 122 MMHG | HEART RATE: 74 BPM

## 2024-05-01 PROBLEM — E83.42 HYPOMAGNESEMIA: Status: ACTIVE | Noted: 2024-05-01

## 2024-05-01 PROBLEM — Z89.512 HX OF BKA, LEFT (MULTI): Status: ACTIVE | Noted: 2024-05-01

## 2024-05-01 PROBLEM — R52 PAIN: Status: ACTIVE | Noted: 2024-05-01

## 2024-05-01 PROBLEM — F10.11 H/O ETOH ABUSE: Status: ACTIVE | Noted: 2024-05-01

## 2024-05-01 PROBLEM — K59.00 CONSTIPATION: Status: ACTIVE | Noted: 2024-05-01

## 2024-05-01 PROBLEM — E87.6 HYPOKALEMIA: Status: ACTIVE | Noted: 2024-05-01

## 2024-05-01 PROBLEM — G47.00 INSOMNIA: Status: ACTIVE | Noted: 2024-05-01

## 2024-05-01 PROBLEM — F41.9 ANXIETY: Status: ACTIVE | Noted: 2024-05-01

## 2024-05-01 PROBLEM — I73.9 PAD (PERIPHERAL ARTERY DISEASE) (CMS-HCC): Status: ACTIVE | Noted: 2024-05-01

## 2024-05-01 LAB
LABORATORY COMMENT REPORT: NORMAL
PATH REPORT.FINAL DX SPEC: NORMAL
PATH REPORT.GROSS SPEC: NORMAL
PATH REPORT.RELEVANT HX SPEC: NORMAL
PATH REPORT.TOTAL CANCER: NORMAL

## 2024-05-01 NOTE — ASSESSMENT & PLAN NOTE
Acetaminophen 650 mg by mouth every 6 hrs as needed  Roxicodone 5 mg by mouth every 6 hrs as needed

## 2024-05-01 NOTE — ASSESSMENT & PLAN NOTE
4/22/2024 Left BKA  4/29/2024 BUN/Creat 12/1.01, Potassium 3.6, H/H 10.6/31.6  BMP and CBC with diff x 1  Dr Guerra

## 2024-05-01 NOTE — ASSESSMENT & PLAN NOTE
Plavix 75 mg by mouth daily   ASA 81 mg by mouth BID  4/25/2024 LLE Angiogram, Stenting left external iliac artery  Dr Steve follow up

## 2024-05-01 NOTE — ASSESSMENT & PLAN NOTE
Atenolol 25 mg by mouth daily   Chlorthalidone 25 mg by mouth daily   Cardizem 240 mg by mouth daily   BMP x 1

## 2024-05-01 NOTE — PROGRESS NOTES
Patient ID: Rose Marie Toussaint is a 68 y.o. female who is acute skilled care being seen and evaluated for multiple medical problems.  [id number]   1955    /73   Pulse 74   Temp 36.7 °C (98.1 °F)   Resp 16   Wt 51 kg (112 lb 6.4 oz)   SpO2 98%   BMI 21.95 kg/m²     Assessment/Plan  Problem List Items Addressed This Visit       Chronic obstructive pulmonary disease (Multi)     Albuterol/Budesonide 90/80 mcg two inhalations QID PRN          Hyperlipidemia - Primary     Pravastatin 20 mg by mouth every HS   Lipid panel x 1         Hypertension     Atenolol 25 mg by mouth daily   Chlorthalidone 25 mg by mouth daily   Cardizem 240 mg by mouth daily   BMP x 1         Depression     Bupropion 100 mg by mouth BID    Sertraline 200 mg by mouth daily         Physical deconditioning     PT/OT         PAD (peripheral artery disease) (CMS-[facility])     Plavix 75 mg by mouth daily   ASA 81 mg by mouth BID  4/25/2024 LLE Angiogram, Stenting left external iliac artery  Dr Steve follow up            Constipation     MiraLax 17 grams by mouth daily   Colace 100 mg by mouth BID as needed          Insomnia     Melatonin 5 mg by mouth every HS as needed          Hypomagnesemia     4/29/2024 Mg 1.4  Magnesium level x 1         Hx of BKA, left (Multi)     4/22/2024 Left BKA  4/29/2024 BUN/Creat 12/1.01, Potassium 3.6, H/H 10.6/31.6  BMP and CBC with diff x 1  Dr Guerra          Pain     Acetaminophen 650 mg by mouth every 6 hrs as needed  Roxicodone 5 mg by mouth every 6 hrs as needed          Anxiety     Buspirone 30 mg by mouth BID         H/O ETOH abuse     MVI one tablet by mouth daily   Folic acid 1 mg by mouth daily          Hypokalemia     Potassium 20 meq by mouth daily               HPI: Client was admitted at [facility] from 4/16/2024- 4/29/2024 with the final diagnosis of Osteomyelitis and Left BKA. She was transferred from [facility] 4/14/2024- 4/16/2024 with the diagnosis of Septic Arthritis, left ankle hardware  infection, cellulitis, hyponatremia, hypokalemia, and hypomagnesemia. On 4/22/2024 she had a left BKA. On 4/25/2024 she had a LLE angiogram with stenting of left external iliac artery. Client denies HA, dizziness, or lightheadedness. Denies CP, SOB, Cough, or increased edema. Denies abdominal pain, N/V, diarrhea, or constipation. Denies dysuria. Denies change in appetite. She C/O LLE pain.      Review of Systems ROS as described in HPI     Physical Exam  Constitutional:       Comments: Sitting in WC   HENT:      Mouth/Throat:      Mouth: Mucous membranes are moist.   Cardiovascular:      Rate and Rhythm: Normal rate.   Pulmonary:      Effort: Pulmonary effort is normal.      Breath sounds: Normal breath sounds.      Comments: RA  Abdominal:      General: Bowel sounds are normal.   Genitourinary:     Comments: Denies dysuria  Musculoskeletal:      Comments: Left BKA  PT/OT   Skin:     General: Skin is warm and dry.   Neurological:      Mental Status: She is alert and oriented to person, place, and time.   Psychiatric:         Mood and Affect: Mood normal.      Comments: Conversational

## 2024-05-03 ENCOUNTER — OFFICE VISIT (OUTPATIENT)
Dept: ORTHOPEDIC SURGERY | Facility: CLINIC | Age: 69
End: 2024-05-03
Payer: COMMERCIAL

## 2024-05-03 DIAGNOSIS — M86.672 CHRONIC OSTEOMYELITIS INVOLVING ANKLE AND FOOT, LEFT (MULTI): Primary | ICD-10-CM

## 2024-05-03 DIAGNOSIS — I70.222 CRITICAL LIMB ISCHEMIA OF LEFT LOWER EXTREMITY (MULTI): ICD-10-CM

## 2024-05-03 DIAGNOSIS — F10.11 H/O ETOH ABUSE: ICD-10-CM

## 2024-05-03 DIAGNOSIS — Z72.0 TOBACCO ABUSE: ICD-10-CM

## 2024-05-03 DIAGNOSIS — Z89.512 HX OF BKA, LEFT (MULTI): ICD-10-CM

## 2024-05-03 PROCEDURE — 1159F MED LIST DOCD IN RCRD: CPT | Performed by: STUDENT IN AN ORGANIZED HEALTH CARE EDUCATION/TRAINING PROGRAM

## 2024-05-03 PROCEDURE — 1111F DSCHRG MED/CURRENT MED MERGE: CPT | Performed by: STUDENT IN AN ORGANIZED HEALTH CARE EDUCATION/TRAINING PROGRAM

## 2024-05-03 PROCEDURE — 1125F AMNT PAIN NOTED PAIN PRSNT: CPT | Performed by: STUDENT IN AN ORGANIZED HEALTH CARE EDUCATION/TRAINING PROGRAM

## 2024-05-03 PROCEDURE — 99024 POSTOP FOLLOW-UP VISIT: CPT | Performed by: STUDENT IN AN ORGANIZED HEALTH CARE EDUCATION/TRAINING PROGRAM

## 2024-05-03 ASSESSMENT — PAIN SCALES - GENERAL: PAINLEVEL_OUTOF10: 8

## 2024-05-03 ASSESSMENT — PAIN - FUNCTIONAL ASSESSMENT: PAIN_FUNCTIONAL_ASSESSMENT: 0-10

## 2024-05-03 ASSESSMENT — PAIN DESCRIPTION - DESCRIPTORS: DESCRIPTORS: ACHING;BURNING;SHARP;SHOOTING;STABBING

## 2024-05-03 NOTE — PROGRESS NOTES
ORTHOPAEDIC SURGERY OUTPATIENT PROGRESS NOTE    Chief Complaint:  Left bka wound check    History Of Present Illness  Rose Marie Toussaint is a 68 y.o. female with a past medical history of alcohol and tobacco abuse s/p L Ankle Bi-malleolar ankle fracture ORIF with syndesmotic stabilization with Dr. Frederick from 11/20/2022 with hardware failure, draining wounds concerning for OM s/p L BKA from 04/22/2024 and L EIA stent with interventional cardiology form 04/25/2024 well known to me at this time who presents for wound check.  Patient reports 8 out of 10 pain in her leg.  She has been residing in an SNF.  Patient reports that she has been compliant with tobacco and alcohol cessation at this time.  She has been compliant with nonweightbearing restrictions.  She is on aspirin and Plavix.     Past Medical History  Past Medical History:   Diagnosis Date    Cognitive disorder     COPD (chronic obstructive pulmonary disease) (Multi)     Depression     Hypertension     Substance addiction (Multi)        Surgical History  Recent Surgeries in Orthopaedic Surgery            No cases to display             Social History  Social History     Socioeconomic History    Marital status:      Spouse name: Not on file    Number of children: Not on file    Years of education: Not on file    Highest education level: Not on file   Occupational History    Not on file   Tobacco Use    Smoking status: Every Day     Current packs/day: 0.50     Average packs/day: 0.5 packs/day for 0.1 years     Types: Cigarettes     Start date: 4/13/2024    Smokeless tobacco: Current   Substance and Sexual Activity    Alcohol use: Not on file    Drug use: Not on file    Sexual activity: Not on file   Other Topics Concern    Not on file   Social History Narrative    Not on file     Social Determinants of Health     Financial Resource Strain: Low Risk  (4/16/2024)    Overall Financial Resource Strain (CARDIA)     Difficulty of Paying Living Expenses: Not  very hard   Food Insecurity: Not on file   Transportation Needs: No Transportation Needs (4/16/2024)    PRAPARE - Transportation     Lack of Transportation (Medical): No     Lack of Transportation (Non-Medical): No   Physical Activity: Not on file   Stress: Not on file   Social Connections: Not on file   Intimate Partner Violence: Not on file   Housing Stability: Low Risk  (4/16/2024)    Housing Stability Vital Sign     Unable to Pay for Housing in the Last Year: No     Number of Places Lived in the Last Year: 1     Unstable Housing in the Last Year: No   Recent Concern: Housing Stability - High Risk (4/14/2024)    Housing Stability Vital Sign     Unable to Pay for Housing in the Last Year: Yes     Number of Places Lived in the Last Year: 1     Unstable Housing in the Last Year: No       Family History  Family History   Problem Relation Name Age of Onset    Hypertension Mother      Heart attack Father          Allergies  Allergies   Allergen Reactions    Penicillin Unknown    Penicillins Hives       Review of Systems  REVIEW OF SYSTEMS  Constitutional: no unplanned weight loss  Psychiatric: no suicidal ideation  ENT: no vision changes, no sinus problems  Pulmonary: no shortness of breath  Lymphatic: no enlarged lymph nodes  Cardiovascular: no chest pain or shortness of breath  Gastrointestinal: no stomach problems  Genitourinary: no dysuria   Skin: no rashes  Endocrine: no thyroid problems  Neurological: no headache, no numbness  Hematological: no easy bruising  Musculoskeletal: Left BKA stump pain     Physical Exam  PHYSICAL EXAMINATION  Constitutional Exam: Patient does not appear her physiologic age, she is frail appearing  Psychiatric Exam: alert and oriented, appropriate mood and behavior  Eye Exam: EOMI  Pulmonary Exam: breathing non-labored, no apparent distress  Lymphatic exam: no appreciable lymphadenopathy in the lower extremities  Cardiovascular exam: RRR to peripheral palpation, DP pulses 2+, PT 2+, toes  are pink with good capillary refill, no pitting edema  Skin exam: no open lesions, rashes, abrasions or ulcerations  Neurological exam: sensation to light touch intact in both lower extremities in peripheral and dermatomal distributions (except for any abnormalities noted in musculoskeletal exam)    Musculoskeletal exam: Left lower extremity examination.  Patient diffusely tender distally about BKA stump with appropriate clyde-incisional tenderness and mild erythema medially.  She appears to have stable eschar versus distal stump flap necrosis about the medial portion of the wound.  Patient otherwise has sensation intact light touch grossly at the distal stump.  She is appropriately able to flex and extend her knee.  Remainder of extremity appears warm and well-perfused.     Last Recorded Vitals  There were no vitals taken for this visit.    Laboratory Results  No results found for this or any previous visit (from the past 24 hour(s)).     Radiology Results  No new imaging at this visit.    Assessment/Plan:  69 y/o F with a past medical history of alcohol and tobacco abuse s/p L Ankle Bi-malleolar ankle fracture ORIF with syndesmotic stabilization with Dr. Frederick from 11/20/2022 with hardware failure, draining wounds concerning for OM s/p L BKA from 04/22/2024 and L EIA stent with interventional cardiology form 04/25/2024 well known to me at this time who presents for wound check.  I had a pako discussion with the patient, I am concerned with the appearance of the wound medially, I would recommend that we follow this closely and allow the wound to declare itself.  I will recommend that she remain nonweightbearing in her left lower extremity in a long-leg fiberglass cast.  I discussed that she may require a return to the operating room for a revision BKA with plastic surgery consultation or local wound care.  I have encouraged the patient to continue with alcohol and tobacco cessation.  I will plan to see the  patient back in approximately 1 week for a repeat clinical evaluation.  I have encouraged the patient to contact the office if she develops any new pain, worsening symptoms including fevers, chills or night sweats or if she has any further questions.  Upon return, patient would not require further imaging.    Flako Guerra MD, ALEJA  Department of Orthopaedic Surgery  [facility]    The diagnosis and treatment plan were reviewed with the patient. All questions were answered. The patient verbalized understanding of the treatment plan. There were no barriers to understanding identified.    Note dictated with Peer60 software.  Completed without full type editing and sent to avoid delay.

## 2024-05-07 ENCOUNTER — NURSING HOME VISIT (OUTPATIENT)
Dept: POST ACUTE CARE | Facility: EXTERNAL LOCATION | Age: 69
End: 2024-05-07
Payer: COMMERCIAL

## 2024-05-07 DIAGNOSIS — Z89.512 HX OF BKA, LEFT (MULTI): ICD-10-CM

## 2024-05-07 DIAGNOSIS — R53.81 PHYSICAL DECONDITIONING: ICD-10-CM

## 2024-05-07 DIAGNOSIS — I73.9 PAD (PERIPHERAL ARTERY DISEASE) (CMS-HCC): ICD-10-CM

## 2024-05-07 DIAGNOSIS — R52 PAIN: ICD-10-CM

## 2024-05-07 DIAGNOSIS — I10 PRIMARY HYPERTENSION: Primary | ICD-10-CM

## 2024-05-07 PROCEDURE — 99308 SBSQ NF CARE LOW MDM 20: CPT | Performed by: NURSE PRACTITIONER

## 2024-05-07 NOTE — LETTER
Patient: Rose Marie Toussaint  : 1955    Encounter Date: 2024    Patient ID: Rose Marie Toussaint is a 68 y.o. female who is acute skilled care being seen and evaluated for multiple medical problems.  [id number]   1955    /70   Pulse 84   Temp 36.7 °C (98 °F)   Resp 16   Wt 50.8 kg (112 lb)   SpO2 98%   BMI 21.87 kg/m²     Assessment/Plan  Problem List Items Addressed This Visit       Hypertension - Primary     Atenolol 25 mg by mouth daily   Chlorthalidone 25 mg by mouth daily   Cardizem 240 mg by mouth daily   2024 BUN/Creat 14/1.0, Potassium 3.5         Physical deconditioning     PT/OT         PAD (peripheral artery disease) (CMS-[facility])     Plavix 75 mg by mouth daily   ASA 81 mg by mouth BID  2024 LLE Angiogram, Stenting left external iliac artery  Dr Steve follow up (Vascular)   2024 H/H 12.3/36         Hx of BKA, left (Multi)     2024 Left BKA  Dr Guerra   5/3/2024 Apt with Cast LLE  Follow up apt 5/10/2024          Pain     Acetaminophen 650 mg by mouth every 6 hrs as needed  Roxicodone 5 mg by mouth every 6 hrs as needed               HPI: Client continues to receive PT/OT services. Client denies HA, dizziness, or lightheadedness. Denies CP, SOB, or increased edema. RA. C/O cough. Denies abdominal pain, N/V, diarrhea, or constipation. Denies dysuria. Denies change in appetite. Denies insomnia. She C/O LLE pain.     Review of Systems ROS as described in HPI     Physical Exam  Constitutional:       Comments: Sitting in WC   HENT:      Mouth/Throat:      Mouth: Mucous membranes are moist.   Cardiovascular:      Rate and Rhythm: Normal rate.   Pulmonary:      Effort: Pulmonary effort is normal.      Breath sounds: Normal breath sounds.      Comments: RA  Abdominal:      General: Bowel sounds are normal.   Genitourinary:     Comments: Denies dysuria   Musculoskeletal:      Comments: Left BKA with Cast   PT/OT   Skin:     General: Skin is warm and dry.   Neurological:       Mental Status: She is alert and oriented to person, place, and time.   Psychiatric:         Mood and Affect: Mood normal.      Comments: Conversational                    Electronically Signed By: SARAHI Nelson   5/9/24  9:52 AM

## 2024-05-09 VITALS
OXYGEN SATURATION: 98 % | BODY MASS INDEX: 21.87 KG/M2 | WEIGHT: 112 LBS | SYSTOLIC BLOOD PRESSURE: 126 MMHG | RESPIRATION RATE: 16 BRPM | DIASTOLIC BLOOD PRESSURE: 70 MMHG | TEMPERATURE: 98 F | HEART RATE: 84 BPM

## 2024-05-09 NOTE — PROGRESS NOTES
Patient ID: Rose Marie Toussaint is a 68 y.o. female who is acute skilled care being seen and evaluated for multiple medical problems.  [id number]   1955    /70   Pulse 84   Temp 36.7 °C (98 °F)   Resp 16   Wt 50.8 kg (112 lb)   SpO2 98%   BMI 21.87 kg/m²     Assessment/Plan  Problem List Items Addressed This Visit       Hypertension - Primary     Atenolol 25 mg by mouth daily   Chlorthalidone 25 mg by mouth daily   Cardizem 240 mg by mouth daily   4/30/2024 BUN/Creat 14/1.0, Potassium 3.5         Physical deconditioning     PT/OT         PAD (peripheral artery disease) (CMS-[facility])     Plavix 75 mg by mouth daily   ASA 81 mg by mouth BID  4/25/2024 LLE Angiogram, Stenting left external iliac artery  Dr Steve follow up (Vascular)   4/30/2024 H/H 12.3/36         Hx of BKA, left (Multi)     4/22/2024 Left BKA  Dr Guerra   5/3/2024 Apt with Cast LLE  Follow up apt 5/10/2024          Pain     Acetaminophen 650 mg by mouth every 6 hrs as needed  Roxicodone 5 mg by mouth every 6 hrs as needed               HPI: Client continues to receive PT/OT services. Client denies HA, dizziness, or lightheadedness. Denies CP, SOB, or increased edema. RA. C/O cough. Denies abdominal pain, N/V, diarrhea, or constipation. Denies dysuria. Denies change in appetite. Denies insomnia. She C/O LLE pain.     Review of Systems ROS as described in HPI     Physical Exam  Constitutional:       Comments: Sitting in WC   HENT:      Mouth/Throat:      Mouth: Mucous membranes are moist.   Cardiovascular:      Rate and Rhythm: Normal rate.   Pulmonary:      Effort: Pulmonary effort is normal.      Breath sounds: Normal breath sounds.      Comments: RA  Abdominal:      General: Bowel sounds are normal.   Genitourinary:     Comments: Denies dysuria   Musculoskeletal:      Comments: Left BKA with Cast   PT/OT   Skin:     General: Skin is warm and dry.   Neurological:      Mental Status: She is alert and oriented to person, place, and time.    Psychiatric:         Mood and Affect: Mood normal.      Comments: Conversational

## 2024-05-09 NOTE — ASSESSMENT & PLAN NOTE
Atenolol 25 mg by mouth daily   Chlorthalidone 25 mg by mouth daily   Cardizem 240 mg by mouth daily   4/30/2024 BUN/Creat 14/1.0, Potassium 3.5

## 2024-05-09 NOTE — ASSESSMENT & PLAN NOTE
Plavix 75 mg by mouth daily   ASA 81 mg by mouth BID  4/25/2024 LLE Angiogram, Stenting left external iliac artery  Dr Steve follow up (Vascular)   4/30/2024 H/H 12.3/36

## 2024-05-10 ENCOUNTER — OFFICE VISIT (OUTPATIENT)
Dept: ORTHOPEDIC SURGERY | Facility: CLINIC | Age: 69
End: 2024-05-10
Payer: COMMERCIAL

## 2024-05-10 DIAGNOSIS — Z89.512 HX OF BKA, LEFT (MULTI): Primary | ICD-10-CM

## 2024-05-10 PROCEDURE — 1159F MED LIST DOCD IN RCRD: CPT | Performed by: STUDENT IN AN ORGANIZED HEALTH CARE EDUCATION/TRAINING PROGRAM

## 2024-05-10 PROCEDURE — L1830 KO IMMOB CANVAS LONG PRE OTS: HCPCS | Performed by: STUDENT IN AN ORGANIZED HEALTH CARE EDUCATION/TRAINING PROGRAM

## 2024-05-10 PROCEDURE — 1111F DSCHRG MED/CURRENT MED MERGE: CPT | Performed by: STUDENT IN AN ORGANIZED HEALTH CARE EDUCATION/TRAINING PROGRAM

## 2024-05-10 PROCEDURE — 99024 POSTOP FOLLOW-UP VISIT: CPT | Performed by: STUDENT IN AN ORGANIZED HEALTH CARE EDUCATION/TRAINING PROGRAM

## 2024-05-16 ENCOUNTER — OFFICE VISIT (OUTPATIENT)
Dept: WOUND CARE | Facility: HOSPITAL | Age: 69
End: 2024-05-16
Payer: COMMERCIAL

## 2024-05-16 ENCOUNTER — APPOINTMENT (OUTPATIENT)
Dept: WOUND CARE | Facility: HOSPITAL | Age: 69
End: 2024-05-16
Payer: COMMERCIAL

## 2024-05-16 DIAGNOSIS — Z89.512 HX OF BKA, LEFT (MULTI): ICD-10-CM

## 2024-05-16 PROCEDURE — 99213 OFFICE O/P EST LOW 20 MIN: CPT | Mod: 25

## 2024-05-16 PROCEDURE — 11042 DBRDMT SUBQ TIS 1ST 20SQCM/<: CPT

## 2024-05-17 ENCOUNTER — NURSING HOME VISIT (OUTPATIENT)
Dept: POST ACUTE CARE | Facility: EXTERNAL LOCATION | Age: 69
End: 2024-05-17
Payer: COMMERCIAL

## 2024-05-17 DIAGNOSIS — Z89.512 HX OF BKA, LEFT (MULTI): ICD-10-CM

## 2024-05-17 DIAGNOSIS — I10 PRIMARY HYPERTENSION: Primary | ICD-10-CM

## 2024-05-17 DIAGNOSIS — R53.81 PHYSICAL DECONDITIONING: ICD-10-CM

## 2024-05-17 DIAGNOSIS — R52 PAIN: ICD-10-CM

## 2024-05-17 PROCEDURE — 99308 SBSQ NF CARE LOW MDM 20: CPT | Performed by: NURSE PRACTITIONER

## 2024-05-17 NOTE — LETTER
Patient: Rose Marie Toussaint  : 1955    Encounter Date: 2024    Patient ID: Rose Marie Toussaint is a 68 y.o. female who is acute skilled care being seen and evaluated for multiple medical problems.  [id number]   1955    /74   Pulse 76   Temp 36.7 °C (98.1 °F)   Resp 16   Wt 50.7 kg (111 lb 12.8 oz)   SpO2 98%   BMI 21.83 kg/m²     Assessment/Plan  Problem List Items Addressed This Visit       Hypertension - Primary     Atenolol 25 mg by mouth daily   Chlorthalidone 25 mg by mouth daily   Cardizem 240 mg by mouth daily   2024 BUN/Creat 14/1.0, Potassium 3.5         Physical deconditioning     PT/OT         Hx of BKA, left (Multi)     2024 Left BKA  Dr Guerra   Follow up apt 2024          Pain     Acetaminophen 650 mg by mouth every 6 hrs as needed  Roxicodone 5 mg by mouth every 6 hrs as needed              HPI: Client pending transfer to [facility]. Client denies HA, dizziness, or lightheadedness. Denies CP, SOB, or increased edema. RA. Denies abdominal pain, N/V, diarrhea, or constipation. Denies dysuria. Denies change in appetite. She C/O pain to LLE.     Review of Systems ROS as described in HPI     Physical Exam  Constitutional:       Comments: Sitting in WC   HENT:      Mouth/Throat:      Mouth: Mucous membranes are moist.   Cardiovascular:      Rate and Rhythm: Normal rate.   Pulmonary:      Effort: Pulmonary effort is normal.      Breath sounds: Normal breath sounds.      Comments: RA  Abdominal:      General: Bowel sounds are normal.   Genitourinary:     Comments: Denies dysuria   Musculoskeletal:      Comments: WC transfers   Left BKA- immobilizer intact  PT/OT     Skin:     General: Skin is warm and dry.   Neurological:      Mental Status: She is alert. Mental status is at baseline.   Psychiatric:         Mood and Affect: Mood normal.      Comments: Conversational                    Electronically Signed By: SARAHI Nelson   24  9:55 AM

## 2024-05-20 VITALS
SYSTOLIC BLOOD PRESSURE: 121 MMHG | RESPIRATION RATE: 16 BRPM | HEART RATE: 76 BPM | DIASTOLIC BLOOD PRESSURE: 74 MMHG | WEIGHT: 111.8 LBS | TEMPERATURE: 98.1 F | OXYGEN SATURATION: 98 % | BODY MASS INDEX: 21.83 KG/M2

## 2024-05-20 NOTE — PROGRESS NOTES
Patient ID: Rose Marie Toussaint is a 68 y.o. female who is acute skilled care being seen and evaluated for multiple medical problems.  [id number]   1955    /74   Pulse 76   Temp 36.7 °C (98.1 °F)   Resp 16   Wt 50.7 kg (111 lb 12.8 oz)   SpO2 98%   BMI 21.83 kg/m²     Assessment/Plan  Problem List Items Addressed This Visit       Hypertension - Primary     Atenolol 25 mg by mouth daily   Chlorthalidone 25 mg by mouth daily   Cardizem 240 mg by mouth daily   4/30/2024 BUN/Creat 14/1.0, Potassium 3.5         Physical deconditioning     PT/OT         Hx of BKA, left (Multi)     4/22/2024 Left BKA  Dr Guerra   Follow up apt 5/16/2024          Pain     Acetaminophen 650 mg by mouth every 6 hrs as needed  Roxicodone 5 mg by mouth every 6 hrs as needed              HPI: Client pending transfer to [facility]. Client denies HA, dizziness, or lightheadedness. Denies CP, SOB, or increased edema. RA. Denies abdominal pain, N/V, diarrhea, or constipation. Denies dysuria. Denies change in appetite. She C/O pain to LLE.     Review of Systems ROS as described in HPI     Physical Exam  Constitutional:       Comments: Sitting in WC   HENT:      Mouth/Throat:      Mouth: Mucous membranes are moist.   Cardiovascular:      Rate and Rhythm: Normal rate.   Pulmonary:      Effort: Pulmonary effort is normal.      Breath sounds: Normal breath sounds.      Comments: RA  Abdominal:      General: Bowel sounds are normal.   Genitourinary:     Comments: Denies dysuria   Musculoskeletal:      Comments: WC transfers   Left BKA- immobilizer intact  PT/OT     Skin:     General: Skin is warm and dry.   Neurological:      Mental Status: She is alert. Mental status is at baseline.   Psychiatric:         Mood and Affect: Mood normal.      Comments: Conversational

## 2024-05-21 NOTE — DISCHARGE SUMMARY
Inpatient Discharge Summary      Admitting Provider: Octavio Jeong MD Discharge Provider: No att. providers found   Admission Date: 4/16/2024   Discharge Date: 4/29/2024    Primary Care Physician at Discharge: Iain Márquez [phone]  Discharge Diagnosis     Patient Active Problem List   Diagnosis    Chronic obstructive pulmonary disease (Multi)    Pneumonia due to infectious organism    Hyperlipidemia    Hypertension    Alcohol dependence with unspecified alcohol-induced disorder (Multi)    Depression    Physical deconditioning    MCGUIRE (dyspnea on exertion)    Septic joint (Multi)    Chronic osteomyelitis involving ankle and foot, left (Multi)    Alcohol withdrawal delirium (Multi)    Critical limb ischemia of right lower extremity (Multi)    Adrenal cortical nodule (Multi)    Critical limb ischemia of left lower extremity (Multi)    PAD (peripheral artery disease) (CMS-HCC)    Constipation    Insomnia    Hypomagnesemia    Hx of BKA, left (Multi)    Pain    Anxiety    H/O ETOH abuse    Hypokalemia        Septic joint (Multi) [M00.9]  Discharge Disposition  Skilled Nursing Facility (SNF)  DETAILS OF HOSPITAL STAY   Presenting Problem  Chronic osteomyelitis involving ankle and foot, left (Multi)  Septic Arthritis  Hospital Course    Operative Procedures Performed  Procedure(s):  Lower Extremity Angiogram  IVUS - Peripheral  Stent - Lower Extremity  BKA  Treatments: antibiotics: Ancef  Consults: ID, general surgery, and orthopedic surgery  Procedures:  angiogram  Adrenal nodules need follow up CT  Outpatient Follow-Up  Future Appointments   Date Time Provider Department Center   5/24/2024  8:45 AM Flako Guerra MD [id number]       Test Results Pending at Discharge  Pending Labs       No current pending labs.                                  Discharge Medications     New Medications   Discharge Medication List as of 4/29/2024 10:52 AM        START taking these medications    Details    acetaminophen (Tylenol) 325 mg tablet Take 2 tablets (650 mg) by mouth every 6 hours if needed for mild pain (1 - 3)., Starting Tue 4/23/2024, No Print      aspirin 81 mg chewable tablet Chew 1 tablet (81 mg) 2 times a day., Starting Tue 4/23/2024, No Print      clopidogrel (Plavix) 75 mg tablet Take 1 tablet (75 mg) by mouth once daily for 363 doses., Starting Sun 4/28/2024, Until Sat 4/26/2025, Normal      !! docusate sodium (Colace) 100 mg capsule Take 1 capsule (100 mg) by mouth 2 times a day for 14 days., Starting Tue 4/23/2024, Until Tue 5/7/2024, No Print      !! docusate sodium (Colace) 100 mg capsule Take 1 capsule (100 mg) by mouth 2 times a day., Starting Sun 4/28/2024, No Print      folic acid (Folvite) 1 mg tablet Take 1 tablet (1 mg) by mouth once daily., Starting Sun 4/28/2024, No Print      melatonin 5 mg tablet Take 1 tablet (5 mg) by mouth as needed at bedtime for sleep., Starting Sun 4/28/2024, No Print      multivitamin with minerals tablet Take 1 tablet by mouth once daily., Starting Sun 4/28/2024, No Print      polyethylene glycol (Glycolax, Miralax) 17 gram packet Take 17 g by mouth once daily., Starting Sun 4/28/2024, No Print      QUEtiapine (SEROquel) 25 mg tablet Take 1 tablet (25 mg) by mouth as needed at bedtime (insomnia)., Starting Sun 4/28/2024, No Print       !! - Potential duplicate medications found. Please discuss with provider.           Medication discontinued during hospitalization   Medications Discontinued During This Encounter   Medication Reason    albuterol 90 mcg/actuation inhaler 2 puff     albuterol 2.5 mg /3 mL (0.083 %) nebulizer solution 2.5 mg     folic acid (Folvite) tablet 1 mg     multivitamin with minerals 1 tablet     thiamine (Vitamin B-1) tablet 100 mg     thiamine (Vitamin B-1) tablet 100 mg     vancomycin in dextrose 5 % (Vancocin) IVPB 750 mg     vancomycin (Vancocin) pharmacy to dose - pharmacy monitoring     busPIRone (Buspar) tablet 30 mg      sertraline (Zoloft) tablet 200 mg     thiamine in dextrose 5% 100 mL  mg     buPROPion (Wellbutrin) tablet 100 mg     sertraline (Zoloft) tablet 100 mg     tobramycin (Nebcin) injection Patient Discharge    vancomycin (Vancocin) vial for injection Patient Discharge    sodium chloride 0.9 % irrigation solution Patient Discharge    ceFAZolin in dextrose (iso-os) (Ancef) IVPB 2 g     lidocaine PF (Xylocaine) 10 mg/mL (1 %) injection 1 mg Patient Transfer    oxygen (O2) therapy Patient Transfer    lactated Ringer's infusion Patient Transfer    oxyCODONE (Roxicodone) immediate release tablet 5 mg Patient Transfer    HYDROmorphone (Dilaudid) injection 0.2 mg Patient Transfer    HYDROmorphone (Dilaudid) injection 0.5 mg Patient Transfer    ondansetron (Zofran) injection 4 mg Patient Transfer    promethazine (Phenergan) 6.25 mg in sodium chloride 0.9% 50 mL IV Patient Transfer    labetaloL (Normodyne,Trandate) injection 5 mg Patient Transfer    hydrALAZINE (Apresoline) injection 5 mg Patient Transfer    albuterol 2.5 mg /3 mL (0.083 %) nebulizer solution 2.5 mg Patient Transfer    ceFAZolin (Ancef) 2 g in dextrose 5 % in water (D5W) 100 mL IV Dose adjustment    polyethylene glycol (Glycolax, Miralax) packet 17 g     oxyCODONE (Roxicodone) 5 mg immediate release tablet     LORazepam (Ativan) tablet 0.5 mg Other    LORazepam (Ativan) tablet 1 mg Other    LORazepam (Ativan) tablet 2 mg Other    iohexol (OMNIPaque) 350 mg iodine/mL solution Patient Discharge    aspirin chewable tablet Patient Discharge    clopidogrel (Plavix) tablet Patient Discharge    hydrALAZINE (Apresoline) injection Patient Discharge    heparin 1,000 unit/mL injection Patient Discharge    lidocaine PF (Xylocaine) 10 mg/mL (1 %) injection Patient Discharge    fentaNYL PF (Sublimaze) injection Patient Discharge    midazolam (Versed) injection Patient Discharge    sodium chloride 0.9% infusion     potassium chloride CR 10 mEq ER tablet     doxepin  (SINEquan) 50 mg capsule Stop Taking at Discharge    aspirin chewable tablet 81 mg Patient Discharge    clopidogrel (Plavix) tablet 75 mg Patient Discharge    oxygen (O2) therapy Patient Discharge    ceFAZolin in dextrose (iso-os) (Ancef) IVPB 2 g Patient Discharge    polyethylene glycol (Glycolax, Miralax) packet 17 g Patient Discharge    docusate sodium (Colace) capsule 100 mg Patient Discharge    QUEtiapine (SEROquel) tablet 25 mg Patient Discharge    sertraline (Zoloft) tablet 50 mg Patient Discharge    buPROPion (Wellbutrin) tablet 100 mg Patient Discharge    lactulose 20 gram/30 mL oral solution 20 g Patient Discharge    multivitamin with minerals 1 tablet Patient Discharge    folic acid (Folvite) tablet 1 mg Patient Discharge    albuterol 2.5 mg /3 mL (0.083 %) nebulizer solution 2.5 mg Patient Discharge    oxyCODONE (Roxicodone) immediate release tablet 5 mg Patient Discharge    oxyCODONE (Roxicodone) immediate release tablet 10 mg Patient Discharge    ondansetron (Zofran) injection 4 mg Patient Discharge    melatonin tablet 4.5 mg Patient Discharge    HYDROmorphone PF (Dilaudid) injection 0.2 mg Patient Discharge    acetaminophen (Tylenol) tablet 650 mg Patient Discharge    pravastatin (Pravachol) tablet 20 mg Patient Discharge    nicotine (Nicoderm CQ) 14 mg/24 hr patch 1 patch Patient Discharge    enoxaparin (Lovenox) syringe 40 mg Patient Discharge    dilTIAZem CD (Cardizem CD) 24 hr capsule 240 mg Patient Discharge    chlorthalidone (Hygroton) tablet 25 mg Patient Discharge    atenolol (Tenormin) tablet 25 mg Patient Discharge      Discharge Medication List as of 4/29/2024 10:52 AM        STOP taking these medications       doxepin (SINEquan) 50 mg capsule Comments:   Reason for Stopping:              Discharge Medication List as of 4/29/2024 10:52 AM        STOP taking these medications       doxepin (SINEquan) 50 mg capsule Comments:   Reason for Stopping:                Medication discontinued during  hospitalization   Medications Discontinued During This Encounter   Medication Reason    albuterol 90 mcg/actuation inhaler 2 puff     albuterol 2.5 mg /3 mL (0.083 %) nebulizer solution 2.5 mg     folic acid (Folvite) tablet 1 mg     multivitamin with minerals 1 tablet     thiamine (Vitamin B-1) tablet 100 mg     thiamine (Vitamin B-1) tablet 100 mg     vancomycin in dextrose 5 % (Vancocin) IVPB 750 mg     vancomycin (Vancocin) pharmacy to dose - pharmacy monitoring     busPIRone (Buspar) tablet 30 mg     sertraline (Zoloft) tablet 200 mg     thiamine in dextrose 5% 100 mL  mg     buPROPion (Wellbutrin) tablet 100 mg     sertraline (Zoloft) tablet 100 mg     tobramycin (Nebcin) injection Patient Discharge    vancomycin (Vancocin) vial for injection Patient Discharge    sodium chloride 0.9 % irrigation solution Patient Discharge    ceFAZolin in dextrose (iso-os) (Ancef) IVPB 2 g     lidocaine PF (Xylocaine) 10 mg/mL (1 %) injection 1 mg Patient Transfer    oxygen (O2) therapy Patient Transfer    lactated Ringer's infusion Patient Transfer    oxyCODONE (Roxicodone) immediate release tablet 5 mg Patient Transfer    HYDROmorphone (Dilaudid) injection 0.2 mg Patient Transfer    HYDROmorphone (Dilaudid) injection 0.5 mg Patient Transfer    ondansetron (Zofran) injection 4 mg Patient Transfer    promethazine (Phenergan) 6.25 mg in sodium chloride 0.9% 50 mL IV Patient Transfer    labetaloL (Normodyne,Trandate) injection 5 mg Patient Transfer    hydrALAZINE (Apresoline) injection 5 mg Patient Transfer    albuterol 2.5 mg /3 mL (0.083 %) nebulizer solution 2.5 mg Patient Transfer    ceFAZolin (Ancef) 2 g in dextrose 5 % in water (D5W) 100 mL IV Dose adjustment    polyethylene glycol (Glycolax, Miralax) packet 17 g     oxyCODONE (Roxicodone) 5 mg immediate release tablet     LORazepam (Ativan) tablet 0.5 mg Other    LORazepam (Ativan) tablet 1 mg Other    LORazepam (Ativan) tablet 2 mg Other    iohexol (OMNIPaque) 350  mg iodine/mL solution Patient Discharge    aspirin chewable tablet Patient Discharge    clopidogrel (Plavix) tablet Patient Discharge    hydrALAZINE (Apresoline) injection Patient Discharge    heparin 1,000 unit/mL injection Patient Discharge    lidocaine PF (Xylocaine) 10 mg/mL (1 %) injection Patient Discharge    fentaNYL PF (Sublimaze) injection Patient Discharge    midazolam (Versed) injection Patient Discharge    sodium chloride 0.9% infusion     potassium chloride CR 10 mEq ER tablet     doxepin (SINEquan) 50 mg capsule Stop Taking at Discharge    aspirin chewable tablet 81 mg Patient Discharge    clopidogrel (Plavix) tablet 75 mg Patient Discharge    oxygen (O2) therapy Patient Discharge    ceFAZolin in dextrose (iso-os) (Ancef) IVPB 2 g Patient Discharge    polyethylene glycol (Glycolax, Miralax) packet 17 g Patient Discharge    docusate sodium (Colace) capsule 100 mg Patient Discharge    QUEtiapine (SEROquel) tablet 25 mg Patient Discharge    sertraline (Zoloft) tablet 50 mg Patient Discharge    buPROPion (Wellbutrin) tablet 100 mg Patient Discharge    lactulose 20 gram/30 mL oral solution 20 g Patient Discharge    multivitamin with minerals 1 tablet Patient Discharge    folic acid (Folvite) tablet 1 mg Patient Discharge    albuterol 2.5 mg /3 mL (0.083 %) nebulizer solution 2.5 mg Patient Discharge    oxyCODONE (Roxicodone) immediate release tablet 5 mg Patient Discharge    oxyCODONE (Roxicodone) immediate release tablet 10 mg Patient Discharge    ondansetron (Zofran) injection 4 mg Patient Discharge    melatonin tablet 4.5 mg Patient Discharge    HYDROmorphone PF (Dilaudid) injection 0.2 mg Patient Discharge    acetaminophen (Tylenol) tablet 650 mg Patient Discharge    pravastatin (Pravachol) tablet 20 mg Patient Discharge    nicotine (Nicoderm CQ) 14 mg/24 hr patch 1 patch Patient Discharge    enoxaparin (Lovenox) syringe 40 mg Patient Discharge    dilTIAZem CD (Cardizem CD) 24 hr capsule 240 mg Patient  Discharge    chlorthalidone (Hygroton) tablet 25 mg Patient Discharge    atenolol (Tenormin) tablet 25 mg Patient Discharge        Medication that have Changed   Discharge Medication List as of 4/29/2024 10:52 AM        CONTINUE these medications which have CHANGED    Details   oxyCODONE (Roxicodone) 5 mg immediate release tablet Take 1 tablet (5 mg) by mouth every 6 hours if needed for severe pain (7 - 10)., Starting Tue 4/23/2024, Print      potassium chloride CR 10 mEq ER tablet Take 2 tablets (20 mEq) by mouth once daily. Do not crush, chew, or split., Starting Sun 4/28/2024, No Print            Discharge Medication List as of 4/29/2024 10:52 AM        CONTINUE these medications which have CHANGED    Details   oxyCODONE (Roxicodone) 5 mg immediate release tablet Take 1 tablet (5 mg) by mouth every 6 hours if needed for severe pain (7 - 10)., Starting Tue 4/23/2024, Print      potassium chloride CR 10 mEq ER tablet Take 2 tablets (20 mEq) by mouth once daily. Do not crush, chew, or split., Starting Sun 4/28/2024, No Print              Discharge Medications      Your medication list        START taking these medications        Instructions Last Dose Given Next Dose Due   acetaminophen 325 mg tablet  Commonly known as: Tylenol      Take 2 tablets (650 mg) by mouth every 6 hours if needed for mild pain (1 - 3).       aspirin 81 mg chewable tablet      Chew 1 tablet (81 mg) 2 times a day.       clopidogrel 75 mg tablet  Commonly known as: Plavix      Take 1 tablet (75 mg) by mouth once daily for 363 doses.       docusate sodium 100 mg capsule  Commonly known as: Colace      Take 1 capsule (100 mg) by mouth 2 times a day for 14 days.       docusate sodium 100 mg capsule  Commonly known as: Colace      Take 1 capsule (100 mg) by mouth 2 times a day.       folic acid 1 mg tablet  Commonly known as: Folvite      Take 1 tablet (1 mg) by mouth once daily.       melatonin 5 mg tablet      Take 1 tablet (5 mg) by mouth as  needed at bedtime for sleep.       multivitamin with minerals tablet      Take 1 tablet by mouth once daily.       oxyCODONE 5 mg immediate release tablet  Commonly known as: Roxicodone      Take 1 tablet (5 mg) by mouth every 6 hours if needed for severe pain (7 - 10).       polyethylene glycol 17 gram packet  Commonly known as: Glycolax, Miralax      Take 17 g by mouth once daily.       QUEtiapine 25 mg tablet  Commonly known as: SEROquel      Take 1 tablet (25 mg) by mouth as needed at bedtime (insomnia).              CHANGE how you take these medications        Instructions Last Dose Given Next Dose Due   potassium chloride CR 10 mEq ER tablet  Commonly known as: Klor-Con  What changed: how much to take      Take 2 tablets (20 mEq) by mouth once daily. Do not crush, chew, or split.              CONTINUE taking these medications        Instructions Last Dose Given Next Dose Due   albuterol 90 mcg/actuation inhaler           atenolol 25 mg tablet  Commonly known as: Tenormin           buPROPion 100 mg tablet  Commonly known as: Wellbutrin           busPIRone 30 mg tablet  Commonly known as: Buspar           chlorthalidone 25 mg tablet  Commonly known as: Hygroton           dilTIAZem  mg 24 hr capsule  Commonly known as: Cardizem CD           pravastatin 20 mg tablet  Commonly known as: Pravachol           sertraline 100 mg tablet  Commonly known as: Zoloft                  STOP taking these medications      doxepin 50 mg capsule  Commonly known as: SINEquan                  Where to Get Your Medications        These medications were sent to [facility]pharmacy #[id number], [place] - [address] [address]      Phone: [phone]   clopidogrel 75 mg tablet       You can get these medications from any pharmacy    Bring a paper prescription for each of these medications  oxyCODONE 5 mg immediate release tablet       Information about where to get these medications is not yet available     Ask your nurse or doctor about these medications  acetaminophen 325 mg tablet  aspirin 81 mg chewable tablet  docusate sodium 100 mg capsule  docusate sodium 100 mg capsule  folic acid 1 mg tablet  melatonin 5 mg tablet  multivitamin with minerals tablet  polyethylene glycol 17 gram packet  potassium chloride CR 10 mEq ER tablet  QUEtiapine 25 mg tablet          Outpatient Follow-Up  Future Appointments   Date Time Provider Department Center   5/24/2024  8:45 AM Flako Guerra MD [id number] [facility]  [address] [place] [address], [address] [place] [address]  Office Phone [phone]

## 2024-05-24 ENCOUNTER — OFFICE VISIT (OUTPATIENT)
Dept: ORTHOPEDIC SURGERY | Facility: CLINIC | Age: 69
End: 2024-05-24
Payer: COMMERCIAL

## 2024-05-24 DIAGNOSIS — Z89.512 HX OF BKA, LEFT (MULTI): ICD-10-CM

## 2024-05-24 DIAGNOSIS — Z91.89 AT RISK FOR INFECTION ASSOCIATED WITH WOUND: ICD-10-CM

## 2024-05-24 DIAGNOSIS — I70.222 CRITICAL LIMB ISCHEMIA OF LEFT LOWER EXTREMITY (MULTI): ICD-10-CM

## 2024-05-24 PROCEDURE — 1159F MED LIST DOCD IN RCRD: CPT | Performed by: STUDENT IN AN ORGANIZED HEALTH CARE EDUCATION/TRAINING PROGRAM

## 2024-05-24 PROCEDURE — 1111F DSCHRG MED/CURRENT MED MERGE: CPT | Performed by: STUDENT IN AN ORGANIZED HEALTH CARE EDUCATION/TRAINING PROGRAM

## 2024-05-24 PROCEDURE — 1160F RVW MEDS BY RX/DR IN RCRD: CPT | Performed by: STUDENT IN AN ORGANIZED HEALTH CARE EDUCATION/TRAINING PROGRAM

## 2024-05-24 PROCEDURE — 99024 POSTOP FOLLOW-UP VISIT: CPT | Performed by: STUDENT IN AN ORGANIZED HEALTH CARE EDUCATION/TRAINING PROGRAM

## 2024-05-24 RX ORDER — SULFAMETHOXAZOLE AND TRIMETHOPRIM 800; 160 MG/1; MG/1
1 TABLET ORAL 2 TIMES DAILY
Qty: 28 TABLET | Refills: 0 | Status: SHIPPED | OUTPATIENT
Start: 2024-05-24 | End: 2024-06-07

## 2024-05-24 NOTE — PROGRESS NOTES
ORTHOPAEDIC SURGERY OUTPATIENT PROGRESS NOTE    Chief Complaint:  Left bka wound check    History Of Present Illness  Rose Marie Toussaint is a 68 y.o. female with a past medical history of alcohol and tobacco abuse s/p L Ankle Bi-malleolar ankle fracture ORIF with syndesmotic stabilization with Dr. Frederick from 11/20/2022 with hardware failure, draining wounds concerning for OM s/p L BKA from 04/22/2024 and L EIA stent with interventional cardiology form 04/25/2024 well known to me at this time who presents for wound check.  Patient reports 8 out of 10 pain in her leg.  She has been residing in an SNF.  Patient reports that she has been compliant with tobacco and alcohol cessation at this time.  She has been compliant with nonweightbearing restrictions.  She is on aspirin and Plavix.    05/10/2024: Patient returns for follow-up as above.  She has continued to reside in an SNF.  She is present with a team member from the facility.  She has been compliant with nonweightbearing restrictions in a short leg fiberglass cast.  She continues on aspirin and clopidogrel.  She has been without fevers, chills or night sweats.    05/20/2024: Patient returns for follow-up as above.  She continues to reside in a SNF.  She was seen in wound care last week, there was concern for infection by review of the documentation,  However no follow-up was scheduled.  Patient now following with wound care at her facility.  She has had no fevers, chills or night sweats.  She is reporting severe pain in her leg.  She has been compliant with tobacco and alcohol cessation while in the facility.  She is here with a representative from the facility.     Past Medical History  Past Medical History:   Diagnosis Date    Cognitive disorder     COPD (chronic obstructive pulmonary disease) (Multi)     Depression     Hypertension     Substance addiction (Multi)        Surgical History  Recent Surgeries in Orthopaedic Surgery            No cases to display              Social History  Social History     Socioeconomic History    Marital status:      Spouse name: Not on file    Number of children: Not on file    Years of education: Not on file    Highest education level: Not on file   Occupational History    Not on file   Tobacco Use    Smoking status: Every Day     Current packs/day: 0.50     Average packs/day: 0.5 packs/day for 0.1 years (0.1 ttl pk-yrs)     Types: Cigarettes     Start date: 4/13/2024    Smokeless tobacco: Current   Substance and Sexual Activity    Alcohol use: Not on file    Drug use: Not on file    Sexual activity: Not on file   Other Topics Concern    Not on file   Social History Narrative    Not on file     Social Determinants of Health     Financial Resource Strain: Low Risk  (4/16/2024)    Overall Financial Resource Strain (CARDIA)     Difficulty of Paying Living Expenses: Not very hard   Food Insecurity: Not on file   Transportation Needs: No Transportation Needs (4/16/2024)    PRAPARE - Transportation     Lack of Transportation (Medical): No     Lack of Transportation (Non-Medical): No   Physical Activity: Not on file   Stress: Not on file   Social Connections: Not on file   Intimate Partner Violence: Not on file   Housing Stability: Low Risk  (4/16/2024)    Housing Stability Vital Sign     Unable to Pay for Housing in the Last Year: No     Number of Places Lived in the Last Year: 1     Unstable Housing in the Last Year: No   Recent Concern: Housing Stability - High Risk (4/14/2024)    Housing Stability Vital Sign     Unable to Pay for Housing in the Last Year: Yes     Number of Places Lived in the Last Year: 1     Unstable Housing in the Last Year: No       Family History  Family History   Problem Relation Name Age of Onset    Hypertension Mother      Heart attack Father          Allergies  Allergies   Allergen Reactions    Penicillin Unknown    Penicillins Hives       Review of Systems  REVIEW OF SYSTEMS  Constitutional: no unplanned  weight loss  Psychiatric: no suicidal ideation  ENT: no vision changes, no sinus problems  Pulmonary: no shortness of breath  Lymphatic: no enlarged lymph nodes  Cardiovascular: no chest pain or shortness of breath  Gastrointestinal: no stomach problems  Genitourinary: no dysuria   Skin: no rashes  Endocrine: no thyroid problems  Neurological: no headache, no numbness  Hematological: no easy bruising  Musculoskeletal: Left BKA stump pain     Physical Exam  PHYSICAL EXAMINATION  Constitutional Exam: Patient does not appear her physiologic age, she is frail appearing  Psychiatric Exam: alert and oriented, appropriate mood and behavior  Eye Exam: EOMI  Pulmonary Exam: breathing non-labored, no apparent distress  Lymphatic exam: no appreciable lymphadenopathy in the lower extremities  Cardiovascular exam: RRR to peripheral palpation, DP pulses 2+, PT 2+, toes are pink with good capillary refill, no pitting edema  Skin exam: no open lesions, rashes, abrasions or ulcerations  Neurological exam: sensation to light touch intact in both lower extremities in peripheral and dermatomal distributions (except for any abnormalities noted in musculoskeletal exam)    Musculoskeletal exam: Left lower extremity examination.  Continued diffuse tenderness to palpation about the BKA stump, although somewhat less noticeable than previous examination.  There is a stable appearing eschar from the most posterior medial wound edge to the middle one third centrally.  There appears to be stable granulation tissue deep.  There is no obvious progression of clyde-incisional erythema or flap necrosis.  There is no obvious fluctuance or drainage.  Patient otherwise has sensation intact light touch at the distal stump.  She is able to appropriately flex and extend her knee.  The remainder of her extremity remains warm and well-perfused.     Last Recorded Vitals  There were no vitals taken for this visit.    Laboratory Results  No results found for  this or any previous visit (from the past 24 hour(s)).     Radiology Results  No new imaging at this visit.    Assessment/Plan:  69 y/o F with a past medical history of alcohol and tobacco abuse s/p L Ankle Bi-malleolar ankle fracture ORIF with syndesmotic stabilization with Dr. Frederick from 11/20/2022 with hardware failure, draining wounds concerning for OM s/p L BKA from 04/22/2024 and L EIA stent with interventional cardiology form 04/25/2024 well known to me at this time who presents for wound check.  I again had a pako discussion with the patient regarding the appearance of her wound, clinically this does appear to be a stable eschar and I would recommend allowing the wound to continue to declare itself.  I will order inflammatory markers to at least establish a baseline and rule out the possibility of a deeper abscess, I will cover her prophylactically with antibiotics as the wound has not completely healed.  I do anticipate continued need for local wound care and reviewed that if this does not continue to heal or if there is pako evidence of infection that we will have to return to the operating room for a repeat debridement and closure.  I have encouraged the patient to contact the office if she develops any new pain, worsening symptoms including fevers, chills or night sweats or if she has any further questions.  I will plan to see the patient back next week for a discussion regarding next treatment steps.  Upon return, patient would not require further imaging.    Flako Guerra MD, ALEJA  Department of Orthopaedic Surgery  [facility]    The diagnosis and treatment plan were reviewed with the patient. All questions were answered. The patient verbalized understanding of the treatment plan. There were no barriers to understanding identified.    Note dictated with [employer] software.  Completed without full type editing and sent to avoid delay.

## 2024-05-25 ENCOUNTER — TELEPHONE (OUTPATIENT)
Dept: ORTHOPEDIC SURGERY | Facility: HOSPITAL | Age: 69
End: 2024-05-25
Payer: COMMERCIAL

## 2024-05-25 NOTE — TELEPHONE ENCOUNTER
I received a telephone call to the answering service from the patient and bedside nurse from her skilled nursing facility.  The inflammatory markers have resulted which were both elevated, white blood count remains within normal limits.  Nursing reflected concern regarding a dehiscence of a portion of the wound.  I recommended immediate presentation to the [facility] ER for evaluation.    Flako Guerra MD, ALEJA  Department of Orthopaedic Surgery  [facility]

## 2024-05-31 ENCOUNTER — OFFICE VISIT (OUTPATIENT)
Dept: ORTHOPEDIC SURGERY | Facility: CLINIC | Age: 69
End: 2024-05-31
Payer: COMMERCIAL

## 2024-05-31 DIAGNOSIS — Z89.512 HX OF BKA, LEFT (MULTI): Primary | ICD-10-CM

## 2024-05-31 DIAGNOSIS — I70.222 CRITICAL LIMB ISCHEMIA OF LEFT LOWER EXTREMITY (MULTI): ICD-10-CM

## 2024-05-31 DIAGNOSIS — F10.11 H/O ETOH ABUSE: ICD-10-CM

## 2024-05-31 DIAGNOSIS — Z72.0 TOBACCO ABUSE: ICD-10-CM

## 2024-05-31 PROCEDURE — 99024 POSTOP FOLLOW-UP VISIT: CPT | Performed by: STUDENT IN AN ORGANIZED HEALTH CARE EDUCATION/TRAINING PROGRAM

## 2024-05-31 PROCEDURE — 1159F MED LIST DOCD IN RCRD: CPT | Performed by: STUDENT IN AN ORGANIZED HEALTH CARE EDUCATION/TRAINING PROGRAM

## 2024-05-31 PROCEDURE — 1160F RVW MEDS BY RX/DR IN RCRD: CPT | Performed by: STUDENT IN AN ORGANIZED HEALTH CARE EDUCATION/TRAINING PROGRAM

## 2024-05-31 NOTE — PROGRESS NOTES
ORTHOPAEDIC SURGERY OUTPATIENT PROGRESS NOTE    Chief Complaint:  Left bka wound check    History Of Present Illness  Rose Marie Toussaint is a 68 y.o. female with a past medical history of alcohol and tobacco abuse s/p L Ankle Bi-malleolar ankle fracture ORIF with syndesmotic stabilization with Dr. Frederick from 11/20/2022 with hardware failure, draining wounds concerning for OM s/p L BKA from 04/22/2024 and L EIA stent with interventional cardiology form 04/25/2024 well known to me at this time who presents for wound check.  Patient reports 8 out of 10 pain in her leg.  She has been residing in an SNF.  Patient reports that she has been compliant with tobacco and alcohol cessation at this time.  She has been compliant with nonweightbearing restrictions.  She is on aspirin and Plavix.    05/10/2024: Patient returns for follow-up as above.  She has continued to reside in an SNF.  She is present with a team member from the facility.  She has been compliant with nonweightbearing restrictions in a short leg fiberglass cast.  She continues on aspirin and clopidogrel.  She has been without fevers, chills or night sweats.    05/20/2024: Patient returns for follow-up as above.  She continues to reside in a SNF.  She was seen in wound care last week, there was concern for infection by review of the documentation,  However no follow-up was scheduled.  Patient now following with wound care at her facility.  She has had no fevers, chills or night sweats.  She is reporting severe pain in her leg.  She has been compliant with tobacco and alcohol cessation while in the facility.  She is here with a representative from the facility.    05/31/2024: Patient returns for follow-up as above.  She continues to she has been receiving weekly wound care in her facility including Santyl.  Reside in an SNF.  As per my telephone encounter from last week, she has had some elevation of her inflammatory markers and has been on p.o. antibiotics.   She has not had any increased drainage, fevers, chills or night sweats.  She does appear more anxious in evaluation today.     Past Medical History  Past Medical History:   Diagnosis Date    Cognitive disorder     COPD (chronic obstructive pulmonary disease) (Multi)     Depression     Hypertension     Substance addiction (Multi)        Surgical History  Recent Surgeries in Orthopaedic Surgery            No cases to display             Social History  Social History     Socioeconomic History    Marital status:      Spouse name: Not on file    Number of children: Not on file    Years of education: Not on file    Highest education level: Not on file   Occupational History    Not on file   Tobacco Use    Smoking status: Every Day     Current packs/day: 0.50     Average packs/day: 0.5 packs/day for 0.1 years (0.1 ttl pk-yrs)     Types: Cigarettes     Start date: 4/13/2024    Smokeless tobacco: Current   Substance and Sexual Activity    Alcohol use: Not on file    Drug use: Not on file    Sexual activity: Not on file   Other Topics Concern    Not on file   Social History Narrative    Not on file     Social Determinants of Health     Financial Resource Strain: Low Risk  (4/16/2024)    Overall Financial Resource Strain (CARDIA)     Difficulty of Paying Living Expenses: Not very hard   Food Insecurity: Not on file   Transportation Needs: No Transportation Needs (4/16/2024)    PRAPARE - Transportation     Lack of Transportation (Medical): No     Lack of Transportation (Non-Medical): No   Physical Activity: Not on file   Stress: Not on file   Social Connections: Not on file   Intimate Partner Violence: Not on file   Housing Stability: Low Risk  (4/16/2024)    Housing Stability Vital Sign     Unable to Pay for Housing in the Last Year: No     Number of Places Lived in the Last Year: 1     Unstable Housing in the Last Year: No   Recent Concern: Housing Stability - High Risk (4/14/2024)    Housing Stability Vital Sign      Unable to Pay for Housing in the Last Year: Yes     Number of Places Lived in the Last Year: 1     Unstable Housing in the Last Year: No       Family History  Family History   Problem Relation Name Age of Onset    Hypertension Mother      Heart attack Father          Allergies  Allergies   Allergen Reactions    Penicillin Unknown    Penicillins Hives       Review of Systems  REVIEW OF SYSTEMS  Constitutional: no unplanned weight loss  Psychiatric: no suicidal ideation  ENT: no vision changes, no sinus problems  Pulmonary: no shortness of breath  Lymphatic: no enlarged lymph nodes  Cardiovascular: no chest pain or shortness of breath  Gastrointestinal: no stomach problems  Genitourinary: no dysuria   Skin: no rashes  Endocrine: no thyroid problems  Neurological: no headache, no numbness  Hematological: no easy bruising  Musculoskeletal: Left BKA stump pain     Physical Exam  PHYSICAL EXAMINATION  Constitutional Exam: Patient does not appear her physiologic age, she is frail appearing  Psychiatric Exam: alert and oriented, appropriate mood and behavior  Eye Exam: EOMI  Pulmonary Exam: breathing non-labored, no apparent distress  Lymphatic exam: no appreciable lymphadenopathy in the lower extremities  Cardiovascular exam: RRR to peripheral palpation, DP pulses 2+, PT 2+, toes are pink with good capillary refill, no pitting edema  Skin exam: no open lesions, rashes, abrasions or ulcerations  Neurological exam: sensation to light touch intact in both lower extremities in peripheral and dermatomal distributions (except for any abnormalities noted in musculoskeletal exam)    Musculoskeletal exam: Left lower extremity examination.  Patient continues with diffuse tenderness to palpation about her BKA stump.  There is continued note of stable appearing eschar from the medial wound edge to the middle one third.  There appears to be stable appearing granulation tissue deep to the eschar.  Suture line well-approximated.  There  is mild associated erythema, without obvious progression from previous examination.  There is no progressive flap necrosis and no obvious drainage. Patient otherwise has sensation intact light touch at the distal stump.  She is able to appropriately flex and extend her knee.  The remainder of her extremity remains warm and well-perfused.     Last Recorded Vitals  There were no vitals taken for this visit.    Laboratory Results  No results found for this or any previous visit (from the past 24 hour(s)).     Radiology Results  No new imaging at this visit.    Assessment/Plan:  67 y/o F with a past medical history of alcohol and tobacco abuse s/p L Ankle Bi-malleolar ankle fracture ORIF with syndesmotic stabilization with Dr. Frederick from 11/20/2022 with hardware failure, draining wounds concerning for OM s/p L BKA from 04/22/2024 and L EIA stent with interventional cardiology form 04/25/2024 well known to me at this time who presents for wound check.  I had a pako discussion with the patient, based on her inflammatory markers I am concerned that she has at least a superficial infection in addition to her delayed wound healing, without obvious deeper infection.  I would recommend continued treatment with oral antibiotics and wound care.  Patient has been reportedly compliant with tobacco and alcohol cessation efforts and I again congratulated her for this effort today.  I have encouraged the patient to contact the office if she develops any new pain, worsening symptoms, fever, chills or night sweats.  I will plan to see the patient back in 2 weeks to follow her clinical course closely, particularly after she completes her antibiotics.  I did discuss that if she has any clinical worsening that she would require return trip to the operating room for I&D.  Upon return, patient would not require further imaging.    Flako Guerra MD, ALEJA  Department of Orthopaedic Surgery  [facility]    The diagnosis and treatment plan were reviewed with the patient. All questions were answered. The patient verbalized understanding of the treatment plan. There were no barriers to understanding identified.    Note dictated with [employer] software.  Completed without full type editing and sent to avoid delay.

## 2024-06-14 ENCOUNTER — OFFICE VISIT (OUTPATIENT)
Dept: ORTHOPEDIC SURGERY | Facility: CLINIC | Age: 69
End: 2024-06-14
Payer: COMMERCIAL

## 2024-06-14 DIAGNOSIS — I70.222 CRITICAL LIMB ISCHEMIA OF LEFT LOWER EXTREMITY (MULTI): ICD-10-CM

## 2024-06-14 DIAGNOSIS — Z89.512 HX OF BKA, LEFT (MULTI): ICD-10-CM

## 2024-06-14 DIAGNOSIS — Z91.89 AT RISK FOR INFECTION ASSOCIATED WITH WOUND: ICD-10-CM

## 2024-06-14 PROCEDURE — 99024 POSTOP FOLLOW-UP VISIT: CPT | Performed by: STUDENT IN AN ORGANIZED HEALTH CARE EDUCATION/TRAINING PROGRAM

## 2024-06-14 NOTE — PROGRESS NOTES
ORTHOPAEDIC SURGERY OUTPATIENT PROGRESS NOTE    Chief Complaint:  Left bka wound check    History Of Present Illness  Rose Marie Toussaint is a 68 y.o. female with a past medical history of alcohol and tobacco abuse s/p L Ankle Bi-malleolar ankle fracture ORIF with syndesmotic stabilization with Dr. Frederick from 11/20/2022 with hardware failure, draining wounds concerning for OM s/p L BKA from 04/22/2024 and L EIA stent with interventional cardiology form 04/25/2024 well known to me at this time who presents for wound check.  Patient reports 8 out of 10 pain in her leg.  She has been residing in an SNF.  Patient reports that she has been compliant with tobacco and alcohol cessation at this time.  She has been compliant with nonweightbearing restrictions.  She is on aspirin and Plavix.    05/10/2024: Patient returns for follow-up as above.  She has continued to reside in an SNF.  She is present with a team member from the facility.  She has been compliant with nonweightbearing restrictions in a short leg fiberglass cast.  She continues on aspirin and clopidogrel.  She has been without fevers, chills or night sweats.    05/20/2024: Patient returns for follow-up as above.  She continues to reside in a SNF.  She was seen in wound care last week, there was concern for infection by review of the documentation,  However no follow-up was scheduled.  Patient now following with wound care at her facility.  She has had no fevers, chills or night sweats.  She is reporting severe pain in her leg.  She has been compliant with tobacco and alcohol cessation while in the facility.  She is here with a representative from the facility.    05/31/2024: Patient returns for follow-up as above.  She continues to she has been receiving weekly wound care in her facility including Santyl.  Reside in an SNF.  As per my telephone encounter from last week, she has had some elevation of her inflammatory markers and has been on p.o. antibiotics.   She has not had any increased drainage, fevers, chills or night sweats.  She does appear more anxious in evaluation today.    06/14/2024: Patient returns for follow-up as above.  She continues with local wound care in her facility.  She continues to reside in a SNF.  She reports severe pain and has been compliant with weightbearing restrictions.  She remains quite appreciative for my participation in her care.     Past Medical History  Past Medical History:   Diagnosis Date    Cognitive disorder     COPD (chronic obstructive pulmonary disease) (Multi)     Depression     Hypertension     Substance addiction (Multi)        Surgical History  Recent Surgeries in Orthopaedic Surgery            No cases to display             Social History  Social History     Socioeconomic History    Marital status:      Spouse name: Not on file    Number of children: Not on file    Years of education: Not on file    Highest education level: Not on file   Occupational History    Not on file   Tobacco Use    Smoking status: Every Day     Current packs/day: 0.50     Average packs/day: 0.5 packs/day for 0.2 years (0.1 ttl pk-yrs)     Types: Cigarettes     Start date: 4/13/2024    Smokeless tobacco: Current   Substance and Sexual Activity    Alcohol use: Not on file    Drug use: Not on file    Sexual activity: Not on file   Other Topics Concern    Not on file   Social History Narrative    Not on file     Social Determinants of Health     Financial Resource Strain: Low Risk  (4/16/2024)    Overall Financial Resource Strain (CARDIA)     Difficulty of Paying Living Expenses: Not very hard   Food Insecurity: Not on file   Transportation Needs: No Transportation Needs (4/16/2024)    PRAPARE - Transportation     Lack of Transportation (Medical): No     Lack of Transportation (Non-Medical): No   Physical Activity: Not on file   Stress: Not on file   Social Connections: Not on file   Intimate Partner Violence: Not on file   Housing Stability:  Low Risk  (4/16/2024)    Housing Stability Vital Sign     Unable to Pay for Housing in the Last Year: No     Number of Places Lived in the Last Year: 1     Unstable Housing in the Last Year: No   Recent Concern: Housing Stability - High Risk (4/14/2024)    Housing Stability Vital Sign     Unable to Pay for Housing in the Last Year: Yes     Number of Places Lived in the Last Year: 1     Unstable Housing in the Last Year: No       Family History  Family History   Problem Relation Name Age of Onset    Hypertension Mother      Heart attack Father          Allergies  Allergies   Allergen Reactions    Penicillin Unknown    Penicillins Hives       Review of Systems  REVIEW OF SYSTEMS  Constitutional: no unplanned weight loss  Psychiatric: no suicidal ideation  ENT: no vision changes, no sinus problems  Pulmonary: no shortness of breath  Lymphatic: no enlarged lymph nodes  Cardiovascular: no chest pain or shortness of breath  Gastrointestinal: no stomach problems  Genitourinary: no dysuria   Skin: no rashes  Endocrine: no thyroid problems  Neurological: no headache, no numbness  Hematological: no easy bruising  Musculoskeletal: Left BKA stump pain     Physical Exam  PHYSICAL EXAMINATION  Constitutional Exam: Patient does not appear her physiologic age, she is frail appearing  Psychiatric Exam: alert and oriented, appropriate mood and behavior  Eye Exam: EOMI  Pulmonary Exam: breathing non-labored, no apparent distress  Lymphatic exam: no appreciable lymphadenopathy in the lower extremities  Cardiovascular exam: RRR to peripheral palpation, DP pulses 2+, PT 2+, toes are pink with good capillary refill, no pitting edema  Skin exam: no open lesions, rashes, abrasions or ulcerations  Neurological exam: sensation to light touch intact in both lower extremities in peripheral and dermatomal distributions (except for any abnormalities noted in musculoskeletal exam)    Musculoskeletal exam: Left lower extremity examination.  Patient  has obvious tenderness to palpation diffusely about her BKA stump.  There is interval mild dehiscence of the medial to middle one third of the wound edge with stable appearing granulation tissue noted.  There is appropriate clyde-incisional erythema without induration, fluctuance, drainage or expressible drainage noted.  There is no noted progressive flap necrosis. Patient otherwise has sensation intact light touch at the distal stump.  She is able to appropriately flex and extend her knee.  The remainder of her extremity remains warm and well-perfused.     Last Recorded Vitals  There were no vitals taken for this visit.    Laboratory Results  No results found for this or any previous visit (from the past 24 hour(s)).     Radiology Results  No new imaging at this visit.    Assessment/Plan:  69 y/o F with a past medical history of alcohol and tobacco abuse s/p L Ankle Bi-malleolar ankle fracture ORIF with syndesmotic stabilization with Dr. Frederick from 11/20/2022 with hardware failure, draining wounds concerning for OM s/p L BKA from 04/22/2024 and L EIA stent with interventional cardiology form 04/25/2024 well known to me at this time who presents with delayed wound healing currently undergoing local wound care.  I will recommend the patient continue nonweightbearing in her left lower extremity.  She will continue with her local wound care and I will provide her with a referral for formal wound care as she is anticipating transitioning out of the facility shortly.  I have encouraged her to continue with her alcohol and tobacco cessation to encourage wound healing.  I discussed that she will remain at increased risk of infection and wound healing problems as the wound attempts to secondarily granulate.  I have encouraged the patient to contact the office if she develops any worsening pain, increased redness, fevers, chills night sweats or present to ER for evaluation.  I will plan to see the patient back in  approximately 1 month for repeat clinical evaluation.  Upon return, patient would not require further imaging.    Flako Guerra MD, ALEJA  Department of Orthopaedic Surgery  [facility]    The diagnosis and treatment plan were reviewed with the patient. All questions were answered. The patient verbalized understanding of the treatment plan. There were no barriers to understanding identified.    Note dictated with [employer] software.  Completed without full type editing and sent to avoid delay.

## 2024-06-25 ENCOUNTER — APPOINTMENT (OUTPATIENT)
Dept: WOUND CARE | Facility: HOSPITAL | Age: 69
End: 2024-06-25
Payer: COMMERCIAL

## 2024-07-01 ENCOUNTER — APPOINTMENT (OUTPATIENT)
Dept: WOUND CARE | Facility: HOSPITAL | Age: 69
End: 2024-07-01
Payer: COMMERCIAL

## 2024-07-12 ENCOUNTER — APPOINTMENT (OUTPATIENT)
Dept: ORTHOPEDIC SURGERY | Facility: CLINIC | Age: 69
End: 2024-07-12
Payer: COMMERCIAL

## 2024-07-19 ENCOUNTER — APPOINTMENT (OUTPATIENT)
Dept: ORTHOPEDIC SURGERY | Facility: CLINIC | Age: 69
End: 2024-07-19
Payer: COMMERCIAL

## 2024-07-26 ENCOUNTER — OFFICE VISIT (OUTPATIENT)
Dept: ORTHOPEDIC SURGERY | Facility: CLINIC | Age: 69
End: 2024-07-26
Payer: COMMERCIAL

## 2024-07-26 DIAGNOSIS — Z89.512 HX OF BKA, LEFT (MULTI): Primary | ICD-10-CM

## 2024-07-26 DIAGNOSIS — F10.11 H/O ETOH ABUSE: ICD-10-CM

## 2024-07-26 DIAGNOSIS — I70.222 CRITICAL LIMB ISCHEMIA OF LEFT LOWER EXTREMITY (MULTI): ICD-10-CM

## 2024-07-26 PROCEDURE — 99212 OFFICE O/P EST SF 10 MIN: CPT | Performed by: STUDENT IN AN ORGANIZED HEALTH CARE EDUCATION/TRAINING PROGRAM

## 2024-07-26 ASSESSMENT — PAIN - FUNCTIONAL ASSESSMENT: PAIN_FUNCTIONAL_ASSESSMENT: 0-10

## 2024-07-26 ASSESSMENT — PAIN DESCRIPTION - DESCRIPTORS: DESCRIPTORS: OTHER (COMMENT)

## 2024-07-26 ASSESSMENT — PAIN SCALES - GENERAL: PAINLEVEL_OUTOF10: 5 - MODERATE PAIN

## 2024-07-26 NOTE — PROGRESS NOTES
ORTHOPAEDIC SURGERY OUTPATIENT PROGRESS NOTE    Chief Complaint:  Left bka wound check    History Of Present Illness  Rose Marie Toussaint is a 68 y.o. female with a past medical history of alcohol and tobacco abuse s/p L Ankle Bi-malleolar ankle fracture ORIF with syndesmotic stabilization with Dr. Frederick from 11/20/2022 with hardware failure, draining wounds concerning for OM s/p L BKA from 04/22/2024 and L EIA stent with interventional cardiology form 04/25/2024 well known to me at this time who presents for wound check.  Patient reports 8 out of 10 pain in her leg.  She has been residing in an SNF.  Patient reports that she has been compliant with tobacco and alcohol cessation at this time.  She has been compliant with nonweightbearing restrictions.  She is on aspirin and Plavix.    05/10/2024: Patient returns for follow-up as above.  She has continued to reside in an SNF.  She is present with a team member from the facility.  She has been compliant with nonweightbearing restrictions in a short leg fiberglass cast.  She continues on aspirin and clopidogrel.  She has been without fevers, chills or night sweats.    05/20/2024: Patient returns for follow-up as above.  She continues to reside in a SNF.  She was seen in wound care last week, there was concern for infection by review of the documentation,  However no follow-up was scheduled.  Patient now following with wound care at her facility.  She has had no fevers, chills or night sweats.  She is reporting severe pain in her leg.  She has been compliant with tobacco and alcohol cessation while in the facility.  She is here with a representative from the facility.    05/31/2024: Patient returns for follow-up as above.  She continues to she has been receiving weekly wound care in her facility including Santyl.  Reside in an SNF.  As per my telephone encounter from last week, she has had some elevation of her inflammatory markers and has been on p.o. antibiotics.   She has not had any increased drainage, fevers, chills or night sweats.  She does appear more anxious in evaluation today.    06/14/2024: Patient returns for follow-up as above.  She continues with local wound care in her facility.  She continues to reside in a SNF.  She reports severe pain and has been compliant with weightbearing restrictions.  She remains quite appreciative for my participation in her care.    07/26/2024: Patient returns for follow-up as above.  She continues to reside in her facility.  She has missed a few visits due to transportation issues and unfortunately, her  passed away.  She is managing relatively well despite this.  She had a fall yesterday directly onto her amputation site, there was no drainage or significant increase in pain.  She is reporting overall 5 out of 10 pain that is improving.  She denies new fevers, chills or night sweats.  She has smoked 1/2 cigarette the day following her 's passing but has abstained from alcohol use.  She has been discharged from wound care in the facility.     Past Medical History  Past Medical History:   Diagnosis Date    Cognitive disorder     COPD (chronic obstructive pulmonary disease) (Multi)     Depression     Hypertension     Substance addiction (Multi)        Surgical History  Recent Surgeries in Orthopaedic Surgery            No cases to display             Social History  Social History     Socioeconomic History    Marital status:    Tobacco Use    Smoking status: Every Day     Current packs/day: 0.50     Average packs/day: 0.5 packs/day for 0.3 years (0.1 ttl pk-yrs)     Types: Cigarettes     Start date: 4/13/2024    Smokeless tobacco: Current     Social Determinants of Health     Financial Resource Strain: Low Risk  (4/16/2024)    Overall Financial Resource Strain (CARDIA)     Difficulty of Paying Living Expenses: Not very hard   Transportation Needs: No Transportation Needs (4/16/2024)    PRAPARE - Transportation      Lack of Transportation (Medical): No     Lack of Transportation (Non-Medical): No   Housing Stability: Low Risk  (4/16/2024)    Housing Stability Vital Sign     Unable to Pay for Housing in the Last Year: No     Number of Places Lived in the Last Year: 1     Unstable Housing in the Last Year: No   Recent Concern: Housing Stability - High Risk (4/14/2024)    Housing Stability Vital Sign     Unable to Pay for Housing in the Last Year: Yes     Number of Places Lived in the Last Year: 1     Unstable Housing in the Last Year: No       Family History  Family History   Problem Relation Name Age of Onset    Hypertension Mother      Heart attack Father          Allergies  Allergies   Allergen Reactions    Penicillin Unknown    Penicillins Hives       Review of Systems  REVIEW OF SYSTEMS  Constitutional: no unplanned weight loss  Psychiatric: no suicidal ideation  ENT: no vision changes, no sinus problems  Pulmonary: no shortness of breath  Lymphatic: no enlarged lymph nodes  Cardiovascular: no chest pain or shortness of breath  Gastrointestinal: no stomach problems  Genitourinary: no dysuria   Skin: no rashes  Endocrine: no thyroid problems  Neurological: no headache, no numbness  Hematological: no easy bruising  Musculoskeletal: Left BKA stump pain     Physical Exam  PHYSICAL EXAMINATION  Constitutional Exam: Patient does not appear her physiologic age, she is frail appearing  Psychiatric Exam: alert and oriented, appropriate mood and behavior  Eye Exam: EOMI  Pulmonary Exam: breathing non-labored, no apparent distress  Lymphatic exam: no appreciable lymphadenopathy in the lower extremities  Cardiovascular exam: RRR to peripheral palpation, DP pulses 2+, PT 2+, toes are pink with good capillary refill, no pitting edema  Skin exam: no open lesions, rashes, abrasions or ulcerations  Neurological exam: sensation to light touch intact in both lower extremities in peripheral and dermatomal distributions (except for any  abnormalities noted in musculoskeletal exam)    Musculoskeletal exam: Left lower extremity examination.  Previous region of medial dehiscence with interval consolidation and healing noted.  There is no noted progressive flap necrosis. Patient otherwise has sensation intact light touch at the distal stump.  She is able to appropriately flex and extend her knee.  The remainder of her extremity remains warm and well-perfused.     Last Recorded Vitals  There were no vitals taken for this visit.    Laboratory Results  No results found for this or any previous visit (from the past 24 hour(s)).     Radiology Results  No new imaging at this visit.    Assessment/Plan:  69 y/o F with a past medical history of alcohol and tobacco abuse s/p L Ankle Bi-malleolar ankle fracture ORIF with syndesmotic stabilization with Dr. Frederick from 11/20/2022 with hardware failure, draining wounds concerning for OM s/p L BKA from 04/22/2024 and L EIA stent with interventional cardiology form 04/25/2024 well known to me at this time who presents with healed wound.  I will provide the patient at this point with a referral for prosthetic and  wear.  She may require additional physical therapy to assist in utilizing these devices.  I have encouraged her again to continue with her alcohol and tobacco cessation and we will plan to see the patient back in approximately 3 months for repeat clinical and radiographic evaluation.  I have encouraged the patient to contact the office if she develops any new pain, worsening symptoms if she has any further questions.  Upon return, patient would require full-length right tibia x-rays.    Flako Guerra MD, ALEJA  Department of Orthopaedic Surgery  [facility]    The diagnosis and treatment plan were reviewed with the patient. All questions were answered. The patient verbalized understanding of the treatment plan. There were no barriers to understanding  identified.    Note dictated with [employer] software.  Completed without full type editing and sent to avoid delay.

## 2024-10-25 ENCOUNTER — OFFICE VISIT (OUTPATIENT)
Dept: ORTHOPEDIC SURGERY | Facility: CLINIC | Age: 69
End: 2024-10-25
Payer: COMMERCIAL

## 2024-10-25 ENCOUNTER — HOSPITAL ENCOUNTER (OUTPATIENT)
Dept: RADIOLOGY | Facility: CLINIC | Age: 69
Discharge: HOME | End: 2024-10-25
Payer: COMMERCIAL

## 2024-10-25 DIAGNOSIS — Z89.512 HX OF BKA, LEFT (MULTI): ICD-10-CM

## 2024-10-25 DIAGNOSIS — Z89.512 HX OF BKA, LEFT (MULTI): Primary | ICD-10-CM

## 2024-10-25 PROCEDURE — 1159F MED LIST DOCD IN RCRD: CPT | Performed by: STUDENT IN AN ORGANIZED HEALTH CARE EDUCATION/TRAINING PROGRAM

## 2024-10-25 PROCEDURE — 73590 X-RAY EXAM OF LOWER LEG: CPT | Mod: LT

## 2024-10-25 PROCEDURE — 99213 OFFICE O/P EST LOW 20 MIN: CPT | Performed by: STUDENT IN AN ORGANIZED HEALTH CARE EDUCATION/TRAINING PROGRAM

## 2024-10-25 PROCEDURE — 1125F AMNT PAIN NOTED PAIN PRSNT: CPT | Performed by: STUDENT IN AN ORGANIZED HEALTH CARE EDUCATION/TRAINING PROGRAM

## 2024-10-25 RX ORDER — GABAPENTIN 300 MG/1
300 CAPSULE ORAL NIGHTLY
Qty: 30 CAPSULE | Refills: 11 | Status: CANCELLED | OUTPATIENT
Start: 2024-10-25 | End: 2025-10-25

## 2024-10-25 RX ORDER — GABAPENTIN 300 MG/1
300 CAPSULE ORAL NIGHTLY
Qty: 60 CAPSULE | Refills: 0 | Status: SHIPPED | OUTPATIENT
Start: 2024-10-25 | End: 2024-12-24

## 2024-10-25 ASSESSMENT — PAIN SCALES - GENERAL: PAINLEVEL_OUTOF10: 8

## 2024-10-25 ASSESSMENT — PAIN DESCRIPTION - DESCRIPTORS: DESCRIPTORS: SHOOTING;STABBING

## 2024-10-25 ASSESSMENT — PAIN - FUNCTIONAL ASSESSMENT: PAIN_FUNCTIONAL_ASSESSMENT: 0-10

## 2024-10-25 NOTE — PATIENT INSTRUCTIONS
[facility]: [phone]    [facility]: [phone]    [facility]: [phone]    [facility]:  [phone]

## 2024-10-25 NOTE — PROGRESS NOTES
ORTHOPAEDIC SURGERY OUTPATIENT PROGRESS NOTE    Chief Complaint:  Left bka wound check    History Of Present Illness  Rose Marie Toussaint is a 69 y.o. female with a past medical history of alcohol and tobacco abuse s/p L Ankle Bi-malleolar ankle fracture ORIF with syndesmotic stabilization with Dr. Frederick from 11/20/2022 with hardware failure, draining wounds concerning for OM s/p L BKA from 04/22/2024 and L EIA stent with interventional cardiology form 04/25/2024 well known to me at this time who presents for wound check.  Patient reports 8 out of 10 pain in her leg.  She has been residing in an SNF.  Patient reports that she has been compliant with tobacco and alcohol cessation at this time.  She has been compliant with nonweightbearing restrictions.  She is on aspirin and Plavix.    05/10/2024: Patient returns for follow-up as above.  She has continued to reside in an SNF.  She is present with a team member from the facility.  She has been compliant with nonweightbearing restrictions in a short leg fiberglass cast.  She continues on aspirin and clopidogrel.  She has been without fevers, chills or night sweats.    05/20/2024: Patient returns for follow-up as above.  She continues to reside in a SNF.  She was seen in wound care last week, there was concern for infection by review of the documentation,  However no follow-up was scheduled.  Patient now following with wound care at her facility.  She has had no fevers, chills or night sweats.  She is reporting severe pain in her leg.  She has been compliant with tobacco and alcohol cessation while in the facility.  She is here with a representative from the facility.    05/31/2024: Patient returns for follow-up as above.  She continues to she has been receiving weekly wound care in her facility including Santyl.  Reside in an SNF.  As per my telephone encounter from last week, she has had some elevation of her inflammatory markers and has been on p.o. antibiotics.   She has not had any increased drainage, fevers, chills or night sweats.  She does appear more anxious in evaluation today.    06/14/2024: Patient returns for follow-up as above.  She continues with local wound care in her facility.  She continues to reside in a SNF.  She reports severe pain and has been compliant with weightbearing restrictions.  She remains quite appreciative for my participation in her care.    07/26/2024: Patient returns for follow-up as above.  She continues to reside in her facility.  She has missed a few visits due to transportation issues and unfortunately, her  passed away.  She is managing relatively well despite this.  She had a fall yesterday directly onto her amputation site, there was no drainage or significant increase in pain.  She is reporting overall 5 out of 10 pain that is improving.  She denies new fevers, chills or night sweats.  She has smoked 1/2 cigarette the day following her 's passing but has abstained from alcohol use.  She has been discharged from wound care in the facility.    10/25/2024: Patient returns for follow-up as above.  She is present with both of her sisters today.  She has primarily moved to the [facility] and much closer proximity to her sisters.  She has continued with both alcohol and tobacco cessation.  She appears in good spirits and is looking forward to beginning prosthetic wear.  She has not had any recurrent drainage or problems from her stump site.     Past Medical History  Past Medical History:   Diagnosis Date    Cognitive disorder     COPD (chronic obstructive pulmonary disease) (Multi)     Depression     Hypertension     Substance addiction (Multi)        Surgical History  Recent Surgeries in Orthopaedic Surgery            No cases to display             Social History  Social History     Socioeconomic History    Marital status:    Tobacco Use    Smoking status: Every Day     Current packs/day: 0.50     Average packs/day:  0.5 packs/day for 0.5 years (0.3 ttl pk-yrs)     Types: Cigarettes     Start date: 4/13/2024    Smokeless tobacco: Current     Social Drivers of Health     Financial Resource Strain: Low Risk  (4/16/2024)    Overall Financial Resource Strain (CARDIA)     Difficulty of Paying Living Expenses: Not very hard   Transportation Needs: No Transportation Needs (4/16/2024)    PRAPARE - Transportation     Lack of Transportation (Medical): No     Lack of Transportation (Non-Medical): No   Housing Stability: Low Risk  (4/16/2024)    Housing Stability Vital Sign     Unable to Pay for Housing in the Last Year: No     Number of Places Lived in the Last Year: 1     Unstable Housing in the Last Year: No   Recent Concern: Housing Stability - High Risk (4/14/2024)    Housing Stability Vital Sign     Unable to Pay for Housing in the Last Year: Yes     Number of Places Lived in the Last Year: 1     Unstable Housing in the Last Year: No       Family History  Family History   Problem Relation Name Age of Onset    Hypertension Mother      Heart attack Father          Allergies  Allergies   Allergen Reactions    Penicillin Unknown    Penicillins Hives       Review of Systems  REVIEW OF SYSTEMS  Constitutional: no unplanned weight loss  Psychiatric: no suicidal ideation  ENT: no vision changes, no sinus problems  Pulmonary: no shortness of breath  Lymphatic: no enlarged lymph nodes  Cardiovascular: no chest pain or shortness of breath  Gastrointestinal: no stomach problems  Genitourinary: no dysuria   Skin: no rashes  Endocrine: no thyroid problems  Neurological: no headache, no numbness  Hematological: no easy bruising  Musculoskeletal: Left BKA stump pain     Physical Exam  PHYSICAL EXAMINATION  Constitutional Exam: Patient does not appear her physiologic age, she is frail appearing  Psychiatric Exam: alert and oriented, appropriate mood and behavior  Eye Exam: EOMI  Pulmonary Exam: breathing non-labored, no apparent distress  Lymphatic  exam: no appreciable lymphadenopathy in the lower extremities  Cardiovascular exam: RRR to peripheral palpation, DP pulses 2+, PT 2+, toes are pink with good capillary refill, no pitting edema  Skin exam: no open lesions, rashes, abrasions or ulcerations  Neurological exam: sensation to light touch intact in both lower extremities in peripheral and dermatomal distributions (except for any abnormalities noted in musculoskeletal exam)    Musculoskeletal exam: Left lower extremity examination.  BKA stump site well-healed and well-appearing.  There is no progressive flap necrosis. Patient otherwise has sensation intact light touch at the distal stump.  She is able to appropriately flex and extend her knee.  The remainder of her extremity remains warm and well-perfused.     Last Recorded Vitals  There were no vitals taken for this visit.    Laboratory Results  No results found for this or any previous visit (from the past 24 hours).     Radiology Results  X-ray imaging 2 view left tib-fib reviewed from 10/25/2024 and independently evaluated by me demonstrates sequela of prior BKA without significant distal periosteal reaction or significant tibial or fibular bossing.    Assessment/Plan:  68 y/o F with a past medical history of alcohol and tobacco abuse s/p L Ankle Bi-malleolar ankle fracture ORIF with syndesmotic stabilization with Dr. Frederick from 11/20/2022 with hardware failure, draining wounds concerning for OM s/p L BKA from 04/22/2024 and L EIA stent with interventional cardiology form 04/25/2024 who presents with clinical and radiographic evidence of healing with continued alcohol and tobacco cessation.  I will provide the patient with a referral for prosthetic wear as well as physical therapy.  Based on the appearance of her stump site she would be unlikely to benefit from further use of a  but if required I would be more than happy to provide her with a separate prescription.  I will also initiate  gabapentin nightly for her phantom limb pain and provide her with a referral to pain management.  I would plan to see the patient back at her 1 year follow-up, I have encouraged the patient and her family to contact the office if she were to develop any new pain or have further questions.  Upon return, patient would not require further imaging.    Flako Guerra MD, ALEJA  Department of Orthopaedic Surgery  [facility]    The diagnosis and treatment plan were reviewed with the patient. All questions were answered. The patient verbalized understanding of the treatment plan. There were no barriers to understanding identified.    Note dictated with Cals[employer] software.  Completed without full type editing and sent to avoid delay.

## 2025-02-19 ENCOUNTER — TELEPHONE (OUTPATIENT)
Dept: ORTHOPEDIC SURGERY | Facility: HOSPITAL | Age: 70
End: 2025-02-19
Payer: COMMERCIAL

## 2025-02-19 DIAGNOSIS — Z89.512 HX OF BKA, LEFT (MULTI): Primary | ICD-10-CM

## 2025-02-19 NOTE — TELEPHONE ENCOUNTER
Copied from CRM #[id number]. Topic: Information Request - Doctor, Hospital, or Provider  >> Feb 19, 2025 11:16 AM Caleb SEGUNDO wrote:  Pt called in requesting an order for a prosthetic.

## 2025-02-20 NOTE — PROGRESS NOTES
Order for prosthetic placed as requested.    Flako Guerra MD, ALEJA  Department of Orthopaedic Surgery  [facility]

## 2025-04-10 NOTE — LETTER
Patient: Rose Marie Tousasint  : 1955    Encounter Date: 2024    Patient ID: Rose Marie Toussaint is a 68 y.o. female who is acute skilled care being seen and evaluated for multiple medical problems.  [id number]   1955    /73   Pulse 74   Temp 36.7 °C (98.1 °F)   Resp 16   Wt 51 kg (112 lb 6.4 oz)   SpO2 98%   BMI 21.95 kg/m²     Assessment/Plan  Problem List Items Addressed This Visit       Chronic obstructive pulmonary disease (Multi)     Albuterol/Budesonide 90/80 mcg two inhalations QID PRN          Hyperlipidemia - Primary     Pravastatin 20 mg by mouth every HS   Lipid panel x 1         Hypertension     Atenolol 25 mg by mouth daily   Chlorthalidone 25 mg by mouth daily   Cardizem 240 mg by mouth daily   BMP x 1         Depression     Bupropion 100 mg by mouth BID    Sertraline 200 mg by mouth daily         Physical deconditioning     PT/OT         PAD (peripheral artery disease) (CMS-HCC)     Plavix 75 mg by mouth daily   ASA 81 mg by mouth BID  2024 LLE Angiogram, Stenting left external iliac artery  Dr Steve follow up            Constipation     MiraLax 17 grams by mouth daily   Colace 100 mg by mouth BID as needed          Insomnia     Melatonin 5 mg by mouth every HS as needed          Hypomagnesemia     2024 Mg 1.4  Magnesium level x 1         Hx of BKA, left (Multi)     2024 Left BKA  2024 BUN/Creat 12/1.01, Potassium 3.6, H/H 10.6/31.6  BMP and CBC with diff x 1  Dr Guerra          Pain     Acetaminophen 650 mg by mouth every 6 hrs as needed  Roxicodone 5 mg by mouth every 6 hrs as needed          Anxiety     Buspirone 30 mg by mouth BID         H/O ETOH abuse     MVI one tablet by mouth daily   Folic acid 1 mg by mouth daily          Hypokalemia     Potassium 20 meq by mouth daily               HPI: Client was admitted at [facility] from 2024- 2024 with the final diagnosis of Osteomyelitis and Left BKA. She was transferred from [facility] 2024-  4/16/2024 with the diagnosis of Septic Arthritis, left ankle hardware infection, cellulitis, hyponatremia, hypokalemia, and hypomagnesemia. On 4/22/2024 she had a left BKA. On 4/25/2024 she had a LLE angiogram with stenting of left external iliac artery. Client denies HA, dizziness, or lightheadedness. Denies CP, SOB, Cough, or increased edema. Denies abdominal pain, N/V, diarrhea, or constipation. Denies dysuria. Denies change in appetite. She C/O LLE pain.      Review of Systems ROS as described in HPI     Physical Exam  Constitutional:       Comments: Sitting in WC   HENT:      Mouth/Throat:      Mouth: Mucous membranes are moist.   Cardiovascular:      Rate and Rhythm: Normal rate.   Pulmonary:      Effort: Pulmonary effort is normal.      Breath sounds: Normal breath sounds.      Comments: RA  Abdominal:      General: Bowel sounds are normal.   Genitourinary:     Comments: Denies dysuria  Musculoskeletal:      Comments: Left BKA  PT/OT   Skin:     General: Skin is warm and dry.   Neurological:      Mental Status: She is alert and oriented to person, place, and time.   Psychiatric:         Mood and Affect: Mood normal.      Comments: Conversational                    Electronically Signed By: SARAHI Nelson   5/1/24 11:28 AM   [Telephone (audio) - Individual/Group] : This telephonic visit was provided via audio only technology. [Patient preference] : Patient preference. [Medical Office: ([facility])___] : The provider was located at the medical office in [unfilled]. [Home] : The patient, [unfilled], was located at home, [unfilled], at the time of the visit. [Verbal consent obtained from patient/other participant(s)] : Verbal consent for telehealth/telephonic services obtained from patient/other participant(s) [Patient] : Patient [Follow-Up Visit] : a follow-up visit [Other: _____] : [unfilled] [FreeTextEntry1] : medication management

## 2025-04-25 ENCOUNTER — APPOINTMENT (OUTPATIENT)
Dept: ORTHOPEDIC SURGERY | Facility: CLINIC | Age: 70
End: 2025-04-25
Payer: COMMERCIAL

## 2025-05-16 ENCOUNTER — APPOINTMENT (OUTPATIENT)
Dept: ORTHOPEDIC SURGERY | Facility: CLINIC | Age: 70
End: 2025-05-16
Payer: MEDICARE

## 2025-06-18 ENCOUNTER — HOSPITAL ENCOUNTER (OUTPATIENT)
Dept: RADIOLOGY | Facility: HOSPITAL | Age: 70
Discharge: HOME | End: 2025-06-18
Payer: MEDICARE

## 2025-06-18 ENCOUNTER — APPOINTMENT (OUTPATIENT)
Dept: ORTHOPEDIC SURGERY | Facility: CLINIC | Age: 70
End: 2025-06-18
Payer: MEDICARE

## 2025-06-18 DIAGNOSIS — Z89.512 HX OF BKA, LEFT (MULTI): ICD-10-CM

## 2025-06-18 PROCEDURE — 99213 OFFICE O/P EST LOW 20 MIN: CPT | Performed by: STUDENT IN AN ORGANIZED HEALTH CARE EDUCATION/TRAINING PROGRAM

## 2025-06-18 PROCEDURE — 1125F AMNT PAIN NOTED PAIN PRSNT: CPT | Performed by: STUDENT IN AN ORGANIZED HEALTH CARE EDUCATION/TRAINING PROGRAM

## 2025-06-18 PROCEDURE — 73562 X-RAY EXAM OF KNEE 3: CPT | Mod: LT

## 2025-06-18 PROCEDURE — 73562 X-RAY EXAM OF KNEE 3: CPT | Mod: LEFT SIDE | Performed by: RADIOLOGY

## 2025-06-18 ASSESSMENT — PAIN SCALES - GENERAL: PAINLEVEL_OUTOF10: 3

## 2025-06-18 ASSESSMENT — PAIN DESCRIPTION - DESCRIPTORS: DESCRIPTORS: ACHING;SORE

## 2025-06-18 ASSESSMENT — PAIN - FUNCTIONAL ASSESSMENT: PAIN_FUNCTIONAL_ASSESSMENT: 0-10

## 2025-06-18 NOTE — PROGRESS NOTES
ORTHOPAEDIC SURGERY OUTPATIENT PROGRESS NOTE    Chief Complaint:  Left bka wound check    History Of Present Illness  Rose Marie Toussaint is a 69 y.o. female with a past medical history of alcohol and tobacco abuse s/p L Ankle Bi-malleolar ankle fracture ORIF with syndesmotic stabilization with Dr. Frederick from 11/20/2022 with hardware failure, draining wounds concerning for OM s/p L BKA from 04/22/2024 and L EIA stent with interventional cardiology form 04/25/2024 well known to me at this time who presents for wound check.  Patient reports 8 out of 10 pain in her leg.  She has been residing in an SNF.  Patient reports that she has been compliant with tobacco and alcohol cessation at this time.  She has been compliant with nonweightbearing restrictions.  She is on aspirin and Plavix.    05/10/2024: Patient returns for follow-up as above.  She has continued to reside in an SNF.  She is present with a team member from the facility.  She has been compliant with nonweightbearing restrictions in a short leg fiberglass cast.  She continues on aspirin and clopidogrel.  She has been without fevers, chills or night sweats.    05/20/2024: Patient returns for follow-up as above.  She continues to reside in a SNF.  She was seen in wound care last week, there was concern for infection by review of the documentation,  However no follow-up was scheduled.  Patient now following with wound care at her facility.  She has had no fevers, chills or night sweats.  She is reporting severe pain in her leg.  She has been compliant with tobacco and alcohol cessation while in the facility.  She is here with a representative from the facility.    05/31/2024: Patient returns for follow-up as above.  She continues to she has been receiving weekly wound care in her facility including Santyl.  Reside in an SNF.  As per my telephone encounter from last week, she has had some elevation of her inflammatory markers and has been on p.o. antibiotics.   She has not had any increased drainage, fevers, chills or night sweats.  She does appear more anxious in evaluation today.    06/14/2024: Patient returns for follow-up as above.  She continues with local wound care in her facility.  She continues to reside in a SNF.  She reports severe pain and has been compliant with weightbearing restrictions.  She remains quite appreciative for my participation in her care.    07/26/2024: Patient returns for follow-up as above.  She continues to reside in her facility.  She has missed a few visits due to transportation issues and unfortunately, her  passed away.  She is managing relatively well despite this.  She had a fall yesterday directly onto her amputation site, there was no drainage or significant increase in pain.  She is reporting overall 5 out of 10 pain that is improving.  She denies new fevers, chills or night sweats.  She has smoked 1/2 cigarette the day following her 's passing but has abstained from alcohol use.  She has been discharged from wound care in the facility.    10/25/2024: Patient returns for follow-up as above.  She is present with both of her sisters today.  She has primarily moved to the [facility] and much closer proximity to her sisters.  She has continued with both alcohol and tobacco cessation.  She appears in good spirits and is looking forward to beginning prosthetic wear.  She has not had any recurrent drainage or problems from her stump site.    06/18/2025: Patient returns for follow-up of above.  She is reporting continued 3 out of 10 pain associated with phantom limb pain.  She recently received her prosthetic.  She is now ambulating with use of a prosthetic.  She has experienced new and worsening pain at the distal aspect of her stump, this worse with stump wear.     Past Medical History  Past Medical History:   Diagnosis Date    Cognitive disorder     COPD (chronic obstructive pulmonary disease) (Multi)     Depression      Hypertension     Substance addiction (Multi)        Surgical History  Recent Surgeries in Orthopaedic Surgery            No cases to display             Social History  Social History     Socioeconomic History    Marital status:    Tobacco Use    Smoking status: Every Day     Current packs/day: 0.50     Average packs/day: 0.5 packs/day for 1.2 years (0.6 ttl pk-yrs)     Types: Cigarettes     Start date: 4/13/2024    Smokeless tobacco: Current     Social Drivers of Health     Financial Resource Strain: Low Risk  (4/16/2024)    Overall Financial Resource Strain (CARDIA)     Difficulty of Paying Living Expenses: Not very hard   Transportation Needs: No Transportation Needs (4/16/2024)    PRAPARE - Transportation     Lack of Transportation (Medical): No     Lack of Transportation (Non-Medical): No   Housing Stability: Low Risk  (4/16/2024)    Housing Stability Vital Sign     Unable to Pay for Housing in the Last Year: No     Number of Places Lived in the Last Year: 1     Unstable Housing in the Last Year: No   Recent Concern: Housing Stability - High Risk (4/14/2024)    Housing Stability Vital Sign     Unable to Pay for Housing in the Last Year: Yes     Number of Places Lived in the Last Year: 1     Unstable Housing in the Last Year: No       Family History  Family History   Problem Relation Name Age of Onset    Hypertension Mother      Heart attack Father          Allergies  Allergies   Allergen Reactions    Penicillin Unknown    Penicillins Hives       Review of Systems  REVIEW OF SYSTEMS  Constitutional: no unplanned weight loss  Psychiatric: no suicidal ideation  ENT: no vision changes, no sinus problems  Pulmonary: no shortness of breath  Lymphatic: no enlarged lymph nodes  Cardiovascular: no chest pain or shortness of breath  Gastrointestinal: no stomach problems  Genitourinary: no dysuria   Skin: no rashes  Endocrine: no thyroid problems  Neurological: no headache, no numbness  Hematological: no easy  bruising  Musculoskeletal: Left BKA stump pain     Physical Exam  PHYSICAL EXAMINATION  Constitutional Exam: Patient does not appear her physiologic age, she is frail appearing  Psychiatric Exam: alert and oriented, appropriate mood and behavior  Eye Exam: EOMI  Pulmonary Exam: breathing non-labored, no apparent distress  Lymphatic exam: no appreciable lymphadenopathy in the lower extremities  Cardiovascular exam: RRR to peripheral palpation, DP pulses 2+, PT 2+, toes are pink with good capillary refill, no pitting edema  Skin exam: no open lesions, rashes, abrasions or ulcerations  Neurological exam: sensation to light touch intact in both lower extremities in peripheral and dermatomal distributions (except for any abnormalities noted in musculoskeletal exam)    Musculoskeletal exam: Left lower extremity examination.  BKA stump site well-healed and well-appearing.  Mild tenderness to palpation at the distal aspect of the tibia, soft tissue coverage limited in this region.  Patient otherwise has sensation intact light touch at the distal stump.  She is able to appropriately flex and extend her knee.  The remainder of her extremity remains warm and well-perfused.     Last Recorded Vitals  There were no vitals taken for this visit.    Laboratory Results  No results found for this or any previous visit (from the past 24 hours).     Radiology Results  X-ray imaging 2 view left tib-fib reviewed from 06/18/2025 and independently evaluated by me demonstrates sequela of prior BKA, no significant periosteal reaction    Assessment/Plan:  70 y/o F with a past medical history of alcohol and tobacco abuse s/p L Ankle Bi-malleolar ankle fracture ORIF with syndesmotic stabilization with Dr. Frederick from 11/20/2022 with hardware failure, draining wounds concerning for OM s/p L BKA from 04/22/2024 and L EIA stent with interventional cardiology form 04/25/2024 who presents with clinical and radiographic evidence of healing with  continued alcohol and tobacco cessation.  I would recommend the patient continue weightbearing to her tolerance in her left lower extremity utilizing her prosthetic.  I discussed continued prosthetic modification in order to avoid undue stress on the soft tissue envelope.  I will provide her with a referral to pain management for her ongoing phantom limb pain.  I discussed that certainly consideration could be made for revision amputation in order to achieve more soft tissue coverage and makes prosthetic wear more tolerable.  I would plan to see the patient back no later than 1 year for repeat clinical evaluation.  Patient and family members expressed gratitude for the care received.  Upon return patient require three-view nonweightbearing left knee.    Flako Guerra MD, ALEJA  Department of Orthopaedic Surgery  [facility]    The diagnosis and treatment plan were reviewed with the patient. All questions were answered. The patient verbalized understanding of the treatment plan. There were no barriers to understanding identified.    Note dictated with [employer] software.  Completed without full type editing and sent to avoid delay.

## (undated) DEVICE — CUFF, TOURNIQUET 24 DUAL PORT/SNGL BLAD"

## (undated) DEVICE — INFLATION KIT, ADVANTAGE, ENCORE 26 (1/BOX)

## (undated) DEVICE — BLADE, SAW PFC DUAL CUT 25 X 90

## (undated) DEVICE — SHEATH, PINNACLE, 10 CM,  6FR INTRODUCER, 6FR DIA, 2.5 CM DIALATOR

## (undated) DEVICE — STAPLER, SKIN PROXIMATE, 35 WIDE

## (undated) DEVICE — TIP, SUCTION, YANKAUER, BULB, ADULT

## (undated) DEVICE — SUTURE, SILK, 2-0, FSL, BR, 30 IN, BLACK

## (undated) DEVICE — BANDAGE, GAUZE, CONFORMING, KERLIX, 6 PLY, 4.5 IN X 4.1 YD

## (undated) DEVICE — CATHETER, OPTICROSS 6HD, 3.6F X 135CM

## (undated) DEVICE — Device

## (undated) DEVICE — DRESSING, GAUZE, PETROLATUM, XEROFORM, 5 X 9 IN, STERILE

## (undated) DEVICE — SUTURE, VICRYL, 0, 36 IN, CT-1, UNDYED

## (undated) DEVICE — EVACUATOR, WOUND, CLOSED, 3 SPRING, 400 CC, Y CONNECTING TUBE

## (undated) DEVICE — CATHETER, PRELUDE ROADSTER 6FR, 45CM, STRAIGHT

## (undated) DEVICE — ELECTRODE, ELECTROSURGICAL, BLADE, STANDARD, 2.75 IN

## (undated) DEVICE — ACCESS KIT, S-MAK MINI, 4FR 10CM 0.018IN 40CM, NT/PT, ECHO ENHANCE NEEDLE

## (undated) DEVICE — CLOSURE SYSTEM, VASCULAR, VASCADE 6/7F VCS

## (undated) DEVICE — CAUTERY, PENCIL, PUSH BUTTON, SMOKE EVAC, 70MM

## (undated) DEVICE — SUTURE, ETHILON, 3-0, 30 IN, FS-1, BLACK

## (undated) DEVICE — SOLUTION, IRRIGATION, SODIUM CHLORIDE 0.9%, 1000 ML, POUR BOTTLE

## (undated) DEVICE — DRAPE, TIBURON, SPLIT SHEET, REINF ADH STRIP, 77X122

## (undated) DEVICE — CATHETER, ANGIO, IMPULSE, IM, 5 FR X 100 CM

## (undated) DEVICE — GLOVE, SURGICAL, PROTEXIS PI MICRO, 7.5, PF, LF

## (undated) DEVICE — GUIDEWIRE, SPARTA CORE,  .014 X 300, 5CM

## (undated) DEVICE — GLOVE, SURGICAL, PROTEXIS PI BLUE W/NEUTHERA, 7.5, PF, LF

## (undated) DEVICE — DRAIN, WOUND, ROUND, W/TROCAR, HOLE PATTERN, 10 IN, MEDIUM/LARGE, 3/16 X 49 IN

## (undated) DEVICE — BANDAGE, COFLEX, 6 X 5 YDS, FOAM TAN, STERILE, LF

## (undated) DEVICE — CATHETER, ARMADA 35 PTA, 7MM X 40MM X 135CM

## (undated) DEVICE — SHEATH, PINNACLE, 10 CM,  5FR INTRODUCER, 5FR DIA, 2.5 CM DIALATOR

## (undated) DEVICE — GUIDEWIRE, HI-TORQUE, VERSACORE, 260CM, FLOPPY

## (undated) DEVICE — IRRIGATION SET, CYSTOSCOPY, TURP, Y, CONTINUOUS, 81 IN